# Patient Record
Sex: FEMALE | Race: BLACK OR AFRICAN AMERICAN | Employment: OTHER | URBAN - METROPOLITAN AREA
[De-identification: names, ages, dates, MRNs, and addresses within clinical notes are randomized per-mention and may not be internally consistent; named-entity substitution may affect disease eponyms.]

---

## 2023-04-23 LAB
HCV PCR QUANT: NOT DETECTED IU/ML
HCV RNA, PCR LOG: NORMAL LOG10 IU/ML

## 2023-10-27 ENCOUNTER — TELEMEDICINE CLINICAL SUPPORT (OUTPATIENT)
Dept: PREADMISSION TESTING | Facility: HOSPITAL | Age: 63
End: 2023-10-27
Payer: COMMERCIAL

## 2023-10-27 PROBLEM — K43.9 VENTRAL HERNIA: Status: ACTIVE | Noted: 2023-10-27

## 2023-10-27 PROBLEM — F17.200 NICOTINE USE DISORDER: Status: ACTIVE | Noted: 2023-06-01

## 2023-10-27 PROBLEM — R21 RASH AND OTHER NONSPECIFIC SKIN ERUPTION: Status: ACTIVE | Noted: 2022-02-01

## 2023-10-27 PROBLEM — B18.2 CHRONIC HEPATITIS C WITHOUT HEPATIC COMA (MULTI): Status: ACTIVE | Noted: 2023-10-27

## 2023-10-27 PROBLEM — I25.110 CORONARY ARTERY DISEASE INVOLVING NATIVE CORONARY ARTERY OF NATIVE HEART WITH UNSTABLE ANGINA PECTORIS (MULTI): Status: ACTIVE | Noted: 2023-05-31

## 2023-10-27 PROBLEM — I21.4 NSTEMI (NON-ST ELEVATED MYOCARDIAL INFARCTION) (MULTI): Status: ACTIVE | Noted: 2023-05-30

## 2023-10-27 RX ORDER — CARVEDILOL 6.25 MG/1
6.25 TABLET ORAL 2 TIMES DAILY
COMMUNITY
Start: 2023-07-10 | End: 2023-10-27 | Stop reason: WASHOUT

## 2023-10-27 RX ORDER — TRIAMCINOLONE ACETONIDE 1 MG/G
1 CREAM TOPICAL
COMMUNITY
Start: 2022-02-01 | End: 2023-10-27 | Stop reason: WASHOUT

## 2023-10-27 RX ORDER — NITROGLYCERIN 0.4 MG/1
0.4 TABLET SUBLINGUAL
COMMUNITY
Start: 2023-06-03

## 2023-10-27 RX ORDER — ATORVASTATIN CALCIUM 40 MG/1
40 TABLET, FILM COATED ORAL NIGHTLY
Status: ON HOLD | COMMUNITY
Start: 2023-04-05 | End: 2023-12-08 | Stop reason: WASHOUT

## 2023-10-27 RX ORDER — LISINOPRIL 40 MG/1
40 TABLET ORAL DAILY
COMMUNITY

## 2023-10-27 RX ORDER — ASPIRIN 81 MG/1
81 TABLET ORAL DAILY
COMMUNITY

## 2023-10-27 RX ORDER — EZETIMIBE 10 MG/1
10 TABLET ORAL DAILY
COMMUNITY
Start: 2023-06-04

## 2023-10-27 RX ORDER — PREGABALIN 75 MG/1
75 CAPSULE ORAL 3 TIMES DAILY
COMMUNITY

## 2023-10-27 RX ORDER — CLOBETASOL PROPIONATE 0.5 MG/G
1 OINTMENT TOPICAL
COMMUNITY
Start: 2022-02-01 | End: 2023-10-27 | Stop reason: WASHOUT

## 2023-10-27 RX ORDER — HYDROXYZINE HYDROCHLORIDE 25 MG/1
1 TABLET, FILM COATED ORAL 3 TIMES DAILY PRN
COMMUNITY

## 2023-10-27 RX ORDER — FAMOTIDINE 20 MG/1
20 TABLET, FILM COATED ORAL 2 TIMES DAILY
COMMUNITY
Start: 2023-07-05 | End: 2024-03-01 | Stop reason: WASHOUT

## 2023-10-27 RX ORDER — EVOLOCUMAB 140 MG/ML
1 INJECTION, SOLUTION SUBCUTANEOUS
COMMUNITY
Start: 2023-10-18 | End: 2024-01-10 | Stop reason: ALTCHOICE

## 2023-10-27 RX ORDER — USTEKINUMAB 90 MG/ML
90 INJECTION, SOLUTION SUBCUTANEOUS
COMMUNITY

## 2023-10-27 RX ORDER — ROSUVASTATIN CALCIUM 40 MG/1
1 TABLET, COATED ORAL NIGHTLY
COMMUNITY
Start: 2023-06-03

## 2023-10-27 RX ORDER — NAPROXEN 250 MG/1
250 TABLET ORAL AS NEEDED
COMMUNITY
End: 2023-12-19 | Stop reason: WASHOUT

## 2023-10-27 RX ORDER — VITAMIN E MIXED 400 UNIT
400 CAPSULE ORAL DAILY
COMMUNITY

## 2023-10-27 RX ORDER — HYDROCHLOROTHIAZIDE 25 MG/1
25 TABLET ORAL DAILY
COMMUNITY

## 2023-10-27 RX ORDER — CARVEDILOL 12.5 MG/1
25 TABLET ORAL 2 TIMES DAILY
COMMUNITY
Start: 2023-08-07

## 2023-10-27 RX ORDER — CLOPIDOGREL BISULFATE 75 MG/1
75 TABLET ORAL DAILY
COMMUNITY

## 2023-10-27 RX ORDER — METOPROLOL SUCCINATE 50 MG/1
50 TABLET, EXTENDED RELEASE ORAL DAILY
COMMUNITY
Start: 2023-05-09 | End: 2023-10-27 | Stop reason: WASHOUT

## 2023-11-01 ENCOUNTER — LAB (OUTPATIENT)
Dept: LAB | Facility: LAB | Age: 63
End: 2023-11-01
Payer: COMMERCIAL

## 2023-11-01 ENCOUNTER — PRE-ADMISSION TESTING (OUTPATIENT)
Dept: PREADMISSION TESTING | Facility: HOSPITAL | Age: 63
End: 2023-11-01
Payer: COMMERCIAL

## 2023-11-01 VITALS
BODY MASS INDEX: 27.05 KG/M2 | OXYGEN SATURATION: 98 % | WEIGHT: 137.79 LBS | HEIGHT: 60 IN | SYSTOLIC BLOOD PRESSURE: 156 MMHG | TEMPERATURE: 97 F | HEART RATE: 81 BPM | DIASTOLIC BLOOD PRESSURE: 86 MMHG | RESPIRATION RATE: 18 BRPM

## 2023-11-01 DIAGNOSIS — I25.110 CORONARY ARTERY DISEASE INVOLVING NATIVE CORONARY ARTERY OF NATIVE HEART WITH UNSTABLE ANGINA PECTORIS (MULTI): ICD-10-CM

## 2023-11-01 DIAGNOSIS — I25.110 CORONARY ARTERY DISEASE INVOLVING NATIVE CORONARY ARTERY OF NATIVE HEART WITH UNSTABLE ANGINA PECTORIS (MULTI): Primary | ICD-10-CM

## 2023-11-01 LAB
ABO GROUP (TYPE) IN BLOOD: NORMAL
ANION GAP SERPL CALC-SCNC: 9 MMOL/L (ref 10–20)
ANTIBODY SCREEN: NORMAL
BASOPHILS # BLD AUTO: 0.04 X10*3/UL (ref 0–0.1)
BASOPHILS NFR BLD AUTO: 0.5 %
BUN SERPL-MCNC: 8 MG/DL (ref 6–23)
CALCIUM SERPL-MCNC: 8.8 MG/DL (ref 8.6–10.3)
CHLORIDE SERPL-SCNC: 105 MMOL/L (ref 98–107)
CO2 SERPL-SCNC: 27 MMOL/L (ref 21–32)
CREAT SERPL-MCNC: 0.8 MG/DL (ref 0.5–1.05)
EOSINOPHIL # BLD AUTO: 0.13 X10*3/UL (ref 0–0.7)
EOSINOPHIL NFR BLD AUTO: 1.7 %
ERYTHROCYTE [DISTWIDTH] IN BLOOD BY AUTOMATED COUNT: 13.4 % (ref 11.5–14.5)
GFR SERPL CREATININE-BSD FRML MDRD: 82 ML/MIN/1.73M*2
GLUCOSE SERPL-MCNC: 84 MG/DL (ref 74–99)
HCT VFR BLD AUTO: 46.4 % (ref 36–46)
HGB BLD-MCNC: 15 G/DL (ref 12–16)
IMM GRANULOCYTES # BLD AUTO: 0.02 X10*3/UL (ref 0–0.7)
IMM GRANULOCYTES NFR BLD AUTO: 0.3 % (ref 0–0.9)
LYMPHOCYTES # BLD AUTO: 1.65 X10*3/UL (ref 1.2–4.8)
LYMPHOCYTES NFR BLD AUTO: 22 %
MCH RBC QN AUTO: 28.6 PG (ref 26–34)
MCHC RBC AUTO-ENTMCNC: 32.3 G/DL (ref 32–36)
MCV RBC AUTO: 89 FL (ref 80–100)
MONOCYTES # BLD AUTO: 0.44 X10*3/UL (ref 0.1–1)
MONOCYTES NFR BLD AUTO: 5.9 %
NEUTROPHILS # BLD AUTO: 5.21 X10*3/UL (ref 1.2–7.7)
NEUTROPHILS NFR BLD AUTO: 69.6 %
NRBC BLD-RTO: 0 /100 WBCS (ref 0–0)
PLATELET # BLD AUTO: 283 X10*3/UL (ref 150–450)
POTASSIUM SERPL-SCNC: 4.4 MMOL/L (ref 3.5–5.3)
RBC # BLD AUTO: 5.24 X10*6/UL (ref 4–5.2)
RH FACTOR (ANTIGEN D): NORMAL
SODIUM SERPL-SCNC: 137 MMOL/L (ref 136–145)
WBC # BLD AUTO: 7.5 X10*3/UL (ref 4.4–11.3)

## 2023-11-01 PROCEDURE — 86901 BLOOD TYPING SEROLOGIC RH(D): CPT

## 2023-11-01 PROCEDURE — 86900 BLOOD TYPING SEROLOGIC ABO: CPT

## 2023-11-01 PROCEDURE — 85025 COMPLETE CBC W/AUTO DIFF WBC: CPT

## 2023-11-01 PROCEDURE — 99204 OFFICE O/P NEW MOD 45 MIN: CPT | Performed by: NURSE PRACTITIONER

## 2023-11-01 PROCEDURE — 87081 CULTURE SCREEN ONLY: CPT | Mod: AHULAB | Performed by: NURSE PRACTITIONER

## 2023-11-01 PROCEDURE — 80048 BASIC METABOLIC PNL TOTAL CA: CPT

## 2023-11-01 PROCEDURE — 93005 ELECTROCARDIOGRAM TRACING: CPT | Performed by: NURSE PRACTITIONER

## 2023-11-01 PROCEDURE — 86850 RBC ANTIBODY SCREEN: CPT

## 2023-11-01 PROCEDURE — 36415 COLL VENOUS BLD VENIPUNCTURE: CPT

## 2023-11-01 RX ORDER — CHLORHEXIDINE GLUCONATE ORAL RINSE 1.2 MG/ML
SOLUTION DENTAL
Qty: 473 ML | Refills: 0 | Status: SHIPPED | OUTPATIENT
Start: 2023-11-01 | End: 2023-12-12 | Stop reason: HOSPADM

## 2023-11-01 NOTE — CPM/PAT H&P
CPM/PAT Evaluation       Name: Vivien Miramontes (Vivien Miramontes)  /Age: 1960/63 y.o.     In-Person       Date of Consult: 23 (original PAT visit date, surgery postponed to 23)    Referring Provider:  Dr. De Guzman    Surgery, Date, and Length:  23, robotic incisional hernia repair with mesh placement, 120 minutes    Patient presents to [facility] for perioperative risk assessment prior to scheduled surgery. Patient presents with recurrent incisional hernia repair that has become increasingly bothersome. She will be 6 months s/p cardiac stent placement and is able to come off Plavix per her cardiologist at [facility]. She was originally postponed for November hernia repair surgery as she was only 5 months post stent placement.     This note was created in part upon personal review of patient's medical records.    Pt denies any past history of anesthetic complications such as PONV, awareness, prolonged sedation, dental damage, aspiration, cardiac arrest, difficult intubation, difficult I.V. access or unexpected hospital admissions.  No history of malignant hyperthermia and or pseudocholinesterase deficiency.  No history of blood transfusions     The patient is not a Church and will accept blood and blood products if medically indicated.     Type and screen sent.    Past Medical History:   Diagnosis Date    Coronary artery disease     2012: cardiac cath with PCI, 23: cardiac cath with PCI x2 (LAD & LCx) intermediate disease of a tortuous RCA - cardiac clearance requested    Crohn's disease (CMS/HCC)     bowel perforation  s/p colostomy, reversed 2010    Depression     Hyperlipidemia     Hypertension     IBS (irritable bowel syndrome)     Lung nodules     nonconcerning per pulm note    Myocardial infarction (CMS/HCC)     Ulcerative colitis (CMS/HCC)        Past Surgical History:   Procedure Laterality Date    ABDOMINAL HERNIA REPAIR  2018    with mesh    CARDIAC CATHETERIZATION  2012    PCI     CARDIAC CATHETERIZATION  2023    PCI x 2 (LAD and LCx)     SECTION, LOW TRANSVERSE      COLONOSCOPY      COLOSTOMY      REVISION / TAKEDOWN COLOSTOMY      TUBAL LIGATION  1998    UMBILICAL HERNIA REPAIR      x 2     Family History   Problem Relation Name Age of Onset    Stroke Mother      Hypertension Mother      Heart attack Mother      Heart attack Father      Diabetes Father      Multiple sclerosis Sister      Breast cancer Sister      Diabetes Brother         Allergies   Allergen Reactions    Penicillins Hives       Current Outpatient Medications:     aspirin 81 mg EC tablet, Take 1 tablet (81 mg) by mouth once daily., Disp: , Rfl:     atorvastatin (Lipitor) 40 mg tablet, Take 1 tablet (40 mg) by mouth once daily at bedtime., Disp: , Rfl:     carvedilol (Coreg) 12.5 mg tablet, Take 1 tablet (12.5 mg) by mouth twice a day., Disp: , Rfl:     clopidogrel (Plavix) 75 mg tablet, Take 1 tablet (75 mg) by mouth once daily., Disp: , Rfl:     ezetimibe (Zetia) 10 mg tablet, Take 1 tablet (10 mg) by mouth once daily., Disp: , Rfl:     famotidine (Pepcid) 20 mg tablet, Take 1 tablet (20 mg) by mouth 2 times a day., Disp: , Rfl:     hydroCHLOROthiazide (HYDRODiuril) 25 mg tablet, Take 1 tablet (25 mg) by mouth once daily., Disp: , Rfl:     hydrOXYzine HCL (Atarax) 25 mg tablet, Take 1 tablet (25 mg) by mouth 3 times a day as needed for itching. May cause drowsiness, Disp: , Rfl:     lisinopril 40 mg tablet, Take 1 tablet (40 mg) by mouth once daily. as directed, Disp: , Rfl:     naproxen (Naprosyn) 250 mg tablet, Take 1 tablet (250 mg) by mouth if needed for mild pain (1 - 3)., Disp: , Rfl:     nitroglycerin (Nitrostat) 0.4 mg SL tablet, Place 1 tablet (0.4 mg) under the tongue. Dissolve 1 tablet under the tongue as needed for chest pain., Disp: , Rfl:     pregabalin (Lyrica) 75 mg capsule, Take 1 capsule (75 mg) by mouth if needed., Disp: , Rfl:     rosuvastatin (Crestor) 40 mg tablet, Take 1  tablet (40 mg) by mouth once daily at bedtime., Disp: , Rfl:     Stelara injection, Inject 1 mL (90 mg) under the skin every 8 (eight) weeks., Disp: , Rfl:     vitamin E 180 mg (400 unit) capsule, Take 1 capsule (400 Units) by mouth once daily., Disp: , Rfl:     chlorhexidine (Peridex) 0.12 % solution, SWISH AND SPIT 15ML FOR 30 SECONDS NIGHT PRIOR TO SURGERY AND MORNING OF SURGERY, Disp: 473 mL, Rfl: 0    evolocumab (Repatha Syringe) 140 mg/mL injection, Inject 1 mL (140 mg) under the skin every 14 (fourteen) days., Disp: , Rfl:       PAT ROS:   Constitutional:   neg    Neuro/Psych:   neg    Eyes:   neg    Ears:   neg    Nose:   neg    Mouth:   neg    Throat:   neg    Neck:   Cardio:   neg    Respiratory:   neg    Endocrine:   neg    GI:    abdominal pain  :   neg    Musculoskeletal:   neg    Hematologic:   neg    Skin:  neg        Physical Exam  Vitals reviewed. Physical exam within normal limits.          PAT AIRWAY:   Airway:     Mallampati::  II    Neck ROM::  Full      Visit Vitals  /86   Pulse 81   Temp 36.1 °C (97 °F)   Resp 18   Ht 1.524 m (5')   Wt 62.5 kg (137 lb 12.6 oz)   SpO2 98%   BMI 26.91 kg/m²   Smoking Status Every Day   BSA 1.63 m²       Assessment and Plan:     Patient is a 63 year old AA female scheduled for incisional hernia repair on 12/8/23.    Patient is at acceptable risk to proceed with planned surgical procedure. Further cardiac risk stratification deferred at this time.    Plan    Neuro:    Cardiovascular:    Patient denies any chest pain, tightness, heaviness, pressure, radiating pain, palpitations, irregular heartbeats, lightheadedness, cough, congestion, shortness of breath, TOBAR, PND, near syncope, weight loss or gain.    Good  functional capacity    EKG in PAT on 11/1/23:    Encounter Date: 11/01/23   ECG 12 Lead   Result Value    Ventricular Rate 68    Atrial Rate 68    NJ Interval 146    QRS Duration 96    QT Interval 432    QTC Calculation(Bazett) 459    P Axis 48    R  "Axis 0    T Axis 44    QRS Count 11    Q Onset 226    P Onset 153    P Offset 211    T Offset 442    QTC Fredericia 450    Narrative    Normal sinus rhythm  Possible Left atrial enlargement  Nonspecific ST and T wave abnormality  Abnormal ECG  When compared with ECG of 29-DEC-2017 00:20,  Questionable change in QRS axis  Confirmed by Judson Li ([id number]) on 11/2/2023 9:01:18 AM     RCRI: 1      CAD- followed by Dr. Christian at [facility].  S/p PCI June 2023. Now 6 months post placement. May come off Plavix for 5 days and  must remain on Aspirin per cardiologist note 11/1/23.    Per office note:    \"She was admitted in 06/2023 for NSTEMI. She had a LHC done showing multivessel disease however patient did want to undergo CABG evaluation. She underwent PCI of LAD and Lcx on 06/02/23 with a post procedural hematoma. She was discharged on ASA81 and Clopidogrel. Denies melena or BRBPR.      Since discharge she has no recurrence of her CP. She has been performing her iADLs at home without difficulty. She continues to follow with interventional cardiology\"    HTN- continue Carvedilol DOS, hold Lisinopril and hydrochlorothiazide DOS. BP controlled on current regimen.    HLD- continue statin    Pulm:  Known or suspected FAYE is considered an independent risk factor for difficult mask ventilation, difficult intubation or both.  Increased vigilance is recommended with the use of narcotics due to an increased risk for opioid induced respiratory depression.  The patient may benefit from continuous pulse oximetry to monitor for hypoxic events until baseline Sp02 is normal on room air.    Stop bang=3, obesity, HTN, age >50    Patient has history of nicotine dependency. Smoking cessation education was provided to the patient.Cessation encouraged. - Physiological and physical aspects of tobacco addiction as well as strategies for quitting were discussed. - Counseling was given focusing on the harmful effects of this addiction especially " given the patient's medical condition(s) which will be worsened because of the chemicals in tobacco    Renal/endo:  Recommendations to avoid nephrotoxic drugs and carefully monitor fluid status to maintain euvolemia. Use dose adjusted medications as needed for the underlying level of renal function.      GI/:    IBS- continue current regimen    Heme:  Patient instructed to ambulate as soon as possible postoperatively to decrease thromboembolic risk.    Initiate mechanical DVT prophylaxis as soon as possible and initiate chemical prophylaxis when deemed safe from a bleeding standpoint post surgery.    S/p PCI June 2023 at [facility]. May come off Plavix of surgery and will remain on Aspirin 81mg.    Caprini =4     Risk assessment complete.  Patient is scheduled for a intermediate surgical risk procedure. She is considered medically optimized for the planned procedure.    Labs/testing obtained in PAT on 11/1/23: CBC, BMP, T&S, MRSA.    Lab Results   Component Value Date    GLUCOSE 84 11/01/2023    CALCIUM 8.8 11/01/2023     11/01/2023    K 4.4 11/01/2023    CO2 27 11/01/2023     11/01/2023    BUN 8 11/01/2023    CREATININE 0.80 11/01/2023     Lab Results   Component Value Date    WBC 7.5 11/01/2023    HGB 15.0 11/01/2023    HCT 46.4 (H) 11/01/2023    MCV 89 11/01/2023     11/01/2023       Follow up: none    Preoperative medication instructions were provided and reviewed with the patient.  Any additional testing or evaluation was explained to the patient.  Nothing by mouth instructions were discussed and patient's questions were answered prior to conclusion to this encounter.  Patient verbalized understanding of preoperative instructions given in preadmission testing; discharge instructions available in EMR.    This note was dictated with speech recognition.  Minor errors may have been detected during use of speech recognition.

## 2023-11-01 NOTE — PREPROCEDURE INSTRUCTIONS
Medication List            Accurate as of November 1, 2023 11:22 AM. Always use your most recent med list.                aspirin 81 mg EC tablet  Notes to patient: Take morning of surgery     atorvastatin 40 mg tablet  Commonly known as: Lipitor  Medication Adjustments for Surgery: Stop 1 day before surgery     carvedilol 12.5 mg tablet  Commonly known as: Coreg  Notes to patient: Take morning of surgery     chlorhexidine 0.12 % solution  Commonly known as: Peridex  SWISH AND SPIT 15ML FOR 30 SECONDS NIGHT PRIOR TO SURGERY AND MORNING OF SURGERY  Notes to patient: Use night before surgery and morning of surgery     clopidogrel 75 mg tablet  Commonly known as: Plavix  Notes to patient: Stop 5 days prior to surgery. We will call you once we receive confirmation from Dr. Dukes.      ezetimibe 10 mg tablet  Commonly known as: Zetia  Medication Adjustments for Surgery: Stop 1 day before surgery     famotidine 20 mg tablet  Commonly known as: Pepcid  Notes to patient: May take morning of surgery     hydroCHLOROthiazide 25 mg tablet  Commonly known as: HYDRODiuril  Medication Adjustments for Surgery: Stop 1 day before surgery     hydrOXYzine HCL 25 mg tablet  Commonly known as: Atarax     lisinopril 40 mg tablet  Medication Adjustments for Surgery: Stop 1 day before surgery     naproxen 250 mg tablet  Commonly known as: Naprosyn  Medication Adjustments for Surgery: Stop 7 days before surgery     nitroglycerin 0.4 mg SL tablet  Commonly known as: Nitrostat  Notes to patient: May take morning of surgery     pregabalin 75 mg capsule  Commonly known as: Lyrica  Notes to patient: May take morning of surgery     Repatha Syringe 140 mg/mL injection  Generic drug: evolocumab  Medication Adjustments for Surgery: Stop 1 day before surgery     rosuvastatin 40 mg tablet  Commonly known as: Crestor  Medication Adjustments for Surgery: Stop 1 day before surgery     Stelara injection  Generic drug: ustekinumab  Medication  Adjustments for Surgery: Stop 1 day before surgery     vitamin E 180 mg (400 unit) capsule  Medication Adjustments for Surgery: Stop 7 days before surgery            CONTACT SURGEON'S OFFICE IF YOU DEVELOP:  * Fever = 100.4 F   * New respiratory symptoms (e.g. cough, shortness of breath, respiratory distress, sore throat)  * Recent loss of taste or smell  *Flu like symptoms such as headache, fatigue or gastrointestinal symptoms  * You develop any open sores, shingles, burning or painful urination   AND/OR:  * You no longer wish to have the surgery.  * Any other personal circumstances change that may lead to the need to cancel or defer this surgery.  *You were admitted to any hospital within one week of your planned procedure.    SMOKING:  *Quitting smoking can make a huge difference to your health and recovery from surgery.    *If you need help with quitting, call 7-726-QUIT-NOW.    THE DAY BEFORE SURGERY:  *Do not eat any food after midnight the night before surgery.   *You are permitted to drink clear liquids (i.e. water, black coffee, tea, clear broth, apple juice) up to 2 hours before your surgery.  DIABETICS:  Please check fasting blood sugar  upon waking up.  If fasting sugar is <80 mg/dl, please drink 100ml/3oz of apple juice no later than 2 hours prior to surgery.      SURGICAL TIME  *You will be contacted between 2 p.m. and 6 p.m. the business day before your surgery with your arrival time.  *If you haven't received a call by 6pm, call [phone].  *Scheduled surgery times may change and you will be notified if this occurs-check your personal voicemail for any updates.    ON THE MORNING OF SURGERY:  *Wear comfortable, loose fitting clothing.   *Do not use moisturizers, creams, lotions or perfume.  *All jewelry and valuables should be left at home.  *Prosthetic devices such as contact lenses, hearing aids, dentures, eyelash extensions, hairpins and body piercing must be removed before surgery.    BRING  WITH YOU:  *Photo ID and insurance card  *Current list of medicines and allergies  *Pacemaker/Defibrillator/Heart stent cards  *CPAP machine and mask  *Slings/splints/crutches  *Copy of your complete Advanced Directive/DHPOA-if applicable  *Neurostimulator implant remote    PARKING AND ARRIVAL:  *Check in at the Main Entrance desk and let them know you are here for surgery.  *You will be directed to the 2nd floor surgical waiting area.    AFTER OUTPATIENT SURGERY:  *A responsible adult MUST accompany you at the time of discharge and stay with you for 24 hours after your surgery.  *You may NOT drive yourself home after surgery.  *You may use a taxi or ride sharing service ([employer], Uber) to return home ONLY if you are accompanied by a friend or family member.  *Instructions for resuming your medications will be provided by your surgeon.

## 2023-11-02 LAB
ATRIAL RATE: 68 BPM
P AXIS: 48 DEGREES
P OFFSET: 211 MS
P ONSET: 153 MS
PR INTERVAL: 146 MS
Q ONSET: 226 MS
QRS COUNT: 11 BEATS
QRS DURATION: 96 MS
QT INTERVAL: 432 MS
QTC CALCULATION(BAZETT): 459 MS
QTC FREDERICIA: 450 MS
R AXIS: 0 DEGREES
T AXIS: 44 DEGREES
T OFFSET: 442 MS
VENTRICULAR RATE: 68 BPM

## 2023-11-03 LAB — STAPHYLOCOCCUS SPEC CULT: NORMAL

## 2023-11-09 ENCOUNTER — TELEPHONE (OUTPATIENT)
Dept: PREADMISSION TESTING | Facility: HOSPITAL | Age: 63
End: 2023-11-09
Payer: COMMERCIAL

## 2023-11-27 ENCOUNTER — TELEMEDICINE CLINICAL SUPPORT (OUTPATIENT)
Dept: PREADMISSION TESTING | Facility: HOSPITAL | Age: 63
End: 2023-11-27
Payer: COMMERCIAL

## 2023-11-27 ENCOUNTER — TELEPHONE (OUTPATIENT)
Dept: PREADMISSION TESTING | Facility: HOSPITAL | Age: 63
End: 2023-11-27
Payer: COMMERCIAL

## 2023-12-04 ASSESSMENT — ENCOUNTER SYMPTOMS
ABDOMINAL PAIN: 1
MUSCULOSKELETAL NEGATIVE: 1
CONSTITUTIONAL NEGATIVE: 1
ENDOCRINE NEGATIVE: 1
NEUROLOGICAL NEGATIVE: 1
CARDIOVASCULAR NEGATIVE: 1
RESPIRATORY NEGATIVE: 1
EYES NEGATIVE: 1

## 2023-12-04 NOTE — H&P (VIEW-ONLY)
[facility] [place]  [facility] Department of Cardiovascular Medicine                                         SECTION OF INTERVENTIONAL CARDIOLOGY    OUTPATIENT VISIT DATE  2023        OUTPATIENT VISIT TYPE  Followup        PRIMARY CARE PHYSICIAN:  Radha Giles MD ([facility])  [facility] [place] [address]  Phone: [phone]  Fax: [phone]    REFERRING PHYSICIAN:  No referring provider defined for this encounter.    CHIEF COMPLAINT:   Followup      HISTORY OF PRESENT ILLNESS:  62 year old female with hypertension and high cholesterol and is smoker and prior CAD s/p pCI in  and then NSTEMI  and found to have TVD and advised CABG but later treated with stenting with PCI of the LAD and LCX and medically managed for intermediate disease of the RCA    She is feeling overall well. She states she is bothered by her hernia and needs it to be repaired. She is not ecxercising but will walk a few blocks. She will tire but she has no chest pain when walking. She doesn't like to walk as her henria hurts her.      PAST MEDICAL HISTORY   Diagnosis Date   • Acute MI (HCC)     3 separate events   • Colon perforation (HCC)    • Coronary artery disease     s/p MI and PCI of the LCX and LAD with intermediate diseae of a tortuous RCA in  (had PCI in )   • Crohn's disease (HCC)    • Dyslipidemia    • Hypertension    • Ulcerative colitis (HCC)        PAST SURGICAL HISTORY   Procedure Laterality Date   • CARDIAC CATHETERIZATION HX     •  DELIVERY ONLY      , low transverse   • COLONOSCOPY  2017   • COLOSTOMY HX     • HERNIA REPAIR W/MESH  2018   • LIG/TRNSXJ FLP TUBE ABDL/VAG APPR UNI/BI  1998    Tubal ligation       SOCIAL HISTORY  Social History     Tobacco Use   • Smoking status: Every Day     Packs/day: 0.25      Years: 40.00     Additional pack years: 0.00     Total pack years: 10.00     Types: Cigarettes   • Smokeless tobacco: Never   • Tobacco comments:     Not even a whole half a pack , smoked since age 11-12 (over 50+ years)    Substance Use Topics   • 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 Advise she discuss with PCP  ALLERGIES   Allergen Reactions   • Penicillins          Hives       MEDICATIONS: Held Plavix and setllar for surgery  Current Outpatient Medications   Medication Sig   • famotidine (PEPCID) 20 mg tablet Take 20 mg by mouth two times a day.   • evolocumab (REPATHA SYRINGE) 140 mg/mL Inject 1 mL subcutaneously every 2 weeks.   • carvedilol (COREG) 12.5 mg tablet Take 1 tablet by mouth twice daily.   • ezetimibe (ZETIA) 10 mg tablet Take 1 tablet by mouth once daily.   • nitroglycerin sublingual (NITROQUICK) 0.4 mg SL tablet Dissolve 1 tablet under the tongue as needed for chest pain.   • rosuvastatin (CRESTOR) 40 mg tablet Take 1 tablet by mouth daily at bedtime.   • hydroCHLOROthiazide 25 mg tablet Take 25 mg by mouth once daily.   • lisinopril (ZESTRIL) 40 mg tablet Take 40 mg by mouth once daily.   • clopidogrel (PLAVIX) 75 mg tablet Take 75 mg by mouth once daily.   • pregabalin (LYRICA) 75 mg capsule Take 75 mg by mouth three times daily.   • ustekinumab (STELARA) 90 mg/mL injection Inject 90 mg subcutaneously every 8 weeks.   • Chlorhexidine Gluconate (PERIDEX) 0.12 % solution SWISH AND SPIT 15 ML FOR 30 SECONDS NIGHT PRIOR  "TO SURGERY AND MORNING OF SURGERY   • Blood Pressure Monitor Monitor BP once daily following BP technique     No current facility-administered medications for this visit.        REVIEW OF SYSTEMS:    Review of Systems   Constitutional: Positive for malaise/fatigue.   Cardiovascular:  Negative for chest pain, palpitations and syncope.   Respiratory:  Negative for shortness of breath.    Hematologic/Lymphatic: Does not bruise/bleed easily.   Neurological:  Negative for dizziness.   She denies chest pain or TOBAR or palpitations or dizziness or fainting or falls. Or easy brusing    PHYSICAL EXAMINATION:   12/04/23  1224   BP: 178/98   Pulse: 66   Resp: 17   SpO2: 99%   Weight: 62.7 kg (138 lb 4.8 oz)   Height: 154.9 cm (5' 1\")         Body mass index is 26.13 kg/m².  General: Well appearing, in no acute distress, speaking in complete sentences.  Skin: No clubbing, no cyanosis.  Head/Eyes: Grossly normal  Lungs: Clear to auscultation and no rales with good effort  Heart: Regular rhythm,S1, S2 normal, no S3, no S4, no rub and no murmur.  Abdomen: Soft, nontender, bowel sounds normal, no palpable organomegaly, no bruits.   Extremities: No peripheral edema   Musculoskeletal: Normal gait and ambulation  Neuro: Oriented to time, place and person    IMPRESSION:  Ms. Miramontes  is a 62 year old female  with hypertension and high choesterol and CAD s/p PCI of the distal LCX and LAD . She is feeling well but needs RF and lifestyle modification as she is not exercising and continues to smoke    Coronary artery disease involving native heart without angina pectoris, unspecified vessel or lesion type  (primary encounter diagnosis)  Comment: s/p PCI in 2012 and then again in 2023 when she had PCI of distal LCX and LAD and had intermediate disease of the Rca. Advised medical therapy for this. She is feeling well now and her symptoms have largely abated. She is on BB and DAPT and statin. Her LDL is not at goal and Dr Brown advised repatha. " She also is still smoking and I advised she stop    Hypertension, unspecified type  Comment: High today will further adjust the coreg to 25 mg twice a day; She is on ACE and diruteic as well    Left ventricular dysfunction  Comment: Mild post MI. On BB and ACE. Will monitor    Smoker  Not ready to quit but we discuss again. She is cutting down on cigarettes      High cholesterol  LDL of 123 when in house. Now on high dose BB and zetia. Then in  TC was 170 HDL of 41 and LDL of 103.     Preoperative Assessment  She has CAD and NSTEMI in June 2023 s/p multivessel PCI. She is feeling well and now > 6 months post PCI. She requires low to moderate risk surgery. She is an acceptable risk for the planned procedure. If needed the P2Y12 maybe held while CONTINUING apsirin 81 mg daily. The P2Y12 should be restrarted as soon as it is acceptable to do so (to be determien by surgeon)      PLAN AND RECOMMENDATIONS:  To continue current medications but increase the coreg to 25 mg twice a day  To take the repatha  There is no cardiac contraindication to surgery  To do routine fasting bloods before next visit  To work on ways to stop smoking  Increase ambulation as tolerated with a goal to do moderate activity 30 minutes a day 5 days a week  Follow a heart healthy diet, rich in fruits and vegetables, fish and whole grains. Limited the amount of processed foods, sugary drinks, fried foods, red meat, pork, and salt    To see me in 3 months      CONTACT INFORMATION:  Fannie Christian MD, FAC, FA, [facility]  Interventional Cardiologist and , Acute Coronary Care  [facility], Heart, V[facility] Thor[facility] [place]  [address] [place] [address], [address]  [place] [id number] [phone]

## 2023-12-08 ENCOUNTER — ANESTHESIA EVENT (OUTPATIENT)
Dept: OPERATING ROOM | Facility: HOSPITAL | Age: 63
DRG: 336 | End: 2023-12-08
Payer: COMMERCIAL

## 2023-12-08 ENCOUNTER — HOSPITAL ENCOUNTER (INPATIENT)
Facility: HOSPITAL | Age: 63
LOS: 4 days | Discharge: HOME | DRG: 336 | End: 2023-12-12
Attending: SURGERY | Admitting: SURGERY
Payer: COMMERCIAL

## 2023-12-08 ENCOUNTER — ANESTHESIA (OUTPATIENT)
Dept: OPERATING ROOM | Facility: HOSPITAL | Age: 63
DRG: 336 | End: 2023-12-08
Payer: COMMERCIAL

## 2023-12-08 DIAGNOSIS — I25.110 CORONARY ARTERY DISEASE INVOLVING NATIVE CORONARY ARTERY OF NATIVE HEART WITH UNSTABLE ANGINA PECTORIS (MULTI): ICD-10-CM

## 2023-12-08 DIAGNOSIS — M79.89 OTHER SPECIFIED SOFT TISSUE DISORDERS: ICD-10-CM

## 2023-12-08 DIAGNOSIS — Z87.19 STATUS POST REPAIR OF RECURRENT VENTRAL HERNIA: Primary | ICD-10-CM

## 2023-12-08 DIAGNOSIS — R60.0 EDEMA OF RIGHT UPPER EXTREMITY: ICD-10-CM

## 2023-12-08 DIAGNOSIS — K43.9 VENTRAL HERNIA WITHOUT OBSTRUCTION OR GANGRENE: ICD-10-CM

## 2023-12-08 DIAGNOSIS — Z98.890 STATUS POST REPAIR OF RECURRENT VENTRAL HERNIA: Primary | ICD-10-CM

## 2023-12-08 PROBLEM — Z95.5 STENTED CORONARY ARTERY: Status: ACTIVE | Noted: 2023-12-08

## 2023-12-08 PROBLEM — K50.90 CROHN'S DISEASE (MULTI): Status: ACTIVE | Noted: 2023-12-08

## 2023-12-08 LAB
ABO GROUP (TYPE) IN BLOOD: NORMAL
ANTIBODY SCREEN: NORMAL
RH FACTOR (ANTIGEN D): NORMAL

## 2023-12-08 PROCEDURE — 86850 RBC ANTIBODY SCREEN: CPT | Performed by: SURGERY

## 2023-12-08 PROCEDURE — 2500000001 HC RX 250 WO HCPCS SELF ADMINISTERED DRUGS (ALT 637 FOR MEDICARE OP): Performed by: SURGERY

## 2023-12-08 PROCEDURE — 7100000001 HC RECOVERY ROOM TIME - INITIAL BASE CHARGE: Performed by: SURGERY

## 2023-12-08 PROCEDURE — A49615 PR RPR AA HERNIA RECR 3-10 CM REDUCIBLE: Performed by: ANESTHESIOLOGIST ASSISTANT

## 2023-12-08 PROCEDURE — 2500000004 HC RX 250 GENERAL PHARMACY W/ HCPCS (ALT 636 FOR OP/ED): Performed by: SURGERY

## 2023-12-08 PROCEDURE — 0WUF4JZ SUPPLEMENT ABDOMINAL WALL WITH SYNTHETIC SUBSTITUTE, PERCUTANEOUS ENDOSCOPIC APPROACH: ICD-10-PCS | Performed by: SURGERY

## 2023-12-08 PROCEDURE — 3700000002 HC GENERAL ANESTHESIA TIME - EACH INCREMENTAL 1 MINUTE: Performed by: SURGERY

## 2023-12-08 PROCEDURE — 1100000001 HC PRIVATE ROOM DAILY

## 2023-12-08 PROCEDURE — A4217 STERILE WATER/SALINE, 500 ML: HCPCS | Performed by: SURGERY

## 2023-12-08 PROCEDURE — 0DNW4ZZ RELEASE PERITONEUM, PERCUTANEOUS ENDOSCOPIC APPROACH: ICD-10-PCS | Performed by: SURGERY

## 2023-12-08 PROCEDURE — 3600000004 HC OR TIME - INITIAL BASE CHARGE - PROCEDURE LEVEL FOUR: Performed by: SURGERY

## 2023-12-08 PROCEDURE — 2500000005 HC RX 250 GENERAL PHARMACY W/O HCPCS: Performed by: ANESTHESIOLOGY

## 2023-12-08 PROCEDURE — 36415 COLL VENOUS BLD VENIPUNCTURE: CPT | Performed by: SURGERY

## 2023-12-08 PROCEDURE — 2500000004 HC RX 250 GENERAL PHARMACY W/ HCPCS (ALT 636 FOR OP/ED): Performed by: ANESTHESIOLOGIST ASSISTANT

## 2023-12-08 PROCEDURE — 3600000009 HC OR TIME - EACH INCREMENTAL 1 MINUTE - PROCEDURE LEVEL FOUR: Performed by: SURGERY

## 2023-12-08 PROCEDURE — 2780000003 HC OR 278 NO HCPCS: Performed by: SURGERY

## 2023-12-08 PROCEDURE — 2500000005 HC RX 250 GENERAL PHARMACY W/O HCPCS: Performed by: ANESTHESIOLOGIST ASSISTANT

## 2023-12-08 PROCEDURE — 86920 COMPATIBILITY TEST SPIN: CPT

## 2023-12-08 PROCEDURE — 2500000004 HC RX 250 GENERAL PHARMACY W/ HCPCS (ALT 636 FOR OP/ED): Performed by: ANESTHESIOLOGY

## 2023-12-08 PROCEDURE — 2500000004 HC RX 250 GENERAL PHARMACY W/ HCPCS (ALT 636 FOR OP/ED)

## 2023-12-08 PROCEDURE — 3700000001 HC GENERAL ANESTHESIA TIME - INITIAL BASE CHARGE: Performed by: SURGERY

## 2023-12-08 PROCEDURE — 7100000002 HC RECOVERY ROOM TIME - EACH INCREMENTAL 1 MINUTE: Performed by: SURGERY

## 2023-12-08 PROCEDURE — 0DNU4ZZ RELEASE OMENTUM, PERCUTANEOUS ENDOSCOPIC APPROACH: ICD-10-PCS | Performed by: SURGERY

## 2023-12-08 PROCEDURE — 2720000007 HC OR 272 NO HCPCS: Performed by: SURGERY

## 2023-12-08 PROCEDURE — 8E0W4CZ ROBOTIC ASSISTED PROCEDURE OF TRUNK REGION, PERCUTANEOUS ENDOSCOPIC APPROACH: ICD-10-PCS | Performed by: SURGERY

## 2023-12-08 PROCEDURE — 99231 SBSQ HOSP IP/OBS SF/LOW 25: CPT

## 2023-12-08 PROCEDURE — A49615 PR RPR AA HERNIA RECR 3-10 CM REDUCIBLE: Performed by: ANESTHESIOLOGY

## 2023-12-08 PROCEDURE — 49616 RPR AA HRN RCR 3-10 NCR/STRN: CPT | Performed by: SURGERY

## 2023-12-08 PROCEDURE — C1781 MESH (IMPLANTABLE): HCPCS | Performed by: SURGERY

## 2023-12-08 PROCEDURE — 2500000005 HC RX 250 GENERAL PHARMACY W/O HCPCS: Performed by: SURGERY

## 2023-12-08 DEVICE — VENTRALIGHT ST MESH WITH ECHO 2 POSITIONING SYSTEM 11 CM (4.5)" CIRCLE
Type: IMPLANTABLE DEVICE | Site: ABDOMEN | Status: FUNCTIONAL
Brand: VENTRALIGHT ST MESH WITH ECHO 2 POSITIONING SYSTEM

## 2023-12-08 RX ORDER — HYDROCHLOROTHIAZIDE 25 MG/1
25 TABLET ORAL DAILY
Status: DISCONTINUED | OUTPATIENT
Start: 2023-12-08 | End: 2023-12-12 | Stop reason: HOSPADM

## 2023-12-08 RX ORDER — PHENYLEPHRINE HCL IN 0.9% NACL 1 MG/10 ML
SYRINGE (ML) INTRAVENOUS AS NEEDED
Status: DISCONTINUED | OUTPATIENT
Start: 2023-12-08 | End: 2023-12-08

## 2023-12-08 RX ORDER — NITROGLYCERIN 0.4 MG/1
0.4 TABLET SUBLINGUAL EVERY 5 MIN PRN
Status: DISCONTINUED | OUTPATIENT
Start: 2023-12-08 | End: 2023-12-12 | Stop reason: HOSPADM

## 2023-12-08 RX ORDER — CARVEDILOL 25 MG/1
25 TABLET ORAL
Status: DISCONTINUED | OUTPATIENT
Start: 2023-12-08 | End: 2023-12-12 | Stop reason: HOSPADM

## 2023-12-08 RX ORDER — OXYCODONE HYDROCHLORIDE 5 MG/1
5 TABLET ORAL EVERY 4 HOURS PRN
Status: DISCONTINUED | OUTPATIENT
Start: 2023-12-08 | End: 2023-12-10

## 2023-12-08 RX ORDER — VITAMIN E MIXED 400 UNIT
400 CAPSULE ORAL DAILY
Status: DISCONTINUED | OUTPATIENT
Start: 2023-12-08 | End: 2023-12-08

## 2023-12-08 RX ORDER — PANTOPRAZOLE SODIUM 40 MG/1
40 TABLET, DELAYED RELEASE ORAL
Status: DISCONTINUED | OUTPATIENT
Start: 2023-12-09 | End: 2023-12-12 | Stop reason: HOSPADM

## 2023-12-08 RX ORDER — FAMOTIDINE 20 MG/1
20 TABLET, FILM COATED ORAL 2 TIMES DAILY
Status: DISCONTINUED | OUTPATIENT
Start: 2023-12-08 | End: 2023-12-12 | Stop reason: HOSPADM

## 2023-12-08 RX ORDER — ACETAMINOPHEN 325 MG/1
650 TABLET ORAL ONCE
Status: DISCONTINUED | OUTPATIENT
Start: 2023-12-08 | End: 2023-12-08 | Stop reason: HOSPADM

## 2023-12-08 RX ORDER — CLOPIDOGREL BISULFATE 75 MG/1
75 TABLET ORAL DAILY
Status: DISCONTINUED | OUTPATIENT
Start: 2023-12-09 | End: 2023-12-12 | Stop reason: HOSPADM

## 2023-12-08 RX ORDER — PROPOFOL 10 MG/ML
INJECTION, EMULSION INTRAVENOUS AS NEEDED
Status: DISCONTINUED | OUTPATIENT
Start: 2023-12-08 | End: 2023-12-08

## 2023-12-08 RX ORDER — CEFAZOLIN 1 G/1
INJECTION, POWDER, FOR SOLUTION INTRAVENOUS AS NEEDED
Status: DISCONTINUED | OUTPATIENT
Start: 2023-12-08 | End: 2023-12-08

## 2023-12-08 RX ORDER — ATORVASTATIN CALCIUM 40 MG/1
40 TABLET, FILM COATED ORAL NIGHTLY
Status: DISCONTINUED | OUTPATIENT
Start: 2023-12-08 | End: 2023-12-08

## 2023-12-08 RX ORDER — ONDANSETRON HYDROCHLORIDE 2 MG/ML
4 INJECTION, SOLUTION INTRAVENOUS ONCE AS NEEDED
Status: DISCONTINUED | OUTPATIENT
Start: 2023-12-08 | End: 2023-12-08 | Stop reason: HOSPADM

## 2023-12-08 RX ORDER — FENTANYL CITRATE 50 UG/ML
INJECTION, SOLUTION INTRAMUSCULAR; INTRAVENOUS AS NEEDED
Status: DISCONTINUED | OUTPATIENT
Start: 2023-12-08 | End: 2023-12-08

## 2023-12-08 RX ORDER — WATER 1 ML/ML
IRRIGANT IRRIGATION AS NEEDED
Status: DISCONTINUED | OUTPATIENT
Start: 2023-12-08 | End: 2023-12-08 | Stop reason: HOSPADM

## 2023-12-08 RX ORDER — POLYETHYLENE GLYCOL 3350 17 G/17G
17 POWDER, FOR SOLUTION ORAL DAILY
Status: DISCONTINUED | OUTPATIENT
Start: 2023-12-08 | End: 2023-12-12 | Stop reason: HOSPADM

## 2023-12-08 RX ORDER — HYDROXYZINE HYDROCHLORIDE 25 MG/1
25 TABLET, FILM COATED ORAL 3 TIMES DAILY PRN
Status: DISCONTINUED | OUTPATIENT
Start: 2023-12-08 | End: 2023-12-12 | Stop reason: HOSPADM

## 2023-12-08 RX ORDER — MEPERIDINE HYDROCHLORIDE 25 MG/ML
12.5 INJECTION INTRAMUSCULAR; INTRAVENOUS; SUBCUTANEOUS EVERY 10 MIN PRN
Status: DISCONTINUED | OUTPATIENT
Start: 2023-12-08 | End: 2023-12-08 | Stop reason: HOSPADM

## 2023-12-08 RX ORDER — DEXAMETHASONE SODIUM PHOSPHATE 4 MG/ML
INJECTION, SOLUTION INTRA-ARTICULAR; INTRALESIONAL; INTRAMUSCULAR; INTRAVENOUS; SOFT TISSUE AS NEEDED
Status: DISCONTINUED | OUTPATIENT
Start: 2023-12-08 | End: 2023-12-08

## 2023-12-08 RX ORDER — MIDAZOLAM HYDROCHLORIDE 1 MG/ML
1 INJECTION INTRAMUSCULAR; INTRAVENOUS ONCE AS NEEDED
Status: DISCONTINUED | OUTPATIENT
Start: 2023-12-08 | End: 2023-12-08 | Stop reason: HOSPADM

## 2023-12-08 RX ORDER — ROSUVASTATIN CALCIUM 20 MG/1
40 TABLET, COATED ORAL NIGHTLY
Status: DISCONTINUED | OUTPATIENT
Start: 2023-12-08 | End: 2023-12-12 | Stop reason: HOSPADM

## 2023-12-08 RX ORDER — LIDOCAINE HYDROCHLORIDE 10 MG/ML
0.1 INJECTION, SOLUTION EPIDURAL; INFILTRATION; INTRACAUDAL; PERINEURAL ONCE
Status: DISCONTINUED | OUTPATIENT
Start: 2023-12-08 | End: 2023-12-08 | Stop reason: HOSPADM

## 2023-12-08 RX ORDER — PREGABALIN 75 MG/1
75 CAPSULE ORAL DAILY
Status: DISCONTINUED | OUTPATIENT
Start: 2023-12-08 | End: 2023-12-12 | Stop reason: HOSPADM

## 2023-12-08 RX ORDER — OXYCODONE HYDROCHLORIDE 5 MG/1
5 TABLET ORAL EVERY 4 HOURS PRN
Status: DISCONTINUED | OUTPATIENT
Start: 2023-12-08 | End: 2023-12-08 | Stop reason: HOSPADM

## 2023-12-08 RX ORDER — BUPIVACAINE HYDROCHLORIDE 5 MG/ML
INJECTION, SOLUTION PERINEURAL AS NEEDED
Status: DISCONTINUED | OUTPATIENT
Start: 2023-12-08 | End: 2023-12-08 | Stop reason: HOSPADM

## 2023-12-08 RX ORDER — KETOROLAC TROMETHAMINE 30 MG/ML
15 INJECTION, SOLUTION INTRAMUSCULAR; INTRAVENOUS EVERY 6 HOURS
Status: COMPLETED | OUTPATIENT
Start: 2023-12-08 | End: 2023-12-10

## 2023-12-08 RX ORDER — LABETALOL HYDROCHLORIDE 5 MG/ML
5 INJECTION, SOLUTION INTRAVENOUS ONCE AS NEEDED
Status: DISCONTINUED | OUTPATIENT
Start: 2023-12-08 | End: 2023-12-08 | Stop reason: HOSPADM

## 2023-12-08 RX ORDER — EZETIMIBE 10 MG/1
10 TABLET ORAL DAILY
Status: DISCONTINUED | OUTPATIENT
Start: 2023-12-08 | End: 2023-12-12 | Stop reason: HOSPADM

## 2023-12-08 RX ORDER — HEPARIN SODIUM 5000 [USP'U]/ML
5000 INJECTION, SOLUTION INTRAVENOUS; SUBCUTANEOUS EVERY 8 HOURS
Status: DISCONTINUED | OUTPATIENT
Start: 2023-12-09 | End: 2023-12-12 | Stop reason: HOSPADM

## 2023-12-08 RX ORDER — NALOXONE HYDROCHLORIDE 0.4 MG/ML
0.2 INJECTION, SOLUTION INTRAMUSCULAR; INTRAVENOUS; SUBCUTANEOUS EVERY 5 MIN PRN
Status: DISCONTINUED | OUTPATIENT
Start: 2023-12-08 | End: 2023-12-10

## 2023-12-08 RX ORDER — MIDAZOLAM HYDROCHLORIDE 1 MG/ML
INJECTION, SOLUTION INTRAMUSCULAR; INTRAVENOUS AS NEEDED
Status: DISCONTINUED | OUTPATIENT
Start: 2023-12-08 | End: 2023-12-08

## 2023-12-08 RX ORDER — ONDANSETRON HYDROCHLORIDE 2 MG/ML
4 INJECTION, SOLUTION INTRAVENOUS EVERY 8 HOURS PRN
Status: DISCONTINUED | OUTPATIENT
Start: 2023-12-08 | End: 2023-12-12 | Stop reason: HOSPADM

## 2023-12-08 RX ORDER — ASPIRIN 81 MG/1
81 TABLET ORAL DAILY
Status: DISCONTINUED | OUTPATIENT
Start: 2023-12-09 | End: 2023-12-12 | Stop reason: HOSPADM

## 2023-12-08 RX ORDER — LISINOPRIL 20 MG/1
40 TABLET ORAL DAILY
Status: DISCONTINUED | OUTPATIENT
Start: 2023-12-08 | End: 2023-12-12 | Stop reason: HOSPADM

## 2023-12-08 RX ORDER — ASPIRIN 81 MG/1
81 TABLET ORAL DAILY
Status: DISCONTINUED | OUTPATIENT
Start: 2023-12-08 | End: 2023-12-08

## 2023-12-08 RX ORDER — OXYCODONE HYDROCHLORIDE 5 MG/1
10 TABLET ORAL EVERY 6 HOURS PRN
Status: DISCONTINUED | OUTPATIENT
Start: 2023-12-08 | End: 2023-12-10

## 2023-12-08 RX ORDER — SODIUM CHLORIDE, SODIUM LACTATE, POTASSIUM CHLORIDE, CALCIUM CHLORIDE 600; 310; 30; 20 MG/100ML; MG/100ML; MG/100ML; MG/100ML
100 INJECTION, SOLUTION INTRAVENOUS CONTINUOUS
Status: DISCONTINUED | OUTPATIENT
Start: 2023-12-08 | End: 2023-12-08 | Stop reason: HOSPADM

## 2023-12-08 RX ORDER — ROCURONIUM BROMIDE 10 MG/ML
INJECTION, SOLUTION INTRAVENOUS AS NEEDED
Status: DISCONTINUED | OUTPATIENT
Start: 2023-12-08 | End: 2023-12-08

## 2023-12-08 RX ORDER — SODIUM CHLORIDE 9 MG/ML
75 INJECTION, SOLUTION INTRAVENOUS CONTINUOUS
Status: DISCONTINUED | OUTPATIENT
Start: 2023-12-08 | End: 2023-12-10

## 2023-12-08 RX ORDER — LIDOCAINE HYDROCHLORIDE 20 MG/ML
INJECTION, SOLUTION INFILTRATION; PERINEURAL AS NEEDED
Status: DISCONTINUED | OUTPATIENT
Start: 2023-12-08 | End: 2023-12-08

## 2023-12-08 RX ORDER — ONDANSETRON HYDROCHLORIDE 2 MG/ML
INJECTION, SOLUTION INTRAVENOUS AS NEEDED
Status: DISCONTINUED | OUTPATIENT
Start: 2023-12-08 | End: 2023-12-08

## 2023-12-08 RX ORDER — ALBUTEROL SULFATE 0.83 MG/ML
2.5 SOLUTION RESPIRATORY (INHALATION) ONCE AS NEEDED
Status: DISCONTINUED | OUTPATIENT
Start: 2023-12-08 | End: 2023-12-08 | Stop reason: HOSPADM

## 2023-12-08 RX ADMIN — ROSUVASTATIN CALCIUM 40 MG: 20 TABLET, FILM COATED ORAL at 21:54

## 2023-12-08 RX ADMIN — FENTANYL CITRATE 50 MCG: 50 INJECTION, SOLUTION INTRAMUSCULAR; INTRAVENOUS at 13:30

## 2023-12-08 RX ADMIN — SUGAMMADEX 200 MG: 100 INJECTION, SOLUTION INTRAVENOUS at 15:47

## 2023-12-08 RX ADMIN — LIDOCAINE HYDROCHLORIDE 50 MG: 20 INJECTION, SOLUTION INFILTRATION; PERINEURAL at 13:30

## 2023-12-08 RX ADMIN — PROPOFOL 60 MG: 10 INJECTION, EMULSION INTRAVENOUS at 13:33

## 2023-12-08 RX ADMIN — PROPOFOL 20 MG: 10 INJECTION, EMULSION INTRAVENOUS at 13:46

## 2023-12-08 RX ADMIN — MIDAZOLAM HYDROCHLORIDE 2 MG: 1 INJECTION, SOLUTION INTRAMUSCULAR; INTRAVENOUS at 13:20

## 2023-12-08 RX ADMIN — DEXAMETHASONE SODIUM PHOSPHATE 4 MG: 4 INJECTION, SOLUTION INTRAMUSCULAR; INTRAVENOUS at 13:49

## 2023-12-08 RX ADMIN — FAMOTIDINE 20 MG: 20 TABLET, FILM COATED ORAL at 21:54

## 2023-12-08 RX ADMIN — Medication 150 MCG: at 14:18

## 2023-12-08 RX ADMIN — FENTANYL CITRATE 50 MCG: 50 INJECTION, SOLUTION INTRAMUSCULAR; INTRAVENOUS at 13:46

## 2023-12-08 RX ADMIN — HYDROMORPHONE HYDROCHLORIDE 0.5 MG: 1 INJECTION, SOLUTION INTRAMUSCULAR; INTRAVENOUS; SUBCUTANEOUS at 16:36

## 2023-12-08 RX ADMIN — PROPOFOL 120 MG: 10 INJECTION, EMULSION INTRAVENOUS at 13:30

## 2023-12-08 RX ADMIN — Medication 100 MCG: at 13:50

## 2023-12-08 RX ADMIN — Medication 3 L/MIN: at 16:08

## 2023-12-08 RX ADMIN — ONDANSETRON 4 MG: 2 INJECTION INTRAMUSCULAR; INTRAVENOUS at 15:42

## 2023-12-08 RX ADMIN — HYDROMORPHONE HYDROCHLORIDE 0.5 MG: 1 INJECTION, SOLUTION INTRAMUSCULAR; INTRAVENOUS; SUBCUTANEOUS at 17:12

## 2023-12-08 RX ADMIN — SODIUM CHLORIDE, SODIUM LACTATE, POTASSIUM CHLORIDE, AND CALCIUM CHLORIDE: 600; 310; 30; 20 INJECTION, SOLUTION INTRAVENOUS at 13:15

## 2023-12-08 RX ADMIN — ROCURONIUM BROMIDE 10 MG: 10 INJECTION, SOLUTION INTRAVENOUS at 14:25

## 2023-12-08 RX ADMIN — ROCURONIUM BROMIDE 20 MG: 10 INJECTION, SOLUTION INTRAVENOUS at 15:11

## 2023-12-08 RX ADMIN — HYDROMORPHONE HYDROCHLORIDE 0.5 MG: 1 INJECTION, SOLUTION INTRAMUSCULAR; INTRAVENOUS; SUBCUTANEOUS at 16:19

## 2023-12-08 RX ADMIN — KETOROLAC TROMETHAMINE 15 MG: 30 INJECTION, SOLUTION INTRAMUSCULAR at 21:54

## 2023-12-08 RX ADMIN — ROCURONIUM BROMIDE 50 MG: 10 INJECTION, SOLUTION INTRAVENOUS at 13:31

## 2023-12-08 RX ADMIN — SODIUM CHLORIDE 500 ML: 9 INJECTION, SOLUTION INTRAVENOUS at 20:50

## 2023-12-08 RX ADMIN — CEFAZOLIN 2 G: 1 INJECTION, POWDER, FOR SOLUTION INTRAMUSCULAR; INTRAVENOUS at 13:42

## 2023-12-08 RX ADMIN — Medication 150 MCG: at 14:26

## 2023-12-08 RX ADMIN — SODIUM CHLORIDE 50 ML/HR: 9 INJECTION, SOLUTION INTRAVENOUS at 21:50

## 2023-12-08 SDOH — HEALTH STABILITY: MENTAL HEALTH: CURRENT SMOKER: 1

## 2023-12-08 SDOH — SOCIAL STABILITY: SOCIAL INSECURITY: WERE YOU ABLE TO COMPLETE ALL THE BEHAVIORAL HEALTH SCREENINGS?: YES

## 2023-12-08 SDOH — SOCIAL STABILITY: SOCIAL INSECURITY: ARE YOU OR HAVE YOU BEEN THREATENED OR ABUSED PHYSICALLY, EMOTIONALLY, OR SEXUALLY BY ANYONE?: NO

## 2023-12-08 SDOH — SOCIAL STABILITY: SOCIAL INSECURITY: ABUSE: ADULT

## 2023-12-08 SDOH — SOCIAL STABILITY: SOCIAL INSECURITY: ARE THERE ANY APPARENT SIGNS OF INJURIES/BEHAVIORS THAT COULD BE RELATED TO ABUSE/NEGLECT?: NO

## 2023-12-08 SDOH — SOCIAL STABILITY: SOCIAL INSECURITY: DOES ANYONE TRY TO KEEP YOU FROM HAVING/CONTACTING OTHER FRIENDS OR DOING THINGS OUTSIDE YOUR HOME?: NO

## 2023-12-08 SDOH — SOCIAL STABILITY: SOCIAL INSECURITY: DO YOU FEEL UNSAFE GOING BACK TO THE PLACE WHERE YOU ARE LIVING?: NO

## 2023-12-08 SDOH — SOCIAL STABILITY: SOCIAL INSECURITY: HAS ANYONE EVER THREATENED TO HURT YOUR FAMILY OR YOUR PETS?: NO

## 2023-12-08 SDOH — SOCIAL STABILITY: SOCIAL INSECURITY: HAVE YOU HAD THOUGHTS OF HARMING ANYONE ELSE?: NO

## 2023-12-08 SDOH — SOCIAL STABILITY: SOCIAL INSECURITY: DO YOU FEEL ANYONE HAS EXPLOITED OR TAKEN ADVANTAGE OF YOU FINANCIALLY OR OF YOUR PERSONAL PROPERTY?: NO

## 2023-12-08 ASSESSMENT — COGNITIVE AND FUNCTIONAL STATUS - GENERAL
MOBILITY SCORE: 18
MOVING FROM LYING ON BACK TO SITTING ON SIDE OF FLAT BED WITH BEDRAILS: A LITTLE
WALKING IN HOSPITAL ROOM: A LITTLE
MOVING TO AND FROM BED TO CHAIR: A LITTLE
HELP NEEDED FOR BATHING: A LITTLE
DRESSING REGULAR LOWER BODY CLOTHING: A LITTLE
DRESSING REGULAR UPPER BODY CLOTHING: A LITTLE
DAILY ACTIVITIY SCORE: 20
MOVING FROM LYING ON BACK TO SITTING ON SIDE OF FLAT BED WITH BEDRAILS: A LITTLE
CLIMB 3 TO 5 STEPS WITH RAILING: A LITTLE
DAILY ACTIVITIY SCORE: 20
MOVING TO AND FROM BED TO CHAIR: A LITTLE
TOILETING: A LITTLE
TURNING FROM BACK TO SIDE WHILE IN FLAT BAD: A LITTLE
CLIMB 3 TO 5 STEPS WITH RAILING: A LITTLE
STANDING UP FROM CHAIR USING ARMS: A LITTLE
STANDING UP FROM CHAIR USING ARMS: A LITTLE
TOILETING: A LITTLE
DRESSING REGULAR LOWER BODY CLOTHING: A LITTLE
HELP NEEDED FOR BATHING: A LITTLE
TURNING FROM BACK TO SIDE WHILE IN FLAT BAD: A LITTLE
WALKING IN HOSPITAL ROOM: A LITTLE
DRESSING REGULAR UPPER BODY CLOTHING: A LITTLE
PATIENT BASELINE BEDBOUND: NO
MOBILITY SCORE: 18

## 2023-12-08 ASSESSMENT — LIFESTYLE VARIABLES
SUBSTANCE_ABUSE_PAST_12_MONTHS: NO
HOW OFTEN DO YOU HAVE 6 OR MORE DRINKS ON ONE OCCASION: NEVER
AUDIT-C TOTAL SCORE: 0
PRESCIPTION_ABUSE_PAST_12_MONTHS: NO
HOW MANY STANDARD DRINKS CONTAINING ALCOHOL DO YOU HAVE ON A TYPICAL DAY: PATIENT DOES NOT DRINK
HOW OFTEN DO YOU HAVE A DRINK CONTAINING ALCOHOL: NEVER
AUDIT-C TOTAL SCORE: 0
SKIP TO QUESTIONS 9-10: 1

## 2023-12-08 ASSESSMENT — PATIENT HEALTH QUESTIONNAIRE - PHQ9
SUM OF ALL RESPONSES TO PHQ9 QUESTIONS 1 & 2: 0
2. FEELING DOWN, DEPRESSED OR HOPELESS: NOT AT ALL
1. LITTLE INTEREST OR PLEASURE IN DOING THINGS: NOT AT ALL

## 2023-12-08 ASSESSMENT — PAIN SCALES - GENERAL
PAINLEVEL_OUTOF10: 7
PAINLEVEL_OUTOF10: 7
PAINLEVEL_OUTOF10: 4
PAINLEVEL_OUTOF10: 3
PAINLEVEL_OUTOF10: 4
PAINLEVEL_OUTOF10: 7
PAINLEVEL_OUTOF10: 9
PAINLEVEL_OUTOF10: 5 - MODERATE PAIN
PAINLEVEL_OUTOF10: 5 - MODERATE PAIN
PAINLEVEL_OUTOF10: 7

## 2023-12-08 ASSESSMENT — COLUMBIA-SUICIDE SEVERITY RATING SCALE - C-SSRS
2. HAVE YOU ACTUALLY HAD ANY THOUGHTS OF KILLING YOURSELF?: NO
1. IN THE PAST MONTH, HAVE YOU WISHED YOU WERE DEAD OR WISHED YOU COULD GO TO SLEEP AND NOT WAKE UP?: NO
6. HAVE YOU EVER DONE ANYTHING, STARTED TO DO ANYTHING, OR PREPARED TO DO ANYTHING TO END YOUR LIFE?: NO

## 2023-12-08 ASSESSMENT — ACTIVITIES OF DAILY LIVING (ADL)
ADEQUATE_TO_COMPLETE_ADL: YES
WALKS IN HOME: INDEPENDENT
GROOMING: INDEPENDENT
HEARING - LEFT EAR: FUNCTIONAL
TOILETING: INDEPENDENT
FEEDING YOURSELF: INDEPENDENT
PATIENT'S MEMORY ADEQUATE TO SAFELY COMPLETE DAILY ACTIVITIES?: YES
DRESSING YOURSELF: INDEPENDENT
JUDGMENT_ADEQUATE_SAFELY_COMPLETE_DAILY_ACTIVITIES: YES
BATHING: INDEPENDENT
HEARING - RIGHT EAR: FUNCTIONAL
LACK_OF_TRANSPORTATION: NO

## 2023-12-08 NOTE — ANESTHESIA PREPROCEDURE EVALUATION
Patient: Vivien Miramontes    Procedure Information       Date/Time: 12/08/23 1300    Procedure: Robotic Incisional Hernia; Mesh Placement    Location: [facility] 08 / [facility]    Surgeons: Suresh De Guzman MD            Relevant Problems   Cardiovascular   (+) Coronary artery disease involving native coronary artery of native heart with unstable angina pectoris (CMS/HCC)   (+) NSTEMI (non-ST elevated myocardial infarction) (CMS/HCC)   (+) Stented coronary artery      GI   (+) Crohn's disease (CMS/HCC)      /Renal   (+) Chronic hepatitis C without hepatic coma (CMS/HCC)      GI/Hepatic   (+) Chronic hepatitis C without hepatic coma (CMS/HCC)      Infectious Disease   (+) Chronic hepatitis C without hepatic coma (CMS/HCC)     No history of anesthesia complications.    Last clopidogrel on 12/2.  Took ASA this morning with famotidine and carvedilol.    Clinical information reviewed:   Tobacco  Allergies  Meds   Med Hx  Surg Hx  OB Status  Fam Hx  Soc   Hx        NPO Detail:  NPO/Void Status  Carbonhydrate Drink Given Prior to Surgery? : N  Date of Last Liquid: 12/08/23  Time of Last Liquid: 1015  Date of Last Solid: 12/07/23  Time of Last Solid: 2030  Last Intake Type: Clear fluids         Physical Exam    Airway  Mallampati: II  TM distance: >3 FB  Neck ROM: full     Cardiovascular   Rhythm: regular  Rate: normal     Dental        Pulmonary   Breath sounds clear to auscultation     Abdominal   (+) scaphoid  Abdomen: soft             Anesthesia Plan    ASA 3     general     The patient is a current smoker.  Patient smoked on day of procedure.    intravenous induction   Postoperative administration of opioids is intended.  Trial extubation is planned.  Anesthetic plan and risks discussed with patient.  Use of blood products discussed with patient who consented to blood products.    Plan discussed with CAA.    Plan general endotracheal anesthesia with peripheral IV placement and ASA standard noninvasive  monitors.  The possibility of blood product transfusion was also described in detail.  Risks, benefits, alternatives of this plan were described in detail to the patient, who indicated understanding and agreed to proceed.    Mikey Rodriguez MD

## 2023-12-08 NOTE — OP NOTE
Robotic Recurrent Incisional Hernia; Mesh Placement; Extensive Lysis of Adhesions Operative Note     Date: 2023  OR Location: [facility] A OR    Name: Vivien Miramontes, : 1960, Age: 63 y.o., MRN: [id number], Sex: female    Diagnosis  Pre-op Diagnosis     * Incisional hernia without obstruction or gangrene [K43.2] Postop diagnosis is recurrent incisional hernia with incarcerated omentum, extensive adhesions     Procedures  Robotic assisted repair of recurrent incisional hernia, multiple defects containing omental fat total length of hernia 6.5 cm.  Robotic assisted enterolysis    Surgeons      * Suresh De Guzman - Primary    Resident/Fellow/Other Assistant:  Surgeon(s) and Role: PGY 4    Procedure Summary  Anesthesia: General  ASA: III  Anesthesia Staff: Anesthesiologist: Mikey Rodriguez MD; Moisés Gu MD  C-AA: SAPPHIRE Chan; SAPPHIRE Orozco  Estimated Blood Loss: 25mL  Intra-op Medications:   Medication Name Total Dose   BUPivacaine HCl (Marcaine) 0.5 % (5 mg/mL) injection 15 mL              Anesthesia Record               Intraprocedure I/O Totals          Intake    LR 1000.00 mL    Total Intake 1000 mL          Specimen: No specimens collected     Staff:   Circulator: Cherie Michel RN; Alisa KHAN RN  Relief Scrub: Zora Jacques  Scrub Person: Sarah Chacon         Drains and/or Catheters: * None in log *    Tourniquet Times:         Implants:  Implants       Type Name Action Serial No.      Implant POSITIONING SYSTEM, ECHO 2, HERNIA REPAIR, 11CM [place]  [facility]  [id number] Implanted NA              Findings: Hernia recurrence in the area of the prior repair involving omentum chronically incarcerated.  Almost look like the mesh avulsed away from abdominal wall superiorly and laterally leaving these defects    Indications: Vivien Miramontes is an 63 y.o. female who is having surgery for Incisional hernia without obstruction or gangrene [K43.2].     The patient was seen in the  preoperative area. The risks, benefits, complications, treatment options, non-operative alternatives, expected recovery and outcomes were discussed with the patient. The possibilities of reaction to medication, pulmonary aspiration, injury to surrounding structures, bleeding, recurrent infection, the need for additional procedures, failure to diagnose a condition, and creating a complication requiring transfusion or operation were discussed with the patient. The patient concurred with the proposed plan, giving informed consent.  The site of surgery was properly noted/marked if necessary per policy. The patient has been actively warmed in preoperative area. Preoperative antibiotics have been ordered and given within 1 hours of incision. Venous thrombosis prophylaxis have been ordered including bilateral sequential compression devices    Procedure Details: Patient consented for surgical correction of a recurrent incisional hernia.  Risks and benefits were explained to her and informed consent was obtained.    She is brought to the OR placed supine.  Timeout was performed confirm patient procedure.  Antibiotics were given general anesthesia ministered through endotracheal tube.  Both arms were tucked and then we prepped and draped sterilely.  Injected Marcaine made subxiphoid left midline incision with scalpel.  Fascia was incised entered the peritoneal cavity with the balloon port.  Placed my 30 degree robotic camera and then we encountered pretty dense adhesions.  I had to place a left subcostal 5 port and then using EndoShears to take down some adhesions to the anterior abdominal wall.  I was able to make a window in the falciform ligament and visualized the right lateral abdominal wall.  This allowed me to place three 8 mm robotic ports.  We then docked the robot with the camera in the middle.  I went to the console and then proceeded to perform enterolysis on fairly extensive adhesions mostly involving the omentum  but also some of the colon to the anterior abdominal wall and to the old mesh.  Fortunately the adhesions were lysed fairly easily.  No dense adhesions to the mesh appreciated.  In doing so we reduced a large amount of omentum from several fascial defects superior to the old mesh and also medially.  Almost look like the mesh had may be avulsed from its medial and superior attachments leaving these defects behind.  In any event once enterolysis was complete and everything was exposed we make sure there is no evidence of any bowel injuries or active bleeding.  I then reapproximated the fascial defects with a series of running #1 V-Loc sutures to close the holes.  I then measured out my area and then introduced the 11.4 cm Ventralight ST mesh.  Using the echo positioning system I was able to range it with adequate overlap and then circumferential transfascial fixation of the mesh was done with 2-0 V-Loc sutures.  We ran another 2 0 V-Loc suture down the metal after removing the echo positioning system.  All her needles were counted and removed from the abdominal cavity.  We then undocked the robot.  We removed our ports under direct visualization and desufflated.  The subxiphoid fascia was closed with 0 Vicryl.  All skin incisions were closed with 4-0 Monocryl and Dermabond.  A binder was placed and the patient was transferred to recovery in satisfactory condition    Complications:  None; patient tolerated the procedure well.    Disposition: PACU - hemodynamically stable.  Condition: stable         Additional Details:     Attending Attestation: I was present and scrubbed for the entire procedure.    Suresh De Guzman  Phone Number: [phone]

## 2023-12-08 NOTE — ANESTHESIA PROCEDURE NOTES
Airway  Date/Time: 12/8/2023 1:34 PM  Urgency: elective    Airway not difficult    Staffing  Performed: SAPPHIRE   Authorized by: Mikey Rodriguez MD    Performed by: SAPPHIRE Chan  Patient location during procedure: OR    Indications and Patient Condition  Indications for airway management: anesthesia  Spontaneous ventilation: present  Sedation level: deep  Preoxygenated: yes  Patient position: sniffing  MILS not maintained throughout  Mask difficulty assessment: 1 - vent by mask  Planned trial extubation    Final Airway Details  Final airway type: endotracheal airway      Successful airway: ETT  Cuffed: yes   Successful intubation technique: direct laryngoscopy  Facilitating devices/methods: intubating stylet  Endotracheal tube insertion site: oral  Blade: Krystle  Blade size: #4  ETT size (mm): 7.0  Placement verified by: chest auscultation and capnometry   Number of attempts at approach: 1  Ventilation between attempts: none

## 2023-12-08 NOTE — ANESTHESIA POSTPROCEDURE EVALUATION
Patient: Vivien Miramontes    Procedure Summary       Date: 12/08/23 Room / Location: [facility] 08 / [facility]    Anesthesia Start: 1315 Anesthesia Stop: 1614    Procedure: Robotic Recurrent Incisional Hernia; Mesh Placement; Extensive Lysis of Adhesions (Abdomen) Diagnosis:       Incisional hernia without obstruction or gangrene      (Incisional hernia without obstruction or gangrene [K43.2])    Surgeons: Suresh De Guzman MD Responsible Provider: Moisés Gu MD    Anesthesia Type: general ASA Status: 3            Anesthesia Type: general    Vitals Value Taken Time   /64 12/08/23 1625   Temp  12/08/23 1625   Pulse 55 12/08/23 1625   Resp  12/08/23 1625   SpO2 99 % 12/08/23 1625   Vitals shown include unvalidated device data.    Anesthesia Post Evaluation    Patient location during evaluation: PACU  Patient participation: complete - patient participated  Level of consciousness: awake  Pain management: adequate  Multimodal analgesia pain management approach  Airway patency: patent  Cardiovascular status: acceptable  Respiratory status: acceptable  Hydration status: acceptable  Postoperative Nausea and Vomiting: none  Comments: Will continue to assess PONV status.        No notable events documented.

## 2023-12-08 NOTE — PERIOPERATIVE NURSING NOTE
1608 Arrival to PACU. Drowsy but easily arousable. Complaining of 7/10 abdominal pain. Lap sites to abdomen x5 sealed with dermabond cdi. Abdominal binder in place.    1619 Medicated for pain with Dilaudid.     1636 Medicated for pain with Dilaudid.    1641 Family updated via text message.     1712 Medicated for pain with Dilaudid.    1720 Report sent to room 721 nurse.     1732 Updated patient's son LJ on patient's status and room number.     1746 Transported to room 721 via stretcher with oxygen and transporter.

## 2023-12-09 LAB
ANION GAP SERPL CALC-SCNC: 14 MMOL/L (ref 10–20)
BUN SERPL-MCNC: 20 MG/DL (ref 6–23)
CALCIUM SERPL-MCNC: 7.6 MG/DL (ref 8.6–10.3)
CHLORIDE SERPL-SCNC: 108 MMOL/L (ref 98–107)
CO2 SERPL-SCNC: 20 MMOL/L (ref 21–32)
CREAT SERPL-MCNC: 1.29 MG/DL (ref 0.5–1.05)
ERYTHROCYTE [DISTWIDTH] IN BLOOD BY AUTOMATED COUNT: 13.7 % (ref 11.5–14.5)
GFR SERPL CREATININE-BSD FRML MDRD: 46 ML/MIN/1.73M*2
GLUCOSE SERPL-MCNC: 132 MG/DL (ref 74–99)
HCT VFR BLD AUTO: 29.4 % (ref 36–46)
HGB BLD-MCNC: 9.2 G/DL (ref 12–16)
MCH RBC QN AUTO: 28.8 PG (ref 26–34)
MCHC RBC AUTO-ENTMCNC: 31.3 G/DL (ref 32–36)
MCV RBC AUTO: 92 FL (ref 80–100)
NRBC BLD-RTO: 0 /100 WBCS (ref 0–0)
PLATELET # BLD AUTO: 245 X10*3/UL (ref 150–450)
POTASSIUM SERPL-SCNC: 4.7 MMOL/L (ref 3.5–5.3)
RBC # BLD AUTO: 3.2 X10*6/UL (ref 4–5.2)
SODIUM SERPL-SCNC: 137 MMOL/L (ref 136–145)
WBC # BLD AUTO: 14.3 X10*3/UL (ref 4.4–11.3)

## 2023-12-09 PROCEDURE — 9420000001 HC RT PATIENT EDUCATION 5 MIN

## 2023-12-09 PROCEDURE — 2500000002 HC RX 250 W HCPCS SELF ADMINISTERED DRUGS (ALT 637 FOR MEDICARE OP, ALT 636 FOR OP/ED): Performed by: SURGERY

## 2023-12-09 PROCEDURE — 85027 COMPLETE CBC AUTOMATED: CPT

## 2023-12-09 PROCEDURE — 99024 POSTOP FOLLOW-UP VISIT: CPT | Performed by: SURGERY

## 2023-12-09 PROCEDURE — 36415 COLL VENOUS BLD VENIPUNCTURE: CPT

## 2023-12-09 PROCEDURE — 2500000004 HC RX 250 GENERAL PHARMACY W/ HCPCS (ALT 636 FOR OP/ED): Performed by: NURSE PRACTITIONER

## 2023-12-09 PROCEDURE — 96372 THER/PROPH/DIAG INJ SC/IM: CPT | Performed by: SURGERY

## 2023-12-09 PROCEDURE — 1100000001 HC PRIVATE ROOM DAILY

## 2023-12-09 PROCEDURE — 2500000004 HC RX 250 GENERAL PHARMACY W/ HCPCS (ALT 636 FOR OP/ED): Performed by: SURGERY

## 2023-12-09 PROCEDURE — 2500000001 HC RX 250 WO HCPCS SELF ADMINISTERED DRUGS (ALT 637 FOR MEDICARE OP): Performed by: PHARMACIST

## 2023-12-09 PROCEDURE — 2500000001 HC RX 250 WO HCPCS SELF ADMINISTERED DRUGS (ALT 637 FOR MEDICARE OP): Performed by: SURGERY

## 2023-12-09 PROCEDURE — 80048 BASIC METABOLIC PNL TOTAL CA: CPT

## 2023-12-09 PROCEDURE — 2500000001 HC RX 250 WO HCPCS SELF ADMINISTERED DRUGS (ALT 637 FOR MEDICARE OP)

## 2023-12-09 PROCEDURE — 99232 SBSQ HOSP IP/OBS MODERATE 35: CPT | Performed by: NURSE PRACTITIONER

## 2023-12-09 RX ADMIN — PREGABALIN 75 MG: 75 CAPSULE ORAL at 08:16

## 2023-12-09 RX ADMIN — OXYCODONE HYDROCHLORIDE 10 MG: 5 TABLET ORAL at 20:12

## 2023-12-09 RX ADMIN — HEPARIN SODIUM 5000 UNITS: 5000 INJECTION INTRAVENOUS; SUBCUTANEOUS at 17:00

## 2023-12-09 RX ADMIN — OXYCODONE HYDROCHLORIDE 10 MG: 5 TABLET ORAL at 01:44

## 2023-12-09 RX ADMIN — PANTOPRAZOLE SODIUM 40 MG: 40 TABLET, DELAYED RELEASE ORAL at 06:19

## 2023-12-09 RX ADMIN — FAMOTIDINE 20 MG: 20 TABLET, FILM COATED ORAL at 08:15

## 2023-12-09 RX ADMIN — FAMOTIDINE 20 MG: 20 TABLET, FILM COATED ORAL at 20:12

## 2023-12-09 RX ADMIN — POLYETHYLENE GLYCOL 3350 17 G: 17 POWDER, FOR SOLUTION ORAL at 08:14

## 2023-12-09 RX ADMIN — KETOROLAC TROMETHAMINE 15 MG: 30 INJECTION, SOLUTION INTRAMUSCULAR at 08:16

## 2023-12-09 RX ADMIN — OXYCODONE HYDROCHLORIDE 10 MG: 5 TABLET ORAL at 13:42

## 2023-12-09 RX ADMIN — KETOROLAC TROMETHAMINE 15 MG: 30 INJECTION, SOLUTION INTRAMUSCULAR at 15:53

## 2023-12-09 RX ADMIN — ASPIRIN 81 MG: 81 TABLET, COATED ORAL at 08:15

## 2023-12-09 RX ADMIN — KETOROLAC TROMETHAMINE 15 MG: 30 INJECTION, SOLUTION INTRAMUSCULAR at 03:03

## 2023-12-09 RX ADMIN — HYDROXYZINE HYDROCHLORIDE 25 MG: 25 TABLET, FILM COATED ORAL at 13:42

## 2023-12-09 RX ADMIN — SODIUM CHLORIDE 50 ML/HR: 9 INJECTION, SOLUTION INTRAVENOUS at 06:25

## 2023-12-09 RX ADMIN — SODIUM CHLORIDE 500 ML: 9 INJECTION, SOLUTION INTRAVENOUS at 11:29

## 2023-12-09 RX ADMIN — OXYCODONE HYDROCHLORIDE 10 MG: 5 TABLET ORAL at 07:52

## 2023-12-09 RX ADMIN — ROSUVASTATIN CALCIUM 40 MG: 20 TABLET, FILM COATED ORAL at 20:12

## 2023-12-09 RX ADMIN — EZETIMIBE 10 MG: 10 TABLET ORAL at 08:15

## 2023-12-09 RX ADMIN — HEPARIN SODIUM 5000 UNITS: 5000 INJECTION INTRAVENOUS; SUBCUTANEOUS at 08:16

## 2023-12-09 ASSESSMENT — COGNITIVE AND FUNCTIONAL STATUS - GENERAL
MOVING TO AND FROM BED TO CHAIR: A LITTLE
DAILY ACTIVITIY SCORE: 20
WALKING IN HOSPITAL ROOM: A LITTLE
STANDING UP FROM CHAIR USING ARMS: A LITTLE
MOVING FROM LYING ON BACK TO SITTING ON SIDE OF FLAT BED WITH BEDRAILS: A LITTLE
TURNING FROM BACK TO SIDE WHILE IN FLAT BAD: A LITTLE
DRESSING REGULAR UPPER BODY CLOTHING: A LITTLE
MOBILITY SCORE: 18
DRESSING REGULAR LOWER BODY CLOTHING: A LITTLE
CLIMB 3 TO 5 STEPS WITH RAILING: A LITTLE
TOILETING: A LITTLE
HELP NEEDED FOR BATHING: A LITTLE

## 2023-12-09 ASSESSMENT — PAIN SCALES - GENERAL
PAINLEVEL_OUTOF10: 9
PAINLEVEL_OUTOF10: 0 - NO PAIN
PAINLEVEL_OUTOF10: 8
PAINLEVEL_OUTOF10: 9
PAINLEVEL_OUTOF10: 8
PAINLEVEL_OUTOF10: 8

## 2023-12-09 ASSESSMENT — PAIN - FUNCTIONAL ASSESSMENT
PAIN_FUNCTIONAL_ASSESSMENT: 0-10

## 2023-12-09 NOTE — POST-PROCEDURE NOTE
Ms. Miramontes is a 63 year old female who is POD #0 from a robotic assisted repair of recurrent incisional hernia with mesh and extensive lysis of adhesions (Intraoperative Findings: Hernia recurrence in the area of the prior repair involving omentum chronically incarcerated. Almost looks like the mesh avulsed away from abdominal wall superiorly and laterally leaving these defects). She is endorsing abdominal pain post-operatively. She endorses nausea without vomiting. Denies fevers or chills. She has not urinated since OR.    PE:  Constitutional: A&Ox3, calm and cooperative, NAD.  Eyes: PERRL, clear sclera.  ENMT: Moist mucous membranes.  Head/Neck: Neck supple.  Cardiovascular: Normal rate and regular rhythm.   Respiratory/Thorax: Unlabored breathing.  Gastrointestinal: Abdomen non distended, soft, appropriately tender to palpation, no peritoneal signs, laparotomy sites c/d/i, well approximated with Dermabond, no erythema or drainage. Abdominal binder in place.  Genitourinary: Awaiting post-op void.  Musculoskeletal: ROM intact.  Neurological: A&Ox3, No focal deficits.  Psychological: Appropriate mood and behavior.  Skin: Warm and dry.    Radiology and labs reviewed. VSS, afebrile.    Plan:   - Diet: Cardiac  - IVF  - Daily PPI  - Continue current pain regimen   - PRN antiemetic   - DVT Proph: SCDs/ ambulate/ Heparin. Plavix start tomorrow morning.  - Bowel regimen: Miralax and Metamucil  - Maintain abdominal binder  - Monitor VS every 4 hours   - Labs ordered for AM   - IS every hour while awake   - Encourage ambulation / OOB as tolerated   - Monitor lap sites for drainage, erythema, excessive bruising   - F/u with Dr. De Guzman outpatient in 10-14 days once d/c from hospital     Dispo: Pain and nausea control. Awaiting post-op urinary void. OOB as able.

## 2023-12-09 NOTE — CARE PLAN
The patient's goals for the shift include      The clinical goals for the shift include patient will remain safe this shift      Problem: Pain - Adult  Goal: Verbalizes/displays adequate comfort level or baseline comfort level  Outcome: Progressing     Problem: Safety - Adult  Goal: Free from fall injury  Outcome: Progressing     Problem: Discharge Planning  Goal: Discharge to home or other facility with appropriate resources  Outcome: Progressing     Problem: Chronic Conditions and Co-morbidities  Goal: Patient's chronic conditions and co-morbidity symptoms are monitored and maintained or improved  Outcome: Progressing     Problem: Pain  Goal: Takes deep breaths with improved pain control throughout the shift  Outcome: Progressing  Goal: Turns in bed with improved pain control throughout the shift  Outcome: Progressing  Goal: Walks with improved pain control throughout the shift  Outcome: Progressing  Goal: Performs ADL's with improved pain control throughout shift  Outcome: Progressing  Goal: Participates in PT with improved pain control throughout the shift  Outcome: Progressing  Goal: Free from opioid side effects throughout the shift  Outcome: Progressing  Goal: Free from acute confusion related to pain meds throughout the shift  Outcome: Progressing     Problem: Fall/Injury  Goal: Not fall by end of shift  Outcome: Progressing  Goal: Be free from injury by end of the shift  Outcome: Progressing  Goal: Verbalize understanding of personal risk factors for fall in the hospital  Outcome: Progressing  Goal: Verbalize understanding of risk factor reduction measures to prevent injury from fall in the home  Outcome: Progressing  Goal: Use assistive devices by end of the shift  Outcome: Progressing  Goal: Pace activities to prevent fatigue by end of the shift  Outcome: Progressing

## 2023-12-09 NOTE — PROGRESS NOTES
"Vivien Miramontes is a 63 y.o. female on day 1 of admission presenting with Ventral hernia without obstruction or gangrene.    Assessment/Plan   We will start IV fluids for slight bump in creatinine.  Plan to repeat labs in morning.  MiraLAX twice a day.  Check hemoglobin in morning.  Venofer ordered    Subjective   Had some urinary retention.  Feels better today.  Good pain control       Objective     Physical Exam  NAD  A&Ox3  Non icteric  CTA  RR  Abdomen soft min tender. Wounds clean, intact  Extremities warm, well perfused     Last Recorded Vitals  Blood pressure 115/63, pulse 62, temperature 36.8 °C (98.3 °F), temperature source Temporal, resp. rate 18, height 1.549 m (5' 1\"), weight 64.5 kg (142 lb 3.2 oz), SpO2 92 %.  Intake/Output last 3 Shifts:  I/O last 3 completed shifts:  In: 1808.3 (28 mL/kg) [I.V.:308.3 (4.8 mL/kg); IV Piggyback:1500]  Out: 900 (14 mL/kg) [Urine:900 (0.4 mL/kg/hr)]  Weight: 64.5 kg     Relevant Results    Scheduled medications  aspirin, 81 mg, oral, Daily  [Held by provider] carvedilol, 25 mg, oral, BID with meals  [Held by provider] clopidogrel, 75 mg, oral, Daily  ezetimibe, 10 mg, oral, Daily  famotidine, 20 mg, oral, BID  heparin (porcine), 5,000 Units, subcutaneous, q8h  [Held by provider] hydroCHLOROthiazide, 25 mg, oral, Daily  ketorolac, 15 mg, intravenous, q6h  [Held by provider] lisinopril, 40 mg, oral, Daily  pantoprazole, 40 mg, oral, Daily before breakfast  polyethylene glycol, 17 g, oral, Daily  pregabalin, 75 mg, oral, Daily  psyllium, 1 packet, oral, Daily  rosuvastatin, 40 mg, oral, Nightly  sodium chloride, 500 mL, intravenous, Once      Continuous medications  sodium chloride 0.9%, 75 mL/hr, Last Rate: 50 mL/hr (12/09/23 0625)      PRN medications  PRN medications: HYDROmorphone, hydrOXYzine HCL, naloxone, nitroglycerin, ondansetron, oxyCODONE, oxyCODONE    Results for orders placed or performed during the hospital encounter of 12/08/23 (from the past 24 hour(s))   Type " And Screen   Result Value Ref Range    ABO TYPE B     Rh TYPE POS     ANTIBODY SCREEN NEG    CBC   Result Value Ref Range    WBC 14.3 (H) 4.4 - 11.3 x10*3/uL    nRBC 0.0 0.0 - 0.0 /100 WBCs    RBC 3.20 (L) 4.00 - 5.20 x10*6/uL    Hemoglobin 9.2 (L) 12.0 - 16.0 g/dL    Hematocrit 29.4 (L) 36.0 - 46.0 %    MCV 92 80 - 100 fL    MCH 28.8 26.0 - 34.0 pg    MCHC 31.3 (L) 32.0 - 36.0 g/dL    RDW 13.7 11.5 - 14.5 %    Platelets 245 150 - 450 x10*3/uL   Basic Metabolic Panel   Result Value Ref Range    Glucose 132 (H) 74 - 99 mg/dL    Sodium 137 136 - 145 mmol/L    Potassium 4.7 3.5 - 5.3 mmol/L    Chloride 108 (H) 98 - 107 mmol/L    Bicarbonate 20 (L) 21 - 32 mmol/L    Anion Gap 14 10 - 20 mmol/L    Urea Nitrogen 20 6 - 23 mg/dL    Creatinine 1.29 (H) 0.50 - 1.05 mg/dL    eGFR 46 (L) >60 mL/min/1.73m*2    Calcium 7.6 (L) 8.6 - 10.3 mg/dL           I spent 25 minutes in the professional and overall care of this patient.      Suresh De Guzman MD

## 2023-12-09 NOTE — DISCHARGE INSTRUCTIONS
Hernia Surgery    Pain  Pain will be different for every person.  For moderate pain a prescription medicine will be given and should be taken as directed.  For milder pain, an over the counter medicine such as Tylenol, Extra Strength Tylenol, or Advil may be taken.  Aspirin or aspirin containing products should not be used for two weeks before surgery and one week after surgery.     Activity  Walking may and should be done daily after surgery.  Stairs may be climbed, but you may need help for the first few days.  You should not do any strenuous or heavy exercise such as bicycling, jogging, or sports should be for at least four weeks after surgery.    Driving  You may resume driving when you no longer have discomfort getting in or out of the vehicle and you can perform braking without difficulty or pain.  This is generally one week after surgery.    Dressings  If you have a gauze dressing over your surgery site, you may remove it after 24 hours.  You may shower after 48 hours.   If you do not have gauze on the site, a clear plastic coating will be over the incision. This will come off on its own after about a week.  You may shower 24 hours after surgery.  It is normal to see bruising (black and blue color) around the groin, penis, scrotum upper thigh or vulva.  If you have severe pain, redness, or swelling this may mean you have an infection and you should call your doctor right away.    Bowel Movements  Constipation is common after hernia surgery. Mineral oil, which you can buy without a prescription, can be taken for 4-5 days after surgery.  Take 2 Tablespoons each evening with supper to avoid constipation. You may also take Miralax once or twice per day as needed.    Follow-up Appointments  Please call my office so that you can be seen 10-14 days after surgery.  Also, please do not hesitate to call my office if you have any questions.  Call doctor [phone] for:             Severe unrelieved pain              Fevers > 101F             Nausea/vomiting             Wound issues             Insurance/return to work forms             Shortness of breath             Chest pains

## 2023-12-09 NOTE — PROGRESS NOTES
"Vivien Miramontes is a 63 y.o. female on day 1 of admission presenting with Ventral hernia without obstruction or gangrene.    Subjective   Patient feels great this morning and is ready to go home. Has not voided since OR. Ate breakfast without issue. Pain is well controlled but has not been OOB. Denies fever, chills, CP, SOB, and N/V.        Objective     Physical Exam  Constitutional:       Appearance: Normal appearance.   Eyes:      Conjunctiva/sclera: Conjunctivae normal.   Cardiovascular:      Rate and Rhythm: Normal rate and regular rhythm.      Heart sounds: Normal heart sounds.   Pulmonary:      Effort: Pulmonary effort is normal.      Breath sounds: Normal breath sounds.   Abdominal:      General: Abdomen is flat.      Comments: Minimally distended, appropriately tender, Lap sites with Dermabond, C/D/I, edges well approximated, minor bruising without drainage or hematoma. Abdominal binder in place    Skin:     General: Skin is warm and dry.   Neurological:      Mental Status: She is oriented to person, place, and time.   Psychiatric:         Mood and Affect: Mood normal.         Behavior: Behavior normal.         Last Recorded Vitals  Blood pressure 135/74, pulse 63, temperature 36.7 °C (98 °F), temperature source Temporal, resp. rate 18, height 1.549 m (5' 1\"), weight 64.5 kg (142 lb 3.2 oz), SpO2 97 %.  Intake/Output last 3 Shifts:  I/O last 3 completed shifts:  In: 1808.3 (28 mL/kg) [I.V.:308.3 (4.8 mL/kg); IV Piggyback:1500]  Out: 900 (14 mL/kg) [Urine:900 (0.4 mL/kg/hr)]  Weight: 64.5 kg     Scheduled medications  aspirin, 81 mg, oral, Daily  [Held by provider] carvedilol, 25 mg, oral, BID with meals  [Held by provider] clopidogrel, 75 mg, oral, Daily  ezetimibe, 10 mg, oral, Daily  famotidine, 20 mg, oral, BID  heparin (porcine), 5,000 Units, subcutaneous, q8h  [Held by provider] hydroCHLOROthiazide, 25 mg, oral, Daily  iron sucrose, 200 mg, intravenous, Daily  ketorolac, 15 mg, intravenous, q6h  [Held by " provider] lisinopril, 40 mg, oral, Daily  pantoprazole, 40 mg, oral, Daily before breakfast  polyethylene glycol, 17 g, oral, Daily  pregabalin, 75 mg, oral, Daily  psyllium, 1 packet, oral, Daily  rosuvastatin, 40 mg, oral, Nightly      Continuous medications  sodium chloride 0.9%, 75 mL/hr, Last Rate: 50 mL/hr (12/09/23 0625)      PRN medications  PRN medications: HYDROmorphone, hydrOXYzine HCL, naloxone, nitroglycerin, ondansetron, oxyCODONE, oxyCODONE    Relevant Results  Results for orders placed or performed during the hospital encounter of 12/08/23 (from the past 24 hour(s))   CBC   Result Value Ref Range    WBC 14.3 (H) 4.4 - 11.3 x10*3/uL    nRBC 0.0 0.0 - 0.0 /100 WBCs    RBC 3.20 (L) 4.00 - 5.20 x10*6/uL    Hemoglobin 9.2 (L) 12.0 - 16.0 g/dL    Hematocrit 29.4 (L) 36.0 - 46.0 %    MCV 92 80 - 100 fL    MCH 28.8 26.0 - 34.0 pg    MCHC 31.3 (L) 32.0 - 36.0 g/dL    RDW 13.7 11.5 - 14.5 %    Platelets 245 150 - 450 x10*3/uL   Basic Metabolic Panel   Result Value Ref Range    Glucose 132 (H) 74 - 99 mg/dL    Sodium 137 136 - 145 mmol/L    Potassium 4.7 3.5 - 5.3 mmol/L    Chloride 108 (H) 98 - 107 mmol/L    Bicarbonate 20 (L) 21 - 32 mmol/L    Anion Gap 14 10 - 20 mmol/L    Urea Nitrogen 20 6 - 23 mg/dL    Creatinine 1.29 (H) 0.50 - 1.05 mg/dL    eGFR 46 (L) >60 mL/min/1.73m*2    Calcium 7.6 (L) 8.6 - 10.3 mg/dL       Assessment/Plan   Principal Problem:    Ventral hernia without obstruction or gangrene  Active Problems:    Stented coronary artery    Crohn's disease (CMS/HCC)    Vivien Miramontes is a 62 yo female who is s/p robotic incisional hernia repair with mesh and extensive ARNOLD.     Neuro:   Acute post-op pain   - OOB/ ambulate 5x per day   - continue     CV:  Hx: CAD, MI x3 s.p PCI to LCX and LAD, dyslipidemia, HTN  - VS every 4 hours   - VSS  - plavix on hold d/t drop in H/H  - held carvedilol, hztz, lisinopril     Pulm: on RA, lungs CTAB  - IS every hour while awake   - Pulse ox every 4 hours with VS      GI: abdomen soft, minimally distended, appropriately tender, Lap sites with Dermabond, C/D/I, edges well approximated, minor bruising without drainage or hematoma.   Hx: UC, Crohns disease, colonic perforation  POD #1 s/p robotic recurrent incisional hernia, mesh placement, extensive ARNOLD,   - regular diet  - Ensure surgery TID  - continue to wear abdominal binder  - PRN antiemetic   - oob and walking as much as possible   - miralax       : voiding without issue   FLORENCE   - Cr 1.29 from 0.80  - IVF  bolus ordered  - Monitor I&Os       HEME: DVT Proph   Acute post-op anemia  - SCDs/ ambulate/heparin  - H/H 9.2/29.4 from 15/46.4  - no s/s of bleeding   - iron ordered      Endo:   -no active issues    ID: afebrile   - WBC 14.3- likely reactive 2/2 surgery   - Monitor for s/s infection    Dispo: IVF bolus, started on iron, repeat labs in the AM. Discussed with Dr De Guzman.     I spent 30 minutes in the professional and overall care of this patient.      Petra Hung, INOCENCIA-CNP

## 2023-12-10 ENCOUNTER — APPOINTMENT (OUTPATIENT)
Dept: RADIOLOGY | Facility: HOSPITAL | Age: 63
DRG: 336 | End: 2023-12-10
Payer: COMMERCIAL

## 2023-12-10 LAB
AMPHETAMINES UR QL SCN: ABNORMAL
ANION GAP SERPL CALC-SCNC: 13 MMOL/L (ref 10–20)
APPEARANCE UR: CLEAR
BARBITURATES UR QL SCN: ABNORMAL
BENZODIAZ UR QL SCN: ABNORMAL
BILIRUB UR STRIP.AUTO-MCNC: NEGATIVE MG/DL
BUN SERPL-MCNC: 21 MG/DL (ref 6–23)
BZE UR QL SCN: ABNORMAL
CALCIUM SERPL-MCNC: 7.8 MG/DL (ref 8.6–10.3)
CANNABINOIDS UR QL SCN: ABNORMAL
CARDIAC TROPONIN I PNL SERPL HS: 162 NG/L (ref 0–13)
CARDIAC TROPONIN I PNL SERPL HS: 336 NG/L (ref 0–13)
CHLORIDE SERPL-SCNC: 109 MMOL/L (ref 98–107)
CO2 SERPL-SCNC: 20 MMOL/L (ref 21–32)
COLOR UR: YELLOW
CREAT SERPL-MCNC: 1.07 MG/DL (ref 0.5–1.05)
ERYTHROCYTE [DISTWIDTH] IN BLOOD BY AUTOMATED COUNT: 13.7 % (ref 11.5–14.5)
FENTANYL+NORFENTANYL UR QL SCN: ABNORMAL
GFR SERPL CREATININE-BSD FRML MDRD: 58 ML/MIN/1.73M*2
GLUCOSE BLD MANUAL STRIP-MCNC: 118 MG/DL (ref 74–99)
GLUCOSE BLD MANUAL STRIP-MCNC: 144 MG/DL (ref 74–99)
GLUCOSE BLD MANUAL STRIP-MCNC: 62 MG/DL (ref 74–99)
GLUCOSE BLD MANUAL STRIP-MCNC: 65 MG/DL (ref 74–99)
GLUCOSE SERPL-MCNC: 84 MG/DL (ref 74–99)
GLUCOSE UR STRIP.AUTO-MCNC: ABNORMAL MG/DL
HCT VFR BLD AUTO: 28.2 % (ref 36–46)
HGB BLD-MCNC: 8.8 G/DL (ref 12–16)
KETONES UR STRIP.AUTO-MCNC: NEGATIVE MG/DL
LEUKOCYTE ESTERASE UR QL STRIP.AUTO: NEGATIVE
MCH RBC QN AUTO: 29 PG (ref 26–34)
MCHC RBC AUTO-ENTMCNC: 31.2 G/DL (ref 32–36)
MCV RBC AUTO: 93 FL (ref 80–100)
NITRITE UR QL STRIP.AUTO: NEGATIVE
NRBC BLD-RTO: 0 /100 WBCS (ref 0–0)
OPIATES UR QL SCN: ABNORMAL
OXYCODONE+OXYMORPHONE UR QL SCN: ABNORMAL
PCP UR QL SCN: ABNORMAL
PH UR STRIP.AUTO: 6.5 [PH]
PLATELET # BLD AUTO: 195 X10*3/UL (ref 150–450)
POTASSIUM SERPL-SCNC: 4 MMOL/L (ref 3.5–5.3)
PROT UR STRIP.AUTO-MCNC: NEGATIVE MG/DL
RBC # BLD AUTO: 3.03 X10*6/UL (ref 4–5.2)
RBC # UR STRIP.AUTO: NEGATIVE /UL
SODIUM SERPL-SCNC: 138 MMOL/L (ref 136–145)
SP GR UR STRIP.AUTO: 1.01
UROBILINOGEN UR STRIP.AUTO-MCNC: ABNORMAL MG/DL
WBC # BLD AUTO: 9.9 X10*3/UL (ref 4.4–11.3)

## 2023-12-10 PROCEDURE — 81003 URINALYSIS AUTO W/O SCOPE: CPT | Performed by: INTERNAL MEDICINE

## 2023-12-10 PROCEDURE — 2550000001 HC RX 255 CONTRASTS: Performed by: SURGERY

## 2023-12-10 PROCEDURE — 80307 DRUG TEST PRSMV CHEM ANLYZR: CPT | Performed by: REGISTERED NURSE

## 2023-12-10 PROCEDURE — 2500000005 HC RX 250 GENERAL PHARMACY W/O HCPCS: Performed by: REGISTERED NURSE

## 2023-12-10 PROCEDURE — 96372 THER/PROPH/DIAG INJ SC/IM: CPT | Performed by: SURGERY

## 2023-12-10 PROCEDURE — 2500000001 HC RX 250 WO HCPCS SELF ADMINISTERED DRUGS (ALT 637 FOR MEDICARE OP): Performed by: SURGERY

## 2023-12-10 PROCEDURE — 80048 BASIC METABOLIC PNL TOTAL CA: CPT | Performed by: NURSE PRACTITIONER

## 2023-12-10 PROCEDURE — 82947 ASSAY GLUCOSE BLOOD QUANT: CPT

## 2023-12-10 PROCEDURE — 2500000004 HC RX 250 GENERAL PHARMACY W/ HCPCS (ALT 636 FOR OP/ED): Performed by: INTERNAL MEDICINE

## 2023-12-10 PROCEDURE — 2500000002 HC RX 250 W HCPCS SELF ADMINISTERED DRUGS (ALT 637 FOR MEDICARE OP, ALT 636 FOR OP/ED): Performed by: INTERNAL MEDICINE

## 2023-12-10 PROCEDURE — 71045 X-RAY EXAM CHEST 1 VIEW: CPT | Mod: FY

## 2023-12-10 PROCEDURE — 36415 COLL VENOUS BLD VENIPUNCTURE: CPT | Performed by: NURSE PRACTITIONER

## 2023-12-10 PROCEDURE — 99233 SBSQ HOSP IP/OBS HIGH 50: CPT | Performed by: REGISTERED NURSE

## 2023-12-10 PROCEDURE — 36415 COLL VENOUS BLD VENIPUNCTURE: CPT | Performed by: INTERNAL MEDICINE

## 2023-12-10 PROCEDURE — 2500000004 HC RX 250 GENERAL PHARMACY W/ HCPCS (ALT 636 FOR OP/ED): Performed by: NURSE PRACTITIONER

## 2023-12-10 PROCEDURE — 85027 COMPLETE CBC AUTOMATED: CPT | Performed by: NURSE PRACTITIONER

## 2023-12-10 PROCEDURE — 84484 ASSAY OF TROPONIN QUANT: CPT | Performed by: INTERNAL MEDICINE

## 2023-12-10 PROCEDURE — 70450 CT HEAD/BRAIN W/O DYE: CPT

## 2023-12-10 PROCEDURE — 2500000004 HC RX 250 GENERAL PHARMACY W/ HCPCS (ALT 636 FOR OP/ED): Performed by: SURGERY

## 2023-12-10 PROCEDURE — 87040 BLOOD CULTURE FOR BACTERIA: CPT | Mod: AHULAB | Performed by: INTERNAL MEDICINE

## 2023-12-10 PROCEDURE — 71275 CT ANGIOGRAPHY CHEST: CPT | Performed by: RADIOLOGY

## 2023-12-10 PROCEDURE — S4991 NICOTINE PATCH NONLEGEND: HCPCS | Performed by: INTERNAL MEDICINE

## 2023-12-10 PROCEDURE — 2500000001 HC RX 250 WO HCPCS SELF ADMINISTERED DRUGS (ALT 637 FOR MEDICARE OP): Performed by: PHARMACIST

## 2023-12-10 PROCEDURE — 71275 CT ANGIOGRAPHY CHEST: CPT

## 2023-12-10 PROCEDURE — 93010 ELECTROCARDIOGRAM REPORT: CPT | Performed by: INTERNAL MEDICINE

## 2023-12-10 PROCEDURE — 71045 X-RAY EXAM CHEST 1 VIEW: CPT | Performed by: STUDENT IN AN ORGANIZED HEALTH CARE EDUCATION/TRAINING PROGRAM

## 2023-12-10 PROCEDURE — 1200000002 HC GENERAL ROOM WITH TELEMETRY DAILY

## 2023-12-10 PROCEDURE — 70450 CT HEAD/BRAIN W/O DYE: CPT | Performed by: RADIOLOGY

## 2023-12-10 RX ORDER — DEXTROSE MONOHYDRATE AND SODIUM CHLORIDE 5; .9 G/100ML; G/100ML
50 INJECTION, SOLUTION INTRAVENOUS CONTINUOUS
Status: DISCONTINUED | OUTPATIENT
Start: 2023-12-10 | End: 2023-12-12 | Stop reason: HOSPADM

## 2023-12-10 RX ORDER — LEVOFLOXACIN 5 MG/ML
750 INJECTION, SOLUTION INTRAVENOUS
Status: DISCONTINUED | OUTPATIENT
Start: 2023-12-10 | End: 2023-12-11

## 2023-12-10 RX ORDER — ACETAMINOPHEN 160 MG/5ML
650 SOLUTION ORAL EVERY 4 HOURS PRN
Status: DISCONTINUED | OUTPATIENT
Start: 2023-12-10 | End: 2023-12-12 | Stop reason: HOSPADM

## 2023-12-10 RX ORDER — LORAZEPAM 1 MG/1
1 TABLET ORAL EVERY 6 HOURS PRN
Status: DISCONTINUED | OUTPATIENT
Start: 2023-12-10 | End: 2023-12-10

## 2023-12-10 RX ORDER — DEXTROSE 50 % IN WATER (D50W) INTRAVENOUS SYRINGE
25
Status: DISCONTINUED | OUTPATIENT
Start: 2023-12-10 | End: 2023-12-12 | Stop reason: HOSPADM

## 2023-12-10 RX ORDER — IPRATROPIUM BROMIDE AND ALBUTEROL SULFATE 2.5; .5 MG/3ML; MG/3ML
3 SOLUTION RESPIRATORY (INHALATION) EVERY 2 HOUR PRN
Status: DISCONTINUED | OUTPATIENT
Start: 2023-12-10 | End: 2023-12-12 | Stop reason: HOSPADM

## 2023-12-10 RX ORDER — ACETAMINOPHEN 325 MG/1
650 TABLET ORAL EVERY 4 HOURS PRN
Status: DISCONTINUED | OUTPATIENT
Start: 2023-12-10 | End: 2023-12-12 | Stop reason: HOSPADM

## 2023-12-10 RX ORDER — ACETAMINOPHEN 650 MG/1
650 SUPPOSITORY RECTAL EVERY 4 HOURS PRN
Status: DISCONTINUED | OUTPATIENT
Start: 2023-12-10 | End: 2023-12-12 | Stop reason: HOSPADM

## 2023-12-10 RX ORDER — DEXTROSE MONOHYDRATE 100 MG/ML
0.3 INJECTION, SOLUTION INTRAVENOUS ONCE AS NEEDED
Status: DISCONTINUED | OUTPATIENT
Start: 2023-12-10 | End: 2023-12-12 | Stop reason: HOSPADM

## 2023-12-10 RX ORDER — IBUPROFEN 200 MG
1 TABLET ORAL DAILY
Status: DISCONTINUED | OUTPATIENT
Start: 2023-12-10 | End: 2023-12-12 | Stop reason: HOSPADM

## 2023-12-10 RX ORDER — IPRATROPIUM BROMIDE AND ALBUTEROL SULFATE 2.5; .5 MG/3ML; MG/3ML
3 SOLUTION RESPIRATORY (INHALATION)
Status: DISCONTINUED | OUTPATIENT
Start: 2023-12-10 | End: 2023-12-10

## 2023-12-10 RX ADMIN — CARVEDILOL 25 MG: 25 TABLET, FILM COATED ORAL at 08:58

## 2023-12-10 RX ADMIN — ROSUVASTATIN CALCIUM 40 MG: 20 TABLET, FILM COATED ORAL at 20:29

## 2023-12-10 RX ADMIN — IOHEXOL 75 ML: 350 INJECTION, SOLUTION INTRAVENOUS at 15:27

## 2023-12-10 RX ADMIN — KETOROLAC TROMETHAMINE 15 MG: 30 INJECTION, SOLUTION INTRAMUSCULAR at 00:52

## 2023-12-10 RX ADMIN — POLYETHYLENE GLYCOL 3350 17 G: 17 POWDER, FOR SOLUTION ORAL at 08:52

## 2023-12-10 RX ADMIN — IRON SUCROSE 200 MG: 20 INJECTION, SOLUTION INTRAVENOUS at 06:20

## 2023-12-10 RX ADMIN — HEPARIN SODIUM 5000 UNITS: 5000 INJECTION INTRAVENOUS; SUBCUTANEOUS at 00:52

## 2023-12-10 RX ADMIN — FAMOTIDINE 20 MG: 20 TABLET, FILM COATED ORAL at 08:53

## 2023-12-10 RX ADMIN — LEVOFLOXACIN 750 MG: 750 INJECTION, SOLUTION INTRAVENOUS at 12:25

## 2023-12-10 RX ADMIN — HEPARIN SODIUM 5000 UNITS: 5000 INJECTION INTRAVENOUS; SUBCUTANEOUS at 17:45

## 2023-12-10 RX ADMIN — ASPIRIN 81 MG: 81 TABLET, COATED ORAL at 08:53

## 2023-12-10 RX ADMIN — DEXTROSE MONOHYDRATE 25 G: 25 INJECTION, SOLUTION INTRAVENOUS at 08:52

## 2023-12-10 RX ADMIN — HEPARIN SODIUM 5000 UNITS: 5000 INJECTION INTRAVENOUS; SUBCUTANEOUS at 08:53

## 2023-12-10 RX ADMIN — CARVEDILOL 25 MG: 25 TABLET, FILM COATED ORAL at 17:45

## 2023-12-10 RX ADMIN — HYDROCHLOROTHIAZIDE 25 MG: 25 TABLET ORAL at 12:26

## 2023-12-10 RX ADMIN — DEXTROSE AND SODIUM CHLORIDE 75 ML/HR: 5; 900 INJECTION, SOLUTION INTRAVENOUS at 10:47

## 2023-12-10 RX ADMIN — NICOTINE 1 PATCH: 14 PATCH, EXTENDED RELEASE TRANSDERMAL at 10:47

## 2023-12-10 ASSESSMENT — COGNITIVE AND FUNCTIONAL STATUS - GENERAL
MOBILITY SCORE: 24
DAILY ACTIVITIY SCORE: 24

## 2023-12-10 ASSESSMENT — PAIN - FUNCTIONAL ASSESSMENT: PAIN_FUNCTIONAL_ASSESSMENT: 0-10

## 2023-12-10 ASSESSMENT — PAIN SCALES - GENERAL: PAINLEVEL_OUTOF10: 4

## 2023-12-10 NOTE — NURSING NOTE
Patient has calmed down a&ox2 remains forgetful to time however easily redirected her son is sitting at bedside no s/s distress noted

## 2023-12-10 NOTE — NURSING NOTE
This am approx 0745 patient noted with change from baseline increased confusion wandering requiring redirection slow slurred speech vs 100.1-488-/64 o2 sats decreased 85-86% on room air o2 @ 3 l nc required to bring sats to >93% BG 62 Luis Damon NP made aware of changes came to access patient at bedside orders written patient given iv glucose with effectiveness  coreg given as ordered for BP patient remains with intermittent confusion anxiety irritable requiring frequent reassurance and redirection Dr TRINI Haynes also arrived to unit assessed patient via bedside with new orders as well IV fluids changed as ordered patient now receiving D5 NS @ 75ml/hr EKG completed NSR labs collected and pending urine collected and pending patient currently sitting up in bed respirations even/unlabored no c/o pain at this time will continue to monitor

## 2023-12-10 NOTE — NURSING NOTE
Patient with increased anxiety delusions becoming verbally aggressive and non-compliant charge nurse made aware patient now has 1on1 sitter at bedside Dr TRINI Haynes made aware

## 2023-12-10 NOTE — PROGRESS NOTES
"Vivien Miramontes is a 63 y.o. female on day 2 of admission presenting with Ventral hernia without obstruction or gangrene.    Subjective   Patient with AMS this morning. Per nursing, patient was found by the elevators this morning with IV pole saying she was going home. Pt was slow to respond, BG checked and was hypoglycemic 62, was given D50 IVP.  on recheck. Patient with new O2 requirements, on 3L NC. RN then found patient smoking cigarettes in the room. Denies any etoh or drug use, having some abdominal pain, but not increased since surgery, afebrile, denies any SOB or CP.        Objective     Physical Exam  Constitutional:       Appearance: Normal appearance.      Comments: Drowsy   HENT:      Mouth/Throat:      Mouth: Mucous membranes are moist.   Eyes:      Conjunctiva/sclera: Conjunctivae normal.   Cardiovascular:      Rate and Rhythm: Normal rate and regular rhythm.      Heart sounds: Normal heart sounds.   Pulmonary:      Effort: Pulmonary effort is normal.      Breath sounds: Normal breath sounds.      Comments: On 3L NC  Abdominal:      General: Abdomen is flat.      Comments: Minimally distended, appropriately tender, Lap sites with Dermabond, C/D/I, edges well approximated, minor bruising without drainage or hematoma. Abdominal binder in place    Skin:     General: Skin is warm and dry.   Neurological:      Comments: GCS 14 ([id number]) -1 confusion, BUE 5/5 BLE 5/5, sensation intact, pupils 3>2 bilateral brisk.    Psychiatric:         Mood and Affect: Mood normal.         Behavior: Behavior normal.         Last Recorded Vitals  Blood pressure 161/64, pulse 101, temperature 38.1 °C (100.5 °F), resp. rate 16, height 1.549 m (5' 1\"), weight 64.5 kg (142 lb 3.2 oz), SpO2 94 %.  Intake/Output last 3 Shifts:  I/O last 3 completed shifts:  In: 2104.2 (32.6 mL/kg) [P.O.:240; I.V.:1364.2 (21.2 mL/kg); IV Piggyback:500]  Out: 1300 (20.2 mL/kg) [Urine:1300 (0.6 mL/kg/hr)]  Weight: 64.5 kg     Scheduled " medications  aspirin, 81 mg, oral, Daily  carvedilol, 25 mg, oral, BID with meals  [Held by provider] clopidogrel, 75 mg, oral, Daily  ezetimibe, 10 mg, oral, Daily  famotidine, 20 mg, oral, BID  heparin (porcine), 5,000 Units, subcutaneous, q8h  [Held by provider] hydroCHLOROthiazide, 25 mg, oral, Daily  iron sucrose, 200 mg, intravenous, Daily  [Held by provider] lisinopril, 40 mg, oral, Daily  pantoprazole, 40 mg, oral, Daily before breakfast  polyethylene glycol, 17 g, oral, Daily  pregabalin, 75 mg, oral, Daily  psyllium, 1 packet, oral, Daily  rosuvastatin, 40 mg, oral, Nightly      Continuous medications  sodium chloride 0.9%, 75 mL/hr, Last Rate: Stopped (12/09/23 2015)      PRN medications  PRN medications: dextrose 10 % in water (D10W), dextrose, glucagon, HYDROmorphone, hydrOXYzine HCL, naloxone, nitroglycerin, ondansetron, oxyCODONE, oxyCODONE    Relevant Results  Results for orders placed or performed during the hospital encounter of 12/08/23 (from the past 24 hour(s))   CBC   Result Value Ref Range    WBC 9.9 4.4 - 11.3 x10*3/uL    nRBC 0.0 0.0 - 0.0 /100 WBCs    RBC 3.03 (L) 4.00 - 5.20 x10*6/uL    Hemoglobin 8.8 (L) 12.0 - 16.0 g/dL    Hematocrit 28.2 (L) 36.0 - 46.0 %    MCV 93 80 - 100 fL    MCH 29.0 26.0 - 34.0 pg    MCHC 31.2 (L) 32.0 - 36.0 g/dL    RDW 13.7 11.5 - 14.5 %    Platelets 195 150 - 450 x10*3/uL   Basic metabolic panel   Result Value Ref Range    Glucose 84 74 - 99 mg/dL    Sodium 138 136 - 145 mmol/L    Potassium 4.0 3.5 - 5.3 mmol/L    Chloride 109 (H) 98 - 107 mmol/L    Bicarbonate 20 (L) 21 - 32 mmol/L    Anion Gap 13 10 - 20 mmol/L    Urea Nitrogen 21 6 - 23 mg/dL    Creatinine 1.07 (H) 0.50 - 1.05 mg/dL    eGFR 58 (L) >60 mL/min/1.73m*2    Calcium 7.8 (L) 8.6 - 10.3 mg/dL   POCT GLUCOSE   Result Value Ref Range    POCT Glucose 62 (L) 74 - 99 mg/dL   POCT GLUCOSE   Result Value Ref Range    POCT Glucose 144 (H) 74 - 99 mg/dL       Assessment/Plan   Principal Problem:    Ventral  hernia without obstruction or gangrene  Active Problems:    Stented coronary artery    Crohn's disease (CMS/HCC)    Vivien Miramontes is a 64 yo female who is s/p robotic incisional hernia repair with mesh and extensive ARNOLD.     Neuro:   Acute post-op pain, new AMS  - STAT CTH ordered  - U TOX pending  - OOB/ ambulate 5x per day   - hold off on narcotics given AMS    CV:  Hx: CAD, MI x3 s.p PCI to LCX and LAD, dyslipidemia, HTN  ##Hypotension now with HTN  - VS every 4 hours   - VSS  - Plavix on hold d/t drop in H/H  - Resume carvedilol, but continue to hold hydrochlorothiazide and lisinopril in setting of resolving FLORENCE    Pulm: New O2 requirements 3L NC  - CXR pending   - Maintain SpO2 >92%  - IS every hour while awake   - Pulse ox every 4 hours with VS     GI: abdomen soft, minimally distended, appropriately tender, Lap sites with Dermabond, C/D/I, edges well approximated, minor bruising without drainage or hematoma.   Hx: UC, Crohns disease, colonic perforation  POD #2 s/p robotic recurrent incisional hernia, mesh placement, extensive ARNOLD,   - Cardiac diet  - Ensure surgery TID  - continue to wear abdominal binder  - PRN antiemetic   - oob and walking as much as possible   - miralax     : voiding without issue   FLORENCE, resolving   - Cr 1.07 from 1.29  - Monitor I&Os     HEME: DVT Proph   Acute post-op anemia  - SCDs/ ambulate/heparin  - H/H 8.8/28  - no s/s of bleeding   - Continue Venofer     Endo: Hypoglycemia  - Hypoglycemia protocol   - Maintain euglycemia     ID: afebrile  - WBC 9.9  - Monitor for s/s infection    Dispo: Consult Medicine, CTH, CXR and Utox pending.     Patient discussed with Dr Gab ZHENG spent 60 minutes in the professional and overall care of this patient.      Nette Damon APRN-CNP

## 2023-12-10 NOTE — NURSING NOTE
Patient able to  be weaned off of o2 sats 92-93% on room air respirations even/unlabored no s/s distress will be able to monitor

## 2023-12-10 NOTE — NURSING NOTE
Patient returned to unit from CT cont to desat on room air 80% o2 reapplied 3L nc to maintain sats >93% patient a&0 x3-4 1on1 sitter maintained at bedside for safety will cont to monitor

## 2023-12-10 NOTE — PROGRESS NOTES
"Vivien Miramontes is a 63 y.o. female on day 2 of admission presenting with Ventral hernia without obstruction or gangrene.    Assessment/Plan   Check urine tox screen.  CT of head.  Unclear if she has a history of alcohol.  May want to institute [facility] protocol.  Internal medicine consult    Subjective   Patient seems a little off today.  Confused about basic events.       Objective     Physical Exam  NAD  A&Ox3  Non icteric  CTA  RR  Abdomen soft mild distended.  No peritoneal signs.  Wound is nicely healed  Extremities warm, well perfused     Last Recorded Vitals  Blood pressure 161/64, pulse 101, temperature 38.1 °C (100.5 °F), resp. rate 16, height 1.549 m (5' 1\"), weight 64.5 kg (142 lb 3.2 oz), SpO2 94 %.  Intake/Output last 3 Shifts:  I/O last 3 completed shifts:  In: 2104.2 (32.6 mL/kg) [P.O.:240; I.V.:1364.2 (21.2 mL/kg); IV Piggyback:500]  Out: 1300 (20.2 mL/kg) [Urine:1300 (0.6 mL/kg/hr)]  Weight: 64.5 kg     Relevant Results    Scheduled medications  aspirin, 81 mg, oral, Daily  carvedilol, 25 mg, oral, BID with meals  [Held by provider] clopidogrel, 75 mg, oral, Daily  ezetimibe, 10 mg, oral, Daily  famotidine, 20 mg, oral, BID  heparin (porcine), 5,000 Units, subcutaneous, q8h  [Held by provider] hydroCHLOROthiazide, 25 mg, oral, Daily  iron sucrose, 200 mg, intravenous, Daily  [Held by provider] lisinopril, 40 mg, oral, Daily  pantoprazole, 40 mg, oral, Daily before breakfast  polyethylene glycol, 17 g, oral, Daily  pregabalin, 75 mg, oral, Daily  psyllium, 1 packet, oral, Daily  rosuvastatin, 40 mg, oral, Nightly      Continuous medications  sodium chloride 0.9%, 75 mL/hr, Last Rate: Stopped (12/09/23 2015)      PRN medications  PRN medications: dextrose 10 % in water (D10W), dextrose, glucagon, [Held by provider] HYDROmorphone, hydrOXYzine HCL, naloxone, nitroglycerin, ondansetron, [Held by provider] oxyCODONE, [Held by provider] oxyCODONE    Results for orders placed or performed during the hospital " encounter of 12/08/23 (from the past 24 hour(s))   CBC   Result Value Ref Range    WBC 9.9 4.4 - 11.3 x10*3/uL    nRBC 0.0 0.0 - 0.0 /100 WBCs    RBC 3.03 (L) 4.00 - 5.20 x10*6/uL    Hemoglobin 8.8 (L) 12.0 - 16.0 g/dL    Hematocrit 28.2 (L) 36.0 - 46.0 %    MCV 93 80 - 100 fL    MCH 29.0 26.0 - 34.0 pg    MCHC 31.2 (L) 32.0 - 36.0 g/dL    RDW 13.7 11.5 - 14.5 %    Platelets 195 150 - 450 x10*3/uL   Basic metabolic panel   Result Value Ref Range    Glucose 84 74 - 99 mg/dL    Sodium 138 136 - 145 mmol/L    Potassium 4.0 3.5 - 5.3 mmol/L    Chloride 109 (H) 98 - 107 mmol/L    Bicarbonate 20 (L) 21 - 32 mmol/L    Anion Gap 13 10 - 20 mmol/L    Urea Nitrogen 21 6 - 23 mg/dL    Creatinine 1.07 (H) 0.50 - 1.05 mg/dL    eGFR 58 (L) >60 mL/min/1.73m*2    Calcium 7.8 (L) 8.6 - 10.3 mg/dL   POCT GLUCOSE   Result Value Ref Range    POCT Glucose 62 (L) 74 - 99 mg/dL   POCT GLUCOSE   Result Value Ref Range    POCT Glucose 144 (H) 74 - 99 mg/dL           I spent 25 minutes in the professional and overall care of this patient.      Suresh De Guzman MD

## 2023-12-10 NOTE — NURSING NOTE
Patient BG 62 refusing iv glucose intervention stated that she will drink juice and eat dinner when her tray arrives insisting to go home stating that we are trying to keep her here and are making up things to keep her here patient reassured that she will be discharged once medically cleared patient remains upset juice and crackers given as requested sitter remains at bedside for safety no s/s distress noted

## 2023-12-10 NOTE — CONSULTS
Medical consult,    Reason For Consult  Postoperative patient is confused, and shortness of breath hypoxia,    History Of Present Illness  Vivien Miramontes is a 63 y.o. female presenting with she was admitted by surgical team, she had robotic assisted hernia surgery with mesh .  Surgery done, today postoperatively she was confused, and also she was hypoxic, and shortness of breath, we will ask for medical consult.  He is a smoker.  Apparently he did leave her room and came back and her urine tox screen was positive for fentanyl.  She complains shortness of breath.  No chest pain.  No fever no chills.       Past Medical History  She has a past medical history of Coronary artery disease, Crohn's disease (CMS/HCC), Depression, Hyperlipidemia, Hypertension, IBS (irritable bowel syndrome), Lung nodules, Myocardial infarction (CMS/HCC), and Ulcerative colitis (CMS/HCC).    Surgical History  She has a past surgical history that includes Colonoscopy; Colostomy (); Revision / takedown colostomy (); Cardiac catheterization (); Cardiac catheterization (2023);  section, low transverse (); Abdominal hernia repair (); Umbilical hernia repair; and Tubal ligation ().     Social History  She reports that she has been smoking cigarettes. She has a 12.50 pack-year smoking history. She has never used smokeless tobacco. She reports that she does not currently use alcohol. She reports that she does not currently use drugs.    Family History  Family History   Problem Relation Name Age of Onset    Stroke Mother      Hypertension Mother      Heart attack Mother      Heart attack Father      Diabetes Father      Multiple sclerosis Sister      Breast cancer Sister      Diabetes Brother          Allergies  Penicillins    Review of Systems  Less shortness of breath no chest pain no fever no chills no cough.  Mild confusion no headache no focal weakness.  No nausea or abdominal pain is better  No falls  noted       Physical Exam  Awake comfortable oriented to 2.  HEENT unremarkable.  Neck no JVD.  Heart S1-S2.  Lungs reduced air entry.  Abdomen is soft with a binder.  Legs no edema.  No focal deficits.         Last Recorded Vitals  /77   Pulse 98   Temp 37.6 °C (99.7 °F) (Temporal)   Resp 18   Wt 64.5 kg (142 lb 3.2 oz)   SpO2 100%     Relevant Results     Assessment/Plan     63-year-old -American female, who is a smoker, also known to have history of coronary artery disease, s/p PCI, hypertension, hyperlipidemia, she was admitted for hernia surgery,, herniorrhaphy done, postoperatively today she was very confused hypoxic, she was put on 3 L of oxygen, we were asked to be on medical consult.  Hypoxia, in a smoker, especially after the abdomen surgery, with the binder, we will add aerosols, also high suspicion for pneumonia, she is allergic to penicillin after the cultures levofloxacin was started CAT scan of the chest was done to rule out PE, also noted to be having high troponin EKG cardiology consult and trending the troponin,.  She is already on aspirin.    She is very confused she is with a sitter, also given Ativan per their request.  DVT prophylaxis    Kary Haynes MD

## 2023-12-11 ENCOUNTER — APPOINTMENT (OUTPATIENT)
Dept: CARDIOLOGY | Facility: HOSPITAL | Age: 63
DRG: 336 | End: 2023-12-11
Payer: COMMERCIAL

## 2023-12-11 LAB
ANION GAP SERPL CALC-SCNC: 8 MMOL/L (ref 10–20)
AORTIC VALVE MEAN GRADIENT: 4
AORTIC VALVE PEAK VELOCITY: 1.29
ATRIAL RATE: 89 BPM
AV PEAK GRADIENT: 6.7
AVA (PEAK VEL): 2.54
AVA (VTI): 2.24
BLOOD EXPIRATION DATE: NORMAL
BNP SERPL-MCNC: 128 PG/ML (ref 0–99)
BUN SERPL-MCNC: 12 MG/DL (ref 6–23)
CALCIUM SERPL-MCNC: 8.1 MG/DL (ref 8.6–10.3)
CHLORIDE SERPL-SCNC: 105 MMOL/L (ref 98–107)
CO2 SERPL-SCNC: 25 MMOL/L (ref 21–32)
CREAT SERPL-MCNC: 0.85 MG/DL (ref 0.5–1.05)
DISPENSE STATUS: NORMAL
EJECTION FRACTION APICAL 4 CHAMBER: 56.3
EJECTION FRACTION: 59
ERYTHROCYTE [DISTWIDTH] IN BLOOD BY AUTOMATED COUNT: 13.6 % (ref 11.5–14.5)
GFR SERPL CREATININE-BSD FRML MDRD: 77 ML/MIN/1.73M*2
GLUCOSE BLD MANUAL STRIP-MCNC: 104 MG/DL (ref 74–99)
GLUCOSE BLD MANUAL STRIP-MCNC: 89 MG/DL (ref 74–99)
GLUCOSE BLD MANUAL STRIP-MCNC: 94 MG/DL (ref 74–99)
GLUCOSE BLD MANUAL STRIP-MCNC: 98 MG/DL (ref 74–99)
GLUCOSE SERPL-MCNC: 91 MG/DL (ref 74–99)
HCT VFR BLD AUTO: 23.2 % (ref 36–46)
HGB BLD-MCNC: 7.5 G/DL (ref 12–16)
LEFT ATRIUM VOLUME AREA LENGTH INDEX BSA: 37.8
LEFT VENTRICLE INTERNAL DIMENSION DIASTOLE: 5.1 (ref 3.5–6)
LEFT VENTRICULAR OUTFLOW TRACT DIAMETER: 2.1
MCH RBC QN AUTO: 28.8 PG (ref 26–34)
MCHC RBC AUTO-ENTMCNC: 32.3 G/DL (ref 32–36)
MCV RBC AUTO: 89 FL (ref 80–100)
MITRAL VALVE E/A RATIO: 1
MITRAL VALVE E/E' RATIO: 10.71
NRBC BLD-RTO: 0 /100 WBCS (ref 0–0)
P AXIS: 41 DEGREES
P OFFSET: 209 MS
P ONSET: 156 MS
PLATELET # BLD AUTO: 196 X10*3/UL (ref 150–450)
POTASSIUM SERPL-SCNC: 3.7 MMOL/L (ref 3.5–5.3)
PR INTERVAL: 142 MS
PRODUCT BLOOD TYPE: 7300
PRODUCT CODE: NORMAL
Q ONSET: 227 MS
QRS COUNT: 15 BEATS
QRS DURATION: 90 MS
QT INTERVAL: 372 MS
QTC CALCULATION(BAZETT): 452 MS
QTC FREDERICIA: 424 MS
R AXIS: 14 DEGREES
RBC # BLD AUTO: 2.6 X10*6/UL (ref 4–5.2)
RIGHT VENTRICLE FREE WALL PEAK S': 16
SODIUM SERPL-SCNC: 134 MMOL/L (ref 136–145)
T AXIS: 3 DEGREES
T OFFSET: 413 MS
TRICUSPID ANNULAR PLANE SYSTOLIC EXCURSION: 2.2
UNIT ABO: NORMAL
UNIT NUMBER: NORMAL
UNIT RH: NORMAL
UNIT VOLUME: 350
VENTRICULAR RATE: 89 BPM
WBC # BLD AUTO: 9.6 X10*3/UL (ref 4.4–11.3)
XM INTEP: NORMAL

## 2023-12-11 PROCEDURE — 2500000004 HC RX 250 GENERAL PHARMACY W/ HCPCS (ALT 636 FOR OP/ED): Performed by: NURSE PRACTITIONER

## 2023-12-11 PROCEDURE — 82374 ASSAY BLOOD CARBON DIOXIDE: CPT | Performed by: INTERNAL MEDICINE

## 2023-12-11 PROCEDURE — 2500000004 HC RX 250 GENERAL PHARMACY W/ HCPCS (ALT 636 FOR OP/ED): Performed by: SURGERY

## 2023-12-11 PROCEDURE — 36430 TRANSFUSION BLD/BLD COMPNT: CPT

## 2023-12-11 PROCEDURE — 82947 ASSAY GLUCOSE BLOOD QUANT: CPT

## 2023-12-11 PROCEDURE — 96372 THER/PROPH/DIAG INJ SC/IM: CPT | Performed by: SURGERY

## 2023-12-11 PROCEDURE — 93306 TTE W/DOPPLER COMPLETE: CPT | Performed by: INTERNAL MEDICINE

## 2023-12-11 PROCEDURE — 99222 1ST HOSP IP/OBS MODERATE 55: CPT | Performed by: INTERNAL MEDICINE

## 2023-12-11 PROCEDURE — P9016 RBC LEUKOCYTES REDUCED: HCPCS

## 2023-12-11 PROCEDURE — 2500000001 HC RX 250 WO HCPCS SELF ADMINISTERED DRUGS (ALT 637 FOR MEDICARE OP): Performed by: SURGERY

## 2023-12-11 PROCEDURE — 1200000002 HC GENERAL ROOM WITH TELEMETRY DAILY

## 2023-12-11 PROCEDURE — 93306 TTE W/DOPPLER COMPLETE: CPT

## 2023-12-11 PROCEDURE — 85027 COMPLETE CBC AUTOMATED: CPT | Performed by: INTERNAL MEDICINE

## 2023-12-11 PROCEDURE — 93005 ELECTROCARDIOGRAM TRACING: CPT

## 2023-12-11 PROCEDURE — 36415 COLL VENOUS BLD VENIPUNCTURE: CPT | Performed by: INTERNAL MEDICINE

## 2023-12-11 PROCEDURE — 83880 ASSAY OF NATRIURETIC PEPTIDE: CPT

## 2023-12-11 PROCEDURE — 2500000002 HC RX 250 W HCPCS SELF ADMINISTERED DRUGS (ALT 637 FOR MEDICARE OP, ALT 636 FOR OP/ED): Performed by: SURGERY

## 2023-12-11 PROCEDURE — 99232 SBSQ HOSP IP/OBS MODERATE 35: CPT | Performed by: NURSE PRACTITIONER

## 2023-12-11 PROCEDURE — 2500000001 HC RX 250 WO HCPCS SELF ADMINISTERED DRUGS (ALT 637 FOR MEDICARE OP): Performed by: NURSE PRACTITIONER

## 2023-12-11 PROCEDURE — 99231 SBSQ HOSP IP/OBS SF/LOW 25: CPT | Performed by: SURGERY

## 2023-12-11 PROCEDURE — 2500000001 HC RX 250 WO HCPCS SELF ADMINISTERED DRUGS (ALT 637 FOR MEDICARE OP): Performed by: PHARMACIST

## 2023-12-11 RX ORDER — LEVOFLOXACIN 5 MG/ML
750 INJECTION, SOLUTION INTRAVENOUS EVERY 24 HOURS
Status: DISCONTINUED | OUTPATIENT
Start: 2023-12-11 | End: 2023-12-11

## 2023-12-11 RX ORDER — TRAMADOL HYDROCHLORIDE 50 MG/1
50 TABLET ORAL EVERY 6 HOURS PRN
Status: DISCONTINUED | OUTPATIENT
Start: 2023-12-11 | End: 2023-12-12 | Stop reason: HOSPADM

## 2023-12-11 RX ADMIN — HEPARIN SODIUM 5000 UNITS: 5000 INJECTION INTRAVENOUS; SUBCUTANEOUS at 01:04

## 2023-12-11 RX ADMIN — ROSUVASTATIN CALCIUM 40 MG: 20 TABLET, FILM COATED ORAL at 21:18

## 2023-12-11 RX ADMIN — HEPARIN SODIUM 5000 UNITS: 5000 INJECTION INTRAVENOUS; SUBCUTANEOUS at 17:15

## 2023-12-11 RX ADMIN — IRON SUCROSE 200 MG: 20 INJECTION, SOLUTION INTRAVENOUS at 05:16

## 2023-12-11 RX ADMIN — CARVEDILOL 25 MG: 25 TABLET, FILM COATED ORAL at 08:15

## 2023-12-11 RX ADMIN — ASPIRIN 81 MG: 81 TABLET, COATED ORAL at 08:15

## 2023-12-11 RX ADMIN — HEPARIN SODIUM 5000 UNITS: 5000 INJECTION INTRAVENOUS; SUBCUTANEOUS at 08:16

## 2023-12-11 RX ADMIN — TRAMADOL HYDROCHLORIDE 50 MG: 50 TABLET, COATED ORAL at 09:59

## 2023-12-11 RX ADMIN — TRAMADOL HYDROCHLORIDE 50 MG: 50 TABLET, COATED ORAL at 21:18

## 2023-12-11 RX ADMIN — HYDROCHLOROTHIAZIDE 25 MG: 25 TABLET ORAL at 08:15

## 2023-12-11 RX ADMIN — CARVEDILOL 25 MG: 25 TABLET, FILM COATED ORAL at 17:15

## 2023-12-11 RX ADMIN — EZETIMIBE 10 MG: 10 TABLET ORAL at 08:15

## 2023-12-11 RX ADMIN — PREGABALIN 75 MG: 75 CAPSULE ORAL at 08:15

## 2023-12-11 ASSESSMENT — PAIN - FUNCTIONAL ASSESSMENT
PAIN_FUNCTIONAL_ASSESSMENT: 0-10

## 2023-12-11 ASSESSMENT — COGNITIVE AND FUNCTIONAL STATUS - GENERAL
PERSONAL GROOMING: A LITTLE
DRESSING REGULAR LOWER BODY CLOTHING: A LITTLE
MOVING TO AND FROM BED TO CHAIR: A LITTLE
DAILY ACTIVITIY SCORE: 19
DRESSING REGULAR LOWER BODY CLOTHING: A LITTLE
DRESSING REGULAR UPPER BODY CLOTHING: A LITTLE
TOILETING: A LITTLE
DAILY ACTIVITIY SCORE: 18
DRESSING REGULAR UPPER BODY CLOTHING: A LITTLE
PERSONAL GROOMING: A LITTLE
MOVING FROM LYING ON BACK TO SITTING ON SIDE OF FLAT BED WITH BEDRAILS: A LITTLE
STANDING UP FROM CHAIR USING ARMS: A LITTLE
HELP NEEDED FOR BATHING: A LITTLE
HELP NEEDED FOR BATHING: A LITTLE
MOBILITY SCORE: 19
TOILETING: A LITTLE
WALKING IN HOSPITAL ROOM: A LITTLE
EATING MEALS: A LITTLE
TURNING FROM BACK TO SIDE WHILE IN FLAT BAD: A LITTLE
MOBILITY SCORE: 24

## 2023-12-11 ASSESSMENT — PAIN SCALES - GENERAL
PAINLEVEL_OUTOF10: 3
PAINLEVEL_OUTOF10: 9
PAINLEVEL_OUTOF10: 10 - WORST POSSIBLE PAIN

## 2023-12-11 ASSESSMENT — PAIN DESCRIPTION - LOCATION
LOCATION: ABDOMEN
LOCATION: BACK

## 2023-12-11 NOTE — PROGRESS NOTES
Multiple attempts to talk to patient and unsuccessful.  Patient to have an echo and receive an unit of blood.  Cardiology and general surgery following.  alexandria Zavala , reached out to [facility] and requesting  'discharge summary be faxed to him upon discharge at [phone].  [facility] to follow for homecare needs.  Penny Irene RN

## 2023-12-11 NOTE — PROGRESS NOTES
"Vivien Miramontes is a 63 y.o. female on day 3 of admission presenting with Ventral hernia without obstruction or gangrene.    Subjective   Alert and oriented this morning, sitter at bedside. Reporting abdominal pain that is not controlled with tylenol. Asking when she can go home. Tolerating diet without n/v. On 2L NC, NAD.        Objective     Physical Exam  Constitutional:       Appearance: Normal appearance.   Eyes:      Conjunctiva/sclera: Conjunctivae normal.   Cardiovascular:      Rate and Rhythm: Normal rate and regular rhythm.      Heart sounds: Normal heart sounds.   Pulmonary:      Effort: Pulmonary effort is normal.      Breath sounds: Normal breath sounds.      Comments: On 2L NC, appears SOB   Abdominal:      General: Abdomen is flat.      Comments: Minimally distended, appropriately tender, Lap sites with Dermabond, C/D/I, edges well approximated, minor bruising without drainage or hematoma. Abdominal binder in place    Genitourinary:     Comments: Voiding without difficulty    Skin:     General: Skin is warm and dry.   Neurological:      Mental Status: She is oriented to person, place, and time.   Psychiatric:         Mood and Affect: Mood normal.         Behavior: Behavior normal.         Last Recorded Vitals  Blood pressure 147/66, pulse 77, temperature 36.8 °C (98.2 °F), temperature source Temporal, resp. rate 18, height 1.549 m (5' 1\"), weight 64.5 kg (142 lb 3.2 oz), SpO2 95 %.  Intake/Output last 3 Shifts:  I/O last 3 completed shifts:  In: 1438.3 (22.3 mL/kg) [I.V.:1288.3 (20 mL/kg); IV Piggyback:150]  Out: 1800 (27.9 mL/kg) [Urine:1800 (0.8 mL/kg/hr)]  Weight: 64.5 kg     Scheduled medications  aspirin, 81 mg, oral, Daily  carvedilol, 25 mg, oral, BID with meals  [Held by provider] clopidogrel, 75 mg, oral, Daily  ezetimibe, 10 mg, oral, Daily  famotidine, 20 mg, oral, BID  heparin (porcine), 5,000 Units, subcutaneous, q8h  hydroCHLOROthiazide, 25 mg, oral, Daily  iron sucrose, 200 mg, " intravenous, Daily  levoFLOXacin, 750 mg, intravenous, q24h  [Held by provider] lisinopril, 40 mg, oral, Daily  nicotine, 1 patch, transdermal, Daily  pantoprazole, 40 mg, oral, Daily before breakfast  polyethylene glycol, 17 g, oral, Daily  pregabalin, 75 mg, oral, Daily  psyllium, 1 packet, oral, Daily  rosuvastatin, 40 mg, oral, Nightly      Continuous medications  D5 % and 0.9 % sodium chloride, 50 mL/hr, Last Rate: 50 mL/hr (12/11/23 0600)      PRN medications  PRN medications: acetaminophen **OR** acetaminophen **OR** acetaminophen, dextrose 10 % in water (D10W), dextrose 10 % in water (D10W), dextrose, dextrose, glucagon, glucagon, hydrOXYzine HCL, ipratropium-albuteroL, nitroglycerin, ondansetron, traMADol    Relevant Results  Results for orders placed or performed during the hospital encounter of 12/08/23 (from the past 24 hour(s))   Urinalysis with Reflex Microscopic   Result Value Ref Range    Color, Urine Yellow Straw, Yellow    Appearance, Urine Clear Clear    Specific Gravity, Urine 1.010 1.005 - 1.035    pH, Urine 6.5 5.0, 5.5, 6.0, 6.5, 7.0, 7.5, 8.0    Protein, Urine NEGATIVE NEGATIVE, 10 (TRACE) mg/dL    Glucose, Urine 300 (3+) (A) NEGATIVE mg/dL    Blood, Urine NEGATIVE NEGATIVE    Ketones, Urine NEGATIVE NEGATIVE mg/dL    Bilirubin, Urine NEGATIVE NEGATIVE    Urobilinogen, Urine NORM NORM mg/dL    Nitrite, Urine NEGATIVE NEGATIVE    Leukocyte Esterase, Urine NEGATIVE NEGATIVE   Blood Culture    Specimen: Peripheral Venipuncture; Blood culture   Result Value Ref Range    Blood Culture Loaded on Instrument - Culture in progress    Troponin I, High Sensitivity   Result Value Ref Range    Troponin I, High Sensitivity 336 (HH) 0 - 13 ng/L   Drug Screen, Urine   Result Value Ref Range    Amphetamine Screen, Urine Presumptive Negative Presumptive Negative    Barbiturate Screen, Urine Presumptive Negative Presumptive Negative    Benzodiazepines Screen, Urine Presumptive Negative Presumptive Negative     Cannabinoid Screen, Urine Presumptive Negative Presumptive Negative    Cocaine Metabolite Screen, Urine Presumptive Negative Presumptive Negative    Fentanyl Screen, Urine Presumptive Positive (A) Presumptive Negative    Opiate Screen, Urine Presumptive Negative Presumptive Negative    Oxycodone Screen, Urine Presumptive Positive (A) Presumptive Negative    PCP Screen, Urine Presumptive Negative Presumptive Negative   POCT GLUCOSE   Result Value Ref Range    POCT Glucose 65 (L) 74 - 99 mg/dL   Troponin I, High Sensitivity   Result Value Ref Range    Troponin I, High Sensitivity 162 (HH) 0 - 13 ng/L   POCT GLUCOSE   Result Value Ref Range    POCT Glucose 118 (H) 74 - 99 mg/dL   CBC   Result Value Ref Range    WBC 9.6 4.4 - 11.3 x10*3/uL    nRBC 0.0 0.0 - 0.0 /100 WBCs    RBC 2.60 (L) 4.00 - 5.20 x10*6/uL    Hemoglobin 7.5 (L) 12.0 - 16.0 g/dL    Hematocrit 23.2 (L) 36.0 - 46.0 %    MCV 89 80 - 100 fL    MCH 28.8 26.0 - 34.0 pg    MCHC 32.3 32.0 - 36.0 g/dL    RDW 13.6 11.5 - 14.5 %    Platelets 196 150 - 450 x10*3/uL   Basic Metabolic Panel   Result Value Ref Range    Glucose 91 74 - 99 mg/dL    Sodium 134 (L) 136 - 145 mmol/L    Potassium 3.7 3.5 - 5.3 mmol/L    Chloride 105 98 - 107 mmol/L    Bicarbonate 25 21 - 32 mmol/L    Anion Gap 8 (L) 10 - 20 mmol/L    Urea Nitrogen 12 6 - 23 mg/dL    Creatinine 0.85 0.50 - 1.05 mg/dL    eGFR 77 >60 mL/min/1.73m*2    Calcium 8.1 (L) 8.6 - 10.3 mg/dL   ECG 12 Lead   Result Value Ref Range    Ventricular Rate 89 BPM    Atrial Rate 89 BPM    KS Interval 142 ms    QRS Duration 90 ms    QT Interval 372 ms    QTC Calculation(Bazett) 452 ms    P Axis 41 degrees    R Axis 14 degrees    T Axis 3 degrees    QRS Count 15 beats    Q Onset 227 ms    P Onset 156 ms    P Offset 209 ms    T Offset 413 ms    QTC Fredericia 424 ms   POCT GLUCOSE   Result Value Ref Range    POCT Glucose 104 (H) 74 - 99 mg/dL       Assessment/Plan   Principal Problem:    Ventral hernia without obstruction or  gangrene  Active Problems:    Stented coronary artery    Crohn's disease (CMS/HCC)    Vivien Miramontes is a 64 yo female who is s/p robotic incisional hernia repair with mesh and extensive ARNOLD.     Neuro:   AMS- resolved, Acute post-op pain   - OOB/ ambulate 5x per day   - tramadol added for severe pain     CV:   Elevated troponins- downtrending   Hx: CAD, MI x3 s.p PCI to LCX and LAD, dyslipidemia, HTN  - VS every 4 hours   - VSS  - cardiology consulted- appreciate recs  - plavix on hold d/t drop in H/H  - held carvedilol, hztz, lisinopril     Pulm: on 2L NC, lungs CTAB  - CT negative for PE, suspected PNA  - Continue levaquin per medicine recs  - IS every hour while awake   - Pulse ox every 4 hours with VS   - wean O2 as able     GI: abdomen soft, minimally distended, appropriately tender, Lap sites with Dermabond, C/D/I, edges well approximated, minor bruising without drainage or hematoma.   Hx: UC, Crohns disease, colonic perforation  POD #3 s/p robotic recurrent incisional hernia, mesh placement, extensive ARNOLD,   - regular diet  - Ensure surgery TID  - continue to wear abdominal binder  - PRN antiemetic   - oob and walking as much as possible   - miralax QD      : voiding without issue   FLORENCE- resolved  - Cr 0.85- back to baseline  - IVF  bolus ordered  - Monitor I&Os       HEME: DVT Proph   Acute post-op anemia  - SCDs/ ambulate/heparin  - H/H 7.5/23.2 from 8.8/28.2  - 1 unit PRBC ordered  - no s/s of bleeding   - continue iron       Endo:   -no active issues    ID: afebrile   - WBC 14.3- likely reactive 2/2 surgery   - Monitor for s/s infection    Dispo: 1 unit of PRBCs ordered, tramadol added for pain control. Appreciate medicine and cardiology assistance and recs     I spent 30 minutes in the professional and overall care of this patient.      Petra Hung, APRN-CNP

## 2023-12-11 NOTE — CONSULTS
Inpatient consult to cardiology  Consult performed by: Pretty Meeks, APRN-CNP  Consult ordered by: Kary Haynes MD  Reason for consult: sob/ elevated trop          History Of Present Illness:    Vivien Miramontes is a 63 y.o. female with a past medical history of CAD s/p remote PCI (2012)> unknown anatomy, NSTEMI s/p PCI to the LAD and left circumflex (6/23) at [facility], last echo reveals LVEF down to 45% (6/23), gastrointestinal bleeding, Crohn's disease on Stelara, hypertension, hyperlipidemia, nicotine use, presents for hernia repair surgery.  Patient underwent robotic incisional hernia repair (12/8/23) course c/b postop confusion, dyspnea and elevated troponins.  Patient was on DAPT therapy with aspirin and Plavix s/p PCI since 6/23, for planned procedure she continued aspirin and took last dose of Plavix 12/2/23.  Initial EKG reveals sinus rhythm heart rate 89, CT angio of the chest to rule out PE was negative for PE, revealed patchy groundglass opacities throughout periphery, CT of head revealed no acute intercranial abnormalities or hemorrhage, troponins 336, 162, arrival hemoglobin 9.2, now 7.5.  Cardiology consulted for further evaluation of shortness of breath/history of CAD/elevated troponins.    [place] [facility] reports that she is here for planned hernia surgery in which she underwent procedure 12/8/23.  She reports feeling very fatigued and has pain all over.  She reports chest discomfort with coughing> causing a chest soreness sensation> reports that chest discomfort is not equivalent to pain experienced during MI requiring stents 6/23.  She denies any associated symptoms of dyspnea, palpitations, heart racing, nausea, vomiting, diaphoresis, PND or orthopnea.  Patient currently has supplemental oxygen in place via  nasal cannula 2 L, does not wear at home and states she needs it.  In addition, yesterday it was noted by nursing staff that patient had left  her room for a short while, when she returned, she was confused, combative and hypoxic requiring a sitter and oxygen> urine drug screen revealed fentanyl and oxycodone ; she has been receiving Dilaudid IV, but last dose of fentanyl was 12/8/2023.  Currently, not on telemetry.  At this time patient is alert, oriented, calm and cooperative.    In addition, patient reports that she lives alone, she takes all medications without missing doses.  She is followed by cardiology at the [facility].      All other systems reviewed and negative unless as mentioned in HPI.      CV medications:  Coreg 6.25 mg p.o. twice daily, aspirin 81 mg p.o. daily, Zetia 10 mg p.o. daily, nitroglycerin as needed, Crestor 40 mg p.o. daily, hydrochlorothiazide 25 mg p.o. daily, lisinopril 40 mg p.o. daily, Plavix 75 mg p.o. daily      Past Medical/ Surgical History:  CAD s/p remote PCI (2012)> unknown anatomy  NSTEMI s/p PCI to the LAD and left circumflex (6/23)  Gastrointestinal bleeding  Crohn's disease on Stelara  Hypertension  Hyperlipidemia      Social History:  Current smoker, smokes half a pack per day x 40 years  Denies alcohol or illicit drug use    Family History:  Reviewed, not pertinent to presenting problem          Family History   Problem Relation Name Age of Onset    Stroke Mother      Hypertension Mother      Heart attack Mother      Heart attack Father      Diabetes Father      Multiple sclerosis Sister      Breast cancer Sister      Diabetes Brother          Allergies:  Penicillins    ROS:  10 point review of systems including (Constitutional, Eyes, ENMT, Respiratory, Cardiac, Gastrointestinal, Neurological, Psychiatric, and Hematologic) was performed and is otherwise negative.    Objective Data:  Last Recorded Vitals:  Vitals:    12/10/23 2001 12/10/23 2335 12/11/23 0400 12/11/23 0830   BP: 111/71 113/70 120/72 147/66   BP Location:    Right arm   Patient Position:    Sitting   Pulse: 80 70 72 77   Resp: 18 18 18 18    Temp: 37.1 °C (98.7 °F) 36.5 °C (97.7 °F) 36.4 °C (97.6 °F) 36.8 °C (98.2 °F)   TempSrc: Temporal Temporal Temporal Temporal   SpO2: 97% 98% 98% 95%   Weight:       Height:         Medical Gas Therapy: Supplemental oxygen  O2 Delivery Method: Nasal cannula  Weight  Av.5 kg (142 lb 3.2 oz)  Min: 64.5 kg (142 lb 3.2 oz)  Max: 64.5 kg (142 lb 3.2 oz)      LABS:  CMP:  Results from last 7 days   Lab Units 23  0559 12/10/23  0627 23  0518   SODIUM mmol/L 134* 138 137   POTASSIUM mmol/L 3.7 4.0 4.7   CHLORIDE mmol/L 105 109* 108*   CO2 mmol/L 25 20* 20*   ANION GAP mmol/L 8* 13 14   BUN mg/dL 12 21 20   CREATININE mg/dL 0.85 1.07* 1.29*   EGFR mL/min/1.73m*2 77 58* 46*     CBC:  Results from last 7 days   Lab Units 23  0559 12/10/23  0627 23  0518   WBC AUTO x10*3/uL 9.6 9.9 14.3*   HEMOGLOBIN g/dL 7.5* 8.8* 9.2*   HEMATOCRIT % 23.2* 28.2* 29.4*   PLATELETS AUTO x10*3/uL 196 195 245   MCV fL 89 93 92     COAG:     ABO:   ABO TYPE   Date Value Ref Range Status   2023 B  Final     HEME/ENDO:     CARDIAC:   Results from last 7 days   Lab Units 12/10/23  1815 12/10/23  1024   TROPHS ng/L 162* 336*             Last I/O:    Intake/Output Summary (Last 24 hours) at 2023 0936  Last data filed at 2023 0600  Gross per 24 hour   Intake 1118.33 ml   Output 1400 ml   Net -281.67 ml     Net IO Since Admission: 1,670.83 mL [23 0936]      Imaging Results:  No results found.    Inpatient Medications:  Scheduled medications   Medication Dose Route Frequency    aspirin  81 mg oral Daily    carvedilol  25 mg oral BID with meals    [Held by provider] clopidogrel  75 mg oral Daily    ezetimibe  10 mg oral Daily    famotidine  20 mg oral BID    heparin (porcine)  5,000 Units subcutaneous q8h    hydroCHLOROthiazide  25 mg oral Daily    iron sucrose  200 mg intravenous Daily    levoFLOXacin  750 mg intravenous q24h    [Held by provider] lisinopril  40 mg oral Daily    nicotine  1 patch  transdermal Daily    pantoprazole  40 mg oral Daily before breakfast    polyethylene glycol  17 g oral Daily    pregabalin  75 mg oral Daily    psyllium  1 packet oral Daily    rosuvastatin  40 mg oral Nightly     PRN medications   Medication    acetaminophen    Or    acetaminophen    Or    acetaminophen    dextrose 10 % in water (D10W)    dextrose 10 % in water (D10W)    dextrose    dextrose    glucagon    glucagon    hydrOXYzine HCL    ipratropium-albuteroL    nitroglycerin    ondansetron     Continuous Medications   Medication Dose Last Rate    D5 % and 0.9 % sodium chloride  50 mL/hr 50 mL/hr (12/11/23 0600)       Inpatient Medications:  Scheduled medications   Medication Dose Route Frequency    aspirin  81 mg oral Daily    carvedilol  25 mg oral BID with meals    [Held by provider] clopidogrel  75 mg oral Daily    ezetimibe  10 mg oral Daily    famotidine  20 mg oral BID    heparin (porcine)  5,000 Units subcutaneous q8h    hydroCHLOROthiazide  25 mg oral Daily    iron sucrose  200 mg intravenous Daily    levoFLOXacin  750 mg intravenous q24h    [Held by provider] lisinopril  40 mg oral Daily    nicotine  1 patch transdermal Daily    pantoprazole  40 mg oral Daily before breakfast    polyethylene glycol  17 g oral Daily    pregabalin  75 mg oral Daily    psyllium  1 packet oral Daily    rosuvastatin  40 mg oral Nightly     PRN medications   Medication    acetaminophen    Or    acetaminophen    Or    acetaminophen    dextrose 10 % in water (D10W)    dextrose 10 % in water (D10W)    dextrose    dextrose    glucagon    glucagon    hydrOXYzine HCL    ipratropium-albuteroL    nitroglycerin    ondansetron     Continuous Medications   Medication Dose Last Rate    D5 % and 0.9 % sodium chloride  50 mL/hr 50 mL/hr (12/11/23 0600)     Outpatient Medications:  Current Outpatient Medications   Medication Instructions    aspirin 81 mg, oral, Daily    carvedilol (COREG) 25 mg, oral, 2 times daily    chlorhexidine (Peridex)  0.12 % solution SWISH AND SPIT 15ML FOR 30 SECONDS NIGHT PRIOR TO SURGERY AND MORNING OF SURGERY    clopidogrel (PLAVIX) 75 mg, oral, Daily    evolocumab (Repatha Syringe) 140 mg/mL injection 1 mL, subcutaneous, Every 14 days    ezetimibe (ZETIA) 10 mg, oral, Daily    famotidine (PEPCID) 20 mg, oral, 2 times daily    hydroCHLOROthiazide (HYDRODIURIL) 25 mg, oral, Daily    hydrOXYzine HCL (Atarax) 25 mg tablet 1 tablet, oral, 3 times daily PRN, May cause drowsiness    lisinopril 40 mg, oral, Daily, as directed    naproxen (NAPROSYN) 250 mg, oral, As needed    nitroglycerin (NITROSTAT) 0.4 mg, sublingual, Dissolve 1 tablet under the tongue as needed for chest pain.<BR>    pregabalin (LYRICA) 75 mg, oral, As needed    rosuvastatin (Crestor) 40 mg tablet 1 tablet, oral, Nightly    Stelara 90 mg, subcutaneous, Every 8 weeks    vitamin E 400 Units, oral, Daily       Physical Exam:  General:  Patient is awake, alert, and oriented.  Patient is in no acute distress.  HEENT:  Pupils equal and reactive.  Normocephalic.  Moist mucosa.    Neck:  No thyromegaly.  Normal Jugular Venous Pressure.  Cardiovascular:  Regular rate and rhythm. No cardiac mumurs noted. Normal S1 and S2.  Pulmonary:  Clear to auscultation bilaterally. Supp oxygen in place  Abdomen:  Soft. Non-tender.   Non-distended.  Positive bowel sounds.  Lower Extremities:  2+ pedal pulses. No LE edema.  Neurologic:  Cranial nerves intact.  No focal deficit.   Skin: Skin warm and dry, normal skin turgor.   Psychiatric: Normal affect.     Assessment/Plan   Vivien Miramontes is a 63 y.o. female with a past medical history of CAD s/p remote PCI (2012)> unknown anatomy, NSTEMI s/p PCI to the LAD and [facility]23[facility], last echo reveals LVEF down to 45% (6/23), gastrointestinal bleeding, Crohn's disease on Stelara, hypertension, hyperlipidemia, nicotine use, presents for hernia repair surgery.  Patient underwent robotic incisional hernia repair  (12/8/23) course c/b postop confusion, dyspnea and elevated troponins.  Patient was on DAPT therapy with aspirin and Plavix s/p PCI since 6/23, for planned procedure she continued aspirin and took last dose of Plavix 12/2/23.  Initial EKG reveals sinus rhythm heart rate 89, CT angio of the chest to rule out PE was negative for PE, revealed patchy groundglass opacities throughout periphery, CT of head revealed no acute intercranial abnormalities or hemorrhage, troponins 336, 162, arrival hemoglobin 9.2, now 7.5.  Cardiology consulted for further evaluation of shortness of breath/history of CAD/elevated troponins.      I reviewed EKG, reveals sinus rhythm heart rate 89  Currently not on telemetry  I reviewed all labs and imaging reports    Dyspnea  Post-op dyspnea in the setting of postop pain requiring IV narcotics, CT imaging revealing probable PNA vs HF> patient euvolemic on exam w/ oxygen that she does not feel she needs, suspicion of substance use during admission> patient reportedly disappeared from room for short period and return hypoxic and confused  Checking BNP> if less than 100 consider PNA eval / tx  Wean oxygen as tolerated    2.  Abnormal troponins in the setting of surgery, anemia, probable PNA versus HF.  Patient denies cardiac chest pain, EKG nonischemic.  Non-MI troponin elevation, core measures do not apply.    3.  CAD s/p PCI  -Continue aspirin, Plavix, statin    4.. ICM (EF reportedly 45% 6/23)  - Repeat Echo pending  - Continue BB    5.  Hypertension. acceptable given current circumstances    6.  Hyperlipidemia.  Continue statin    7.  Anemia> defer to primary for management        Recommendations as above:  Repeat echo  BNP pending  Recommend continuing aspirin and Plavix in the setting of recent cardiac stents> will discuss with surgery team          Code Status:  Full Code            Pretty Meeks, APRN-CNP

## 2023-12-11 NOTE — PROGRESS NOTES
"Vivien Miramontes is a 63 y.o. female on day 3 of admission presenting with Ventral hernia without obstruction or gangrene.    Subjective         , Discussed with the patient and the staff, she is with a sitter, but she is more alert not confused    Objective     Physical Exam  Awake comfortable oriented to 2.  HEENT unremarkable.  Neck no JVD.  Heart S1-S2.  Lungs reduced air entry.  Abdomen is soft with a binder.  Legs no edema.  No focal deficits.  Last Recorded Vitals  Blood pressure 177/81, pulse 69, temperature 36.8 °C (98.2 °F), resp. rate 16, height 1.549 m (5' 1\"), weight 64.4 kg (142 lb), SpO2 94 %.  Intake/Output last 3 Shifts:  I/O last 3 completed shifts:  In: 1828.3 (28.4 mL/kg) [P.O.:360; I.V.:968.3 (15 mL/kg); Blood:350; IV Piggyback:150]  Out: 1700 (26.4 mL/kg) [Urine:1700 (0.7 mL/kg/hr)]  Weight: 64.4 kg     Relevant Results  Results for orders placed or performed during the hospital encounter of 12/08/23 (from the past 96 hour(s))   Type And Screen   Result Value Ref Range    ABO TYPE B     Rh TYPE POS     ANTIBODY SCREEN NEG    CBC   Result Value Ref Range    WBC 14.3 (H) 4.4 - 11.3 x10*3/uL    nRBC 0.0 0.0 - 0.0 /100 WBCs    RBC 3.20 (L) 4.00 - 5.20 x10*6/uL    Hemoglobin 9.2 (L) 12.0 - 16.0 g/dL    Hematocrit 29.4 (L) 36.0 - 46.0 %    MCV 92 80 - 100 fL    MCH 28.8 26.0 - 34.0 pg    MCHC 31.3 (L) 32.0 - 36.0 g/dL    RDW 13.7 11.5 - 14.5 %    Platelets 245 150 - 450 x10*3/uL   Basic Metabolic Panel   Result Value Ref Range    Glucose 132 (H) 74 - 99 mg/dL    Sodium 137 136 - 145 mmol/L    Potassium 4.7 3.5 - 5.3 mmol/L    Chloride 108 (H) 98 - 107 mmol/L    Bicarbonate 20 (L) 21 - 32 mmol/L    Anion Gap 14 10 - 20 mmol/L    Urea Nitrogen 20 6 - 23 mg/dL    Creatinine 1.29 (H) 0.50 - 1.05 mg/dL    eGFR 46 (L) >60 mL/min/1.73m*2    Calcium 7.6 (L) 8.6 - 10.3 mg/dL   CBC   Result Value Ref Range    WBC 9.9 4.4 - 11.3 x10*3/uL    nRBC 0.0 0.0 - 0.0 /100 WBCs    RBC 3.03 (L) 4.00 - 5.20 x10*6/uL    " Hemoglobin 8.8 (L) 12.0 - 16.0 g/dL    Hematocrit 28.2 (L) 36.0 - 46.0 %    MCV 93 80 - 100 fL    MCH 29.0 26.0 - 34.0 pg    MCHC 31.2 (L) 32.0 - 36.0 g/dL    RDW 13.7 11.5 - 14.5 %    Platelets 195 150 - 450 x10*3/uL   Basic metabolic panel   Result Value Ref Range    Glucose 84 74 - 99 mg/dL    Sodium 138 136 - 145 mmol/L    Potassium 4.0 3.5 - 5.3 mmol/L    Chloride 109 (H) 98 - 107 mmol/L    Bicarbonate 20 (L) 21 - 32 mmol/L    Anion Gap 13 10 - 20 mmol/L    Urea Nitrogen 21 6 - 23 mg/dL    Creatinine 1.07 (H) 0.50 - 1.05 mg/dL    eGFR 58 (L) >60 mL/min/1.73m*2    Calcium 7.8 (L) 8.6 - 10.3 mg/dL   POCT GLUCOSE   Result Value Ref Range    POCT Glucose 62 (L) 74 - 99 mg/dL   POCT GLUCOSE   Result Value Ref Range    POCT Glucose 144 (H) 74 - 99 mg/dL   Urinalysis with Reflex Microscopic   Result Value Ref Range    Color, Urine Yellow Straw, Yellow    Appearance, Urine Clear Clear    Specific Gravity, Urine 1.010 1.005 - 1.035    pH, Urine 6.5 5.0, 5.5, 6.0, 6.5, 7.0, 7.5, 8.0    Protein, Urine NEGATIVE NEGATIVE, 10 (TRACE) mg/dL    Glucose, Urine 300 (3+) (A) NEGATIVE mg/dL    Blood, Urine NEGATIVE NEGATIVE    Ketones, Urine NEGATIVE NEGATIVE mg/dL    Bilirubin, Urine NEGATIVE NEGATIVE    Urobilinogen, Urine NORM NORM mg/dL    Nitrite, Urine NEGATIVE NEGATIVE    Leukocyte Esterase, Urine NEGATIVE NEGATIVE   Blood Culture    Specimen: Peripheral Venipuncture; Blood culture   Result Value Ref Range    Blood Culture Loaded on Instrument - Culture in progress    Troponin I, High Sensitivity   Result Value Ref Range    Troponin I, High Sensitivity 336 (HH) 0 - 13 ng/L   Drug Screen, Urine   Result Value Ref Range    Amphetamine Screen, Urine Presumptive Negative Presumptive Negative    Barbiturate Screen, Urine Presumptive Negative Presumptive Negative    Benzodiazepines Screen, Urine Presumptive Negative Presumptive Negative    Cannabinoid Screen, Urine Presumptive Negative Presumptive Negative    Cocaine Metabolite  Screen, Urine Presumptive Negative Presumptive Negative    Fentanyl Screen, Urine Presumptive Positive (A) Presumptive Negative    Opiate Screen, Urine Presumptive Negative Presumptive Negative    Oxycodone Screen, Urine Presumptive Positive (A) Presumptive Negative    PCP Screen, Urine Presumptive Negative Presumptive Negative   POCT GLUCOSE   Result Value Ref Range    POCT Glucose 65 (L) 74 - 99 mg/dL   Troponin I, High Sensitivity   Result Value Ref Range    Troponin I, High Sensitivity 162 (HH) 0 - 13 ng/L   POCT GLUCOSE   Result Value Ref Range    POCT Glucose 118 (H) 74 - 99 mg/dL   CBC   Result Value Ref Range    WBC 9.6 4.4 - 11.3 x10*3/uL    nRBC 0.0 0.0 - 0.0 /100 WBCs    RBC 2.60 (L) 4.00 - 5.20 x10*6/uL    Hemoglobin 7.5 (L) 12.0 - 16.0 g/dL    Hematocrit 23.2 (L) 36.0 - 46.0 %    MCV 89 80 - 100 fL    MCH 28.8 26.0 - 34.0 pg    MCHC 32.3 32.0 - 36.0 g/dL    RDW 13.6 11.5 - 14.5 %    Platelets 196 150 - 450 x10*3/uL   Basic Metabolic Panel   Result Value Ref Range    Glucose 91 74 - 99 mg/dL    Sodium 134 (L) 136 - 145 mmol/L    Potassium 3.7 3.5 - 5.3 mmol/L    Chloride 105 98 - 107 mmol/L    Bicarbonate 25 21 - 32 mmol/L    Anion Gap 8 (L) 10 - 20 mmol/L    Urea Nitrogen 12 6 - 23 mg/dL    Creatinine 0.85 0.50 - 1.05 mg/dL    eGFR 77 >60 mL/min/1.73m*2    Calcium 8.1 (L) 8.6 - 10.3 mg/dL   B-type Natriuretic Peptide   Result Value Ref Range     (H) 0 - 99 pg/mL   ECG 12 Lead   Result Value Ref Range    Ventricular Rate 89 BPM    Atrial Rate 89 BPM    PA Interval 142 ms    QRS Duration 90 ms    QT Interval 372 ms    QTC Calculation(Bazett) 452 ms    P Axis 41 degrees    R Axis 14 degrees    T Axis 3 degrees    QRS Count 15 beats    Q Onset 227 ms    P Onset 156 ms    P Offset 209 ms    T Offset 413 ms    QTC Fredericia 424 ms   POCT GLUCOSE   Result Value Ref Range    POCT Glucose 104 (H) 74 - 99 mg/dL   Prepare RBC: 1 Units   Result Value Ref Range    PRODUCT CODE U7323Y14     Unit Number  [id number]     Unit ABO B     Unit RH POS     XM INTEP COMP     Dispense Status TR     Blood Expiration Date January 02, 2024 23:59 EST     PRODUCT BLOOD TYPE 7300     UNIT VOLUME 350    POCT GLUCOSE   Result Value Ref Range    POCT Glucose 89 74 - 99 mg/dL   Transthoracic Echo (TTE) Complete   Result Value Ref Range    BSA 1.66 m2   POCT GLUCOSE   Result Value Ref Range    POCT Glucose 94 74 - 99 mg/dL        Medications:  aspirin, 81 mg, oral, Daily  carvedilol, 25 mg, oral, BID with meals  [Held by provider] clopidogrel, 75 mg, oral, Daily  ezetimibe, 10 mg, oral, Daily  famotidine, 20 mg, oral, BID  heparin (porcine), 5,000 Units, subcutaneous, q8h  hydroCHLOROthiazide, 25 mg, oral, Daily  iron sucrose, 200 mg, intravenous, Daily  [Held by provider] lisinopril, 40 mg, oral, Daily  nicotine, 1 patch, transdermal, Daily  pantoprazole, 40 mg, oral, Daily before breakfast  perflutren lipid microspheres, 0.5-10 mL of dilution, intravenous, Once in imaging  perflutren protein A microsphere, 0.5 mL, intravenous, Once in imaging  polyethylene glycol, 17 g, oral, Daily  pregabalin, 75 mg, oral, Daily  psyllium, 1 packet, oral, Daily  rosuvastatin, 40 mg, oral, Nightly  sulfur hexafluoride microsphr, 2 mL, intravenous, Once in imaging      PRN medications: acetaminophen **OR** acetaminophen **OR** acetaminophen, dextrose 10 % in water (D10W), dextrose 10 % in water (D10W), dextrose, dextrose, glucagon, glucagon, hydrOXYzine HCL, ipratropium-albuteroL, nitroglycerin, ondansetron, traMADol    No results found.    Assessment/Plan   Principal Problem:    Ventral hernia without obstruction or gangrene  Active Problems:    Stented coronary artery    Crohn's disease (CMS/HCC)    63-year-old -American female, who is a smoker, also known to have history of coronary artery disease, s/p PCI, hypertension, hyperlipidemia, she was admitted for hernia surgery,, herniorrhaphy done, postoperatively today she was very confused  hypoxic, she was put on 3 L of oxygen, we were asked to be on medical consult.  Pain, CT PE no evidence of pneumonia or no evidence of PE,  Hypoxia, in a smoker, pneumonia, continue with aerosols.  Positive troponin, most likely is secondary to the above and also acute anemia, though do not see any acute bleeding, known the fact that she has Crohn's also, and postop, but there is no overt bleeding, to be transfused, that may explain the high troponin, we are getting cardiology consult, monitoring, and repeat EKG,.  Regarding confusion she is much better today.    DVT prophylaxis       I spent 35 minutes in the professional and overall care of this patient.    Kary Haynes MD

## 2023-12-11 NOTE — PROGRESS NOTES
"Vivien Miramontes is a 63 y.o. female on day 3 of admission presenting with Ventral hernia without obstruction or gangrene.    Assessment/Plan   Acute blood loss anemia secondary to some bleeding from probably the effects of Plavix.  We were holding her Plavix.  Agree for 1 unit of blood transfusion.  Continue to wean oxygen.  May benefit from some Lasix.  Appreciate internal medicine help.    Subjective   Less confused today.  Still on oxygen however.       Objective     Physical Exam  NAD  A&Ox3  Non icteric  CTA  RR  Abdomen soft min tender. Wounds clean, intact.  Some bruising around the midline.  Extremities warm, well perfused     Last Recorded Vitals  Blood pressure 147/66, pulse 77, temperature 36.8 °C (98.2 °F), temperature source Temporal, resp. rate 18, height 1.549 m (5' 1\"), weight 64.5 kg (142 lb 3.2 oz), SpO2 95 %.  Intake/Output last 3 Shifts:  I/O last 3 completed shifts:  In: 1438.3 (22.3 mL/kg) [I.V.:1288.3 (20 mL/kg); IV Piggyback:150]  Out: 1800 (27.9 mL/kg) [Urine:1800 (0.8 mL/kg/hr)]  Weight: 64.5 kg     Relevant Results    Scheduled medications  aspirin, 81 mg, oral, Daily  carvedilol, 25 mg, oral, BID with meals  [Held by provider] clopidogrel, 75 mg, oral, Daily  ezetimibe, 10 mg, oral, Daily  famotidine, 20 mg, oral, BID  heparin (porcine), 5,000 Units, subcutaneous, q8h  hydroCHLOROthiazide, 25 mg, oral, Daily  iron sucrose, 200 mg, intravenous, Daily  [Held by provider] lisinopril, 40 mg, oral, Daily  nicotine, 1 patch, transdermal, Daily  pantoprazole, 40 mg, oral, Daily before breakfast  polyethylene glycol, 17 g, oral, Daily  pregabalin, 75 mg, oral, Daily  psyllium, 1 packet, oral, Daily  rosuvastatin, 40 mg, oral, Nightly      Continuous medications  D5 % and 0.9 % sodium chloride, 50 mL/hr, Last Rate: 50 mL/hr (12/11/23 0600)      PRN medications  PRN medications: acetaminophen **OR** acetaminophen **OR** acetaminophen, dextrose 10 % in water (D10W), dextrose 10 % in water (D10W), " dextrose, dextrose, glucagon, glucagon, hydrOXYzine HCL, ipratropium-albuteroL, nitroglycerin, ondansetron, traMADol    Results for orders placed or performed during the hospital encounter of 12/08/23 (from the past 24 hour(s))   Blood Culture    Specimen: Peripheral Venipuncture; Blood culture   Result Value Ref Range    Blood Culture Loaded on Instrument - Culture in progress    Troponin I, High Sensitivity   Result Value Ref Range    Troponin I, High Sensitivity 336 (HH) 0 - 13 ng/L   Drug Screen, Urine   Result Value Ref Range    Amphetamine Screen, Urine Presumptive Negative Presumptive Negative    Barbiturate Screen, Urine Presumptive Negative Presumptive Negative    Benzodiazepines Screen, Urine Presumptive Negative Presumptive Negative    Cannabinoid Screen, Urine Presumptive Negative Presumptive Negative    Cocaine Metabolite Screen, Urine Presumptive Negative Presumptive Negative    Fentanyl Screen, Urine Presumptive Positive (A) Presumptive Negative    Opiate Screen, Urine Presumptive Negative Presumptive Negative    Oxycodone Screen, Urine Presumptive Positive (A) Presumptive Negative    PCP Screen, Urine Presumptive Negative Presumptive Negative   POCT GLUCOSE   Result Value Ref Range    POCT Glucose 65 (L) 74 - 99 mg/dL   Troponin I, High Sensitivity   Result Value Ref Range    Troponin I, High Sensitivity 162 (HH) 0 - 13 ng/L   POCT GLUCOSE   Result Value Ref Range    POCT Glucose 118 (H) 74 - 99 mg/dL   CBC   Result Value Ref Range    WBC 9.6 4.4 - 11.3 x10*3/uL    nRBC 0.0 0.0 - 0.0 /100 WBCs    RBC 2.60 (L) 4.00 - 5.20 x10*6/uL    Hemoglobin 7.5 (L) 12.0 - 16.0 g/dL    Hematocrit 23.2 (L) 36.0 - 46.0 %    MCV 89 80 - 100 fL    MCH 28.8 26.0 - 34.0 pg    MCHC 32.3 32.0 - 36.0 g/dL    RDW 13.6 11.5 - 14.5 %    Platelets 196 150 - 450 x10*3/uL   Basic Metabolic Panel   Result Value Ref Range    Glucose 91 74 - 99 mg/dL    Sodium 134 (L) 136 - 145 mmol/L    Potassium 3.7 3.5 - 5.3 mmol/L    Chloride  105 98 - 107 mmol/L    Bicarbonate 25 21 - 32 mmol/L    Anion Gap 8 (L) 10 - 20 mmol/L    Urea Nitrogen 12 6 - 23 mg/dL    Creatinine 0.85 0.50 - 1.05 mg/dL    eGFR 77 >60 mL/min/1.73m*2    Calcium 8.1 (L) 8.6 - 10.3 mg/dL   ECG 12 Lead   Result Value Ref Range    Ventricular Rate 89 BPM    Atrial Rate 89 BPM    SC Interval 142 ms    QRS Duration 90 ms    QT Interval 372 ms    QTC Calculation(Bazett) 452 ms    P Axis 41 degrees    R Axis 14 degrees    T Axis 3 degrees    QRS Count 15 beats    Q Onset 227 ms    P Onset 156 ms    P Offset 209 ms    T Offset 413 ms    QTC Fredericia 424 ms   POCT GLUCOSE   Result Value Ref Range    POCT Glucose 104 (H) 74 - 99 mg/dL   Prepare RBC: 1 Units   Result Value Ref Range    PRODUCT CODE [id number]     Unit Number [id number]     Unit ABO B     Unit RH POS     XM INTEP COMP     Dispense Status XM     Blood Expiration Date January 02, 2024 23:59 EST     PRODUCT BLOOD TYPE 7300     UNIT VOLUME 350            I spent 25 minutes in the professional and overall care of this patient.      Suresh De Guzman MD

## 2023-12-12 ENCOUNTER — APPOINTMENT (OUTPATIENT)
Dept: VASCULAR MEDICINE | Facility: HOSPITAL | Age: 63
DRG: 336 | End: 2023-12-12
Payer: COMMERCIAL

## 2023-12-12 VITALS
HEIGHT: 61 IN | WEIGHT: 142 LBS | HEART RATE: 68 BPM | RESPIRATION RATE: 17 BRPM | DIASTOLIC BLOOD PRESSURE: 79 MMHG | SYSTOLIC BLOOD PRESSURE: 130 MMHG | BODY MASS INDEX: 26.81 KG/M2 | OXYGEN SATURATION: 96 % | TEMPERATURE: 97.8 F

## 2023-12-12 LAB
ANION GAP SERPL CALC-SCNC: 14 MMOL/L (ref 10–20)
BUN SERPL-MCNC: 15 MG/DL (ref 6–23)
CALCIUM SERPL-MCNC: 8.3 MG/DL (ref 8.6–10.3)
CHLORIDE SERPL-SCNC: 103 MMOL/L (ref 98–107)
CO2 SERPL-SCNC: 22 MMOL/L (ref 21–32)
CREAT SERPL-MCNC: 0.81 MG/DL (ref 0.5–1.05)
ERYTHROCYTE [DISTWIDTH] IN BLOOD BY AUTOMATED COUNT: 14.2 % (ref 11.5–14.5)
GFR SERPL CREATININE-BSD FRML MDRD: 81 ML/MIN/1.73M*2
GLUCOSE BLD MANUAL STRIP-MCNC: 86 MG/DL (ref 74–99)
GLUCOSE BLD MANUAL STRIP-MCNC: 94 MG/DL (ref 74–99)
GLUCOSE SERPL-MCNC: 67 MG/DL (ref 74–99)
HCT VFR BLD AUTO: 31.5 % (ref 36–46)
HGB BLD-MCNC: 10.2 G/DL (ref 12–16)
MCH RBC QN AUTO: 28.9 PG (ref 26–34)
MCHC RBC AUTO-ENTMCNC: 32.4 G/DL (ref 32–36)
MCV RBC AUTO: 89 FL (ref 80–100)
NRBC BLD-RTO: 0 /100 WBCS (ref 0–0)
PLATELET # BLD AUTO: 247 X10*3/UL (ref 150–450)
POTASSIUM SERPL-SCNC: 4.2 MMOL/L (ref 3.5–5.3)
RBC # BLD AUTO: 3.53 X10*6/UL (ref 4–5.2)
SODIUM SERPL-SCNC: 135 MMOL/L (ref 136–145)
WBC # BLD AUTO: 11.5 X10*3/UL (ref 4.4–11.3)

## 2023-12-12 PROCEDURE — 2500000001 HC RX 250 WO HCPCS SELF ADMINISTERED DRUGS (ALT 637 FOR MEDICARE OP): Performed by: NURSE PRACTITIONER

## 2023-12-12 PROCEDURE — 82947 ASSAY GLUCOSE BLOOD QUANT: CPT

## 2023-12-12 PROCEDURE — S4991 NICOTINE PATCH NONLEGEND: HCPCS | Performed by: INTERNAL MEDICINE

## 2023-12-12 PROCEDURE — 2500000002 HC RX 250 W HCPCS SELF ADMINISTERED DRUGS (ALT 637 FOR MEDICARE OP, ALT 636 FOR OP/ED): Performed by: SURGERY

## 2023-12-12 PROCEDURE — 93971 EXTREMITY STUDY: CPT

## 2023-12-12 PROCEDURE — 99231 SBSQ HOSP IP/OBS SF/LOW 25: CPT | Performed by: STUDENT IN AN ORGANIZED HEALTH CARE EDUCATION/TRAINING PROGRAM

## 2023-12-12 PROCEDURE — 85027 COMPLETE CBC AUTOMATED: CPT | Performed by: INTERNAL MEDICINE

## 2023-12-12 PROCEDURE — 99231 SBSQ HOSP IP/OBS SF/LOW 25: CPT | Performed by: SURGERY

## 2023-12-12 PROCEDURE — 2500000001 HC RX 250 WO HCPCS SELF ADMINISTERED DRUGS (ALT 637 FOR MEDICARE OP): Performed by: PHARMACIST

## 2023-12-12 PROCEDURE — 2500000001 HC RX 250 WO HCPCS SELF ADMINISTERED DRUGS (ALT 637 FOR MEDICARE OP): Performed by: SURGERY

## 2023-12-12 PROCEDURE — 2500000002 HC RX 250 W HCPCS SELF ADMINISTERED DRUGS (ALT 637 FOR MEDICARE OP, ALT 636 FOR OP/ED): Performed by: INTERNAL MEDICINE

## 2023-12-12 PROCEDURE — 96372 THER/PROPH/DIAG INJ SC/IM: CPT | Performed by: SURGERY

## 2023-12-12 PROCEDURE — 2500000004 HC RX 250 GENERAL PHARMACY W/ HCPCS (ALT 636 FOR OP/ED): Performed by: SURGERY

## 2023-12-12 PROCEDURE — 82374 ASSAY BLOOD CARBON DIOXIDE: CPT | Performed by: INTERNAL MEDICINE

## 2023-12-12 PROCEDURE — 36415 COLL VENOUS BLD VENIPUNCTURE: CPT | Performed by: INTERNAL MEDICINE

## 2023-12-12 PROCEDURE — 93971 EXTREMITY STUDY: CPT | Performed by: SURGERY

## 2023-12-12 PROCEDURE — 99232 SBSQ HOSP IP/OBS MODERATE 35: CPT | Performed by: NURSE PRACTITIONER

## 2023-12-12 PROCEDURE — 2500000004 HC RX 250 GENERAL PHARMACY W/ HCPCS (ALT 636 FOR OP/ED): Performed by: NURSE PRACTITIONER

## 2023-12-12 RX ORDER — POLYETHYLENE GLYCOL 3350 17 G/17G
17 POWDER, FOR SOLUTION ORAL DAILY
Qty: 14 PACKET | Refills: 0 | Status: SHIPPED | OUTPATIENT
Start: 2023-12-13 | End: 2023-12-27

## 2023-12-12 RX ORDER — ACETAMINOPHEN 325 MG/1
650 TABLET ORAL EVERY 6 HOURS PRN
Qty: 30 TABLET | Refills: 0 | OUTPATIENT
Start: 2023-12-12 | End: 2024-01-10

## 2023-12-12 RX ORDER — TRAMADOL HYDROCHLORIDE 50 MG/1
50 TABLET ORAL EVERY 6 HOURS PRN
Qty: 12 TABLET | Refills: 0 | Status: SHIPPED | OUTPATIENT
Start: 2023-12-12 | End: 2023-12-15

## 2023-12-12 RX ORDER — ONDANSETRON 4 MG/1
4 TABLET, ORALLY DISINTEGRATING ORAL EVERY 8 HOURS PRN
Qty: 20 TABLET | Refills: 0 | Status: SHIPPED | OUTPATIENT
Start: 2023-12-12

## 2023-12-12 RX ADMIN — HEPARIN SODIUM 5000 UNITS: 5000 INJECTION INTRAVENOUS; SUBCUTANEOUS at 08:43

## 2023-12-12 RX ADMIN — HYDROCHLOROTHIAZIDE 25 MG: 25 TABLET ORAL at 08:43

## 2023-12-12 RX ADMIN — FAMOTIDINE 20 MG: 20 TABLET, FILM COATED ORAL at 08:43

## 2023-12-12 RX ADMIN — EZETIMIBE 10 MG: 10 TABLET ORAL at 08:43

## 2023-12-12 RX ADMIN — TRAMADOL HYDROCHLORIDE 50 MG: 50 TABLET, COATED ORAL at 06:57

## 2023-12-12 RX ADMIN — ASPIRIN 81 MG: 81 TABLET, COATED ORAL at 08:43

## 2023-12-12 RX ADMIN — IRON SUCROSE 200 MG: 20 INJECTION, SOLUTION INTRAVENOUS at 05:54

## 2023-12-12 RX ADMIN — CARVEDILOL 25 MG: 25 TABLET, FILM COATED ORAL at 08:43

## 2023-12-12 RX ADMIN — HEPARIN SODIUM 5000 UNITS: 5000 INJECTION INTRAVENOUS; SUBCUTANEOUS at 00:35

## 2023-12-12 RX ADMIN — NICOTINE 1 PATCH: 14 PATCH, EXTENDED RELEASE TRANSDERMAL at 08:44

## 2023-12-12 RX ADMIN — PANTOPRAZOLE SODIUM 40 MG: 40 TABLET, DELAYED RELEASE ORAL at 06:45

## 2023-12-12 RX ADMIN — PREGABALIN 75 MG: 75 CAPSULE ORAL at 08:43

## 2023-12-12 NOTE — PROGRESS NOTES
Vivien Miramontes is a 63 y.o. female on day 4 of admission presenting with Ventral hernia without obstruction or gangrene.      Subjective   MALCOLM. Drowsy upon assessment but oriented x3, complains of right arm pain. Tolerating diet, abdominal pain minimal.      Objective     PE:  Constitutional: A&Ox3, calm and drowsy, NAD  Eyes: EOMI, clear sclera   Cardiovascular: Normal rate and regular rhythm. No murmurs  Respiratory/Thorax: CTAB, on RA  Gastrointestinal: Abd soft, minimally distended, +BS, lap sites CDI with surrounding ecchymosis.   Genitourinary: Voiding independently   Musculoskeletal: ROM intact  Extremities: No peripheral edema  Neurological: A&Ox3, No focal deficits   Psychological: Appropriate mood and behavior      Last Recorded Vitals  Vitals:    12/11/23 2100 12/12/23 0010 12/12/23 0336 12/12/23 0804   BP:  151/87 159/85 155/88   BP Location:  Left arm Left arm    Patient Position:  Lying Lying    Pulse:  67 69 69   Resp:  18 17 17   Temp:  36.8 °C (98.3 °F) 36.7 °C (98.1 °F) 36.6 °C (97.8 °F)   TempSrc:  Temporal Temporal    SpO2: 92% 91% 94% 93%   Weight:       Height:             Relevant Results    Imaging:     .=== 12/08/23 ===    XR CHEST 1 VIEW    - Impression -  1. Findings concerning for pulmonary venous congestion.    Critical Finding:  See findings. Notification was initiated on  12/10/2023 at 10:37 am by  Gentry Henderson.  (**-OCF-**) Instructions:      Signed by: Gentry Henderson 12/10/2023 10:37 AM  Dictation workstation:   [id number]   .=== 12/08/23 ===    CT ANGIO CHEST FOR PULMONARY EMBOLISM    - Impression -  1. No evidence for pulmonary embolism to the level of the distal  segmental arteries. Small subsegmental filling defects can not be  entirely excluded.  2. Patchy ground-glass opacities throughout the periphery of the  upper lobes which could represent edema or infectious or inflammatory  process.  3. Cardiomegaly. Reflux of contrast into the hepatic venous circuit  suggesting impaired  "right heart function.  4. Scattered locules of subdiaphragmatic free air adjacent to the  liver and subcutaneous air within the ventral abdominal wall,  correlate for recent surgery.    MACRO:  None    Signed by: Suresh Sanon 12/10/2023 6:08 PM  Dictation workstation:   [id number]         Lab Results:   Lab Results   Component Value Date    WBC 11.5 (H) 12/12/2023    HGB 10.2 (L) 12/12/2023    HCT 31.5 (L) 12/12/2023    MCV 89 12/12/2023     12/12/2023     Lab Results   Component Value Date    GLUCOSE 67 (L) 12/12/2023    CALCIUM 8.3 (L) 12/12/2023     (L) 12/12/2023    K 4.2 12/12/2023    CO2 22 12/12/2023     12/12/2023    BUN 15 12/12/2023    CREATININE 0.81 12/12/2023         Estimated Creatinine Clearance: 60.3 mL/min (by C-G formula based on SCr of 0.81 mg/dL).  No results found for: \"CRP\"      Assessment/Plan   Vivien Miramontes is a 63 y.o. female on day 4 of admission presenting with Ventral hernia without obstruction or gangrene,     now POD 4 Robotic Incisional Hernia repair with Mesh and Extensive ARNOLD complicated by FLORENCE (resolved), post-op anemia (improved) altered mental status (improved), and increased O2 requirements (resolved)     Intra-op findings: hernia recurrence in the area of the prior repair involving the omentum chronically incarcerated. Almost look like the mesh avulsed away from the abdominal wall superiorly and laterally leaving these defects.     Neuro: Post-op pain controlled with current regimen. Pt drowsy but oriented x 3 this morning.   - OOB/ ambulate 5x per day   - OARRS Reviewed: 30 day avg mme/day: 0.00  -continue home lyrica      CV: BP stable, more hypertensive today   - VS every 4 hours   - cardiology following, appreciate input  - resume plavix  - resume carvedilol, hydrochlorothiazide, and lisinopril   - continue home ezetimibe and statin  - PRN nitrostat      Pulm: CTAB, on RA  - IS every hour while awake   - Pulse ox every 4 hours with VS   - CT negative " for PE  - was put on levaquin by medicine but discontinued before administration?     GI: Tolerating diet  Hx: UC, Crohns, colonic perforation, incisional hernia  - PRN antiemetic   - Bowel regimen: miralax daily  - cardiac diet  - maintain abdominal binder  - daily PPI    : Voiding independently   - Cr 0.81- starr resolved  - Monitor I&Os     HEME: H/H 10.2/31.5  -no s/sx of active bleeding  - DVT Proph: SCDs/ ambulate/ heparin  - continue aspirin daily   - okay to resume plavix  - S/P 1 unit pRBC yesterday  - continue iron       Endo: BS 67  - no  hx of issues      ID: Afebrile, wbc 11.5  - Monitor for s/s infection    Dispo: Anticipate discharge today, discussed with medicine and cardiology who are in agreement that patient is medically ready.     I spent 35 minutes in the professional and overall care of this patient.      Cecy Travis PA-C

## 2023-12-12 NOTE — PROGRESS NOTES
"Vivien Miramontes is a 63 y.o. female on day 4 of admission presenting with Ventral hernia without obstruction or gangrene.    Assessment/Plan   Hemoglobin has risen appropriately.  She is been eating better.  Off oxygen.  I be okay with discharge as long as the other physicians agree.  Okay to resume Plavix    Subjective   Much better today.  Had a couple bowel movements yesterday.       Objective     Physical Exam  NAD  A&Ox3  Non icteric  CTA  RR  Abdomen soft min tender. Wounds clean, intact  Extremities warm, well perfused     Last Recorded Vitals  Blood pressure 155/88, pulse 69, temperature 36.6 °C (97.8 °F), resp. rate 17, height 1.549 m (5' 1\"), weight 64.4 kg (142 lb), SpO2 93 %.  Intake/Output last 3 Shifts:  I/O last 3 completed shifts:  In: 1678.3 (26.1 mL/kg) [P.O.:360; I.V.:968.3 (15 mL/kg); Blood:350]  Out: 700 (10.9 mL/kg) [Urine:700 (0.3 mL/kg/hr)]  Weight: 64.4 kg     Relevant Results    Scheduled medications  aspirin, 81 mg, oral, Daily  carvedilol, 25 mg, oral, BID with meals  [Held by provider] clopidogrel, 75 mg, oral, Daily  ezetimibe, 10 mg, oral, Daily  famotidine, 20 mg, oral, BID  heparin (porcine), 5,000 Units, subcutaneous, q8h  hydroCHLOROthiazide, 25 mg, oral, Daily  iron sucrose, 200 mg, intravenous, Daily  [Held by provider] lisinopril, 40 mg, oral, Daily  nicotine, 1 patch, transdermal, Daily  pantoprazole, 40 mg, oral, Daily before breakfast  perflutren lipid microspheres, 0.5-10 mL of dilution, intravenous, Once in imaging  perflutren protein A microsphere, 0.5 mL, intravenous, Once in imaging  polyethylene glycol, 17 g, oral, Daily  pregabalin, 75 mg, oral, Daily  psyllium, 1 packet, oral, Daily  rosuvastatin, 40 mg, oral, Nightly  sulfur hexafluoride microsphr, 2 mL, intravenous, Once in imaging      Continuous medications  D5 % and 0.9 % sodium chloride, 50 mL/hr, Last Rate: 50 mL/hr (12/11/23 0600)      PRN medications  PRN medications: acetaminophen **OR** acetaminophen **OR** " acetaminophen, dextrose 10 % in water (D10W), dextrose 10 % in water (D10W), dextrose, dextrose, glucagon, glucagon, hydrOXYzine HCL, ipratropium-albuteroL, nitroglycerin, ondansetron, traMADol    Results for orders placed or performed during the hospital encounter of 12/08/23 (from the past 24 hour(s))   Prepare RBC: 1 Units   Result Value Ref Range    PRODUCT CODE [id number]     Unit Number [id number]     Unit ABO B     Unit RH POS     XM INTEP COMP     Dispense Status TR     Blood Expiration Date January 02, 2024 23:59 EST     PRODUCT BLOOD TYPE 7300     UNIT VOLUME 350    POCT GLUCOSE   Result Value Ref Range    POCT Glucose 89 74 - 99 mg/dL   Transthoracic Echo (TTE) Complete   Result Value Ref Range    AV pk maryjo 1.29     AV mn grad 4.0     LVOT diam 2.10     LV biplane EF 59     MV avg E/e' ratio 10.71     MV E/A ratio 1.00     LA vol index A/L 37.8     Tricuspid annular plane systolic excursion 2.2     RV free wall pk S' 16.00     LVIDd 5.10     Aortic Valve Area by Continuity of VTI 2.24     Aortic Valve Area by Continuity of Peak Velocity 2.54     AV pk grad 6.7     LV A4C EF 56.3    POCT GLUCOSE   Result Value Ref Range    POCT Glucose 94 74 - 99 mg/dL   POCT GLUCOSE   Result Value Ref Range    POCT Glucose 98 74 - 99 mg/dL   Basic Metabolic Panel   Result Value Ref Range    Glucose 67 (L) 74 - 99 mg/dL    Sodium 135 (L) 136 - 145 mmol/L    Potassium 4.2 3.5 - 5.3 mmol/L    Chloride 103 98 - 107 mmol/L    Bicarbonate 22 21 - 32 mmol/L    Anion Gap 14 10 - 20 mmol/L    Urea Nitrogen 15 6 - 23 mg/dL    Creatinine 0.81 0.50 - 1.05 mg/dL    eGFR 81 >60 mL/min/1.73m*2    Calcium 8.3 (L) 8.6 - 10.3 mg/dL   CBC   Result Value Ref Range    WBC 11.5 (H) 4.4 - 11.3 x10*3/uL    nRBC 0.0 0.0 - 0.0 /100 WBCs    RBC 3.53 (L) 4.00 - 5.20 x10*6/uL    Hemoglobin 10.2 (L) 12.0 - 16.0 g/dL    Hematocrit 31.5 (L) 36.0 - 46.0 %    MCV 89 80 - 100 fL    MCH 28.9 26.0 - 34.0 pg    MCHC 32.4 32.0 - 36.0 g/dL    RDW 14.2 11.5 -  14.5 %    Platelets 247 150 - 450 x10*3/uL   POCT GLUCOSE   Result Value Ref Range    POCT Glucose 86 74 - 99 mg/dL           I spent 25 minutes in the professional and overall care of this patient.      Suresh De Guzman MD

## 2023-12-12 NOTE — PROGRESS NOTES
"Vivine Miramontes is a 63 y.o. female on day 4 of admission presenting with Ventral hernia without obstruction or gangrene.    Subjective       Examined, no new complaints, she is feeling much better    Objective     Physical Exam  Alert oriented to 3.  HEENT unremarkable.  Neck no JVD.  Heart S1-S2.  Lungs clear.  Upper extremity the right side with edema.  Lower extremity no edema  Last Recorded Vitals  Blood pressure 130/79, pulse 68, temperature 36.6 °C (97.8 °F), resp. rate 17, height 1.549 m (5' 1\"), weight 64.4 kg (142 lb), SpO2 96 %.  Intake/Output last 3 Shifts:  I/O last 3 completed shifts:  In: 1678.3 (26.1 mL/kg) [P.O.:360; I.V.:968.3 (15 mL/kg); Blood:350]  Out: 700 (10.9 mL/kg) [Urine:700 (0.3 mL/kg/hr)]  Weight: 64.4 kg     Relevant Results  Results for orders placed or performed during the hospital encounter of 12/08/23 (from the past 96 hour(s))   CBC   Result Value Ref Range    WBC 14.3 (H) 4.4 - 11.3 x10*3/uL    nRBC 0.0 0.0 - 0.0 /100 WBCs    RBC 3.20 (L) 4.00 - 5.20 x10*6/uL    Hemoglobin 9.2 (L) 12.0 - 16.0 g/dL    Hematocrit 29.4 (L) 36.0 - 46.0 %    MCV 92 80 - 100 fL    MCH 28.8 26.0 - 34.0 pg    MCHC 31.3 (L) 32.0 - 36.0 g/dL    RDW 13.7 11.5 - 14.5 %    Platelets 245 150 - 450 x10*3/uL   Basic Metabolic Panel   Result Value Ref Range    Glucose 132 (H) 74 - 99 mg/dL    Sodium 137 136 - 145 mmol/L    Potassium 4.7 3.5 - 5.3 mmol/L    Chloride 108 (H) 98 - 107 mmol/L    Bicarbonate 20 (L) 21 - 32 mmol/L    Anion Gap 14 10 - 20 mmol/L    Urea Nitrogen 20 6 - 23 mg/dL    Creatinine 1.29 (H) 0.50 - 1.05 mg/dL    eGFR 46 (L) >60 mL/min/1.73m*2    Calcium 7.6 (L) 8.6 - 10.3 mg/dL   CBC   Result Value Ref Range    WBC 9.9 4.4 - 11.3 x10*3/uL    nRBC 0.0 0.0 - 0.0 /100 WBCs    RBC 3.03 (L) 4.00 - 5.20 x10*6/uL    Hemoglobin 8.8 (L) 12.0 - 16.0 g/dL    Hematocrit 28.2 (L) 36.0 - 46.0 %    MCV 93 80 - 100 fL    MCH 29.0 26.0 - 34.0 pg    MCHC 31.2 (L) 32.0 - 36.0 g/dL    RDW 13.7 11.5 - 14.5 %    " Platelets 195 150 - 450 x10*3/uL   Basic metabolic panel   Result Value Ref Range    Glucose 84 74 - 99 mg/dL    Sodium 138 136 - 145 mmol/L    Potassium 4.0 3.5 - 5.3 mmol/L    Chloride 109 (H) 98 - 107 mmol/L    Bicarbonate 20 (L) 21 - 32 mmol/L    Anion Gap 13 10 - 20 mmol/L    Urea Nitrogen 21 6 - 23 mg/dL    Creatinine 1.07 (H) 0.50 - 1.05 mg/dL    eGFR 58 (L) >60 mL/min/1.73m*2    Calcium 7.8 (L) 8.6 - 10.3 mg/dL   POCT GLUCOSE   Result Value Ref Range    POCT Glucose 62 (L) 74 - 99 mg/dL   POCT GLUCOSE   Result Value Ref Range    POCT Glucose 144 (H) 74 - 99 mg/dL   Urinalysis with Reflex Microscopic   Result Value Ref Range    Color, Urine Yellow Straw, Yellow    Appearance, Urine Clear Clear    Specific Gravity, Urine 1.010 1.005 - 1.035    pH, Urine 6.5 5.0, 5.5, 6.0, 6.5, 7.0, 7.5, 8.0    Protein, Urine NEGATIVE NEGATIVE, 10 (TRACE) mg/dL    Glucose, Urine 300 (3+) (A) NEGATIVE mg/dL    Blood, Urine NEGATIVE NEGATIVE    Ketones, Urine NEGATIVE NEGATIVE mg/dL    Bilirubin, Urine NEGATIVE NEGATIVE    Urobilinogen, Urine NORM NORM mg/dL    Nitrite, Urine NEGATIVE NEGATIVE    Leukocyte Esterase, Urine NEGATIVE NEGATIVE   Blood Culture    Specimen: Peripheral Venipuncture; Blood culture   Result Value Ref Range    Blood Culture No growth at 1 day    Troponin I, High Sensitivity   Result Value Ref Range    Troponin I, High Sensitivity 336 (HH) 0 - 13 ng/L   Drug Screen, Urine   Result Value Ref Range    Amphetamine Screen, Urine Presumptive Negative Presumptive Negative    Barbiturate Screen, Urine Presumptive Negative Presumptive Negative    Benzodiazepines Screen, Urine Presumptive Negative Presumptive Negative    Cannabinoid Screen, Urine Presumptive Negative Presumptive Negative    Cocaine Metabolite Screen, Urine Presumptive Negative Presumptive Negative    Fentanyl Screen, Urine Presumptive Positive (A) Presumptive Negative    Opiate Screen, Urine Presumptive Negative Presumptive Negative    Oxycodone  Screen, Urine Presumptive Positive (A) Presumptive Negative    PCP Screen, Urine Presumptive Negative Presumptive Negative   POCT GLUCOSE   Result Value Ref Range    POCT Glucose 65 (L) 74 - 99 mg/dL   Troponin I, High Sensitivity   Result Value Ref Range    Troponin I, High Sensitivity 162 (HH) 0 - 13 ng/L   POCT GLUCOSE   Result Value Ref Range    POCT Glucose 118 (H) 74 - 99 mg/dL   CBC   Result Value Ref Range    WBC 9.6 4.4 - 11.3 x10*3/uL    nRBC 0.0 0.0 - 0.0 /100 WBCs    RBC 2.60 (L) 4.00 - 5.20 x10*6/uL    Hemoglobin 7.5 (L) 12.0 - 16.0 g/dL    Hematocrit 23.2 (L) 36.0 - 46.0 %    MCV 89 80 - 100 fL    MCH 28.8 26.0 - 34.0 pg    MCHC 32.3 32.0 - 36.0 g/dL    RDW 13.6 11.5 - 14.5 %    Platelets 196 150 - 450 x10*3/uL   Basic Metabolic Panel   Result Value Ref Range    Glucose 91 74 - 99 mg/dL    Sodium 134 (L) 136 - 145 mmol/L    Potassium 3.7 3.5 - 5.3 mmol/L    Chloride 105 98 - 107 mmol/L    Bicarbonate 25 21 - 32 mmol/L    Anion Gap 8 (L) 10 - 20 mmol/L    Urea Nitrogen 12 6 - 23 mg/dL    Creatinine 0.85 0.50 - 1.05 mg/dL    eGFR 77 >60 mL/min/1.73m*2    Calcium 8.1 (L) 8.6 - 10.3 mg/dL   B-type Natriuretic Peptide   Result Value Ref Range     (H) 0 - 99 pg/mL   ECG 12 Lead   Result Value Ref Range    Ventricular Rate 89 BPM    Atrial Rate 89 BPM    KS Interval 142 ms    QRS Duration 90 ms    QT Interval 372 ms    QTC Calculation(Bazett) 452 ms    P Axis 41 degrees    R Axis 14 degrees    T Axis 3 degrees    QRS Count 15 beats    Q Onset 227 ms    P Onset 156 ms    P Offset 209 ms    T Offset 413 ms    QTC Fredericia 424 ms   POCT GLUCOSE   Result Value Ref Range    POCT Glucose 104 (H) 74 - 99 mg/dL   Prepare RBC: 1 Units   Result Value Ref Range    PRODUCT CODE E1527J04     Unit Number [id number]     Unit ABO B     Unit RH POS     XM INTEP COMP     Dispense Status TR     Blood Expiration Date January 02, 2024 23:59 EST     PRODUCT BLOOD TYPE 7300     UNIT VOLUME 350    POCT GLUCOSE   Result  Value Ref Range    POCT Glucose 89 74 - 99 mg/dL   Transthoracic Echo (TTE) Complete   Result Value Ref Range    AV pk maryjo 1.29     AV mn grad 4.0     LVOT diam 2.10     LV biplane EF 59     MV avg E/e' ratio 10.71     MV E/A ratio 1.00     LA vol index A/L 37.8     Tricuspid annular plane systolic excursion 2.2     RV free wall pk S' 16.00     LVIDd 5.10     Aortic Valve Area by Continuity of VTI 2.24     Aortic Valve Area by Continuity of Peak Velocity 2.54     AV pk grad 6.7     LV A4C EF 56.3    POCT GLUCOSE   Result Value Ref Range    POCT Glucose 94 74 - 99 mg/dL   POCT GLUCOSE   Result Value Ref Range    POCT Glucose 98 74 - 99 mg/dL   Basic Metabolic Panel   Result Value Ref Range    Glucose 67 (L) 74 - 99 mg/dL    Sodium 135 (L) 136 - 145 mmol/L    Potassium 4.2 3.5 - 5.3 mmol/L    Chloride 103 98 - 107 mmol/L    Bicarbonate 22 21 - 32 mmol/L    Anion Gap 14 10 - 20 mmol/L    Urea Nitrogen 15 6 - 23 mg/dL    Creatinine 0.81 0.50 - 1.05 mg/dL    eGFR 81 >60 mL/min/1.73m*2    Calcium 8.3 (L) 8.6 - 10.3 mg/dL   CBC   Result Value Ref Range    WBC 11.5 (H) 4.4 - 11.3 x10*3/uL    nRBC 0.0 0.0 - 0.0 /100 WBCs    RBC 3.53 (L) 4.00 - 5.20 x10*6/uL    Hemoglobin 10.2 (L) 12.0 - 16.0 g/dL    Hematocrit 31.5 (L) 36.0 - 46.0 %    MCV 89 80 - 100 fL    MCH 28.9 26.0 - 34.0 pg    MCHC 32.4 32.0 - 36.0 g/dL    RDW 14.2 11.5 - 14.5 %    Platelets 247 150 - 450 x10*3/uL   POCT GLUCOSE   Result Value Ref Range    POCT Glucose 86 74 - 99 mg/dL   POCT GLUCOSE   Result Value Ref Range    POCT Glucose 94 74 - 99 mg/dL   Vascular US upper extremity venous duplex right   Result Value Ref Range    BSA 1.66 m2        Medications:  aspirin, 81 mg, oral, Daily  carvedilol, 25 mg, oral, BID with meals  clopidogrel, 75 mg, oral, Daily  ezetimibe, 10 mg, oral, Daily  famotidine, 20 mg, oral, BID  heparin (porcine), 5,000 Units, subcutaneous, q8h  hydroCHLOROthiazide, 25 mg, oral, Daily  iron sucrose, 200 mg, intravenous, Daily  [Held by  provider] lisinopril, 40 mg, oral, Daily  nicotine, 1 patch, transdermal, Daily  pantoprazole, 40 mg, oral, Daily before breakfast  perflutren lipid microspheres, 0.5-10 mL of dilution, intravenous, Once in imaging  perflutren protein A microsphere, 0.5 mL, intravenous, Once in imaging  polyethylene glycol, 17 g, oral, Daily  pregabalin, 75 mg, oral, Daily  psyllium, 1 packet, oral, Daily  rosuvastatin, 40 mg, oral, Nightly  sulfur hexafluoride microsphr, 2 mL, intravenous, Once in imaging      PRN medications: acetaminophen **OR** acetaminophen **OR** acetaminophen, dextrose 10 % in water (D10W), dextrose 10 % in water (D10W), dextrose, dextrose, glucagon, glucagon, hydrOXYzine HCL, ipratropium-albuteroL, nitroglycerin, ondansetron, traMADol    No results found.    Assessment/Plan    63-year-old -American female, who is a smoker, also known to have history of coronary artery disease, s/p PCI, hypertension, hyperlipidemia, she was admitted for hernia surgery,, herniorrhaphy done, postoperatively today she was very confused hypoxic, she was put on 3 L of oxygen, we were asked to be on medical consult.  Pain, CT PE no evidence of pneumonia or no evidence of PE,  Hypoxia, in a smoker, continue with aerosols.  Positive troponin, most likely is secondary to the above and also acute anemia, though do not see any acute bleeding, known the fact that she has Crohn's also, and postop, but there is no overt bleeding, to be transfused, that may explain the high troponin, seen by cardiologist,.  She is much better today, she is medically cleared for discharge    I spent 25 minutes in the professional and overall care of this patient.    Kary Haynes MD

## 2023-12-12 NOTE — NURSING NOTE

## 2023-12-12 NOTE — NURSING NOTE
Pt c/o of inability to move right hand after giving IV Venofer over 2 minutes refused IV fluids secure message sent INOCENCIA Camarena to notify prn Tramadol given for 10/10 c/o pain

## 2023-12-12 NOTE — CARE PLAN
The patient's goals for the shift include      The clinical goals for the shift include patient will remain safe and ring for assistance this shift

## 2023-12-12 NOTE — DISCHARGE SUMMARY
Discharge Diagnosis  Ventral hernia without obstruction or gangrene    Issues Requiring Follow-Up  S/P robotic assisted incisional hernia repair with mesh and extensive lysis of adhesions     Test Results Pending At Discharge  Pending Labs       Order Current Status    Urine culture Collected (12/10/23 1030)    Blood Culture Preliminary result            Hospital Course  Briefly, the patient is a a 63 year-old female with  history of recurrent incisional hernia status post repair that has become increasingly bothersome.  Pt opts for robotic assisted incisional hernia repair     HOSPITAL COURSE: The patient underwent robotic assisted incisional hernia repair with mesh and extensive lysis of adhesions on 12/8/23 which patient tolerated well and remained stable in the postoperative period. Post-op course complicated by acute kidney injury, hypotension, post-op anemia, and altered mental status. Medicine and Cardiology were consulted, CT head was negative for acute intracranial process, CTA was negative for PE. Echo showed normal LV function and ejection fraction. Urine tox screen was positive for fentanyl which was not given in OR per documentation. After transfusion of 1 unit of pRBCs and aggressive rehydration patient had improvement in H/H and kidney function. Her mental status improved on the next few days and her blood pressure returned to baseline.  On day of discharge patient was tolerating a diet well, afebrile with stable vital signs and lab values, and had adequate pain control. The lap sites were clean and dry. Patient was instructed to follow up in the office within two weeks after discharge and was given prescription for tramadol for pain. Patient also given script for miralax as needed for constipation, and zofran as needed for nausea.        Pertinent Physical Exam At Time of Discharge  PE:  Constitutional: A&Ox3, calm and drowsy, NAD  Eyes: EOMI, clear sclera   Cardiovascular: Normal rate and regular  rhythm. No murmurs  Respiratory/Thorax: CTAB, on RA  Gastrointestinal: Abd soft, minimally distended, +BS, lap sites CDI with surrounding ecchymosis.   Genitourinary: Voiding independently   Musculoskeletal: ROM intact  Extremities: No peripheral edema  Neurological: A&Ox3, No focal deficits   Psychological: Appropriate mood and behavior  Skin: right forearm edema without erythema       Home Medications     Medication List      START taking these medications     acetaminophen 325 mg tablet; Commonly known as: Tylenol; Take 2 tablets   (650 mg) by mouth every 6 hours if needed for mild pain (1 - 3).   ondansetron ODT 4 mg disintegrating tablet; Commonly known as:   Zofran-ODT; Take 1 tablet (4 mg) by mouth every 8 hours if needed for   nausea or vomiting.   polyethylene glycol 17 gram packet; Commonly known as: Glycolax,   Miralax; Take 17 g by mouth once daily for 14 days. Do not start before   December 13, 2023.; Start taking on: December 13, 2023   traMADol 50 mg tablet; Commonly known as: Ultram; Take 1 tablet (50 mg)   by mouth every 6 hours if needed for severe pain (7 - 10) for up to 3   days.     CONTINUE taking these medications     aspirin 81 mg EC tablet   carvedilol 12.5 mg tablet; Commonly known as: Coreg   clopidogrel 75 mg tablet; Commonly known as: Plavix   ezetimibe 10 mg tablet; Commonly known as: Zetia   famotidine 20 mg tablet; Commonly known as: Pepcid   hydroCHLOROthiazide 25 mg tablet; Commonly known as: HYDRODiuril   hydrOXYzine HCL 25 mg tablet; Commonly known as: Atarax   lisinopril 40 mg tablet   naproxen 250 mg tablet; Commonly known as: Naprosyn   nitroglycerin 0.4 mg SL tablet; Commonly known as: Nitrostat   pregabalin 75 mg capsule; Commonly known as: Lyrica   Repatha Syringe 140 mg/mL injection; Generic drug: evolocumab   rosuvastatin 40 mg tablet; Commonly known as: Crestor   Stelara injection; Generic drug: ustekinumab   vitamin E 180 mg (400 unit) capsule     STOP taking these  medications     chlorhexidine 0.12 % solution; Commonly known as: Peridex       Outpatient Follow-Up  Future Appointments   Date Time Provider Department Center   1/5/2024 10:00 AM Ping Pedro MD [id number] Enrique Travis PA-C

## 2023-12-12 NOTE — PROGRESS NOTES
"Subjective Data:  Denies any chest pain or shortness of breath  Eager to be discharged  Currently on room air     Overnight Events:    None reported     Objective Data:  Last Recorded Vitals:  Vitals:    12/11/23 2100 12/12/23 0010 12/12/23 0336 12/12/23 0804   BP:  151/87 159/85 155/88   BP Location:  Left arm Left arm    Patient Position:  Lying Lying    Pulse:  67 69 69   Resp:  18 17 17   Temp:  36.8 °C (98.3 °F) 36.7 °C (98.1 °F) 36.6 °C (97.8 °F)   TempSrc:  Temporal Temporal    SpO2: 92% 91% 94% 93%   Weight:       Height:           Last Labs:  LABS:  CMP:  Results from last 7 days   Lab Units 12/12/23  0718 12/11/23  0559 12/10/23  0627 12/09/23  0518   SODIUM mmol/L 135* 134* 138 137   POTASSIUM mmol/L 4.2 3.7 4.0 4.7   CHLORIDE mmol/L 103 105 109* 108*   CO2 mmol/L 22 25 20* 20*   ANION GAP mmol/L 14 8* 13 14   BUN mg/dL 15 12 21 20   CREATININE mg/dL 0.81 0.85 1.07* 1.29*   EGFR mL/min/1.73m*2 81 77 58* 46*     CBC:  Results from last 7 days   Lab Units 12/12/23  0719 12/11/23  0559 12/10/23  0627 12/09/23  0518   WBC AUTO x10*3/uL 11.5* 9.6 9.9 14.3*   HEMOGLOBIN g/dL 10.2* 7.5* 8.8* 9.2*   HEMATOCRIT % 31.5* 23.2* 28.2* 29.4*   PLATELETS AUTO x10*3/uL 247 196 195 245   MCV fL 89 89 93 92     COAG:     ABO: No results found for: \"ABO\"  HEME/ENDO:     CARDIAC:   Results from last 7 days   Lab Units 12/11/23  0559 12/10/23  1815 12/10/23  1024   TROPHS ng/L  --  162* 336*   BNP pg/mL 128*  --   --    No results for input(s): \"CHOL\", \"LDLF\", \"LDL\", \"LDLCALC\", \"HDL\", \"TRIG\" in the last 34071 hours.   Transthoracic Echo (TTE) Complete   Final Result      CT head wo IV contrast   Final Result   1. No acute intracranial abnormality identified.   2. Diffuse white matter changes, likely sequela of small-vessel   ischemic disease.                  MACRO:   None        Signed by: Suresh Sanon 12/10/2023 5:55 PM   Dictation workstation:   [id number]      CT angio chest for pulmonary embolism   Final Result   1. " No evidence for pulmonary embolism to the level of the distal   segmental arteries. Small subsegmental filling defects can not be   entirely excluded.   2. Patchy ground-glass opacities throughout the periphery of the   upper lobes which could represent edema or infectious or inflammatory   process.   3. Cardiomegaly. Reflux of contrast into the hepatic venous circuit   suggesting impaired right heart function.   4. Scattered locules of subdiaphragmatic free air adjacent to the   liver and subcutaneous air within the ventral abdominal wall,   correlate for recent surgery.        MACRO:   None        Signed by: Suresh Sanon 12/10/2023 6:08 PM   Dictation workstation:   [id number]      XR chest 1 view   Final Result   1. Findings concerning for pulmonary venous congestion.        Critical Finding:  See findings. Notification was initiated on   12/10/2023 at 10:37 am by  Gentry Henderson.  (**-OCF-**) Instructions:             Signed by: Gentry Henderson 12/10/2023 10:37 AM   Dictation workstation:   [id number]      Vascular US upper extremity venous duplex right    (Results Pending)      Last I/O:  I/O last 3 completed shifts:  In: 1678.3 (26.1 mL/kg) [P.O.:360; I.V.:968.3 (15 mL/kg); Blood:350]  Out: 700 (10.9 mL/kg) [Urine:700 (0.3 mL/kg/hr)]  Weight: 64.4 kg     Past Cardiology Tests (Last 3 Years):  EKG:  ECG 12 Lead 12/11/2023      ECG 12 Lead 11/01/2023    Echo:  Transthoracic Echo (TTE) Complete 12/11/2023   1. Left ventricular systolic function is normal with a 55% estimated ejection fraction.   2. Basal and mid inferolateral wall is abnormal.    Echocardiogram 5/30/2023- [facility]  CONCLUSIONS:  - Technically difficult exam due to suboptimal positioning.  - Exam indication: Abnormal Cardiac Biomarkers  - The left ventricle is normal in size. There is eccentric left ventricular  hypertrophy. Left ventricular systolic function is mildly decreased. EF = 45 ± 5%  (2D biplane) Grade I left ventricular diastolic  dysfunction.  - The right ventricle is normal in size. Right ventricular systolic function is  normal.  - The visualized aorta is dilated with a maximal dimension of 4.0 cm at the sinus  of Valsalva.  - No significant valve disease.  - Insufficient tricuspid regurgitation to estimate RVSP.  - The patient has not had a prior  echocardiographic exam for comparison.    The left ventricle is normal in size.  There is eccentric left ventricular hypertrophy.  Left ventricular systolic function is mildly decreased.  Grade I left ventricular diastolic dysfunction.  Mitral annular lateral E/e': 9.2. Mitral annular septal E/e': 11.5.  Wall Motion:  The inferior wall, posterior wall, and basal inferoseptal segment are akinetic.  The mid anterolateral segment is severely hypokinetic. The basal anteroseptal  segment, apical lateral segment, basal anterolateral segment, mid inferoseptal  segment, and apical inferior segment are mildly hypokinetic. All remaining scored  segments are normal.     Electronically signed by Ksenia Mckeon MD on 5/30/2023 at 2:30:32 PM      * * * Final * * *     Ejection Fractions:  EF   Date/Time Value Ref Range Status   12/11/2023 02:39 PM 59       [facility] note, last echo reveals LVEF down to 45% (6/23)     Cath:  NSTEMI with subsequent C 5/31- 30%LMT, 80% pLAD 60%mLAD, 90%distLCx, 50% proxRamus, 70% pRCA. She is planned to have revascularization on 6/2     s/p PCI to the LAD and left circumflex (6/2023) at [facility]         Stress Test:  No results found for this or any previous visit from the past 1095 days.    Cardiac Imaging:  No results found for this or any previous visit from the past 1095 days.      Inpatient Medications:  Scheduled medications   Medication Dose Route Frequency    aspirin  81 mg oral Daily    carvedilol  25 mg oral BID with meals    clopidogrel  75 mg oral Daily    ezetimibe  10 mg oral Daily    famotidine  20 mg oral BID    heparin (porcine)  5,000 Units subcutaneous q8h     hydroCHLOROthiazide  25 mg oral Daily    iron sucrose  200 mg intravenous Daily    [Held by provider] lisinopril  40 mg oral Daily    nicotine  1 patch transdermal Daily    pantoprazole  40 mg oral Daily before breakfast    perflutren lipid microspheres  0.5-10 mL of dilution intravenous Once in imaging    perflutren protein A microsphere  0.5 mL intravenous Once in imaging    polyethylene glycol  17 g oral Daily    pregabalin  75 mg oral Daily    psyllium  1 packet oral Daily    rosuvastatin  40 mg oral Nightly    sulfur hexafluoride microsphr  2 mL intravenous Once in imaging     PRN medications   Medication    acetaminophen    Or    acetaminophen    Or    acetaminophen    dextrose 10 % in water (D10W)    dextrose 10 % in water (D10W)    dextrose    dextrose    glucagon    glucagon    hydrOXYzine HCL    ipratropium-albuteroL    nitroglycerin    ondansetron    traMADol     Continuous Medications   Medication Dose Last Rate    D5 % and 0.9 % sodium chloride  50 mL/hr 50 mL/hr (12/11/23 0600)       Physical Exam:  GENERAL: alert, cooperative, pleasant, in no acute distress  SKIN: warm, dry  NECK: no JVD  CARDIAC: Regular rate and rhythm no murmurs  CHEST: Normal respiratory efforts, lungs clear to auscultation bilaterally. On room air   ABDOMEN: soft, nondistended  EXTREMITIES: no lower extremity edema  NEURO: Alert and oriented x 3.  Grossly normal.  Moves all 4 extremities.      Assessment/Plan   Vivien Miramontes is a 63 y.o. female with a past medical history of CAD s/p remote PCI (2012)> unknown anatomy, NSTEMI s/p PCI to the LAD and left circumflex (6/23) at [facility], last echo reveals LVEF down to 45% (6/23), gastrointestinal bleeding, Crohn's disease on Stelara, hypertension, hyperlipidemia, nicotine use, presents for hernia repair surgery.  Patient underwent robotic incisional hernia repair (12/8/23) course c/b postop confusion, dyspnea and elevated troponins.  Patient was on DAPT therapy with aspirin  and Plavix s/p PCI since 6/23, for planned procedure she continued aspirin and took last dose of Plavix 12/2/23.  Initial EKG reveals sinus rhythm heart rate 89, CT angio of the chest to rule out PE was negative for PE, revealed patchy groundglass opacities throughout periphery, CT of head revealed no acute intercranial abnormalities or hemorrhage, troponins 336, 162, arrival hemoglobin 9.2, now 7.5.  Cardiology consulted for further evaluation of shortness of breath/history of CAD/elevated troponins.       Dyspnea  Post-op dyspnea in the setting of postop pain requiring IV narcotics,    suspicion of substance use during admission> patient reportedly disappeared from room for short period and return hypoxic and confused    CT imaging revealing probable PNA vs HF> patient euvolemic on exam. Now on room air     2. Elevated troponin being 2/2 a non-MI troponin elevation / acute non-traumatic myocardial injury in the setting of history of CAD and marked anemia. Core measures do not apply.   -Echocardiogram stable this admission with improvement in LV systolic function.     3.  CAD s/p PCI  -Continue aspirin and statin  -Plavix resumed today.      4.. ICM (EF reportedly 45% 6/23)  - Continue BB  -Echo stable as above          Recommendations as above:  No cardiac barriers to discharge   No further cardiac testing needed at this time.   Follow up with outpatient cardiologist at [facility].     Code Status:  Full Code      Conner Hayes, INOCENCIA-CNP

## 2023-12-14 LAB — BACTERIA BLD CULT: NORMAL

## 2023-12-15 ENCOUNTER — PATIENT MESSAGE (OUTPATIENT)
Dept: GASTROENTEROLOGY | Facility: CLINIC | Age: 63
End: 2023-12-15

## 2023-12-15 DIAGNOSIS — K50.119 CROHN'S DISEASE OF LARGE INTESTINE WITH COMPLICATION (MULTI): Primary | ICD-10-CM

## 2023-12-19 ENCOUNTER — APPOINTMENT (OUTPATIENT)
Dept: RADIOLOGY | Facility: HOSPITAL | Age: 63
End: 2023-12-19
Payer: COMMERCIAL

## 2023-12-19 ENCOUNTER — APPOINTMENT (OUTPATIENT)
Dept: CARDIOLOGY | Facility: HOSPITAL | Age: 63
End: 2023-12-19
Payer: COMMERCIAL

## 2023-12-19 ENCOUNTER — HOSPITAL ENCOUNTER (OUTPATIENT)
Facility: HOSPITAL | Age: 63
Setting detail: OBSERVATION
Discharge: HOME | End: 2023-12-20
Attending: STUDENT IN AN ORGANIZED HEALTH CARE EDUCATION/TRAINING PROGRAM | Admitting: NURSE PRACTITIONER
Payer: COMMERCIAL

## 2023-12-19 DIAGNOSIS — E78.5 HYPERLIPIDEMIA, UNSPECIFIED HYPERLIPIDEMIA TYPE: ICD-10-CM

## 2023-12-19 DIAGNOSIS — I25.2 HISTORY OF NON-ST ELEVATION MYOCARDIAL INFARCTION (NSTEMI): ICD-10-CM

## 2023-12-19 DIAGNOSIS — K50.919 CROHN'S DISEASE WITH COMPLICATION, UNSPECIFIED GASTROINTESTINAL TRACT LOCATION (MULTI): ICD-10-CM

## 2023-12-19 DIAGNOSIS — Z95.5 HISTORY OF HEART ARTERY STENT: ICD-10-CM

## 2023-12-19 DIAGNOSIS — N17.9 AKI (ACUTE KIDNEY INJURY) (CMS-HCC): ICD-10-CM

## 2023-12-19 DIAGNOSIS — S30.1XXA ABDOMINAL HEMATOMA: Primary | ICD-10-CM

## 2023-12-19 DIAGNOSIS — R11.2 NAUSEA AND VOMITING, UNSPECIFIED VOMITING TYPE: ICD-10-CM

## 2023-12-19 DIAGNOSIS — R91.1 PULMONARY NODULE: ICD-10-CM

## 2023-12-19 DIAGNOSIS — I10 PRIMARY HYPERTENSION: ICD-10-CM

## 2023-12-19 DIAGNOSIS — B18.2 CHRONIC HEPATITIS C WITHOUT HEPATIC COMA (MULTI): ICD-10-CM

## 2023-12-19 DIAGNOSIS — F17.210 CIGARETTE SMOKER: ICD-10-CM

## 2023-12-19 PROBLEM — T14.8XXA HEMATOMA: Status: ACTIVE | Noted: 2023-12-19

## 2023-12-19 PROBLEM — R10.9 ABDOMINAL PAIN: Status: ACTIVE | Noted: 2023-12-19

## 2023-12-19 LAB
ALBUMIN SERPL BCP-MCNC: 4.2 G/DL (ref 3.4–5)
ALP SERPL-CCNC: 90 U/L (ref 33–136)
ALT SERPL W P-5'-P-CCNC: 19 U/L (ref 7–45)
ANION GAP BLDV CALCULATED.4IONS-SCNC: 10 MMOL/L (ref 10–25)
ANION GAP BLDV CALCULATED.4IONS-SCNC: 9 MMOL/L (ref 10–25)
ANION GAP SERPL CALC-SCNC: 19 MMOL/L (ref 10–20)
AST SERPL W P-5'-P-CCNC: 26 U/L (ref 9–39)
BASE EXCESS BLDV CALC-SCNC: -1.2 MMOL/L (ref -2–3)
BASE EXCESS BLDV CALC-SCNC: 1.3 MMOL/L (ref -2–3)
BASOPHILS # BLD AUTO: 0.07 X10*3/UL (ref 0–0.1)
BASOPHILS NFR BLD AUTO: 0.4 %
BILIRUB SERPL-MCNC: 1.1 MG/DL (ref 0–1.2)
BODY TEMPERATURE: 37 DEGREES CELSIUS
BODY TEMPERATURE: 37 DEGREES CELSIUS
BUN SERPL-MCNC: 31 MG/DL (ref 6–23)
CA-I BLDV-SCNC: 1.11 MMOL/L (ref 1.1–1.33)
CA-I BLDV-SCNC: 1.18 MMOL/L (ref 1.1–1.33)
CALCIUM SERPL-MCNC: 9.6 MG/DL (ref 8.6–10.6)
CARDIAC TROPONIN I PNL SERPL HS: 12 NG/L (ref 0–34)
CHLORIDE BLDV-SCNC: 102 MMOL/L (ref 98–107)
CHLORIDE BLDV-SCNC: 102 MMOL/L (ref 98–107)
CHLORIDE SERPL-SCNC: 101 MMOL/L (ref 98–107)
CO2 SERPL-SCNC: 19 MMOL/L (ref 21–32)
CREAT SERPL-MCNC: 1.62 MG/DL (ref 0.5–1.05)
CREAT SERPL-MCNC: 1.79 MG/DL (ref 0.5–1.05)
EOSINOPHIL # BLD AUTO: 0.15 X10*3/UL (ref 0–0.7)
EOSINOPHIL NFR BLD AUTO: 0.9 %
ERYTHROCYTE [DISTWIDTH] IN BLOOD BY AUTOMATED COUNT: 13.7 % (ref 11.5–14.5)
GFR SERPL CREATININE-BSD FRML MDRD: 31 ML/MIN/1.73M*2
GFR SERPL CREATININE-BSD FRML MDRD: 35 ML/MIN/1.73M*2
GLUCOSE BLDV-MCNC: 118 MG/DL (ref 74–99)
GLUCOSE BLDV-MCNC: 71 MG/DL (ref 74–99)
GLUCOSE SERPL-MCNC: 99 MG/DL (ref 74–99)
HCO3 BLDV-SCNC: 23.6 MMOL/L (ref 22–26)
HCO3 BLDV-SCNC: 25.9 MMOL/L (ref 22–26)
HCT VFR BLD AUTO: 38.6 % (ref 36–46)
HCT VFR BLD EST: 35 % (ref 36–46)
HCT VFR BLD EST: 41 % (ref 36–46)
HGB BLD-MCNC: 13 G/DL (ref 12–16)
HGB BLDV-MCNC: 11.6 G/DL (ref 12–16)
HGB BLDV-MCNC: 13.6 G/DL (ref 12–16)
HOLD SPECIMEN: NORMAL
IMM GRANULOCYTES # BLD AUTO: 0.26 X10*3/UL (ref 0–0.7)
IMM GRANULOCYTES NFR BLD AUTO: 1.5 % (ref 0–0.9)
LACTATE BLDV-SCNC: 0.8 MMOL/L (ref 0.4–2)
LACTATE BLDV-SCNC: 1.6 MMOL/L (ref 0.4–2)
LACTATE SERPL-SCNC: 1.6 MMOL/L (ref 0.4–2)
LYMPHOCYTES # BLD AUTO: 1.33 X10*3/UL (ref 1.2–4.8)
LYMPHOCYTES NFR BLD AUTO: 7.6 %
MCH RBC QN AUTO: 28.6 PG (ref 26–34)
MCHC RBC AUTO-ENTMCNC: 33.7 G/DL (ref 32–36)
MCV RBC AUTO: 85 FL (ref 80–100)
MONOCYTES # BLD AUTO: 0.86 X10*3/UL (ref 0.1–1)
MONOCYTES NFR BLD AUTO: 4.9 %
NEUTROPHILS # BLD AUTO: 14.77 X10*3/UL (ref 1.2–7.7)
NEUTROPHILS NFR BLD AUTO: 84.7 %
NRBC BLD-RTO: 0 /100 WBCS (ref 0–0)
OXYHGB MFR BLDV: 42.5 % (ref 45–75)
OXYHGB MFR BLDV: 59.3 % (ref 45–75)
PCO2 BLDV: 39 MM HG (ref 41–51)
PCO2 BLDV: 40 MM HG (ref 41–51)
PH BLDV: 7.39 PH (ref 7.33–7.43)
PH BLDV: 7.42 PH (ref 7.33–7.43)
PLATELET # BLD AUTO: 485 X10*3/UL (ref 150–450)
PO2 BLDV: 26 MM HG (ref 35–45)
PO2 BLDV: 40 MM HG (ref 35–45)
POTASSIUM BLDV-SCNC: 4.5 MMOL/L (ref 3.5–5.3)
POTASSIUM BLDV-SCNC: 5 MMOL/L (ref 3.5–5.3)
POTASSIUM SERPL-SCNC: 5.1 MMOL/L (ref 3.5–5.3)
POTASSIUM SERPL-SCNC: 5.8 MMOL/L (ref 3.5–5.3)
PROT SERPL-MCNC: 8.3 G/DL (ref 6.4–8.2)
RBC # BLD AUTO: 4.55 X10*6/UL (ref 4–5.2)
SAO2 % BLDV: 44 % (ref 45–75)
SAO2 % BLDV: 61 % (ref 45–75)
SODIUM BLDV-SCNC: 130 MMOL/L (ref 136–145)
SODIUM BLDV-SCNC: 133 MMOL/L (ref 136–145)
SODIUM SERPL-SCNC: 133 MMOL/L (ref 136–145)
WBC # BLD AUTO: 17.4 X10*3/UL (ref 4.4–11.3)

## 2023-12-19 PROCEDURE — G0378 HOSPITAL OBSERVATION PER HR: HCPCS

## 2023-12-19 PROCEDURE — 36415 COLL VENOUS BLD VENIPUNCTURE: CPT | Performed by: STUDENT IN AN ORGANIZED HEALTH CARE EDUCATION/TRAINING PROGRAM

## 2023-12-19 PROCEDURE — 99285 EMERGENCY DEPT VISIT HI MDM: CPT | Performed by: STUDENT IN AN ORGANIZED HEALTH CARE EDUCATION/TRAINING PROGRAM

## 2023-12-19 PROCEDURE — 2500000005 HC RX 250 GENERAL PHARMACY W/O HCPCS: Mod: SE | Performed by: STUDENT IN AN ORGANIZED HEALTH CARE EDUCATION/TRAINING PROGRAM

## 2023-12-19 PROCEDURE — 96375 TX/PRO/DX INJ NEW DRUG ADDON: CPT | Performed by: STUDENT IN AN ORGANIZED HEALTH CARE EDUCATION/TRAINING PROGRAM

## 2023-12-19 PROCEDURE — 2550000001 HC RX 255 CONTRASTS: Mod: SE | Performed by: STUDENT IN AN ORGANIZED HEALTH CARE EDUCATION/TRAINING PROGRAM

## 2023-12-19 PROCEDURE — 71045 X-RAY EXAM CHEST 1 VIEW: CPT | Performed by: RADIOLOGY

## 2023-12-19 PROCEDURE — 84075 ASSAY ALKALINE PHOSPHATASE: CPT | Performed by: STUDENT IN AN ORGANIZED HEALTH CARE EDUCATION/TRAINING PROGRAM

## 2023-12-19 PROCEDURE — 2500000002 HC RX 250 W HCPCS SELF ADMINISTERED DRUGS (ALT 637 FOR MEDICARE OP, ALT 636 FOR OP/ED): Mod: SE | Performed by: STUDENT IN AN ORGANIZED HEALTH CARE EDUCATION/TRAINING PROGRAM

## 2023-12-19 PROCEDURE — 93010 ELECTROCARDIOGRAM REPORT: CPT | Performed by: STUDENT IN AN ORGANIZED HEALTH CARE EDUCATION/TRAINING PROGRAM

## 2023-12-19 PROCEDURE — 71045 X-RAY EXAM CHEST 1 VIEW: CPT

## 2023-12-19 PROCEDURE — 84132 ASSAY OF SERUM POTASSIUM: CPT

## 2023-12-19 PROCEDURE — 96361 HYDRATE IV INFUSION ADD-ON: CPT | Performed by: STUDENT IN AN ORGANIZED HEALTH CARE EDUCATION/TRAINING PROGRAM

## 2023-12-19 PROCEDURE — 2500000004 HC RX 250 GENERAL PHARMACY W/ HCPCS (ALT 636 FOR OP/ED): Mod: SE | Performed by: STUDENT IN AN ORGANIZED HEALTH CARE EDUCATION/TRAINING PROGRAM

## 2023-12-19 PROCEDURE — 87040 BLOOD CULTURE FOR BACTERIA: CPT | Performed by: STUDENT IN AN ORGANIZED HEALTH CARE EDUCATION/TRAINING PROGRAM

## 2023-12-19 PROCEDURE — 93010 ELECTROCARDIOGRAM REPORT: CPT | Performed by: INTERNAL MEDICINE

## 2023-12-19 PROCEDURE — 2500000001 HC RX 250 WO HCPCS SELF ADMINISTERED DRUGS (ALT 637 FOR MEDICARE OP): Mod: SE | Performed by: NURSE PRACTITIONER

## 2023-12-19 PROCEDURE — 85025 COMPLETE CBC W/AUTO DIFF WBC: CPT | Performed by: STUDENT IN AN ORGANIZED HEALTH CARE EDUCATION/TRAINING PROGRAM

## 2023-12-19 PROCEDURE — 84132 ASSAY OF SERUM POTASSIUM: CPT | Mod: 59 | Performed by: STUDENT IN AN ORGANIZED HEALTH CARE EDUCATION/TRAINING PROGRAM

## 2023-12-19 PROCEDURE — 83605 ASSAY OF LACTIC ACID: CPT | Performed by: STUDENT IN AN ORGANIZED HEALTH CARE EDUCATION/TRAINING PROGRAM

## 2023-12-19 PROCEDURE — 93005 ELECTROCARDIOGRAM TRACING: CPT

## 2023-12-19 PROCEDURE — 83690 ASSAY OF LIPASE: CPT | Performed by: STUDENT IN AN ORGANIZED HEALTH CARE EDUCATION/TRAINING PROGRAM

## 2023-12-19 PROCEDURE — 2500000004 HC RX 250 GENERAL PHARMACY W/ HCPCS (ALT 636 FOR OP/ED): Mod: SE | Performed by: NURSE PRACTITIONER

## 2023-12-19 PROCEDURE — 71275 CT ANGIOGRAPHY CHEST: CPT

## 2023-12-19 PROCEDURE — 74177 CT ABD & PELVIS W/CONTRAST: CPT

## 2023-12-19 PROCEDURE — 71275 CT ANGIOGRAPHY CHEST: CPT | Performed by: RADIOLOGY

## 2023-12-19 PROCEDURE — 84484 ASSAY OF TROPONIN QUANT: CPT | Performed by: STUDENT IN AN ORGANIZED HEALTH CARE EDUCATION/TRAINING PROGRAM

## 2023-12-19 PROCEDURE — 74177 CT ABD & PELVIS W/CONTRAST: CPT | Performed by: STUDENT IN AN ORGANIZED HEALTH CARE EDUCATION/TRAINING PROGRAM

## 2023-12-19 PROCEDURE — 82565 ASSAY OF CREATININE: CPT | Mod: 59 | Performed by: STUDENT IN AN ORGANIZED HEALTH CARE EDUCATION/TRAINING PROGRAM

## 2023-12-19 PROCEDURE — 2500000002 HC RX 250 W HCPCS SELF ADMINISTERED DRUGS (ALT 637 FOR MEDICARE OP, ALT 636 FOR OP/ED): Mod: SE | Performed by: NURSE PRACTITIONER

## 2023-12-19 RX ORDER — CLOPIDOGREL BISULFATE 75 MG/1
75 TABLET ORAL ONCE
Status: COMPLETED | OUTPATIENT
Start: 2023-12-19 | End: 2023-12-19

## 2023-12-19 RX ORDER — LISINOPRIL 40 MG/1
40 TABLET ORAL DAILY
Status: DISCONTINUED | OUTPATIENT
Start: 2023-12-19 | End: 2023-12-19

## 2023-12-19 RX ORDER — SODIUM CHLORIDE 9 MG/ML
125 INJECTION, SOLUTION INTRAVENOUS CONTINUOUS
Status: DISCONTINUED | OUTPATIENT
Start: 2023-12-19 | End: 2023-12-20 | Stop reason: HOSPADM

## 2023-12-19 RX ORDER — DEXTROSE 50 % IN WATER (D50W) INTRAVENOUS SYRINGE
25 ONCE
Status: COMPLETED | OUTPATIENT
Start: 2023-12-19 | End: 2023-12-19

## 2023-12-19 RX ORDER — HYDROMORPHONE HYDROCHLORIDE 1 MG/ML
0.5 INJECTION, SOLUTION INTRAMUSCULAR; INTRAVENOUS; SUBCUTANEOUS ONCE
Status: COMPLETED | OUTPATIENT
Start: 2023-12-19 | End: 2023-12-19

## 2023-12-19 RX ORDER — FAMOTIDINE 20 MG/1
20 TABLET, FILM COATED ORAL ONCE
Status: DISCONTINUED | OUTPATIENT
Start: 2023-12-19 | End: 2023-12-20 | Stop reason: HOSPADM

## 2023-12-19 RX ORDER — EZETIMIBE 10 MG/1
10 TABLET ORAL NIGHTLY
Status: DISCONTINUED | OUTPATIENT
Start: 2023-12-19 | End: 2023-12-20 | Stop reason: HOSPADM

## 2023-12-19 RX ORDER — CARVEDILOL 25 MG/1
25 TABLET ORAL
Status: DISCONTINUED | OUTPATIENT
Start: 2023-12-19 | End: 2023-12-20 | Stop reason: HOSPADM

## 2023-12-19 RX ORDER — ONDANSETRON HYDROCHLORIDE 2 MG/ML
4 INJECTION, SOLUTION INTRAVENOUS EVERY 6 HOURS PRN
Status: DISCONTINUED | OUTPATIENT
Start: 2023-12-19 | End: 2023-12-20 | Stop reason: HOSPADM

## 2023-12-19 RX ORDER — ROSUVASTATIN CALCIUM 40 MG/1
40 TABLET, COATED ORAL NIGHTLY
Status: DISCONTINUED | OUTPATIENT
Start: 2023-12-19 | End: 2023-12-20 | Stop reason: HOSPADM

## 2023-12-19 RX ORDER — MORPHINE SULFATE 4 MG/ML
4 INJECTION INTRAVENOUS EVERY 4 HOURS PRN
Status: DISCONTINUED | OUTPATIENT
Start: 2023-12-19 | End: 2023-12-20 | Stop reason: HOSPADM

## 2023-12-19 RX ORDER — HYDROCHLOROTHIAZIDE 25 MG/1
25 TABLET ORAL DAILY
Status: DISCONTINUED | OUTPATIENT
Start: 2023-12-19 | End: 2023-12-19

## 2023-12-19 RX ADMIN — ROSUVASTATIN CALCIUM 40 MG: 40 TABLET, FILM COATED ORAL at 21:00

## 2023-12-19 RX ADMIN — HYDROMORPHONE HYDROCHLORIDE 0.5 MG: 1 INJECTION, SOLUTION INTRAMUSCULAR; INTRAVENOUS; SUBCUTANEOUS at 15:56

## 2023-12-19 RX ADMIN — MORPHINE SULFATE 4 MG: 4 INJECTION INTRAVENOUS at 21:21

## 2023-12-19 RX ADMIN — CLOPIDOGREL BISULFATE 75 MG: 75 TABLET ORAL at 19:28

## 2023-12-19 RX ADMIN — IOHEXOL 90 ML: 350 INJECTION, SOLUTION INTRAVENOUS at 14:21

## 2023-12-19 RX ADMIN — SODIUM CHLORIDE, SODIUM LACTATE, POTASSIUM CHLORIDE, AND CALCIUM CHLORIDE 1000 ML: 600; 310; 30; 20 INJECTION, SOLUTION INTRAVENOUS at 11:45

## 2023-12-19 RX ADMIN — CARVEDILOL 25 MG: 25 TABLET, FILM COATED ORAL at 19:28

## 2023-12-19 RX ADMIN — INSULIN HUMAN 5 UNITS: 100 INJECTION, SOLUTION PARENTERAL at 14:51

## 2023-12-19 RX ADMIN — DEXTROSE MONOHYDRATE 25 G: 25 INJECTION, SOLUTION INTRAVENOUS at 14:51

## 2023-12-19 RX ADMIN — EZETIMIBE 10 MG: 10 TABLET ORAL at 21:00

## 2023-12-19 RX ADMIN — SODIUM CHLORIDE 125 ML/HR: 9 INJECTION, SOLUTION INTRAVENOUS at 19:28

## 2023-12-19 ASSESSMENT — ENCOUNTER SYMPTOMS
SYNCOPE: 0
CHILLS: 0
DYSURIA: 0
SHORTNESS OF BREATH: 0
ABDOMINAL PAIN: 1
NAUSEA: 1
HEADACHES: 0
COUGH: 0
DIARRHEA: 1
VOMITING: 1
DIZZINESS: 0
FEVER: 0

## 2023-12-19 ASSESSMENT — PAIN - FUNCTIONAL ASSESSMENT
PAIN_FUNCTIONAL_ASSESSMENT: 0-10
PAIN_FUNCTIONAL_ASSESSMENT: 0-10

## 2023-12-19 ASSESSMENT — LIFESTYLE VARIABLES
HAVE YOU EVER FELT YOU SHOULD CUT DOWN ON YOUR DRINKING: NO
REASON UNABLE TO ASSESS: NO
HAVE PEOPLE ANNOYED YOU BY CRITICIZING YOUR DRINKING: NO
EVER FELT BAD OR GUILTY ABOUT YOUR DRINKING: NO
EVER HAD A DRINK FIRST THING IN THE MORNING TO STEADY YOUR NERVES TO GET RID OF A HANGOVER: NO

## 2023-12-19 ASSESSMENT — PAIN SCALES - GENERAL
PAINLEVEL_OUTOF10: 10 - WORST POSSIBLE PAIN
PAINLEVEL_OUTOF10: 3

## 2023-12-19 ASSESSMENT — COLUMBIA-SUICIDE SEVERITY RATING SCALE - C-SSRS
2. HAVE YOU ACTUALLY HAD ANY THOUGHTS OF KILLING YOURSELF?: NO
6. HAVE YOU EVER DONE ANYTHING, STARTED TO DO ANYTHING, OR PREPARED TO DO ANYTHING TO END YOUR LIFE?: NO
1. IN THE PAST MONTH, HAVE YOU WISHED YOU WERE DEAD OR WISHED YOU COULD GO TO SLEEP AND NOT WAKE UP?: NO

## 2023-12-19 NOTE — PROGRESS NOTES
Patient has been identified as having an emergent need for administration of iodinated contrast for CT scan prior to result of laboratory studies OR despite known elevated GFR due to possibility of life and/or limb threatening pathology.    I acknowledge the risks and benefits of emergently proceeding with contrast administration including that, at present, it is the position of the [employer] that contrast induced nephropathy (ELOY) is a rare but possible consequence. At this time the benefits of proceeding with contrast administration outweigh the risks.    Attempts will be made to mitigate possible ELOY risk with IV fluid hydration if able.    Eileen Portillo, DO   Emergency Medicine, PGY3

## 2023-12-19 NOTE — ED TRIAGE NOTES
Pt reports vomiting and SOB since last night. Pt had hernia surgery on 12/8 at [facility]. Pt has been doing okay since surgery but is not back to full activity

## 2023-12-19 NOTE — ED PROVIDER NOTES
HPI:  Vivien Miramontes is a 63 y.o. with a history of recent incisional hernia repair with mesh on 12/8, hypertension, CAD, status post stents, Crohn's, presenting to the emergency department with concern for abdominal pain, nausea, vomiting, chest pain and shortness of breath.  Patient states that she has been feeling lightheaded as well.  Denies any radiation of her chest pain to her back or arms.  States she has not been tolerating food or liquids well recently and ultimately came into the emergency department today per recommendation of her son.  Endorses that she has been having diarrhea, states has been ongoing for many months now, not associated with her surgery.  Denies any black or bloody stool.    Limitations to History: No limitations, history was additionally obtained by son at bedside    .------------------------------------------------------------------------------------------------------------------------------------------    Physical Exam:    ED Triage Vitals [12/19/23 1124]   Temp Heart Rate Resp BP   36.5 °C (97.7 °F) 85 16 83/60      SpO2 Temp src Heart Rate Source Patient Position   94 % -- -- Sitting      BP Location FiO2 (%)     Left arm --         Gen: Awake, alert, initial vital signs do show blood pressure 80s over 60s, no tachycardia, afebrile, satting well, no acute distress.    Head/Neck: NCAT, neck w/ FROM  Eyes: EOMI, PERRL, anicteric sclerae, noninjected conjunctivae  Nose: Nares patent w/o rhinorrhea  Mouth: Mucous membranes dry, no OP lesions noted  Heart: RRR, well perfused  Lungs: CTA b/l no RRW, no increased work of breathing  Abdomen: soft, nondistended, mild tenderness throughout, patient does have ecchymosis varied stages near incision sites which appear to be clean dry and intact, no surrounding erythema or purulence.    Musculoskeletal: no joint swelling noted  Extremities: Warm, well perfused. Compartments soft, nontender   Neurologic: Alert, symmetrical facies, phonates  clearly, moves all extremities equally, responsive to touch  Skin: warm, dry   Psychological: calm, no SI/HI    ------------------------------------------------------------------------------------------------------------------------------------------    Medical Decision Making  63-year-old female with a recent incisional hernia repair with mesh on 12 8 by Dr. De Guzman, CAD, hypertension, Crohn's, presents the emergency department complaining of abdominal pain, nausea, vomiting.  Patient also endorsing chest pain and shortness of breath, feels like sharp pleuritic pain is described by the patient.  Vital signs on arrival do show soft blood pressure 80s over 60s, she is mentating well, not tachycardic, satting well with no respiratory effort.  Does appear clinically dehydrated, abdominal exam is as above.  Differential is wide includes infectious pathology related to recent surgery, PE, ACS, pneumonia, etc.  EKG is nonischemic does show T wave inversions lead III without any contiguous changes. 2 L of fluids with improvement in blood pressure, remains mentating well. Initial labs show trop 12, CBC concerning for leukocytosis with a left shift, hemoglobin is stable at 13, metabolic panel does show hyperkalemia of 5.8, FLORENCE with a creatinine of 1.79, LFTs within normal limits.  Lactate is negative.  Chest x-ray shows no acute pathology.  CT PE, CT of the ab pelvis ordered and pending.  CT results do show large ventral hematoma.  Patient's hemoglobin is stable, not on any anticoagulation.  ACS was paged, callbacks pending on signout to incoming team.      ED Course as of 12/20/23 1413   Tue Dec 19, 2023   1550 EKG, interpreted by me, shows normal sinus rhythm at 75 bpm, normal axis, no ST elevations or depressions, patient does have T wave inversions in lead III, no contiguous changes. [SB]      ED Course User Index  [SB] Eileen Portillo DO         Diagnoses as of 12/20/23 1413   Abdominal hematoma   Nausea and vomiting,  unspecified vomiting type   FLORENCE (acute kidney injury) (CMS/HCC)   History of non-ST elevation myocardial infarction (NSTEMI)   History of heart artery stent   Chronic hepatitis C without hepatic coma (CMS/HCC)   Cigarette smoker   Crohn's disease with complication, unspecified gastrointestinal tract location (CMS/HCC)   Hyperlipidemia, unspecified hyperlipidemia type   Primary hypertension   Pulmonary nodule        Procedures      Chronic Medical Conditions Significantly Affecting Care: recent ventral hernia repair     Discussion of Management with Other Providers:  ACS paged, callback pending     Clinical Impression: abdominal pain, ventral hernia repair c/b abdominal wall hematoma     Dispo: pending upon signout to incoming team      Discussed with ED Attending, Dr. Augusta Portillo DO   Emergency Medicine, PGY3      Eileen Portillo DO  Resident  12/20/23 6723

## 2023-12-19 NOTE — H&P
History and Physical  [facility] CLINICAL DECISION  Patient: Vivien Miramontes  MRN: [id number]  : 1960  Date of Evaluation: 2023  ED Provider: AURELIO Harvey      Patient History:  Vivien Miramontes is a 63 y.o. female s/p robotic incisional hernia repair with mesh (Dr De Guzman) on , who presents to the emergency department complaining of abdominal pain. She endorses intermittent abdominal cramping since her surgery with worsening pain yesterday and associated nausea. Has had 3 episodes of vomiting this morning. Last BM yesterday, reports having chronic non-bloody diarrhea. She has reported decreased PO intake at home over the last several days due to abdominal pain and nausea. Denies fever/chills, CP, SOB, dizziness, urinary symptoms. Denies any drainage from surgical incision sites.     The acute evaluation included: CBC with elevated WBC of 17.4. Hgb 13, hcg 38.6. CMP with sodium of 133, BUN 31, Cr 1.62. Napoleon likely secondary to dehydration given decreased PO intake. Blood cultures obtained. She was given a total of 2L LR in the ED along with 0.5mg Dilaudid. CT abd/pelvis findings revealed postop changes of ventral hernia repair with mesh, an 11.2x8.5cm loculated fluid collection along with anterior abdominal wall within the surgical bed. Findings compatible with postop hematoma. Acute care surgery was consulted in the ED. Per ACS, low suspicion for acute intraabdominal or infectious process. Recommendations for CDU admission for IVF hydration, symptom management and repeat CBC, BMP in am.     Upon admission to the Clinical Decision Unit, patient is well appearing, nontoxic resting comfortably without distress. Continues to endorse generalized abdominal pain with associated nausea.     Past History     Past Medical History:   Diagnosis Date    Coronary artery disease     2012: cardiac cath with PCI, 23: cardiac cath with PCI x2 (LAD & LCx) intermediate disease of a tortuous RCA  - cardiac clearance requested    Crohn's disease (CMS/HCC)     bowel perforation  s/p colostomy, reversed 2010    Depression     Hyperlipidemia     Hypertension     IBS (irritable bowel syndrome)     Lung nodules     nonconcerning per pulm note    Myocardial infarction (CMS/HCC)     Ulcerative colitis (CMS/HCC)      Past Surgical History:   Procedure Laterality Date    ABDOMINAL HERNIA REPAIR  2018    with mesh    CARDIAC CATHETERIZATION      PCI    CARDIAC CATHETERIZATION  2023    PCI x 2 (LAD and LCx)     SECTION, LOW TRANSVERSE  1987    COLONOSCOPY      COLOSTOMY      REVISION / TAKEDOWN COLOSTOMY      TUBAL LIGATION  1998    UMBILICAL HERNIA REPAIR      x 2             Medications/Allergies     Previous Medications    ACETAMINOPHEN (TYLENOL) 325 MG TABLET    Take 2 tablets (650 mg) by mouth every 6 hours if needed for mild pain (1 - 3).    ASPIRIN 81 MG EC TABLET    Take 1 tablet (81 mg) by mouth once daily.    CARVEDILOL (COREG) 12.5 MG TABLET    Take 2 tablets (25 mg) by mouth twice a day.    CLOPIDOGREL (PLAVIX) 75 MG TABLET    Take 1 tablet (75 mg) by mouth once daily.    EVOLOCUMAB (REPATHA SYRINGE) 140 MG/ML INJECTION    Inject 1 mL (140 mg) under the skin every 14 (fourteen) days.    EZETIMIBE (ZETIA) 10 MG TABLET    Take 1 tablet (10 mg) by mouth once daily.    FAMOTIDINE (PEPCID) 20 MG TABLET    Take 1 tablet (20 mg) by mouth 2 times a day.    HYDROCHLOROTHIAZIDE (HYDRODIURIL) 25 MG TABLET    Take 1 tablet (25 mg) by mouth once daily.    HYDROXYZINE HCL (ATARAX) 25 MG TABLET    Take 1 tablet (25 mg) by mouth 3 times a day as needed for itching. May cause drowsiness    LISINOPRIL 40 MG TABLET    Take 1 tablet (40 mg) by mouth once daily. as directed    NAPROXEN (NAPROSYN) 250 MG TABLET    Take 1 tablet (250 mg) by mouth if needed for mild pain (1 - 3).    NITROGLYCERIN (NITROSTAT) 0.4 MG SL TABLET    Place 1 tablet (0.4 mg) under the tongue. Dissolve 1 tablet under the  tongue as needed for chest pain.    ONDANSETRON ODT (ZOFRAN-ODT) 4 MG DISINTEGRATING TABLET    Take 1 tablet (4 mg) by mouth every 8 hours if needed for nausea or vomiting.    POLYETHYLENE GLYCOL (GLYCOLAX, MIRALAX) 17 GRAM PACKET    Take 17 g by mouth once daily for 14 days. Do not start before December 13, 2023.    PREGABALIN (LYRICA) 75 MG CAPSULE    Take 1 capsule (75 mg) by mouth if needed.    ROSUVASTATIN (CRESTOR) 40 MG TABLET    Take 1 tablet (40 mg) by mouth once daily at bedtime.    STELARA INJECTION    Inject 1 mL (90 mg) under the skin every 8 (eight) weeks.    VITAMIN E 180 MG (400 UNIT) CAPSULE    Take 1 capsule (400 Units) by mouth once daily.     Allergies   Allergen Reactions    Penicillins Hives         Physical Exam     Visit Vitals  /67   Pulse 66   Temp 36.5 °C (97.7 °F)   Resp 19   SpO2 100%   OB Status Postmenopausal   Smoking Status Every Day           Consultants  1) Acute Care Surgery- consulted in the ED. After reviewing CT abd/pelvis, fluid collection likey hematoma secondary to postop vs infection. Recommend CDU admission for IVF, symptom management and repeat CBC, BMP in am                 Social History  She reports that she has been smoking cigarettes. She has a 12.50 pack-year smoking history. She has never used smokeless tobacco. She reports that she does not currently use alcohol. She reports that she does not currently use drugs.           Allergies  Penicillins         Review of Systems   Constitutional: Negative for chills and fever.   Eyes:  Negative for visual disturbance.   Cardiovascular:  Negative for chest pain, leg swelling and syncope.   Respiratory:  Negative for cough and shortness of breath.    Gastrointestinal:  Positive for abdominal pain, diarrhea, nausea and vomiting.   Genitourinary:  Negative for dysuria.   Neurological:  Negative for dizziness and headaches.        Physical Exam   GENERAL:  The patient appears nourished and normally developed. Vital signs  as documented.     HEENT:  Head normocephalic, atraumatic, EOMs intact, PERRLA, Mucous membranes moist. Nares patent without copious rhinorrhea.  No lymphadenopathy.    PULMONARY:  Lungs are clear to auscultation, without any respiratory distress. Able to speak full sentences, no accessory muscle use    CARDIAC:   Normal rate. No murmurs, rubs or gallops    ABDOMEN:  Soft, non-distended, , BS positive x 4 quadrants, Incisional sites intact, there is no drainage or surrounding erythema. +RUQ, epigastric and LUQ tenderness on exam. No rebound or guarding, no peritoneal signs, no CVA tenderness, no masses or organomegaly    MUSCULOSKELETAL:   Able to ambulate, Non edematous, with no obvious deformities. Pulses intact distal    SKIN:   Good color, with no significant rashes.  No pallor.    NEURO:  No obvious neurological deficits, normal sensation and strength bilaterally.  Able to follow commands, NIH 0, CN 2-12 intact.    Psych: Appropriate mood and affect  Last Recorded Vitals  Blood pressure 106/67, pulse 66, temperature 36.5 °C (97.7 °F), resp. rate 19, SpO2 100 %.      Assessment/Plan   Principal Problem:    Hematoma  Active Problems:    Abdominal hematoma    Abdominal pain          Impression and Plan  In summary, iVvien Miramontes is admitted to the [facility] Clinical Decision Unit for abdominal pain. Dr. Munguia is the CDU admission attending.    This patient has been risk-stratified based on available history, physical exam, and related study findings. Admission to the observation status for further diagnosis/treatment/monitoring of abdominal pain, FLORENCE is warranted clinically. This extended period of observation is specifically required to determine the need for hospitalization.     The goals of this admission based on the patient's clinical problem list are:  1) Patient will achieve adequate improvement of abdominal pain  2) FLORENCE- likely secondary to dehydration. IV NS fluids @  125cc/hr  3) Patient will be able to tolerate regular diet    We will observe the patient for the following endpoints:   1) Monitor for improvement in pain and nausea  2) Repeat CBC, BMP in am  3) Acute care surgery to re-evaluate patient tomorrow    When met, appropriate disposition will be arranged.            Sarina Mariee, APRN-CNP

## 2023-12-19 NOTE — CONSULTS
[facility]  ACUTE CARE SURGERY - HISTORY AND PHYSICAL / CONSULT    Patient Name: Vivien Miramontes  MRN: [id number]  Admit Date: 1219  : 1960  AGE: 63 y.o.   GENDER: female  ==============================================================================  TODAY'S ASSESSMENT AND PLAN OF CARE:  62 y/o F who is POD 11 s/p robotic incisional hernia repair with mesh (Dr. De Guzman) on  and presents to the ED with abdominal pain, N/V, and poor PO intake. CT A/P concerning for intraabdominal fluid collection, likely post-surgical in nature. This patient's labs are reflective of her dehydrated state and less concerning for an acute intraabdominal process.    Recommendations:  - Observe in CDU  - IVF hydration  - Regular diet  - Zofran for nausea  - CBC, BMP in the AM for determination of admission vs discharge    Discussed with Dr. Narvaez and Dr. De Guzman (operating attending).     Arianne Preciado MD  PGY-2 General Surgery  ACS [id number]  ==============================================================================  CHIEF COMPLAINT/REASON FOR CONSULT:  This is a 62 y/o F with a recent surgical history of robotic repair of incisional hernia with Dr. De Guzman on  who presents to the ED with N/V, abdominal pain. ACS consulted to evaluate given CT findings c/f interval development of abscess vs hematoma in the setting of leukocytosis.    Ms. Miramontes has underwent repair of this same incisional hernia twice, with the second instance done robotically as aforementioned. Her hernia was repaired with mesh and involved extensive ARNOLD. Her postop course was c/b FLORENCE, hypotension, ABLA, and AMS. These were managed in the inpatient setting and the patient was discharged on . She was initially doing well after discharge but developed N/V, abdominal pain, and SOB last night. On arrival to the ED, she was hypotensive to 80s/60s but has been fluid responsive. Her labs are remarkable for a leukocytosis to 17,  hyponatremia to 133, hyperkalemia to 5.8, and an FLORENCE with Cr 1.79 (baseline 0.8). She states that she has had poor PO intake since discharge from [facility]. Prior to discharge, she was able to tolerate solid food but has not been able to do that the last few days. She endorses chills and diarrhea, although she states that that is 2/2 to her Crohn's disease. She denies any dysuria.    PAST MEDICAL HISTORY:   PMH:   Past Medical History:   Diagnosis Date    Coronary artery disease     2012: cardiac cath with PCI, 23: cardiac cath with PCI x2 (LAD & LCx) intermediate disease of a tortuous RCA - cardiac clearance requested    Crohn's disease (CMS/HCC)     bowel perforation 2009 s/p colostomy, reversed 2010    Depression     Hyperlipidemia     Hypertension     IBS (irritable bowel syndrome)     Lung nodules     nonconcerning per pulm note    Myocardial infarction (CMS/HCC)     Ulcerative colitis (CMS/HCC)      Last menstrual period: N/A    PSH:   Past Surgical History:   Procedure Laterality Date    ABDOMINAL HERNIA REPAIR  2018    with mesh    CARDIAC CATHETERIZATION      PCI    CARDIAC CATHETERIZATION  2023    PCI x 2 (LAD and LCx)     SECTION, LOW TRANSVERSE  1987    COLONOSCOPY      COLOSTOMY  2009    REVISION / TAKEDOWN COLOSTOMY  2010    TUBAL LIGATION  1998    UMBILICAL HERNIA REPAIR      x 2     FH:   Family History   Problem Relation Name Age of Onset    Stroke Mother      Hypertension Mother      Heart attack Mother      Heart attack Father      Diabetes Father      Multiple sclerosis Sister      Breast cancer Sister      Diabetes Brother       SOCIAL HISTORY:    Smoking:    Social History     Tobacco Use   Smoking Status Every Day    Packs/day: 0.25    Years: 50.00    Additional pack years: 0.00    Total pack years: 12.50    Types: Cigarettes   Smokeless Tobacco Never       Alcohol:    Social History     Substance and Sexual Activity   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 Authorizing Provider   acetaminophen (Tylenol) 325 mg tablet Take 2 tablets (650 mg) by mouth every 6 hours if needed for mild pain (1 - 3). 12/12/23   Cecy Travis PA-C   aspirin 81 mg EC tablet Take 1 tablet (81 mg) by mouth once daily.    Historical Provider, MD   carvedilol (Coreg) 12.5 mg tablet Take 2 tablets (25 mg) by mouth twice a day. 8/7/23   Historical Provider, MD   clopidogrel (Plavix) 75 mg tablet Take 1 tablet (75 mg) by mouth once daily.    Historical Provider, MD   evolocumab (Repatha Syringe) 140 mg/mL injection Inject 1 mL (140 mg) under the skin every 14 (fourteen) days. 10/18/23   Historical Provider, MD   ezetimibe (Zetia) 10 mg tablet Take 1 tablet (10 mg) by mouth once daily. 6/4/23   Historical Provider, MD   famotidine (Pepcid) 20 mg tablet Take 1 tablet (20 mg) by mouth 2 times a day. 7/5/23   Historical Provider, MD   hydroCHLOROthiazide (HYDRODiuril) 25 mg tablet Take 1 tablet (25 mg) by mouth once daily.    Historical Provider, MD   hydrOXYzine HCL (Atarax) 25 mg tablet Take 1 tablet (25 mg) by mouth 3 times a day as needed for itching. May cause drowsiness    Historical Provider, MD   lisinopril 40 mg tablet Take 1 tablet (40 mg) by mouth once daily. as directed    Historical Provider, MD   naproxen (Naprosyn) 250 mg tablet Take 1 tablet (250 mg) by mouth if needed for mild pain (1 - 3).    Historical Provider, MD   nitroglycerin (Nitrostat) 0.4 mg SL tablet Place 1 tablet (0.4 mg) under the tongue. Dissolve 1 tablet under the tongue as needed for chest pain. 6/3/23   Historical Provider, MD   ondansetron ODT (Zofran-ODT) 4 mg disintegrating tablet Take 1 tablet (4 mg) by mouth every 8 hours if needed for nausea or vomiting. 12/12/23   Cecy Travis PA-C   polyethylene glycol (Glycolax, Miralax) 17 gram packet Take 17 g by mouth once daily for 14 days. Do not  start before December 13, 2023. 12/13/23 12/27/23  Cecy Travis PA-C   pregabalin (Lyrica) 75 mg capsule Take 1 capsule (75 mg) by mouth if needed.    Historical Provider, MD   rosuvastatin (Crestor) 40 mg tablet Take 1 tablet (40 mg) by mouth once daily at bedtime. 6/3/23   Historical Provider, MD   Stelara injection Inject 1 mL (90 mg) under the skin every 8 (eight) weeks.    Historical Provider, MD   traMADol (Ultram) 50 mg tablet Take 1 tablet (50 mg) by mouth every 6 hours if needed for severe pain (7 - 10) for up to 3 days. 12/12/23 12/15/23  Cecy Travis PA-C   vitamin E 180 mg (400 unit) capsule Take 1 capsule (400 Units) by mouth once daily.    Historical Provider, MD     ALLERGIES:   Allergies   Allergen Reactions    Penicillins Hives     REVIEW OF SYSTEMS: All systems reviewed and otherwise negative.    PHYSICAL EXAM:  General: nontoxic  HEENT: NCAT  Resp: nonlabored breathing   CV: RRR  Abd: soft, mildly distended, moderately TTP diffusely without any rebound or guarding  : no suprapubic tenderness  MSK: moves all extremities  Ext: no LE edema  Psych: appropriate mood and behavior    IMAGING SUMMARY:   1.  Postoperative changes of ventral hernia repair with mesh. An approximately 11.2 x 3.4 x 8.5 cm loculated hyperdense fluid collection with layering hyperdensity, along the anterior abdominal wall within the surgical bed extending to the left subphrenic region. This collection was partially visualized on 12/10/2023 CT chest, however appears more hypodense on today's scan. Findings compatible with postoperative hematoma. No air foci noted within the collection, however sterility can not be assessed on this exam.   2. Please refer to concurrently imaged and separately dictated CTA of the chest for intrathoracic findings.    LABS:  Results from last 7 days   Lab Units 12/19/23  1205   WBC AUTO x10*3/uL 17.4*   HEMOGLOBIN g/dL 13.0   HEMATOCRIT % 38.6   PLATELETS AUTO x10*3/uL 485*   NEUTROS PCT AUTO  % 84.7   LYMPHS PCT AUTO % 7.6   MONOS PCT AUTO % 4.9   EOS PCT AUTO % 0.9         Results from last 7 days   Lab Units 12/19/23  1205   SODIUM mmol/L 133*   POTASSIUM mmol/L 5.8*   CHLORIDE mmol/L 101   CO2 mmol/L 19*   BUN mg/dL 31*   CREATININE mg/dL 1.79*  1.62*   CALCIUM mg/dL 9.6   PROTEIN TOTAL g/dL 8.3*   BILIRUBIN TOTAL mg/dL 1.1   ALK PHOS U/L 90   ALT U/L 19   AST U/L 26   GLUCOSE mg/dL 99     Results from last 7 days   Lab Units 12/19/23  1205   BILIRUBIN TOTAL mg/dL 1.1         I have reviewed all laboratory and imaging results ordered/pertinent for this encounter.

## 2023-12-20 VITALS
RESPIRATION RATE: 18 BRPM | SYSTOLIC BLOOD PRESSURE: 109 MMHG | HEART RATE: 75 BPM | DIASTOLIC BLOOD PRESSURE: 73 MMHG | TEMPERATURE: 97.7 F | OXYGEN SATURATION: 95 %

## 2023-12-20 LAB
ANION GAP SERPL CALC-SCNC: 11 MMOL/L (ref 10–20)
APPEARANCE UR: CLEAR
ATRIAL RATE: 75 BPM
BASOPHILS # BLD AUTO: 0.03 X10*3/UL (ref 0–0.1)
BASOPHILS NFR BLD AUTO: 0.3 %
BILIRUB UR STRIP.AUTO-MCNC: NEGATIVE MG/DL
BUN SERPL-MCNC: 30 MG/DL (ref 6–23)
CALCIUM SERPL-MCNC: 7.4 MG/DL (ref 8.6–10.6)
CHLORIDE SERPL-SCNC: 109 MMOL/L (ref 98–107)
CO2 SERPL-SCNC: 19 MMOL/L (ref 21–32)
COLOR UR: YELLOW
CREAT SERPL-MCNC: 1.19 MG/DL (ref 0.5–1.05)
EOSINOPHIL # BLD AUTO: 0.23 X10*3/UL (ref 0–0.7)
EOSINOPHIL NFR BLD AUTO: 2.2 %
ERYTHROCYTE [DISTWIDTH] IN BLOOD BY AUTOMATED COUNT: 13.6 % (ref 11.5–14.5)
GFR SERPL CREATININE-BSD FRML MDRD: 51 ML/MIN/1.73M*2
GLUCOSE SERPL-MCNC: 89 MG/DL (ref 74–99)
GLUCOSE UR STRIP.AUTO-MCNC: NEGATIVE MG/DL
HCT VFR BLD AUTO: 24.5 % (ref 36–46)
HGB BLD-MCNC: 8.5 G/DL (ref 12–16)
HOLD SPECIMEN: NORMAL
IMM GRANULOCYTES # BLD AUTO: 0.27 X10*3/UL (ref 0–0.7)
IMM GRANULOCYTES NFR BLD AUTO: 2.5 % (ref 0–0.9)
KETONES UR STRIP.AUTO-MCNC: NEGATIVE MG/DL
LEUKOCYTE ESTERASE UR QL STRIP.AUTO: NEGATIVE
LIPASE SERPL-CCNC: 44 U/L (ref 9–82)
LYMPHOCYTES # BLD AUTO: 1.25 X10*3/UL (ref 1.2–4.8)
LYMPHOCYTES NFR BLD AUTO: 11.8 %
MCH RBC QN AUTO: 29.2 PG (ref 26–34)
MCHC RBC AUTO-ENTMCNC: 34.7 G/DL (ref 32–36)
MCV RBC AUTO: 84 FL (ref 80–100)
MONOCYTES # BLD AUTO: 0.73 X10*3/UL (ref 0.1–1)
MONOCYTES NFR BLD AUTO: 6.9 %
NEUTROPHILS # BLD AUTO: 8.09 X10*3/UL (ref 1.2–7.7)
NEUTROPHILS NFR BLD AUTO: 76.3 %
NITRITE UR QL STRIP.AUTO: NEGATIVE
NRBC BLD-RTO: 0 /100 WBCS (ref 0–0)
P AXIS: 38 DEGREES
P OFFSET: 196 MS
P ONSET: 143 MS
PH UR STRIP.AUTO: 5 [PH]
PLATELET # BLD AUTO: 330 X10*3/UL (ref 150–450)
POTASSIUM SERPL-SCNC: 3.6 MMOL/L (ref 3.5–5.3)
PR INTERVAL: 138 MS
PROT UR STRIP.AUTO-MCNC: NEGATIVE MG/DL
Q ONSET: 212 MS
QRS COUNT: 12 BEATS
QRS DURATION: 88 MS
QT INTERVAL: 436 MS
QTC CALCULATION(BAZETT): 486 MS
QTC FREDERICIA: 469 MS
R AXIS: 6 DEGREES
RBC # BLD AUTO: 2.91 X10*6/UL (ref 4–5.2)
RBC # UR STRIP.AUTO: NEGATIVE /UL
SODIUM SERPL-SCNC: 135 MMOL/L (ref 136–145)
SP GR UR STRIP.AUTO: >1.06
T AXIS: 18 DEGREES
T OFFSET: 430 MS
UROBILINOGEN UR STRIP.AUTO-MCNC: <2 MG/DL
VENTRICULAR RATE: 75 BPM
WBC # BLD AUTO: 10.6 X10*3/UL (ref 4.4–11.3)

## 2023-12-20 PROCEDURE — 81003 URINALYSIS AUTO W/O SCOPE: CPT | Performed by: STUDENT IN AN ORGANIZED HEALTH CARE EDUCATION/TRAINING PROGRAM

## 2023-12-20 PROCEDURE — G0378 HOSPITAL OBSERVATION PER HR: HCPCS

## 2023-12-20 PROCEDURE — 96374 THER/PROPH/DIAG INJ IV PUSH: CPT | Performed by: STUDENT IN AN ORGANIZED HEALTH CARE EDUCATION/TRAINING PROGRAM

## 2023-12-20 PROCEDURE — 85025 COMPLETE CBC W/AUTO DIFF WBC: CPT | Performed by: PHYSICIAN ASSISTANT

## 2023-12-20 PROCEDURE — 2500000004 HC RX 250 GENERAL PHARMACY W/ HCPCS (ALT 636 FOR OP/ED): Mod: SE | Performed by: NURSE PRACTITIONER

## 2023-12-20 PROCEDURE — 36415 COLL VENOUS BLD VENIPUNCTURE: CPT | Performed by: PHYSICIAN ASSISTANT

## 2023-12-20 PROCEDURE — 96376 TX/PRO/DX INJ SAME DRUG ADON: CPT | Performed by: STUDENT IN AN ORGANIZED HEALTH CARE EDUCATION/TRAINING PROGRAM

## 2023-12-20 PROCEDURE — 80048 BASIC METABOLIC PNL TOTAL CA: CPT | Performed by: PHYSICIAN ASSISTANT

## 2023-12-20 RX ORDER — ONDANSETRON 4 MG/1
4 TABLET, ORALLY DISINTEGRATING ORAL EVERY 6 HOURS PRN
Qty: 20 TABLET | Refills: 0 | Status: SHIPPED | OUTPATIENT
Start: 2023-12-20 | End: 2024-03-06 | Stop reason: WASHOUT

## 2023-12-20 RX ADMIN — ONDANSETRON 4 MG: 2 INJECTION INTRAMUSCULAR; INTRAVENOUS at 08:04

## 2023-12-20 RX ADMIN — MORPHINE SULFATE 4 MG: 4 INJECTION INTRAVENOUS at 08:04

## 2023-12-20 ASSESSMENT — PAIN DESCRIPTION - PROGRESSION: CLINICAL_PROGRESSION: NOT CHANGED

## 2023-12-20 ASSESSMENT — PAIN DESCRIPTION - FREQUENCY: FREQUENCY: CONSTANT/CONTINUOUS

## 2023-12-20 ASSESSMENT — PAIN DESCRIPTION - ONSET: ONSET: ONGOING

## 2023-12-20 ASSESSMENT — PAIN - FUNCTIONAL ASSESSMENT: PAIN_FUNCTIONAL_ASSESSMENT: 0-10

## 2023-12-20 ASSESSMENT — PAIN DESCRIPTION - LOCATION: LOCATION: ABDOMEN

## 2023-12-20 ASSESSMENT — PAIN SCALES - GENERAL: PAINLEVEL_OUTOF10: 7

## 2023-12-20 NOTE — PROGRESS NOTES
Subjective   Vivien Miramontes is a 63 y.o. female on day 1 of admission presenting with 11 x 8 x 3cm hematoma of abdominal surgical wound found to have FLORENCE. She presented to the ED c/o abdominal pain, nausea and vomiting since robotic repair of incisional hernia 12/08/23.   At re-exam, she states that she feels significantly improved, has eaten a meal, and denies nausea or pain.       Objective     Physical Exam  Vitals and nursing note reviewed.   Constitutional:       General: She is not in acute distress.     Appearance: Normal appearance. She is well-developed. She is obese. She is not ill-appearing, toxic-appearing or diaphoretic.   HENT:      Head: Normocephalic and atraumatic.      Right Ear: Tympanic membrane normal.      Left Ear: Tympanic membrane normal.      Nose: Nose normal. No congestion or rhinorrhea.      Mouth/Throat:      Mouth: Mucous membranes are moist.      Pharynx: Oropharynx is clear.   Eyes:      Extraocular Movements: Extraocular movements intact.      Pupils: Pupils are equal, round, and reactive to light.   Cardiovascular:      Rate and Rhythm: Normal rate and regular rhythm.      Heart sounds: No murmur heard.  Pulmonary:      Effort: Pulmonary effort is normal. No respiratory distress.      Breath sounds: Normal breath sounds.   Abdominal:      General: Bowel sounds are normal. There is no distension.      Palpations: Abdomen is soft.      Tenderness: There is generalized abdominal tenderness. There is no guarding or rebound.       Musculoskeletal:         General: No tenderness. Normal range of motion.      Cervical back: Normal range of motion and neck supple. No rigidity.      Right lower leg: No edema.      Left lower leg: No edema.   Skin:     General: Skin is warm and dry.      Capillary Refill: Capillary refill takes less than 2 seconds.   Neurological:      General: No focal deficit present.      Mental Status: She is alert and oriented to person, place, and time. Mental  status is at baseline.      GCS: GCS eye subscore is 4. GCS verbal subscore is 5. GCS motor subscore is 6.      Cranial Nerves: Cranial nerves 2-12 are intact. No cranial nerve deficit.      Sensory: Sensation is intact.      Motor: Motor function is intact.      Coordination: Coordination is intact.      Gait: Gait is intact.      Comments: Speech clear, thoughts concise.   Psychiatric:         Attention and Perception: Attention normal.         Mood and Affect: Mood normal.         Speech: Speech normal.         Behavior: Behavior is cooperative.         Cognition and Memory: Cognition normal.         Judgment: Judgment normal.         Last Recorded Vitals  Blood pressure 124/67, pulse 76, temperature 36.5 °C (97.7 °F), resp. rate 18, SpO2 98 %.  Intake/Output last 3 Shifts:  No intake/output data recorded.    Relevant Results  Results for orders placed or performed during the hospital encounter of 12/19/23 (from the past 24 hour(s))   CBC and Auto Differential   Result Value Ref Range    WBC 17.4 (H) 4.4 - 11.3 x10*3/uL    nRBC 0.0 0.0 - 0.0 /100 WBCs    RBC 4.55 4.00 - 5.20 x10*6/uL    Hemoglobin 13.0 12.0 - 16.0 g/dL    Hematocrit 38.6 36.0 - 46.0 %    MCV 85 80 - 100 fL    MCH 28.6 26.0 - 34.0 pg    MCHC 33.7 32.0 - 36.0 g/dL    RDW 13.7 11.5 - 14.5 %    Platelets 485 (H) 150 - 450 x10*3/uL    Neutrophils % 84.7 40.0 - 80.0 %    Immature Granulocytes %, Automated 1.5 (H) 0.0 - 0.9 %    Lymphocytes % 7.6 13.0 - 44.0 %    Monocytes % 4.9 2.0 - 10.0 %    Eosinophils % 0.9 0.0 - 6.0 %    Basophils % 0.4 0.0 - 2.0 %    Neutrophils Absolute 14.77 (H) 1.20 - 7.70 x10*3/uL    Immature Granulocytes Absolute, Automated 0.26 0.00 - 0.70 x10*3/uL    Lymphocytes Absolute 1.33 1.20 - 4.80 x10*3/uL    Monocytes Absolute 0.86 0.10 - 1.00 x10*3/uL    Eosinophils Absolute 0.15 0.00 - 0.70 x10*3/uL    Basophils Absolute 0.07 0.00 - 0.10 x10*3/uL   Comprehensive Metabolic Panel   Result Value Ref Range    Glucose 99 74 - 99 mg/dL     Sodium 133 (L) 136 - 145 mmol/L    Potassium 5.8 (H) 3.5 - 5.3 mmol/L    Chloride 101 98 - 107 mmol/L    Bicarbonate 19 (L) 21 - 32 mmol/L    Anion Gap 19 10 - 20 mmol/L    Urea Nitrogen 31 (H) 6 - 23 mg/dL    Creatinine 1.62 (H) 0.50 - 1.05 mg/dL    eGFR 35 (L) >60 mL/min/1.73m*2    Calcium 9.6 8.6 - 10.6 mg/dL    Albumin 4.2 3.4 - 5.0 g/dL    Alkaline Phosphatase 90 33 - 136 U/L    Total Protein 8.3 (H) 6.4 - 8.2 g/dL    AST 26 9 - 39 U/L    Bilirubin, Total 1.1 0.0 - 1.2 mg/dL    ALT 19 7 - 45 U/L   Blood Culture    Specimen: Peripheral Venipuncture; Blood culture   Result Value Ref Range    Blood Culture Loaded on Instrument - Culture in progress    Creatinine, Serum   Result Value Ref Range    Creatinine 1.79 (H) 0.50 - 1.05 mg/dL    eGFR 31 (L) >60 mL/min/1.73m*2   Troponin, High Sensitivity, 1 Hour   Result Value Ref Range    Troponin I, High Sensitivity 12 0 - 34 ng/L   Lavender Top   Result Value Ref Range    Extra Tube Hold for add-ons.    Blood Gas Venous Full Panel Unsolicited   Result Value Ref Range    POCT pH, Venous 7.42 7.33 - 7.43 pH    POCT pCO2, Venous 40 (L) 41 - 51 mm Hg    POCT pO2, Venous 26 (L) 35 - 45 mm Hg    POCT SO2, Venous 44 (L) 45 - 75 %    POCT Oxy Hemoglobin, Venous 42.5 (L) 45.0 - 75.0 %    POCT Hematocrit Calculated, Venous 35.0 (L) 36.0 - 46.0 %    POCT Sodium, Venous 133 (L) 136 - 145 mmol/L    POCT Potassium, Venous 5.0 3.5 - 5.3 mmol/L    POCT Chloride, Venous 102 98 - 107 mmol/L    POCT Ionized Calicum, Venous 1.18 1.10 - 1.33 mmol/L    POCT Glucose, Venous 118 (H) 74 - 99 mg/dL    POCT Lactate, Venous 1.6 0.4 - 2.0 mmol/L    POCT Base Excess, Venous 1.3 -2.0 - 3.0 mmol/L    POCT HCO3 Calculated, Venous 25.9 22.0 - 26.0 mmol/L    POCT Hemoglobin, Venous 11.6 (L) 12.0 - 16.0 g/dL    POCT Anion Gap, Venous 10.0 10.0 - 25.0 mmol/L    Patient Temperature 37.0 degrees Celsius   Lactate   Result Value Ref Range    Lactate 1.6 0.4 - 2.0 mmol/L   Blood Culture    Specimen:  Peripheral Venipuncture; Blood culture   Result Value Ref Range    Blood Culture Loaded on Instrument - Culture in progress    Potassium   Result Value Ref Range    Potassium 5.1 3.5 - 5.3 mmol/L   Blood Gas Venous Full Panel Unsolicited   Result Value Ref Range    POCT pH, Venous 7.39 7.33 - 7.43 pH    POCT pCO2, Venous 39 (L) 41 - 51 mm Hg    POCT pO2, Venous 40 35 - 45 mm Hg    POCT SO2, Venous 61 45 - 75 %    POCT Oxy Hemoglobin, Venous 59.3 45.0 - 75.0 %    POCT Hematocrit Calculated, Venous 41.0 36.0 - 46.0 %    POCT Sodium, Venous 130 (L) 136 - 145 mmol/L    POCT Potassium, Venous 4.5 3.5 - 5.3 mmol/L    POCT Chloride, Venous 102 98 - 107 mmol/L    POCT Ionized Calicum, Venous 1.11 1.10 - 1.33 mmol/L    POCT Glucose, Venous 71 (L) 74 - 99 mg/dL    POCT Lactate, Venous 0.8 0.4 - 2.0 mmol/L    POCT Base Excess, Venous -1.2 -2.0 - 3.0 mmol/L    POCT HCO3 Calculated, Venous 23.6 22.0 - 26.0 mmol/L    POCT Hemoglobin, Venous 13.6 12.0 - 16.0 g/dL    POCT Anion Gap, Venous 9.0 (L) 10.0 - 25.0 mmol/L    Patient Temperature 37.0 degrees Celsius           IMPRESSION:  1. No evidence of acute pulmonary embolism to segmental level. Please  note that, assessment of subsegmental branches is limited and small  peripheral emboli are not entirely excluded.  2. Left ventricular chamber enlargement with extensive coronary  artery calcifications. Correlate with a component of ischemic heart  disease. Bibasilar opacities most likely atelectatic in nature.          1.  Incidental Finding:  A non-calcified pulmonary nodule measures  0.5 cm, likely benign. No further follow-up is required, however, if  the patient has high risk factors for primary lung malignancy,  follow-up noncontrast CT scan chest in 12 months may be obtained.  (Saurabh Griffiths et al., Guidelines for management of incidental  pulmonary nodules detected on CT images: From the Fleischner Society  2017, Radiology. 2017 Jul;284 (1):228-243.) [email or web address]       I personally reviewed the images/study and I agree with the findings  as stated above by resident physician, Joel Trammell MD. This study  was interpreted at [facility],  [place], [place].      MACRO:  Incidental Finding:  A non-calcified pulmonary nodule/multiple  non-calcified pulmonary nodules measuring less than 6 mm, likely  benign.  (**-YCF-**)      Instructions:  No further follow-up is required, however, if the  patient has high risk factors for primary lung malignancy, follow-up  noncontrast CT scan chest in 12 months may be obtained. (Saurabh Griffiths et al., Guidelines for management of incidental pulmonary  nodules detected on CT images: From the Fleischner Society 2017,  Radiology. 2017 Jul;284 (1):228-243.) [email or web address]      Signed by: Bipin Woody 12/19/2023 5:07 PM       IMPRESSION:  1.  Postoperative changes of ventral hernia repair with mesh. An  approximately 11.2 x 3.4 x 8.5 cm loculated hyperdense fluid  collection with layering hyperdensity, along the anterior abdominal  wall within the surgical bed extending to the left subphrenic region.  This collection was partially visualized on 12/10/2023 CT chest,  however appears more hypodense on today's scan. Findings compatible  with postoperative hematoma. No air foci noted within the collection,  however sterility can not be assessed on this exam. Follow-up to  resolution is recommended.  2. Please refer to concurrently imaged and separately dictated CTA of  the chest for intrathoracic findings.      I personally reviewed the images/study and resident's interpretation  and I agree with the findings as stated by Destiny Griffin MD (resident  radiologist). This study was analyzed and interpreted at [facility], [place], [place].      MACRO:  Gilmar Ellis discussed the significance and urgency of this critical  finding by Epic secure chat with Dr. Joel Solorzano and   QUYEN STONE on 12/19/2023 at 3:11 pm.  (**-RCF-**) Findings:  See findings.      Signed by: Gilmar Ellis 12/19/2023 3:15 PM      Assessment/Plan   Principal Problem:    Hematoma  Active Problems:    Abdominal hematoma    Abdominal pain  --ACS consulting and providing recommendations.  2.  FLORENCE:  IVF, advance diet, repeat labs  3.  CAD with Stent: Plavix  4.  History of NSTEMI.  5.  Chronic Hep C  6.  HTN  7.  Hyperlipidemia.  8.  Crohn's Disease         I spent 40 minutes in the professional and overall care of this patient.      CARMITA JangC

## 2023-12-20 NOTE — SIGNIFICANT EVENT
Acute Care Surgery updated Recs:    64 y/o F who is POD 12 s/p robotic incisional hernia repair with mesh (Dr. De Guzman) on 12/8 and presents to the ED with abdominal pain, N/V, and poor PO intake. CT A/P concerning for intraabdominal fluid collection, likely post-surgical in nature. This patient's labs are reflective of her dehydrated state and less concerning for an acute intraabdominal process. She was admitted to the CDU and fluid resuscitated and given Zofran as needed for nausea. Patient significantly improved. Minimal abdominal pain on exam this AM. Tolerating diet and having bowel function.     Recs::  Ok from Surgical standpoint for patient to be discharged home  Patient has follow up with Dr. De Guzman tomorrow  Dispo per ED    Patient seen and discussed with Attending Dr. Nadya [facility]  Acute Care Surgery   Pager [phone]

## 2023-12-21 ENCOUNTER — OFFICE VISIT (OUTPATIENT)
Dept: SURGERY | Facility: CLINIC | Age: 63
End: 2023-12-21
Payer: COMMERCIAL

## 2023-12-21 VITALS
BODY MASS INDEX: 25.7 KG/M2 | DIASTOLIC BLOOD PRESSURE: 79 MMHG | SYSTOLIC BLOOD PRESSURE: 116 MMHG | HEART RATE: 85 BPM | WEIGHT: 136 LBS | TEMPERATURE: 96.8 F

## 2023-12-21 DIAGNOSIS — K43.2 RECURRENT VENTRAL HERNIA: Primary | ICD-10-CM

## 2023-12-21 PROCEDURE — 99213 OFFICE O/P EST LOW 20 MIN: CPT | Performed by: SURGERY

## 2023-12-21 ASSESSMENT — ENCOUNTER SYMPTOMS: DEPRESSION: 0

## 2023-12-21 ASSESSMENT — PAIN SCALES - GENERAL: PAINLEVEL: 7

## 2023-12-21 NOTE — PROGRESS NOTES
Assessment/Plan   Slowly making progress.  She is in return to work January 2.  We reviewed activity limitations.  She is given see me in 2 to 3 weeks    Subjective   Status post robotic assisted repair of a recurrent incisional hernia.  He was actually at [facility] with dehydration yesterday and was discharged home.  Feeling better this week compared to last week.  Bowels are working.  Appetite slowly coming back.       Objective     Physical Exam  NAD  A&Ox3  Non icteric  CTA  RR  Abdomen soft min tender. Wounds clean, intact.  Some extensive bruising in the right flank where she had her laparoscopic port sites placed.  No hernia recurrence  Extremities warm, well perfused         Relevant Results      No results found for this or any previous visit (from the past 24 hour(s)).    I reviewed blood work and CT scan from yesterday.    I spent 25 minutes in the professional and overall care of this patient.      Suresh De Guzman MD

## 2023-12-21 NOTE — LETTER
December 21, 2023     Kelly Morales MD  [address]  [address]  [place] [address]    Patient: Vivien Miramontes   YOB: 1960   Date of Visit: 12/21/2023       Dear Dr. Kelly Morales MD:    Thank you for referring Vivien Miramontes to me for evaluation. Below are my notes for this consultation.  If you have questions, please do not hesitate to call me. I look forward to following your patient along with you.       Sincerely,     Suresh De Guzman MD      CC: No Recipients  ______________________________________________________________________________________    Assessment/Plan  Slowly making progress.  She is in return to work January 2.  We reviewed activity limitations.  She is given see me in 2 to 3 weeks    Subjective  Status post robotic assisted repair of a recurrent incisional hernia.  He was actually at [facility] with dehydration yesterday and was discharged home.  Feeling better this week compared to last week.  Bowels are working.  Appetite slowly coming back.       Objective    Physical Exam  NAD  A&Ox3  Non icteric  CTA  RR  Abdomen soft min tender. Wounds clean, intact.  Some extensive bruising in the right flank where she had her laparoscopic port sites placed.  No hernia recurrence  Extremities warm, well perfused         Relevant Results      No results found for this or any previous visit (from the past 24 hour(s)).    I reviewed blood work and CT scan from yesterday.    I spent 25 minutes in the professional and overall care of this patient.      Suresh De Guzman MD

## 2023-12-21 NOTE — LETTER
December 21, 2023     Patient: Vivien Miramontes   YOB: 1960   Date of Visit: 12/21/2023       To Whom It May Concern:    It is my medical opinion that Vivien Miramontes may return to work on January 2, 2024.  She has been recovering from recent hernia surgery .    If you have any questions or concerns, please don't hesitate to call.         Sincerely,        Suresh De Guzman MD    CC: No Recipients

## 2023-12-23 LAB
BACTERIA BLD CULT: NORMAL
BACTERIA BLD CULT: NORMAL

## 2023-12-28 RX ORDER — GABAPENTIN 300 MG/1
1 CAPSULE ORAL 3 TIMES DAILY
COMMUNITY
End: 2024-01-10 | Stop reason: ALTCHOICE

## 2024-01-05 ENCOUNTER — APPOINTMENT (OUTPATIENT)
Dept: GASTROENTEROLOGY | Facility: CLINIC | Age: 64
End: 2024-01-05
Payer: COMMERCIAL

## 2024-01-09 ENCOUNTER — APPOINTMENT (OUTPATIENT)
Dept: RADIOLOGY | Facility: HOSPITAL | Age: 64
End: 2024-01-09
Payer: COMMERCIAL

## 2024-01-09 ENCOUNTER — CLINICAL SUPPORT (OUTPATIENT)
Dept: EMERGENCY MEDICINE | Facility: HOSPITAL | Age: 64
End: 2024-01-09
Payer: COMMERCIAL

## 2024-01-09 ENCOUNTER — HOSPITAL ENCOUNTER (OUTPATIENT)
Facility: HOSPITAL | Age: 64
Setting detail: OBSERVATION
Discharge: HOME | End: 2024-01-10
Attending: EMERGENCY MEDICINE | Admitting: NURSE PRACTITIONER
Payer: COMMERCIAL

## 2024-01-09 DIAGNOSIS — R26.2 AMBULATORY DYSFUNCTION: ICD-10-CM

## 2024-01-09 DIAGNOSIS — R29.898 WEAKNESS OF BOTH LOWER EXTREMITIES: Primary | ICD-10-CM

## 2024-01-09 DIAGNOSIS — M48.061 LUMBAR FORAMINAL STENOSIS: ICD-10-CM

## 2024-01-09 LAB
ABO GROUP (TYPE) IN BLOOD: NORMAL
ALBUMIN SERPL BCP-MCNC: 4.1 G/DL (ref 3.4–5)
ANION GAP SERPL CALC-SCNC: 13 MMOL/L (ref 10–20)
ANTIBODY SCREEN: NORMAL
APPEARANCE UR: ABNORMAL
APTT PPP: 36 SECONDS (ref 27–38)
BASOPHILS # BLD AUTO: 0.03 X10*3/UL (ref 0–0.1)
BASOPHILS NFR BLD AUTO: 0.3 %
BILIRUB UR STRIP.AUTO-MCNC: NEGATIVE MG/DL
BUN SERPL-MCNC: 15 MG/DL (ref 6–23)
CA-I BLD-SCNC: 1.1 MMOL/L (ref 1.1–1.33)
CALCIUM SERPL-MCNC: 9.6 MG/DL (ref 8.6–10.6)
CARDIAC TROPONIN I PNL SERPL HS: 5 NG/L (ref 0–34)
CHLORIDE SERPL-SCNC: 106 MMOL/L (ref 98–107)
CO2 SERPL-SCNC: 25 MMOL/L (ref 21–32)
COLOR UR: YELLOW
CREAT SERPL-MCNC: 1.05 MG/DL (ref 0.5–1.05)
EGFRCR SERPLBLD CKD-EPI 2021: 59 ML/MIN/1.73M*2
EOSINOPHIL # BLD AUTO: 0.19 X10*3/UL (ref 0–0.7)
EOSINOPHIL NFR BLD AUTO: 2 %
ERYTHROCYTE [DISTWIDTH] IN BLOOD BY AUTOMATED COUNT: 13.8 % (ref 11.5–14.5)
GLUCOSE SERPL-MCNC: 88 MG/DL (ref 74–99)
GLUCOSE UR STRIP.AUTO-MCNC: NEGATIVE MG/DL
HCT VFR BLD AUTO: 39.4 % (ref 36–46)
HGB BLD-MCNC: 13.3 G/DL (ref 12–16)
HOLD SPECIMEN: NORMAL
IMM GRANULOCYTES # BLD AUTO: 0.03 X10*3/UL (ref 0–0.7)
IMM GRANULOCYTES NFR BLD AUTO: 0.3 % (ref 0–0.9)
INR PPP: 1 (ref 0.9–1.1)
KETONES UR STRIP.AUTO-MCNC: NEGATIVE MG/DL
LEUKOCYTE ESTERASE UR QL STRIP.AUTO: ABNORMAL
LYMPHOCYTES # BLD AUTO: 2.03 X10*3/UL (ref 1.2–4.8)
LYMPHOCYTES NFR BLD AUTO: 21.3 %
MAGNESIUM SERPL-MCNC: 1.94 MG/DL (ref 1.6–2.4)
MCH RBC QN AUTO: 28.9 PG (ref 26–34)
MCHC RBC AUTO-ENTMCNC: 33.8 G/DL (ref 32–36)
MCV RBC AUTO: 86 FL (ref 80–100)
MONOCYTES # BLD AUTO: 0.59 X10*3/UL (ref 0.1–1)
MONOCYTES NFR BLD AUTO: 6.2 %
MUCOUS THREADS #/AREA URNS AUTO: NORMAL /LPF
NEUTROPHILS # BLD AUTO: 6.68 X10*3/UL (ref 1.2–7.7)
NEUTROPHILS NFR BLD AUTO: 69.9 %
NITRITE UR QL STRIP.AUTO: NEGATIVE
NRBC BLD-RTO: 0 /100 WBCS (ref 0–0)
PH UR STRIP.AUTO: 5 [PH]
PHOSPHATE SERPL-MCNC: 3.2 MG/DL (ref 2.5–4.9)
PLATELET # BLD AUTO: 302 X10*3/UL (ref 150–450)
POTASSIUM SERPL-SCNC: 4 MMOL/L (ref 3.5–5.3)
PROT UR STRIP.AUTO-MCNC: NEGATIVE MG/DL
PROTHROMBIN TIME: 11.2 SECONDS (ref 9.8–12.8)
RBC # BLD AUTO: 4.6 X10*6/UL (ref 4–5.2)
RBC # UR STRIP.AUTO: NEGATIVE /UL
RBC #/AREA URNS AUTO: NORMAL /HPF
RH FACTOR (ANTIGEN D): NORMAL
SODIUM SERPL-SCNC: 140 MMOL/L (ref 136–145)
SP GR UR STRIP.AUTO: 1.01
SQUAMOUS #/AREA URNS AUTO: NORMAL /HPF
TSH SERPL-ACNC: 0.56 MIU/L (ref 0.44–3.98)
UROBILINOGEN UR STRIP.AUTO-MCNC: <2 MG/DL
WBC # BLD AUTO: 9.6 X10*3/UL (ref 4.4–11.3)
WBC #/AREA URNS AUTO: NORMAL /HPF

## 2024-01-09 PROCEDURE — 36415 COLL VENOUS BLD VENIPUNCTURE: CPT | Performed by: STUDENT IN AN ORGANIZED HEALTH CARE EDUCATION/TRAINING PROGRAM

## 2024-01-09 PROCEDURE — 72131 CT LUMBAR SPINE W/O DYE: CPT | Performed by: RADIOLOGY

## 2024-01-09 PROCEDURE — 85610 PROTHROMBIN TIME: CPT | Performed by: STUDENT IN AN ORGANIZED HEALTH CARE EDUCATION/TRAINING PROGRAM

## 2024-01-09 PROCEDURE — 82330 ASSAY OF CALCIUM: CPT | Performed by: STUDENT IN AN ORGANIZED HEALTH CARE EDUCATION/TRAINING PROGRAM

## 2024-01-09 PROCEDURE — 2500000001 HC RX 250 WO HCPCS SELF ADMINISTERED DRUGS (ALT 637 FOR MEDICARE OP): Mod: SE | Performed by: NURSE PRACTITIONER

## 2024-01-09 PROCEDURE — 72148 MRI LUMBAR SPINE W/O DYE: CPT | Performed by: RADIOLOGY

## 2024-01-09 PROCEDURE — 96375 TX/PRO/DX INJ NEW DRUG ADDON: CPT

## 2024-01-09 PROCEDURE — 99285 EMERGENCY DEPT VISIT HI MDM: CPT | Performed by: EMERGENCY MEDICINE

## 2024-01-09 PROCEDURE — 84484 ASSAY OF TROPONIN QUANT: CPT | Performed by: EMERGENCY MEDICINE

## 2024-01-09 PROCEDURE — 72131 CT LUMBAR SPINE W/O DYE: CPT

## 2024-01-09 PROCEDURE — 83735 ASSAY OF MAGNESIUM: CPT | Performed by: STUDENT IN AN ORGANIZED HEALTH CARE EDUCATION/TRAINING PROGRAM

## 2024-01-09 PROCEDURE — 84443 ASSAY THYROID STIM HORMONE: CPT | Performed by: STUDENT IN AN ORGANIZED HEALTH CARE EDUCATION/TRAINING PROGRAM

## 2024-01-09 PROCEDURE — 96372 THER/PROPH/DIAG INJ SC/IM: CPT | Performed by: NURSE PRACTITIONER

## 2024-01-09 PROCEDURE — G0378 HOSPITAL OBSERVATION PER HR: HCPCS

## 2024-01-09 PROCEDURE — 2500000004 HC RX 250 GENERAL PHARMACY W/ HCPCS (ALT 636 FOR OP/ED): Mod: SE | Performed by: NURSE PRACTITIONER

## 2024-01-09 PROCEDURE — 99222 1ST HOSP IP/OBS MODERATE 55: CPT | Performed by: NURSE PRACTITIONER

## 2024-01-09 PROCEDURE — 93005 ELECTROCARDIOGRAM TRACING: CPT

## 2024-01-09 PROCEDURE — 96372 THER/PROPH/DIAG INJ SC/IM: CPT | Mod: 25

## 2024-01-09 PROCEDURE — 80069 RENAL FUNCTION PANEL: CPT | Performed by: STUDENT IN AN ORGANIZED HEALTH CARE EDUCATION/TRAINING PROGRAM

## 2024-01-09 PROCEDURE — 99221 1ST HOSP IP/OBS SF/LOW 40: CPT | Performed by: ORTHOPAEDIC SURGERY

## 2024-01-09 PROCEDURE — 2500000004 HC RX 250 GENERAL PHARMACY W/ HCPCS (ALT 636 FOR OP/ED): Mod: SE | Performed by: STUDENT IN AN ORGANIZED HEALTH CARE EDUCATION/TRAINING PROGRAM

## 2024-01-09 PROCEDURE — 85025 COMPLETE CBC W/AUTO DIFF WBC: CPT | Performed by: STUDENT IN AN ORGANIZED HEALTH CARE EDUCATION/TRAINING PROGRAM

## 2024-01-09 PROCEDURE — 72148 MRI LUMBAR SPINE W/O DYE: CPT

## 2024-01-09 PROCEDURE — 96374 THER/PROPH/DIAG INJ IV PUSH: CPT

## 2024-01-09 PROCEDURE — 87086 URINE CULTURE/COLONY COUNT: CPT | Performed by: STUDENT IN AN ORGANIZED HEALTH CARE EDUCATION/TRAINING PROGRAM

## 2024-01-09 PROCEDURE — 2500000001 HC RX 250 WO HCPCS SELF ADMINISTERED DRUGS (ALT 637 FOR MEDICARE OP): Mod: SE | Performed by: STUDENT IN AN ORGANIZED HEALTH CARE EDUCATION/TRAINING PROGRAM

## 2024-01-09 PROCEDURE — 86900 BLOOD TYPING SEROLOGIC ABO: CPT | Performed by: STUDENT IN AN ORGANIZED HEALTH CARE EDUCATION/TRAINING PROGRAM

## 2024-01-09 PROCEDURE — 81001 URINALYSIS AUTO W/SCOPE: CPT | Performed by: STUDENT IN AN ORGANIZED HEALTH CARE EDUCATION/TRAINING PROGRAM

## 2024-01-09 RX ORDER — CYCLOBENZAPRINE HCL 10 MG
5 TABLET ORAL ONCE
Status: COMPLETED | OUTPATIENT
Start: 2024-01-09 | End: 2024-01-09

## 2024-01-09 RX ORDER — MORPHINE SULFATE 4 MG/ML
1 INJECTION INTRAVENOUS EVERY 4 HOURS PRN
Status: DISCONTINUED | OUTPATIENT
Start: 2024-01-09 | End: 2024-01-10 | Stop reason: HOSPADM

## 2024-01-09 RX ORDER — PREGABALIN 75 MG/1
75 CAPSULE ORAL 3 TIMES DAILY
Status: DISCONTINUED | OUTPATIENT
Start: 2024-01-09 | End: 2024-01-10 | Stop reason: HOSPADM

## 2024-01-09 RX ORDER — EZETIMIBE 10 MG/1
10 TABLET ORAL DAILY
Status: DISCONTINUED | OUTPATIENT
Start: 2024-01-10 | End: 2024-01-10 | Stop reason: HOSPADM

## 2024-01-09 RX ORDER — PANTOPRAZOLE SODIUM 40 MG/1
40 TABLET, DELAYED RELEASE ORAL
Status: DISCONTINUED | OUTPATIENT
Start: 2024-01-10 | End: 2024-01-10 | Stop reason: HOSPADM

## 2024-01-09 RX ORDER — POLYETHYLENE GLYCOL 3350 17 G/17G
17 POWDER, FOR SOLUTION ORAL DAILY PRN
Status: DISCONTINUED | OUTPATIENT
Start: 2024-01-09 | End: 2024-01-10 | Stop reason: HOSPADM

## 2024-01-09 RX ORDER — ACETAMINOPHEN 325 MG/1
975 TABLET ORAL EVERY 8 HOURS
Status: DISCONTINUED | OUTPATIENT
Start: 2024-01-09 | End: 2024-01-10 | Stop reason: HOSPADM

## 2024-01-09 RX ORDER — ASPIRIN 81 MG/1
81 TABLET ORAL DAILY
Status: DISCONTINUED | OUTPATIENT
Start: 2024-01-10 | End: 2024-01-10 | Stop reason: HOSPADM

## 2024-01-09 RX ORDER — HYDROCHLOROTHIAZIDE 25 MG/1
25 TABLET ORAL DAILY
Status: DISCONTINUED | OUTPATIENT
Start: 2024-01-10 | End: 2024-01-10 | Stop reason: HOSPADM

## 2024-01-09 RX ORDER — ROSUVASTATIN CALCIUM 20 MG/1
40 TABLET, COATED ORAL NIGHTLY
Status: DISCONTINUED | OUTPATIENT
Start: 2024-01-09 | End: 2024-01-10 | Stop reason: HOSPADM

## 2024-01-09 RX ORDER — CLOPIDOGREL BISULFATE 75 MG/1
75 TABLET ORAL DAILY
Status: DISCONTINUED | OUTPATIENT
Start: 2024-01-10 | End: 2024-01-10 | Stop reason: HOSPADM

## 2024-01-09 RX ORDER — CARVEDILOL 12.5 MG/1
25 TABLET ORAL
Status: DISCONTINUED | OUTPATIENT
Start: 2024-01-10 | End: 2024-01-10 | Stop reason: HOSPADM

## 2024-01-09 RX ORDER — HYDROMORPHONE HYDROCHLORIDE 1 MG/ML
0.5 INJECTION, SOLUTION INTRAMUSCULAR; INTRAVENOUS; SUBCUTANEOUS ONCE
Status: COMPLETED | OUTPATIENT
Start: 2024-01-09 | End: 2024-01-09

## 2024-01-09 RX ORDER — KETOROLAC TROMETHAMINE 15 MG/ML
15 INJECTION, SOLUTION INTRAMUSCULAR; INTRAVENOUS EVERY 6 HOURS PRN
Status: DISCONTINUED | OUTPATIENT
Start: 2024-01-09 | End: 2024-01-10 | Stop reason: HOSPADM

## 2024-01-09 RX ORDER — KETOROLAC TROMETHAMINE 15 MG/ML
15 INJECTION, SOLUTION INTRAMUSCULAR; INTRAVENOUS ONCE
Status: COMPLETED | OUTPATIENT
Start: 2024-01-09 | End: 2024-01-09

## 2024-01-09 RX ORDER — OXYCODONE HYDROCHLORIDE 5 MG/1
5 TABLET ORAL EVERY 6 HOURS PRN
Status: DISCONTINUED | OUTPATIENT
Start: 2024-01-09 | End: 2024-01-10 | Stop reason: HOSPADM

## 2024-01-09 RX ORDER — LISINOPRIL 20 MG/1
40 TABLET ORAL DAILY
Status: DISCONTINUED | OUTPATIENT
Start: 2024-01-10 | End: 2024-01-10 | Stop reason: HOSPADM

## 2024-01-09 RX ORDER — ENOXAPARIN SODIUM 100 MG/ML
40 INJECTION SUBCUTANEOUS EVERY 24 HOURS
Status: DISCONTINUED | OUTPATIENT
Start: 2024-01-09 | End: 2024-01-10 | Stop reason: HOSPADM

## 2024-01-09 RX ORDER — ACETAMINOPHEN 500 MG
10 TABLET ORAL DAILY PRN
Status: DISCONTINUED | OUTPATIENT
Start: 2024-01-09 | End: 2024-01-10 | Stop reason: HOSPADM

## 2024-01-09 RX ADMIN — ROSUVASTATIN 40 MG: 20 TABLET, FILM COATED ORAL at 23:25

## 2024-01-09 RX ADMIN — CYCLOBENZAPRINE 5 MG: 10 TABLET, FILM COATED ORAL at 14:45

## 2024-01-09 RX ADMIN — ENOXAPARIN SODIUM 40 MG: 100 INJECTION SUBCUTANEOUS at 23:25

## 2024-01-09 RX ADMIN — PREGABALIN 75 MG: 75 CAPSULE ORAL at 23:25

## 2024-01-09 RX ADMIN — HYDROMORPHONE HYDROCHLORIDE 0.5 MG: 1 INJECTION, SOLUTION INTRAMUSCULAR; INTRAVENOUS; SUBCUTANEOUS at 21:45

## 2024-01-09 RX ADMIN — KETOROLAC TROMETHAMINE 15 MG: 15 INJECTION, SOLUTION INTRAMUSCULAR; INTRAVENOUS at 14:45

## 2024-01-09 ASSESSMENT — PAIN DESCRIPTION - ORIENTATION
ORIENTATION: LOWER
ORIENTATION: RIGHT;LEFT

## 2024-01-09 ASSESSMENT — LIFESTYLE VARIABLES
HAVE YOU EVER FELT YOU SHOULD CUT DOWN ON YOUR DRINKING: NO
HAVE PEOPLE ANNOYED YOU BY CRITICIZING YOUR DRINKING: NO
EVER HAD A DRINK FIRST THING IN THE MORNING TO STEADY YOUR NERVES TO GET RID OF A HANGOVER: NO
REASON UNABLE TO ASSESS: NO
EVER FELT BAD OR GUILTY ABOUT YOUR DRINKING: NO

## 2024-01-09 ASSESSMENT — PAIN SCALES - GENERAL
PAINLEVEL_OUTOF10: 4
PAINLEVEL_OUTOF10: 8
PAINLEVEL_OUTOF10: 9

## 2024-01-09 ASSESSMENT — PAIN - FUNCTIONAL ASSESSMENT
PAIN_FUNCTIONAL_ASSESSMENT: 0-10

## 2024-01-09 ASSESSMENT — ENCOUNTER SYMPTOMS
FLANK PAIN: 0
FEVER: 0
CONSTIPATION: 0
VOMITING: 0
PALPITATIONS: 0
NAUSEA: 0
HEMATURIA: 0
ABDOMINAL PAIN: 0
CHILLS: 0
WEAKNESS: 1
DIARRHEA: 0
FREQUENCY: 0
DYSURIA: 0
SHORTNESS OF BREATH: 0

## 2024-01-09 ASSESSMENT — PAIN DESCRIPTION - LOCATION
LOCATION: ABDOMEN
LOCATION: LEG
LOCATION: LEG

## 2024-01-09 ASSESSMENT — PAIN DESCRIPTION - DESCRIPTORS: DESCRIPTORS: CRAMPING

## 2024-01-09 ASSESSMENT — PAIN DESCRIPTION - PAIN TYPE: TYPE: ACUTE PAIN

## 2024-01-09 NOTE — ED PROVIDER NOTES
HPI:  Patient is a 63-year-old female with a history of hypertension, CAD (on dual antiplatelet therapy) status post PCI x 2, LAD and left circumflex (June 2023), Crohn's disease, depression, GERD, who presents with bilateral lower extremity weakness onset last night.  Patient states she woke up this morning with persistent weakness and difficulty ambulating which prompted her to come to the ED.  She denies recent falls or other trauma.  No back pain.  No lower extremity paresthesias.  No bowel or bladder incontinence.    ROS: A 10-system ROS was performed and was negative except as documented in the HPI.    PMH/PSH: Reviewed in EMR. As above in HPI.  SH: Denies EtOH or illicit drug use. Pt smokes 0.5PPD.  Allergies:   Allergies   Allergen Reactions    Penicillins Hives      Medications: See prescription writer for full medication list.     General: no acute distress, appropriate conversation  HEENT:  No rhinorrhea. MMM.  Cardiac: regular rate rhythm, no murmurs  Pulm:  normal respiratory effort on room air, equal chest expansion, clear bilaterally, no wheeze or crackles  GI: soft, nontender, nondistended, +BS  Extremities:  moves all extremities freely, no edema noted  Skin: warm, well-perfused, no lesions noted on exposed skin.  Back: No midline or paraspinal L-spine tenderness palpation.  No bony deformities or step-offs.  Neuro:  AOx3, moves all 4 extremities freely and independently. CN II-XII grossly intact. No facial droop. Normal reported sensation to light touch in all 4 extremities distally. Appropriate and symmetric  strength bilaterally. Appropriate and symmetric plantar/dorsiflexion with resistance bilaterally however patient unable to lift her legs independently off the bed for greater than 5 seconds.  Gait is slowed and patient requiring 2 person assistance to ambulate to the bathroom and [facility]    DDX: Includes but not limited to electrolyte derangement versus cord  compression versus cauda equina versus UTI versus other.    Assessment/Plan/MDM  Patient is a 63-year-old female with a history of hypertension, CAD (on dual antiplatelet therapy) status post PCI x 2, LAD and left circumflex (June 2023), Crohn's disease, depression, GERD, who presents with bilateral lower extremity weakness onset last night.  UA without evidence of UTI.   iCal within normal limits.  Mag within normal limits.  TSH within normal limits.,  No gross electrolyte derangements, no leukocytosis or anemia, CT L-spine with degenerative changes at L4/L5.  Given significant lower extremity weakness, MRI L-spine without contrast ordered.  Pending at time of shift change. Pt requiring 2-person assistance to ambulate to the bathroom and transfer from wheelchair to stretcher.     EKG shows normal sinus rhythm, rate 78, normal axis, normal parables, normal QTc, no ST elevation, PVCs in aVL and aVF, grossly unchanged from prior (December 2023).    ED Course/Progress:    Diagnoses as of 01/09/24 1445   Weakness of both lower extremities        Clinical Impression: as above  Dispo: deferred to Dr. Jimenez    Pt seen and discussed with attending physician, Dr. Luis Flores MD  PGY3, Emergency Medicine    Disclaimer: This note was dictated by speech recognition. An attempt at proof reading was made to minimize errors. Errors in transcription may be present.  Please call if questions.      Angelica Flores MD  Resident  01/10/24 [id number]

## 2024-01-09 NOTE — ED TRIAGE NOTES
"Pt to ED with c/o bilateral leg weakness that began last night. Pt takes BP medication and was unsure if she may have taken too much and was causing her to feel that way. Pt states it began last night but brushed it off until she was sitting at her desk working and her legs still felt \"wobbly.\" Pt has hx of stents in May 2023 and HTN. On thinners for her stents. Pt denies CP/SOB and only dizzy when up walking. Pt A&Ox4.   "

## 2024-01-09 NOTE — PROGRESS NOTES
63-year-old female here with acute onset of bilateral lower extremity weakness and pain.  She presents hemodynamically stable, exam shows 4 out of 5 strength bilateral lower extremities and pain over L-spine consistent with spinal pathology, given the acute onset as well as weakness, previous team ordered MRI of the L-spine to further assess which shows significant stenosis at L3-L5 without acute cord compression, spine surgery consulted who recommended nonoperative management, and admission for PT OT, patient admitted in stable condition with pain well-controlled.

## 2024-01-10 VITALS
HEART RATE: 68 BPM | HEIGHT: 61 IN | OXYGEN SATURATION: 98 % | TEMPERATURE: 99.3 F | WEIGHT: 125 LBS | SYSTOLIC BLOOD PRESSURE: 143 MMHG | RESPIRATION RATE: 16 BRPM | BODY MASS INDEX: 23.6 KG/M2 | DIASTOLIC BLOOD PRESSURE: 64 MMHG

## 2024-01-10 LAB
ATRIAL RATE: 76 BPM
BACTERIA UR CULT: NORMAL
P AXIS: 43 DEGREES
P OFFSET: 196 MS
P ONSET: 138 MS
PR INTERVAL: 150 MS
Q ONSET: 213 MS
QRS COUNT: 12 BEATS
QRS DURATION: 100 MS
QT INTERVAL: 386 MS
QTC CALCULATION(BAZETT): 434 MS
QTC FREDERICIA: 417 MS
R AXIS: 24 DEGREES
T AXIS: -2 DEGREES
T OFFSET: 406 MS
VENTRICULAR RATE: 76 BPM

## 2024-01-10 PROCEDURE — 97161 PT EVAL LOW COMPLEX 20 MIN: CPT | Mod: GP

## 2024-01-10 PROCEDURE — 2500000001 HC RX 250 WO HCPCS SELF ADMINISTERED DRUGS (ALT 637 FOR MEDICARE OP): Mod: SE | Performed by: NURSE PRACTITIONER

## 2024-01-10 PROCEDURE — G0378 HOSPITAL OBSERVATION PER HR: HCPCS

## 2024-01-10 PROCEDURE — 2500000002 HC RX 250 W HCPCS SELF ADMINISTERED DRUGS (ALT 637 FOR MEDICARE OP, ALT 636 FOR OP/ED): Mod: SE | Performed by: NURSE PRACTITIONER

## 2024-01-10 RX ADMIN — HYDROCHLOROTHIAZIDE 25 MG: 25 TABLET ORAL at 08:23

## 2024-01-10 RX ADMIN — CLOPIDOGREL BISULFATE 75 MG: 75 TABLET ORAL at 08:23

## 2024-01-10 RX ADMIN — ASPIRIN 81 MG: 81 TABLET, COATED ORAL at 08:23

## 2024-01-10 RX ADMIN — LISINOPRIL 40 MG: 20 TABLET ORAL at 08:23

## 2024-01-10 RX ADMIN — PREGABALIN 75 MG: 75 CAPSULE ORAL at 08:23

## 2024-01-10 RX ADMIN — OXYCODONE HYDROCHLORIDE 5 MG: 5 TABLET ORAL at 05:24

## 2024-01-10 RX ADMIN — ACETAMINOPHEN 975 MG: 325 TABLET ORAL at 05:24

## 2024-01-10 RX ADMIN — CARVEDILOL 25 MG: 12.5 TABLET, FILM COATED ORAL at 07:48

## 2024-01-10 RX ADMIN — EZETIMIBE 10 MG: 10 TABLET ORAL at 08:23

## 2024-01-10 ASSESSMENT — PAIN DESCRIPTION - FREQUENCY: FREQUENCY: CONSTANT/CONTINUOUS

## 2024-01-10 ASSESSMENT — COGNITIVE AND FUNCTIONAL STATUS - GENERAL
STANDING UP FROM CHAIR USING ARMS: A LITTLE
TURNING FROM BACK TO SIDE WHILE IN FLAT BAD: A LITTLE
CLIMB 3 TO 5 STEPS WITH RAILING: A LOT
WALKING IN HOSPITAL ROOM: A LITTLE
MOVING TO AND FROM BED TO CHAIR: A LITTLE
MOVING FROM LYING ON BACK TO SITTING ON SIDE OF FLAT BED WITH BEDRAILS: A LITTLE
MOBILITY SCORE: 17

## 2024-01-10 ASSESSMENT — PAIN SCALES - GENERAL
PAINLEVEL_OUTOF10: 6
PAINLEVEL_OUTOF10: 0 - NO PAIN

## 2024-01-10 ASSESSMENT — PAIN DESCRIPTION - PROGRESSION: CLINICAL_PROGRESSION: NOT CHANGED

## 2024-01-10 ASSESSMENT — PAIN - FUNCTIONAL ASSESSMENT
PAIN_FUNCTIONAL_ASSESSMENT: 0-10
PAIN_FUNCTIONAL_ASSESSMENT: 0-10

## 2024-01-10 ASSESSMENT — PAIN DESCRIPTION - DESCRIPTORS: DESCRIPTORS: ACHING

## 2024-01-10 ASSESSMENT — PAIN DESCRIPTION - ORIENTATION
ORIENTATION: RIGHT;LEFT
ORIENTATION: RIGHT;LEFT

## 2024-01-10 ASSESSMENT — PAIN DESCRIPTION - LOCATION
LOCATION: LEG
LOCATION: LEG

## 2024-01-10 ASSESSMENT — ACTIVITIES OF DAILY LIVING (ADL): ADL_ASSISTANCE: INDEPENDENT

## 2024-01-10 ASSESSMENT — PAIN DESCRIPTION - ONSET: ONSET: ONGOING

## 2024-01-10 ASSESSMENT — PAIN DESCRIPTION - PAIN TYPE: TYPE: ACUTE PAIN

## 2024-01-10 NOTE — PROGRESS NOTES
The patient is undecided about which home health agencies to pick from. I informed her that she can have her PCP set up the home care once discharged if she needs more time. I advised the patient to let her nurse know if she picks a home agency before she is discharged. Then her nurse can contact me. MD, and nurse updated.   Benji Ayon RN

## 2024-01-10 NOTE — PROGRESS NOTES
I spoke to patient at bedside regarding discharge planning. The patient stated she wanted to go home with home PT. Her PCP is Dr. Martinez with [facility]. I gave the patient a list of home health agencies to pick from. Per PT, the patient also would benefit from a walker. Walker delivered to patient. MD updated. Per MD, patient will be discharged today.  Benji Ayon RN

## 2024-01-10 NOTE — DISCHARGE SUMMARY
"[facility]  Discharge Summary with Progress Note       Vivien Miramontes  : 1960  MRN:  [id number]    ADMIT DATE: 2024  DISCHARGE DATE:  1/10/2024    PRIMARYCARE PHYSICIAN:  Kelly Morales MD    VISIT STATUS: Observation    CODE STATUS:  Full Code    HOSPITAL COURSE, DISCHARGE DIAGNOSES, AND PLAN:  Vivien Miramontes is a 63 y.o. female with a past medical history of CAD s/p PCI () with NSTEMI (2023) w/ PCI x2 (LAD and Lcx), Crohn's disease c/b GI bleeding, HTN, and tobacco use disorder who presented to the ED with bilateral leg weakness that started last night. She reported that she was at work and her legs felt \"wobbly\".     #Inability to ambulate - improved  #Bilteral leg weakness  #Moderate foraminal spinal stenosis of L4-5  -Ortho evaluated patient in ED -> no surgical intervention; recommend PT/OT  -MRI with mild-mod stenosis  -PT evaluated and recommended [facility] - patient agreeable; will be discharged home with walker for DME     #HTN  -Controlled  -Continue home medications     #CAD s/p PCI  #Remote NSTEMI  -No acute issues  -Continue Plavix, Zetia     #Crohn's disease  -No acute issues  -Stelara as outpatient  -f/u with GI/PCP on DC as needed     #Nicotine dependence  -Encourage cessation  -Nicotine patch    DAY OF DISCHARGE:    HPI:   Patient seen and examined on day of discharge. Vitals reviewed. Patient reports she is feeling \"okay\" and has been ambulating to bathroom and back without issues this morning. She states she feels comfortable going home.  Discussed discharge plan, follow up appointments, new medications with patient/family - patient/family express understanding and agreement.    ROS:  All other ROS negative unless otherwise noted above in HPI.      Patient Vitals for the past 24 hrs:   BP Pulse Resp SpO2   01/10/24 1100 112/74 61 18 97 %   01/10/24 0726 120/77 65 -- 98 %   01/10/24 0515 131/85 67 16 100 %   01/10/24 0238 120/76 72 16 99 %   01/10/24 0040 122/74 74 18 99 %   24 2105 126/80 " 69 16 100 %   24 1700 125/71 76 18 97 %   24 1500 130/80 71 18 98 %       Average, Min, and Max forlast 24 hours Vitals:  TEMPERATURE:  No data recorded    RESPIRATIONS RANGE: Resp  Av.1  Min: 16  Max: 18    PULSE RANGE: Pulse  Av.4  Min: 61  Max: 76    BLOOD PRESSURE RANGE:  Systolic (24hrs), Av , Min:112 , Max:131   ; Diastolic (24hrs), Av, Min:71, Max:85      PULSE OXIMETRYRANGE: SpO2  Av.5 %  Min: 97 %  Max: 100 %    No intake/output data recorded.      Physical Exam  Vitals and nursing note reviewed.   Constitutional:       General: She is not in acute distress.     Appearance: Normal appearance. She is not ill-appearing or toxic-appearing.   HENT:      Head: Normocephalic and atraumatic.      Nose: Nose normal.      Mouth/Throat:      Mouth: Mucous membranes are moist.   Eyes:      Extraocular Movements: Extraocular movements intact.   Pulmonary:      Effort: Pulmonary effort is normal. No respiratory distress.   Abdominal:      General: There is no distension.   Musculoskeletal:         General: No swelling.      Cervical back: No rigidity.   Skin:     Coloration: Skin is not pale.   Neurological:      Mental Status: She is alert.      Motor: Weakness (bilat lower extremities, but able to ambulate) present.      Gait: Gait normal.   Psychiatric:         Mood and Affect: Mood normal.         Behavior: Behavior normal.         PROCEDURES:  N/A    CONSULTANTS:  Orthopedic spine surgery    DISCHARGE MEDICATIONS:         Your medication list        CONTINUE taking these medications        Instructions Last Dose Given Next Dose Due   aspirin 81 mg EC tablet           carvedilol 12.5 mg tablet  Commonly known as: Coreg           clopidogrel 75 mg tablet  Commonly known as: Plavix           ezetimibe 10 mg tablet  Commonly known as: Zetia           famotidine 20 mg tablet  Commonly known as: Pepcid           hydroCHLOROthiazide 25 mg tablet  Commonly known as: HYDRODiuril            hydrOXYzine HCL 25 mg tablet  Commonly known as: Atarax           lisinopril 40 mg tablet           nitroglycerin 0.4 mg SL tablet  Commonly known as: Nitrostat           ondansetron ODT 4 mg disintegrating tablet  Commonly known as: Zofran-ODT      Take 1 tablet (4 mg) by mouth every 8 hours if needed for nausea or vomiting.       ondansetron ODT 4 mg disintegrating tablet  Commonly known as: Zofran-ODT      Take 1 tablet (4 mg) by mouth every 6 hours if needed for nausea or vomiting.       pregabalin 75 mg capsule  Commonly known as: Lyrica           rosuvastatin 40 mg tablet  Commonly known as: Crestor           Stelara injection  Generic drug: ustekinumab           vitamin E 180 mg (400 unit) capsule                  STOP taking these medications      acetaminophen 325 mg tablet  Commonly known as: Tylenol                  DIET: regular    ACTIVITY: resume regular activity    DISPOSITION:  Home with Home Health care    FACILITY/HOME CARE AGENCY NAME: [facility]    Follow up with Kelly Morales MD within 14 days of discharge.    DISCHARGE TIME: > 30 minutes    Electronically signed by Tamara Day DO on 01/10/24 at 11:57 AM

## 2024-01-10 NOTE — CONSULTS
"Orthopaedic Surgery Consult H&P      Requesting Provider / Service:  ED    CC: new L>R leg weakness    HPI: 63F (HTN, CAD on ASA/Plavix s/p PCI, Crohn’s, depression, GERD) presents w/ 1 day hx of new L>R leg weakness. Denies any trauma. Denies paresthesias, B/B incontinence, saddle anesthesia. States her L leg just got \"wobbly\". MRI w/ multi-level degen changes, L3-4 mild canal stenosis w/ b/l mod foraminal stenosis, L4-5 mod conal stenosis, mod R>L foraminal stenosis.     Patient ambulates without assistance at baseline.    No history of VTE.  /2 ppd smoker.    PMH:  Past Medical History:   Diagnosis Date    Coronary artery disease     2012: cardiac cath with PCI, 23: cardiac cath with PCI x2 (LAD & LCx) intermediate disease of a tortuous RCA - cardiac clearance requested    Crohn's disease (CMS/HCC)     bowel perforation 2009 s/p colostomy, reversed 2010    Depression     Hyperlipidemia     Hypertension     IBS (irritable bowel syndrome)     Lung nodules     nonconcerning per pulm note    Myocardial infarction (CMS/HCC)     Ulcerative colitis (CMS/HCC)        Past Surgical History:   Procedure Laterality Date    ABDOMINAL HERNIA REPAIR  2018    with mesh    CARDIAC CATHETERIZATION      PCI    CARDIAC CATHETERIZATION  2023    PCI x 2 (LAD and LCx)     SECTION, LOW TRANSVERSE      COLONOSCOPY      COLOSTOMY  2009    REVISION / TAKEDOWN COLOSTOMY  2010    TUBAL LIGATION  1998    UMBILICAL HERNIA REPAIR      x 2       Social History     Socioeconomic History    Marital status:      Spouse name: Not on file    Number of children: Not on file    Years of education: Not on file    Highest education level: Not on file   Occupational History    Not on file   Tobacco Use    Smoking status: Every Day     Packs/day: 0.25     Years: 50.00     Additional pack years: 0.00     Total pack years: 12.50     Types: Cigarettes    Smokeless tobacco: Never   Substance and Sexual Activity    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    Sexual activity: Not on file   Other Topics Concern    Not on file   Social History Narrative    Not on file     Social Determinants of Health     Financial Resource Strain: Low Risk  (12/8/2023)    Overall Financial Resource Strain (CARDIA)     Difficulty of Paying Living Expenses: Not hard at all   Food Insecurity: Not on file   Transportation Needs: No Transportation Needs (12/8/2023)    PRAPARE - Transportation     Lack of Transportation (Medical): No     Lack of Transportation (Non-Medical): No   Physical Activity: Not on file   Stress: Not on file   Social Connections: Not on file   Intimate Partner Violence: Not on file   Housing Stability: Low Risk  (12/8/2023)    Housing Stability Vital Sign     Unable to Pay for Housing in the Last Year: No     Number of Places Lived in the Last Year: 1     Unstable Housing in the Last Year: No       Allergies   Allergen Reactions    Penicillins Hives       No current facility-administered medications for this encounter.    Current Outpatient Medications:     acetaminophen (Tylenol) 325 mg tablet, Take 2 tablets (650 mg) by mouth every 6 hours if needed for mild pain (1 - 3). (Patient not taking: Reported on 12/21/2023), Disp: 30 tablet, Rfl: 0    aspirin 81 mg EC tablet, Take 1 tablet (81 mg) by mouth once daily., Disp: , Rfl:     carvedilol (Coreg) 12.5 mg tablet, Take 2 tablets (25 mg) by mouth twice a day., Disp: , Rfl:     clopidogrel (Plavix) 75 mg tablet, Take 1 tablet (75 mg) by mouth once daily., Disp: , Rfl:     evolocumab (Repatha Syringe) 140 mg/mL injection, Inject 1 mL (140 mg) under the skin every 14 (fourteen) days., Disp: , Rfl:     ezetimibe (Zetia) 10 mg tablet, Take 1 tablet (10 mg) by mouth once daily., Disp: , Rfl:     famotidine (Pepcid) 20 mg tablet, Take 1 tablet (20 mg) by mouth 2 times a day., Disp: , Rfl:     gabapentin (Neurontin) 300 mg  "capsule, Take 1 capsule (300 mg) by mouth 3 times a day., Disp: , Rfl:     hydroCHLOROthiazide (HYDRODiuril) 25 mg tablet, Take 1 tablet (25 mg) by mouth once daily., Disp: , Rfl:     hydrOXYzine HCL (Atarax) 25 mg tablet, Take 1 tablet (25 mg) by mouth 3 times a day as needed for itching. May cause drowsiness, Disp: , Rfl:     lisinopril 40 mg tablet, Take 1 tablet (40 mg) by mouth once daily. as directed, Disp: , Rfl:     nitroglycerin (Nitrostat) 0.4 mg SL tablet, Place 1 tablet (0.4 mg) under the tongue. Dissolve 1 tablet under the tongue as needed for chest pain., Disp: , Rfl:     ondansetron ODT (Zofran-ODT) 4 mg disintegrating tablet, Take 1 tablet (4 mg) by mouth every 8 hours if needed for nausea or vomiting., Disp: 20 tablet, Rfl: 0    ondansetron ODT (Zofran-ODT) 4 mg disintegrating tablet, Take 1 tablet (4 mg) by mouth every 6 hours if needed for nausea or vomiting., Disp: 20 tablet, Rfl: 0    pregabalin (Lyrica) 75 mg capsule, Take 1 capsule (75 mg) by mouth if needed., Disp: , Rfl:     rosuvastatin (Crestor) 40 mg tablet, Take 1 tablet (40 mg) by mouth once daily at bedtime., Disp: , Rfl:     Stelara injection, Inject 1 mL (90 mg) under the skin every 8 (eight) weeks., Disp: , Rfl:     vitamin E 180 mg (400 unit) capsule, Take 1 capsule (400 Units) by mouth once daily., Disp: , Rfl:     Family History: non-contributory      Review of Systems:   ROS  No pertinent symptoms related to systems other than those stated above      O:  @24HRVITALS@  No intake or output data in the 24 hours ending 01/09/24 2010    Physical Exam:   /71   Pulse 76   Temp 37.4 °C (99.3 °F) (Temporal)   Resp 18   Ht 1.549 m (5' 1\")   Wt 56.7 kg (125 lb)   SpO2 97%   BMI 23.62 kg/m²     Constitutional: NAD  HEENT: hearing and vision grossly intact, MMM  Resp: breathing comfortably on RA  CV: extremities warm, well perfused  Neuro: alert, sensory and motor grossly intact  Psych: Appropriate mood and " affect    Spine:    L1: SILT       L2: SILT      Hip flexors 0/5 Left (poor effort); 5/5 Right  L3: SILT      Knee extension 4/5 Left; 5/5 Right  L4: SILT      Tib Ant. (Dorsiflexion) 4/5 Left; 5/5 Right  L5: SILT      EHL 4/5 Left; 5/5 Right  S1: SILT      Plantar flexion 5/5 Left; 5/5 Right    Patellar reflex: 2+   Bilaterally    Babinkski: Intact  No clonus    Relevant Results  Results for orders placed or performed during the hospital encounter of 01/09/24 (from the past 24 hour(s))   CBC and Auto Differential   Result Value Ref Range    WBC 9.6 4.4 - 11.3 x10*3/uL    nRBC 0.0 0.0 - 0.0 /100 WBCs    RBC 4.60 4.00 - 5.20 x10*6/uL    Hemoglobin 13.3 12.0 - 16.0 g/dL    Hematocrit 39.4 36.0 - 46.0 %    MCV 86 80 - 100 fL    MCH 28.9 26.0 - 34.0 pg    MCHC 33.8 32.0 - 36.0 g/dL    RDW 13.8 11.5 - 14.5 %    Platelets 302 150 - 450 x10*3/uL    Neutrophils % 69.9 40.0 - 80.0 %    Immature Granulocytes %, Automated 0.3 0.0 - 0.9 %    Lymphocytes % 21.3 13.0 - 44.0 %    Monocytes % 6.2 2.0 - 10.0 %    Eosinophils % 2.0 0.0 - 6.0 %    Basophils % 0.3 0.0 - 2.0 %    Neutrophils Absolute 6.68 1.20 - 7.70 x10*3/uL    Immature Granulocytes Absolute, Automated 0.03 0.00 - 0.70 x10*3/uL    Lymphocytes Absolute 2.03 1.20 - 4.80 x10*3/uL    Monocytes Absolute 0.59 0.10 - 1.00 x10*3/uL    Eosinophils Absolute 0.19 0.00 - 0.70 x10*3/uL    Basophils Absolute 0.03 0.00 - 0.10 x10*3/uL   Renal function panel   Result Value Ref Range    Glucose 88 74 - 99 mg/dL    Sodium 140 136 - 145 mmol/L    Potassium 4.0 3.5 - 5.3 mmol/L    Chloride 106 98 - 107 mmol/L    Bicarbonate 25 21 - 32 mmol/L    Anion Gap 13 10 - 20 mmol/L    Urea Nitrogen 15 6 - 23 mg/dL    Creatinine 1.05 0.50 - 1.05 mg/dL    eGFR 59 (L) >60 mL/min/1.73m*2    Calcium 9.6 8.6 - 10.6 mg/dL    Phosphorus 3.2 2.5 - 4.9 mg/dL    Albumin 4.1 3.4 - 5.0 g/dL   Type and Screen   Result Value Ref Range    ABO TYPE B     Rh TYPE POS     ANTIBODY SCREEN NEG    Coagulation Screen    Result Value Ref Range    Protime 11.2 9.8 - 12.8 seconds    INR 1.0 0.9 - 1.1    aPTT 36 27 - 38 seconds   Magnesium   Result Value Ref Range    Magnesium 1.94 1.60 - 2.40 mg/dL   TSH with reflex to Free T4 if abnormal   Result Value Ref Range    Thyroid Stimulating Hormone 0.56 0.44 - 3.98 mIU/L   CALCIUM, IONIZED   Result Value Ref Range    POCT Calcium, Ionized 1.10 1.1 - 1.33 mmol/L   Troponin I, High Sensitivity   Result Value Ref Range    Troponin I, High Sensitivity 5 0 - 34 ng/L   Urinalysis with Reflex Culture and Microscopic   Result Value Ref Range    Color, Urine Yellow Straw, Yellow    Appearance, Urine Hazy (N) Clear    Specific Gravity, Urine 1.012 1.005 - 1.035    pH, Urine 5.0 5.0, 5.5, 6.0, 6.5, 7.0, 7.5, 8.0    Protein, Urine NEGATIVE NEGATIVE mg/dL    Glucose, Urine NEGATIVE NEGATIVE mg/dL    Blood, Urine NEGATIVE NEGATIVE    Ketones, Urine NEGATIVE NEGATIVE mg/dL    Bilirubin, Urine NEGATIVE NEGATIVE    Urobilinogen, Urine <2.0 <2.0 mg/dL    Nitrite, Urine NEGATIVE NEGATIVE    Leukocyte Esterase, Urine TRACE (A) NEGATIVE   Microscopic Only, Urine   Result Value Ref Range    WBC, Urine 1-5 1-5, NONE /HPF    RBC, Urine 1-2 NONE, 1-2, 3-5 /HPF    Squamous Epithelial Cells, Urine 10-25 (FEW) Reference range not established. /HPF    Mucus, Urine 1+ Reference range not established. /LPF       MR lumbar spine wo IV contrast    Result Date: 1/9/2024  Interpreted By:  Eliel Tan,  and Lianet Willams STUDY: MR LUMBAR SPINE WO IV CONTRAST;  1/9/2024 5:52 pm   INDICATION: Signs/Symptoms:BLE weakness, acute onset, difficulty walking.   COMPARISON: Same day CT of the lumbar spine from 12:40 p.m. CT abdomen pelvis 09/21/2023   ACCESSION NUMBER(S): [id number]   ORDERING CLINICIAN: CARLITA RAMOS   TECHNIQUE: Sagittal T1, T2, STIR, axial T1 and T2 weighted images of the lumbar spine were acquired.   FINDINGS: Alignment: No spondylolisthesis or facet widening.   Vertebrae/Intervertebral Discs: Vertebral  body heights are maintained.Mild diffuse nonspecific heterogeneity of the bone marrow without focal marrow replacing lesion. Signal changes compatible with disc desiccation are present predominantly within the lower lumbar spine. There is severe disc space height loss at L4-5 with more mild disc space height loss at L3-4.   Conus: The lower thoracic cord appears unremarkable. The conus terminates at L1. The more distal nerve fibers are unremarkable..   T12-L1:  There is no significant spinal canal or neural foraminal stenosis.   L1-2: Mild-to-moderate bilateral facet arthropathy. No significant posterior disc contour abnormality. There is no significant spinal canal or neural foraminal stenosis.   L2-3: Moderate bilateral facet arthropathy with subchondral cystic formation in the bilateral inferior articular processes of L2 as well as fluid signal intensity in the joint spaces bilaterally. Thickening of the right aspect of the ligamentum flavum. No significant posterior disc contour abnormality. There is no significant spinal canal or neural foraminal stenosis.   L3-4: Moderate bilateral facet arthropathy with increased fluid signal in the joint spaces bilaterally. Thickening of the ligamentum flavum. Moderate diffuse disc bulge causing mild spinal canal stenosis with effacement of the anterior thecal sac as well as mild-to-moderate bilateral neural foraminal stenosis.   L4-5: Moderate bilateral facet arthropathy with right-sided thickening of the ligamentum flavum. There is diffuse disc bulge moderate predominantly right-sided spinal canal stenosis with effacement of the anterior thecal sac and moderate right-greater-than-left neural foraminal stenosis.   L5-S1: Moderate bilateral facet arthropathy. Mild diffuse circumferential disc bulge without significant spinal canal stenosis. No significant right-sided neural foraminal stenosis. Mild-to-moderate left-sided neural foraminal stenosis.   The prevertebral and  posterior paraspinous soft tissues are unremarkable. No significant abnormality of the visualized abdomen.       Multilevel degenerative changes of the lumbar spine as described above worst at L4-5, where there is moderate spinal canal stenosis with moderate right-greater-than-left neural foraminal stenosis, and L3-4, where there is mild spinal canal stenosis with mild-to-moderate bilateral neural foraminal stenosis.   I personally reviewed the images/study and I agree with the findings as stated by Imer Martini MD (resident) . This study was interpreted at [place], [place].   MACRO: None   Signed by: Eliel Tan 1/9/2024 6:40 PM Dictation workstation:   [id number]    CT lumbar spine wo IV contrast    Result Date: 1/9/2024  Interpreted By:  Wero Lin, STUDY: L spine  CT LUMBAR SPINE WO IV CONTRAST;  1/9/2024 12:40 pm   INDICATION: Signs/Symptoms:BLE weakness, atraumatic.   COMPARISON: None.   ACCESSION NUMBER(S): [id number]   ORDERING CLINICIAN: CARLITA RAMOS   TECHNIQUE: CT examination was performed throughout the lumbar spine. Multiplanar reconstructions were made.   FINDINGS: Alignment and vertebral body height are normal. Bone density is normal.   There is loss of height with endplate sclerosis and vacuum disc phenomenon at L4/L5 with minor lateral marginal osteophytes. There is no measurable bony canal stenosis or bony foraminal narrowing. Visualized sacrum is normal. Visualized paraspinal soft tissues are normal.       Minor bony productive and degenerative changes at L4/L5   MACRO: none   Signed by: Wero Lin 1/9/2024 12:48 PM Dictation workstation:   [id number]         Imaging:   MRI w/ multi-level degen changes, L3-4 mild canal stenosis w/ b/l mod foraminal stenosis, L4-5 mod conal stenosis, mod R>L foraminal stenosis.      A/P: 63F (HTN, CAD on ASA/Plavix s/p PCI, Crohn’s, depression, GERD) presents w/ 1 day hx of new L>R leg  "weakness. Denies any trauma. Denies paresthesias, B/B incontinence, saddle anesthesia. States her L leg just got \"wobbly\". On exam, full strength/sensation RLE, LLE SILT throughout, 0/5 L HF (poor effort/questionable exam), 4/5 KE/KF/DF/EHL, 5/5 PF, no UMNs. No MRI evidence of severe stenosis. Appropriate for initial trial of PT in house.     - No acute orthopedic intervention  - Recommend medicine admit for PT then placement  - WB: WBAT, AAT  - No spine restrictions    - Dispo: Follow-up OP w/ Dr. Leal in 1-2 weeks    Discussed with attending on call, Dr. Leal.    Please do not hesitate to page with additional questions or concerns.    ------------------------------------------------------    Noe Duffy MD  Orthopaedic Surgery, PGY-2  Available by Epic Chat  01/09/24  8:10 PM      After 5 pm and on weekends, please page the on-call resident ([phone]) with questions or concerns.      01/09/24  This consult was seen and staffed within 30 minutes of the initial consult.     I saw and evaluated the patient.  I personally obtained the key and critical portions of the history and physical exam or was physically present for key and critical portions performed by the Resident. I reviewed the documentation and discussed the patient with the Resident.  I agree with the Resident’s medical decision making as documented in the note.    63-year-old female who presents to the ED with 1 day history of left leg weakness.  Otherwise no bowel or bladder disturbances or saddle anesthesia.  Exam limited to patient's poor effort.  MRI of the lumbar spine show multilevel degenerative changes.  No significant central stenosis however patient has right L4-L5 foraminal stenosis which does not correlate with patient's physical exam.  Recommend a course of physical therapy for strengthening and conservative treatments for pain management.  Patient can follow-up as an outpatient.  "

## 2024-01-10 NOTE — DISCHARGE INSTRUCTIONS
Physician discharge instructions:    Dear Ms. Kulwinder,     Thank you for allowing me to be a part of the team that provided your medical care during this hospital stay.  Here is a brief summary of what we found and instructions for you as you leave the hospital.    Reason(s) for hospital stay:  Weakness due to spinal stenosis  -You were evaluated by orthopedic spine surgery and deemed not a candidate for surgical intervention.  -You were evaluated by physical therapy and was recommended ongoing physical therapy - this will be set up at home via home health care services. Please make sure to arrange this with your company of choice.  -Please call and schedule an appointment with your primary care physician to be seen within 2 weeks of discharge.    Discharge instructions:  - Please attend all follow-up appointments as scheduled.  - Please bring a medication list and all of your medications to your appointments.  - Please take all medications exactly as prescribed in these instructions.    If your symptoms return or worsen, please seek immediate medical attention.      Thank you for allowing me to be part of your care. I hope you continue to feel better soon.    Tamara Day, [facility]   Hospital Medicine    Please note: You were cared for by a hospitalist during your hospital stay. Once you are discharged, your primary care physician will handle any further medical issues. Please note that no refills for any discharge medications will be authorized once you are discharged, as it is imperative that you return to your primary care physician (or establish a relationship with a primary care physician if you do not have one) for your aftercare needs so that they can reassess your need for medications and monitor your lab values. Your primary care physician should be able to send refills for medications after your follow up appointment.

## 2024-01-10 NOTE — PROGRESS NOTES
The patient picked [facility]. Referral sent to agency. I told the patient to call her PCP to set up home health care if she does not hear from [facility] in three days. MD updated.    Benji Ayon RN

## 2024-01-10 NOTE — H&P
"History Of Present Illness  Vivien Miramontes is a 63 y.o. female with a past medical history of CAD s/p PCI () with NSTEMI (2023) w/ PCI x2 (LAD and Lcx), Crohn's disease c/b GI bleeding, HTN, and tobacco use disorder who presented to the ED with bilateral leg weakness that started last night. She reported that she was at work and her legs felt \"wobbly\". She denies any fever, chills, chest pain, shortness of breath, falls, trauma, back pain, numbness, tingling, or loss of bladder/bowel. At baseline, the patient ambulates without any assistance. On exam in ED21H, she reports improvement of her pain, however reports her weakness is still present. She reports feeling unsteady, denying dizziness, elaborating that she \"feels like she is on a boat\".      Past Medical History  Past Medical History:   Diagnosis Date    Coronary artery disease     2012: cardiac cath with PCI, 23: cardiac cath with PCI x2 (LAD & LCx) intermediate disease of a tortuous RCA - cardiac clearance requested    Crohn's disease (CMS/HCC)     bowel perforation 2009 s/p colostomy, reversed 2010    Depression     Hyperlipidemia     Hypertension     IBS (irritable bowel syndrome)     Lung nodules     nonconcerning per pulm note    Myocardial infarction (CMS/HCC)     Ulcerative colitis (CMS/HCC)        Surgical History  Past Surgical History:   Procedure Laterality Date    ABDOMINAL HERNIA REPAIR  2018    with mesh    CARDIAC CATHETERIZATION      PCI    CARDIAC CATHETERIZATION  2023    PCI x 2 (LAD and LCx)     SECTION, LOW TRANSVERSE      COLONOSCOPY      COLOSTOMY      REVISION / TAKEDOWN COLOSTOMY      TUBAL LIGATION      UMBILICAL HERNIA REPAIR      x 2        Social History  She reports that she has been smoking cigarettes. She has a 12.50 pack-year smoking history. She has never used smokeless tobacco. She reports that she does not currently use alcohol. She reports that she does not currently use " "drugs.    Family History  Family History   Problem Relation Name Age of Onset    Stroke Mother      Hypertension Mother      Heart attack Mother      Heart attack Father      Diabetes Father      Multiple sclerosis Sister      Breast cancer Sister      Diabetes Brother          Allergies  Penicillins    Review of Systems   Constitutional:  Negative for chills and fever.   Respiratory:  Negative for shortness of breath.    Cardiovascular:  Negative for chest pain and palpitations.   Gastrointestinal:  Negative for abdominal pain, constipation, diarrhea, nausea and vomiting.   Genitourinary:  Negative for dysuria, flank pain, frequency, hematuria and urgency.   Neurological:  Positive for weakness.   All other systems reviewed and are negative.       Physical Exam  Vitals reviewed.   HENT:      Head: Normocephalic and atraumatic.   Cardiovascular:      Rate and Rhythm: Normal rate and regular rhythm.   Pulmonary:      Effort: Pulmonary effort is normal.      Breath sounds: Normal breath sounds and air entry.   Abdominal:      General: Bowel sounds are normal.      Palpations: Abdomen is soft.      Tenderness: There is no abdominal tenderness.   Musculoskeletal:         General: No deformity.   Skin:     General: Skin is warm and dry.   Neurological:      General: No focal deficit present.      Mental Status: She is alert and oriented to person, place, and time.   Psychiatric:         Mood and Affect: Mood normal.         Behavior: Behavior normal.          Last Recorded Vitals  Blood pressure 126/80, pulse 69, temperature 37.4 °C (99.3 °F), temperature source Temporal, resp. rate 16, height 1.549 m (5' 1\"), weight 56.7 kg (125 lb), SpO2 100 %.    Relevant Results      Lab Results   Component Value Date    WBC 9.6 01/09/2024    HGB 13.3 01/09/2024    HCT 39.4 01/09/2024    MCV 86 01/09/2024     01/09/2024     Lab Results   Component Value Date    GLUCOSE 88 01/09/2024    CALCIUM 9.6 01/09/2024     " 01/09/2024    K 4.0 01/09/2024    CO2 25 01/09/2024     01/09/2024    BUN 15 01/09/2024    CREATININE 1.05 01/09/2024     MR lumbar spine wo IV contrast  Narrative: Interpreted By:  Eliel Tan,  and Lianet Willams   STUDY:  MR LUMBAR SPINE WO IV CONTRAST;  1/9/2024 5:52 pm      INDICATION:  Signs/Symptoms:BLE weakness, acute onset, difficulty walking.      COMPARISON:  Same day CT of the lumbar spine from 12:40 p.m.  CT abdomen pelvis 09/21/2023      ACCESSION NUMBER(S):  [id number]      ORDERING CLINICIAN:  CARLITA RAMOS      TECHNIQUE:  Sagittal T1, T2, STIR, axial T1 and T2 weighted images of the lumbar  spine were acquired.      FINDINGS:  Alignment: No spondylolisthesis or facet widening.      Vertebrae/Intervertebral Discs: Vertebral body heights are  maintained.Mild diffuse nonspecific heterogeneity of the bone marrow  without focal marrow replacing lesion. Signal changes compatible with  disc desiccation are present predominantly within the lower lumbar  spine. There is severe disc space height loss at L4-5 with more mild  disc space height loss at L3-4.      Conus: The lower thoracic cord appears unremarkable. The conus  terminates at L1. The more distal nerve fibers are unremarkable..      T12-L1:  There is no significant spinal canal or neural foraminal  stenosis.      L1-2: Mild-to-moderate bilateral facet arthropathy. No significant  posterior disc contour abnormality. There is no significant spinal  canal or neural foraminal stenosis.      L2-3: Moderate bilateral facet arthropathy with subchondral cystic  formation in the bilateral inferior articular processes of L2 as well  as fluid signal intensity in the joint spaces bilaterally. Thickening  of the right aspect of the ligamentum flavum. No significant  posterior disc contour abnormality. There is no significant spinal  canal or neural foraminal stenosis.      L3-4: Moderate bilateral facet arthropathy with increased fluid  signal in  the joint spaces bilaterally. Thickening of the ligamentum  flavum. Moderate diffuse disc bulge causing mild spinal canal  stenosis with effacement of the anterior thecal sac as well as  mild-to-moderate bilateral neural foraminal stenosis.      L4-5: Moderate bilateral facet arthropathy with right-sided  thickening of the ligamentum flavum. There is diffuse disc bulge  moderate predominantly right-sided spinal canal stenosis with  effacement of the anterior thecal sac and moderate  right-greater-than-left neural foraminal stenosis.      L5-S1: Moderate bilateral facet arthropathy. Mild diffuse  circumferential disc bulge without significant spinal canal stenosis.  No significant right-sided neural foraminal stenosis.  Mild-to-moderate left-sided neural foraminal stenosis.      The prevertebral and posterior paraspinous soft tissues are  unremarkable. No significant abnormality of the visualized abdomen.      Impression: Multilevel degenerative changes of the lumbar spine as described  above worst at L4-5, where there is moderate spinal canal stenosis  with moderate right-greater-than-left neural foraminal stenosis, and  L3-4, where there is mild spinal canal stenosis with mild-to-moderate  bilateral neural foraminal stenosis.      I personally reviewed the images/study and I agree with the findings  as stated by Imer Martini MD (resident) . This study was  interpreted at [place], [place].      MACRO:  None      Signed by: Eliel Tan 1/9/2024 6:40 PM  Dictation workstation:   [id number]  CT lumbar spine wo IV contrast  Narrative: Interpreted By:  Wero Lin,   STUDY:  L spine  CT LUMBAR SPINE WO IV CONTRAST;  1/9/2024 12:40 pm      INDICATION:  Signs/Symptoms:BLE weakness, atraumatic.      COMPARISON:  None.      ACCESSION NUMBER(S):  [id number]      ORDERING CLINICIAN:  CARLITA RAMOS      TECHNIQUE:  CT examination was performed throughout the lumbar  spine. Multiplanar  reconstructions were made.      FINDINGS:  Alignment and vertebral body height are normal. Bone density is  normal.      There is loss of height with endplate sclerosis and vacuum disc  phenomenon at L4/L5 with minor lateral marginal osteophytes. There is  no measurable bony canal stenosis or bony foraminal narrowing.  Visualized sacrum is normal. Visualized paraspinal soft tissues are  normal.      Impression: Minor bony productive and degenerative changes at L4/L5      MACRO:  none      Signed by: Wero Lin 1/9/2024 12:48 PM  Dictation workstation:   [id number]    ED Medication Administration from 01/09/2024 1039 to 01/09/2024 2156         Date/Time Order Dose Route Action Action by     01/09/2024 1445 EST cyclobenzaprine (Flexeril) tablet 5 mg 5 mg oral Given MARIANELA Rodríguez     01/09/2024 1445 EST ketorolac (Toradol) injection 15 mg 15 mg intravenous Given MARIANELA Rodríguez     01/09/2024 2145 EST HYDROmorphone (Dilaudid) injection 0.5 mg 0.5 mg intravenous Given MARIANELA Rodríguez               Assessment/Plan   Principal Problem:    Ambulatory dysfunction  Active Problems:    Weakness of both lower extremities      #Inability to ambulate  #Bilteral leg weakness  -Fall precautions  -Ortho evaluated patient in ED  '-MRI with mild-mod stenosis  -No acute surgical intervention  -PT for possible placement  -WBAT  -Multimodal pain control    #HTN  -Controlled  -Continue home medications    #CAD s/p PCI  #Remote NSTEMI  -No acute issues  -Continue Plavix, Zetia    #Crohn's disease  -No acute issues  -Stelara as outpatient  -f/u with GI/PCP on DC as needed    #Nicotine dependence  -Encourage cessation  -Nicotine patch         Toi Pacheco, APRN-CNP

## 2024-01-10 NOTE — PROGRESS NOTES
Pharmacy Medication History Review    Vivien Miramontes is a 63 y.o. female admitted for Ambulatory dysfunction. Pharmacy reviewed the patient's [id number] medications and allergies for accuracy.    The list below reflects the updated PTA list. Comments regarding how patient may be taking medications differently can be found in the Admit Orders Activity  Prior to Admission Medications   Prescriptions Last Dose Informant Patient Reported? Taking?   Stelara injection Past Month Self Yes Yes   Sig: Inject 1 mL (90 mg) under the skin every 8 (eight) weeks.   acetaminophen (Tylenol) 325 mg tablet  Self No No   Sig: Take 2 tablets (650 mg) by mouth every 6 hours if needed for mild pain (1 - 3).   Patient not taking: Reported on 12/21/2023   aspirin 81 mg EC tablet 1/8/2024 at am Self Yes Yes   Sig: Take 1 tablet (81 mg) by mouth once daily.   carvedilol (Coreg) 12.5 mg tablet 1/9/2024 Self Yes Yes   Sig: Take 2 tablets (25 mg) by mouth twice a day.   clopidogrel (Plavix) 75 mg tablet 1/9/2024 at am Self Yes Yes   Sig: Take 1 tablet (75 mg) by mouth once daily.   ezetimibe (Zetia) 10 mg tablet 1/9/2024 at am Self Yes Yes   Sig: Take 1 tablet (10 mg) by mouth once daily.   famotidine (Pepcid) 20 mg tablet 1/8/2024 Self Yes Yes   Sig: Take 1 tablet (20 mg) by mouth 2 times a day.   hydrOXYzine HCL (Atarax) 25 mg tablet 1/8/2024 Self Yes Yes   Sig: Take 1 tablet (25 mg) by mouth 3 times a day as needed for itching. May cause drowsiness   hydroCHLOROthiazide (HYDRODiuril) 25 mg tablet 1/8/2024 at pm Self Yes Yes   Sig: Take 1 tablet (25 mg) by mouth once daily.   lisinopril 40 mg tablet 1/9/2024 at am Self Yes Yes   Sig: Take 1 tablet (40 mg) by mouth once daily. as directed   nitroglycerin (Nitrostat) 0.4 mg SL tablet  Self Yes Yes   Sig: Place 1 tablet (0.4 mg) under the tongue. Dissolve 1 tablet under the tongue as needed for chest pain.   ondansetron ODT (Zofran-ODT) 4 mg disintegrating tablet  Self No Yes   Sig: Take  1 tablet (4 mg) by mouth every 8 hours if needed for nausea or vomiting.   ondansetron ODT (Zofran-ODT) 4 mg disintegrating tablet  Self No Yes   Sig: Take 1 tablet (4 mg) by mouth every 6 hours if needed for nausea or vomiting.   pregabalin (Lyrica) 75 mg capsule 1/9/2024 at am Self Yes Yes   Sig: Take 1 capsule (75 mg) by mouth 3 times a day.   rosuvastatin (Crestor) 40 mg tablet 1/8/2024 at pm Self Yes Yes   Sig: Take 1 tablet (40 mg) by mouth once daily at bedtime.   vitamin E 180 mg (400 unit) capsule 1/9/2024 at am Self Yes Yes   Sig: Take 1 capsule (400 Units) by mouth once daily.      Facility-Administered Medications: None        The list below reflects the updated allergy list. Please review each documented allergy for additional clarification and justification.  Allergies  Reviewed by Yumiko Bergeron PharmD on 1/10/2024        Severity Reactions Comments    Penicillins Low Hives             Patient accepts M2B at discharge. Pharmacy has been updated to [facility].    Sources used to complete the med history include   Patient Interview - reliable historian able to confirm medications from a verbally presented list, and independently state accurate directions for administration and provide supplemental medication history infornation  Admission MedRec Grid  [facility] - Pregabalin 75mg LF: 12/18/23, #90, 30DS  [facility] medication dispense report    Below are additional concerns with the patient's PTA list.  Patient states that PCP recently changed pregbalin directions from 1 cap TID to 2 capsules BID, no notes or dispense history to support claim     Yumiko Bergeron PharmD   Transitions of Care Pharmacist  [facility]  Please reach out via Secure Chat for questions, or if no response call BiiCode or [facility]era [facility]

## 2024-01-10 NOTE — PROGRESS NOTES
Physical Therapy    Physical Therapy Evaluation    Patient Name: Vivien Miramontes  MRN: [id number]  Today's Date: 1/10/2024   Time Calculation  Start Time: 1043  Stop Time: 1105  Time Calculation (min): 22 min    Assessment/Plan   PT Assessment  PT Assessment Results: Decreased strength, Decreased endurance, Impaired balance, Decreased mobility, Decreased safety awareness  Rehab Prognosis: Good  Evaluation/Treatment Tolerance: Patient tolerated treatment well  Medical Staff Made Aware: Yes  End of Session Communication: Bedside nurse  Assessment Comment: 63 year old female admitted with bilateral leg weakness.. Pt presents with decreased strength, endurance and balance impacting functional mobility. Pt would benefit from continued PT while in hospital to improve functional mobility and return to PLOF. Pt lives alone and is at a high fall risk at this time, with pt demonstrating L knee buckling when walking. Recommend MOD intensity PT after DC. If pt refuses, recommend LOW intensity with 24/7 supervision.  End of Session Patient Position: Bed, 1 rail up (sitting EOB; RN aware)  IP OR SWING BED PT PLAN  Inpatient or Swing Bed: Inpatient  PT Plan  Treatment/Interventions: Bed mobility, Transfer training, Gait training, Stair training, Balance training, Neuromuscular re-education, Strengthening, Endurance training, Therapeutic exercise, Therapeutic activity, Home exercise program, Positioning, Postural re-education  PT Plan: Skilled PT  PT Frequency: 3 times per week  PT Discharge Recommendations: Moderate intensity level of continued care  Equipment Recommended upon Discharge: Wheeled walker  PT Recommended Transfer Status: Assist x1  PT - OK to Discharge: Yes    Subjective   General Visit Information:  Reason for Referral: 63 year old female admitted with bilateral leg weakness.  Past Medical History Relevant to Rehab: CAD s/p PCI (2012) with NSTEMI (05/2023) w/ PCI x2 (LAD and Lcx), Crohn's disease c/b GI bleeding, HTN,  and tobacco use disorder  Prior to Session Communication: Bedside nurse  Patient Position Received: Bed, 2 rail up, Alarm off, not on at start of session  General Comment: Pt supine in bed upon entry to room. Pt pleasant, cooperative and willing to work with PT.   Home Living:  Home Living  Type of Home: House  Lives With: Alone  Home Adaptive Equipment: None  Home Layout: One level  Home Access:  (4 VELIA with HR)  Bathroom Shower/Tub: Tub/shower unit  Prior Level of Function:  Prior Function Per Pt/Caregiver Report  Level of [place]: Independent with ADLs and functional transfers, Independent with homemaking with ambulation  ADL Assistance: Independent  Homemaking Assistance: Independent  Ambulatory Assistance: Independent  Vocational:  (works as an )  Prior Function Comments: pt denies any falls over the last 6 months. Pt stating that prior to monday, that she had no issues walking or completing any functional mobility. Pt stating that if she needs help, her friend can come over.  Precautions:  Precautions  Medical Precautions: Fall precautions    Objective     Pain:  Pain Assessment  Pain Assessment: 0-10  Pain Score: 0 - No pain  Cognition:  Cognition  Orientation Level: Oriented X4    Extremity/Trunk Assessments:  Strength:     RLE   RLE : Within Functional Limits  LLE   LLE :  (3+/5 noted in L knee extension and hip flexion; 4/5 noted for DF; knee flexion and PF 5/5; overall questionably participation in MMT)    General Assessments:     General Observation  General Observation: pt genrally unsteady while completing ambulation this date. Recommend MOD itnensity PT for safety, as pt is a high fall risk. If pt declines, recommend LOW with 24/7 assist.   Activity Tolerance  Endurance: Tolerates 10 - 20 min exercise with multiple rests  Sensation  Light Touch:  (NT occasionally in B hands)     Static Sitting Balance  Static Sitting-Comment/Number of Minutes: sbsa  Dynamic Sitting  Balance  Dynamic Sitting-Comments: sba  Static Standing Balance  Static Standing-Comment/Number of Minutes: min  Dynamic Standing Balance  Dynamic Standing-Comments: mod    Functional Assessments:  Bed Mobility  Bed Mobility: Yes  Bed Mobility 1  Bed Mobility 1: Supine to sitting  Level of Assistance 1: Contact guard  Transfers  Transfer: Yes  Transfer 1  Technique 1: Sit to stand, Stand to sit  Transfer Device 1: Walker  Transfer Level of Assistance 1: Contact guard (verbal cuing for hand placement on fww)  Ambulation/Gait Training  Ambulation/Gait Training Performed: Yes  Ambulation/Gait Training 1  Surface 1: Level tile  Device 1: Rolling walker  Assistance 1: Minimum assistance  Quality of Gait 1:  (pt with noted L knee buckling upon standing and then again about ~7 ft into walk. Pt able to maintain balance and recover with fww and no assist. Pt noted to have difficulty advancing and placing LLE on floor when advancing, with pt appearing)  Comments/Distance (ft) 1: 15ft     Outcome Measures:  [facility] Basic Mobility  Turning from your back to your side while in a flat bed without using bedrails: A little  Moving from lying on your back to sitting on the side of a flat bed without using bedrails: A little  Moving to and from bed to chair (including a wheelchair): A little  Standing up from a chair using your arms (e.g. wheelchair or bedside chair): A little  To walk in hospital room: A little  Climbing 3-5 steps with railing: A lot  Basic Mobility - Total Score: 17    Encounter Problems       Encounter Problems (Active)       Balance       Pt will score >24 On the [place] to reduce the risk for falls.   (Progressing)       Start:  01/10/24    Expected End:  01/24/24               Mobility       Pt will ambulate >250ft with LRAD and sba Assist with no LOB and VSS.   (Progressing)       Start:  01/10/24    Expected End:  01/24/24            Pt will demonstrate WFL BLE strength to complete therapeutic exercise and  participate in functional mobility.   (Progressing)       Start:  01/10/24    Expected End:  01/24/24               Transfers       Pt will perform all functional transfers with LRAD and sba assist.   (Progressing)       Start:  01/10/24    Expected End:  01/24/24                   Education Documentation  Mobility Training, taught by Ana Maria Heath PT at 1/10/2024 12:26 PM.  Learner: Patient  Readiness: Acceptance  Method: Explanation  Response: Needs Reinforcement    Education Comments  No comments found.      01/10/24 at 12:28 PM   Ana Maria Heath PT   Rehab Office: [phone]

## 2024-01-11 ENCOUNTER — HOME HEALTH ADMISSION (OUTPATIENT)
Dept: HOME HEALTH SERVICES | Facility: HOME HEALTH | Age: 64
End: 2024-01-11
Payer: COMMERCIAL

## 2024-01-22 ENCOUNTER — DOCUMENTATION (OUTPATIENT)
Dept: HOME HEALTH SERVICES | Facility: HOME HEALTH | Age: 64
End: 2024-01-22
Payer: COMMERCIAL

## 2024-01-22 NOTE — HH CARE COORDINATION
This referral has been made a Non Admit with  Home Care due to Staffing Constraints. If you have further questions, feel free to reach out to our office at [phone]. Thank you, [facility] Intake.

## 2024-02-19 NOTE — PROGRESS NOTES
It was a pleasure to see Ms. Miramontes at the [facility] at [facility].     She is a really nice 63 y.o. female  who presents to us with complaints LOW BACK PAIN radiation to LEFT LEG  that started about  1 1/2months ago, and have been gradually worsening since that time.  The symptoms started after no known injury    The pain is 8 /10. The pain is described as aching, sharp, and tingling and occurs all day.  Symptoms are exacerbated by nothing in particular. Factors which relieve the pain include nothing      Numbness and/or tingling - YES left arm and leg    Weakness : YES left arm and leg    Bladder/Bowel symptoms - NO    The patient has tried medications (eg: gabapentin, NSAIDS and narcotics ) : No  PT : No  Pain Management with ESIs/selective nerve blocks  - NO    she is a SMOKER and NON-DIABETIC    History of Depression : YES    PRIOR SPINE SURGERY: NO     use of Aspirin, Coumadin, or Plavix or any other anticoagulants Plavix and Baby ASA    NARCOTICS for pain : NO    Part of this patient’s history is from personal review of the patient’s previous charts.      Past Medical History:   Diagnosis Date    Coronary artery disease     2012: cardiac cath with PCI, 6/2/23: cardiac cath with PCI x2 (LAD & LCx) intermediate disease of a tortuous RCA - cardiac clearance requested    Crohn's disease (CMS/HCC)     bowel perforation 2009 s/p colostomy, reversed 2010    Depression     Hyperlipidemia     Hypertension     IBS (irritable bowel syndrome)     Lung nodules     nonconcerning per pulm note    Myocardial infarction (CMS/HCC)     Ulcerative colitis (CMS/HCC)            Current Outpatient Medications:     aspirin 81 mg EC tablet, Take 1 tablet (81 mg) by mouth once daily., Disp: , Rfl:     carvedilol (Coreg) 12.5 mg tablet, Take 2 tablets (25 mg) by mouth twice a day., Disp: , Rfl:     clopidogrel (Plavix) 75 mg tablet, Take 1 tablet (75 mg) by mouth once daily., Disp: , Rfl:     ezetimibe (Zetia)  Last Office Visit 7/26/19  Next Office Visit none   Last Labs  Last Refill   PARoxetine (PAXIL) 20 MG tablet 90 tablet 0 10/31/2019     Sig: TAKE 1 TABLET BY MOUTH ONCE DAILY    Sent to pharmacy as: PARoxetine HCl 20 MG Oral Tablet    Class: Eprescribe    E-Prescribing Status: Receipt confirmed by pharmacy (10/31/2019 12:54 PM CDT)      we will continue all medications I have given Robaxin gabapentin Paxil  I cannot explain her symptoms in terms of her diarrhea urine incontinence recommend see urology she will see GI look for non-rheumatologic causes MRI lumbar spine to be done as well     return in 3 months    Please advise          10 mg tablet, Take 1 tablet (10 mg) by mouth once daily., Disp: , Rfl:     famotidine (Pepcid) 20 mg tablet, Take 1 tablet (20 mg) by mouth 2 times a day., Disp: , Rfl:     hydroCHLOROthiazide (HYDRODiuril) 25 mg tablet, Take 1 tablet (25 mg) by mouth once daily., Disp: , Rfl:     hydrOXYzine HCL (Atarax) 25 mg tablet, Take 1 tablet (25 mg) by mouth 3 times a day as needed for itching. May cause drowsiness, Disp: , Rfl:     lisinopril 40 mg tablet, Take 1 tablet (40 mg) by mouth once daily. as directed, Disp: , Rfl:     nitroglycerin (Nitrostat) 0.4 mg SL tablet, Place 1 tablet (0.4 mg) under the tongue. Dissolve 1 tablet under the tongue as needed for chest pain., Disp: , Rfl:     ondansetron ODT (Zofran-ODT) 4 mg disintegrating tablet, Take 1 tablet (4 mg) by mouth every 8 hours if needed for nausea or vomiting., Disp: 20 tablet, Rfl: 0    ondansetron ODT (Zofran-ODT) 4 mg disintegrating tablet, Take 1 tablet (4 mg) by mouth every 6 hours if needed for nausea or vomiting., Disp: 20 tablet, Rfl: 0    pregabalin (Lyrica) 75 mg capsule, Take 1 capsule (75 mg) by mouth 3 times a day., Disp: , Rfl:     rosuvastatin (Crestor) 40 mg tablet, Take 1 tablet (40 mg) by mouth once daily at bedtime., Disp: , Rfl:     Stelara injection, Inject 1 mL (90 mg) under the skin every 8 (eight) weeks., Disp: , Rfl:     vitamin E 180 mg (400 unit) capsule, Take 1 capsule (400 Units) by mouth once daily., Disp: , Rfl:       Review of Systems :   Constitutional: No fever or chills  Respiratory: No shortness of breath or wheezing  Musculoskeletal: see HPI above   Neurologic: See HPI above        EXAM:   There were no vitals filed for this visit.  NEURO: Neurologically patient is awake alert and oriented X 3    No obvious cranial nerve deficit.  Strength is almost 5/5 throughout all motor groups tested with no asymmetric significant motor deficit but she does have weakness involving left upper extremity with  of about 80% as compared to  the right along with 4 x 5 strength in the left upper extremity involving all muscle groups.  Deep tendon reflexes are symmetric throughout but she does have evidence of a wide-based gait and has difficulty doing tandem walking and 1 legged stance even for a few seconds.  Gait : She does have a staggering gait  Sensory examination is intact to touch and painful stimuli.  Tone increased in the lower extremities    IMAGING:   MRI of the lumbar spine was reviewed personally and shows evidence of degenerative changes at the L4-5 level with versus right L4-5 from stenosis compared to the left.    ASSESSMENT AND PLAN:  Vivien Miramontes is a 63 y.o. female presents to me with symptoms of low back and left lower extremity pain but weakness involving the left side of the body especially the hand along with numbness and tingling and gait imbalance on direct questioning.  She also has hyperreflexia on clinical examination.  Lumbar spine shows evidence of degenerative changes but I do not recommend any surgical intervention for the same.  Discussed with her the option of physical therapy and planning for that.  Recommended stopping smoking.  However she does have significant symptoms that are bothering him and my clinical examination are suspicious for presence of cervical myelopathy and I have ordered an MRI of the cervical spine to rule that out as she would remain at risk of progression of the same she does have any spinal cord compression.  I will review the MR images and we will get back to her with the results.    It was a pleasure to participate in Ms. Miramontes clinical care. All questions were answered to her satisfaction and she explained understanding of the further treatment plan.       Martin Garcia MD, [facility]   of Neurological Surgery  [facility] [employer]  Attending Surgeon  Director - Minimally Invasive Spine Surgery  [facility] ,  [place]      Some of this note was completed using Dragon voice recognition technology and sometimes the software misinterprets words. This may include unintended errors with respect to translation of words, typographical errors or grammar errors which may not have been identified prior to finalization of the chart note. Please take this into account when reading this note

## 2024-02-20 ENCOUNTER — OFFICE VISIT (OUTPATIENT)
Dept: NEUROSURGERY | Facility: CLINIC | Age: 64
End: 2024-02-20
Payer: COMMERCIAL

## 2024-02-20 VITALS
DIASTOLIC BLOOD PRESSURE: 85 MMHG | HEART RATE: 66 BPM | WEIGHT: 138.4 LBS | TEMPERATURE: 96.1 F | HEIGHT: 60 IN | SYSTOLIC BLOOD PRESSURE: 169 MMHG | BODY MASS INDEX: 27.17 KG/M2 | RESPIRATION RATE: 18 BRPM

## 2024-02-20 DIAGNOSIS — G95.9 CERVICAL MYELOPATHY (MULTI): Primary | ICD-10-CM

## 2024-02-20 DIAGNOSIS — M48.061 LUMBAR FORAMINAL STENOSIS: ICD-10-CM

## 2024-02-20 PROCEDURE — 99204 OFFICE O/P NEW MOD 45 MIN: CPT | Performed by: NEUROLOGICAL SURGERY

## 2024-02-20 PROCEDURE — 99214 OFFICE O/P EST MOD 30 MIN: CPT | Performed by: NEUROLOGICAL SURGERY

## 2024-02-20 ASSESSMENT — PAIN SCALES - GENERAL: PAINLEVEL: 9

## 2024-03-01 ENCOUNTER — OFFICE VISIT (OUTPATIENT)
Dept: GASTROENTEROLOGY | Facility: CLINIC | Age: 64
End: 2024-03-01
Payer: COMMERCIAL

## 2024-03-01 ENCOUNTER — LAB (OUTPATIENT)
Dept: LAB | Facility: LAB | Age: 64
End: 2024-03-01
Payer: COMMERCIAL

## 2024-03-01 VITALS
DIASTOLIC BLOOD PRESSURE: 84 MMHG | WEIGHT: 136 LBS | SYSTOLIC BLOOD PRESSURE: 148 MMHG | HEART RATE: 66 BPM | TEMPERATURE: 97.3 F | HEIGHT: 60 IN | BODY MASS INDEX: 26.7 KG/M2

## 2024-03-01 DIAGNOSIS — K50.919 CROHN'S DISEASE WITH COMPLICATION, UNSPECIFIED GASTROINTESTINAL TRACT LOCATION (MULTI): Primary | ICD-10-CM

## 2024-03-01 DIAGNOSIS — R10.13 EPIGASTRIC PAIN: ICD-10-CM

## 2024-03-01 DIAGNOSIS — K50.919 CROHN'S DISEASE WITH COMPLICATION, UNSPECIFIED GASTROINTESTINAL TRACT LOCATION (MULTI): ICD-10-CM

## 2024-03-01 LAB — HBV SURFACE AG SERPL QL IA: NONREACTIVE

## 2024-03-01 PROCEDURE — 86481 TB AG RESPONSE T-CELL SUSP: CPT

## 2024-03-01 PROCEDURE — 36415 COLL VENOUS BLD VENIPUNCTURE: CPT

## 2024-03-01 PROCEDURE — 99213 OFFICE O/P EST LOW 20 MIN: CPT | Performed by: INTERNAL MEDICINE

## 2024-03-01 PROCEDURE — 87340 HEPATITIS B SURFACE AG IA: CPT

## 2024-03-01 RX ORDER — POLYETHYLENE GLYCOL 3350, SODIUM SULFATE ANHYDROUS, SODIUM BICARBONATE, SODIUM CHLORIDE, POTASSIUM CHLORIDE 236; 22.74; 6.74; 5.86; 2.97 G/4L; G/4L; G/4L; G/4L; G/4L
4000 POWDER, FOR SOLUTION ORAL ONCE
Qty: 4000 ML | Refills: 0 | Status: SHIPPED | OUTPATIENT
Start: 2024-03-01 | End: 2024-03-01

## 2024-03-01 RX ORDER — PANTOPRAZOLE SODIUM 40 MG/1
40 TABLET, DELAYED RELEASE ORAL
COMMUNITY
Start: 2023-12-09

## 2024-03-01 RX ORDER — METOPROLOL SUCCINATE 50 MG/1
TABLET, EXTENDED RELEASE ORAL
COMMUNITY
Start: 2022-02-01

## 2024-03-01 RX ORDER — ACETAMINOPHEN 500 MG
10 TABLET ORAL DAILY PRN
COMMUNITY
Start: 2024-01-09

## 2024-03-01 RX ORDER — ENOXAPARIN SODIUM 100 MG/ML
40 INJECTION SUBCUTANEOUS
COMMUNITY
Start: 2024-01-09 | End: 2024-03-06 | Stop reason: WASHOUT

## 2024-03-01 NOTE — PATIENT INSTRUCTIONS
Colonoscopy with MAC.  Will check fecal calprotectin, T spot, hepatitis B surface antigen.  May need to switch to Rinvoq (Upadacitinib)  Hold Plavix 5 days before colonoscopy if okay with cardiologist.  Okay to continue aspirin.  Continue famotidine  Follow-up with me in 3 to 4 months

## 2024-03-03 LAB
NIL(NEG) CONTROL SPOT COUNT: NORMAL
PANEL A SPOT COUNT: 0
PANEL B SPOT COUNT: 0
POS CONTROL SPOT COUNT: NORMAL
T-SPOT. TB INTERPRETATION: NEGATIVE

## 2024-03-06 ENCOUNTER — HOSPITAL ENCOUNTER (OUTPATIENT)
Dept: RADIOLOGY | Facility: HOSPITAL | Age: 64
Discharge: HOME | End: 2024-03-06
Payer: COMMERCIAL

## 2024-03-06 DIAGNOSIS — M48.061 LUMBAR FORAMINAL STENOSIS: ICD-10-CM

## 2024-03-06 PROCEDURE — 72141 MRI NECK SPINE W/O DYE: CPT

## 2024-03-06 PROCEDURE — 72141 MRI NECK SPINE W/O DYE: CPT | Performed by: RADIOLOGY

## 2024-03-08 ENCOUNTER — APPOINTMENT (OUTPATIENT)
Dept: PAIN MEDICINE | Facility: CLINIC | Age: 64
End: 2024-03-08
Payer: COMMERCIAL

## 2024-03-10 ENCOUNTER — HOSPITAL ENCOUNTER (EMERGENCY)
Facility: HOSPITAL | Age: 64
Discharge: HOME | End: 2024-03-10
Payer: COMMERCIAL

## 2024-03-10 VITALS
RESPIRATION RATE: 16 BRPM | OXYGEN SATURATION: 100 % | HEART RATE: 84 BPM | TEMPERATURE: 97 F | SYSTOLIC BLOOD PRESSURE: 141 MMHG | DIASTOLIC BLOOD PRESSURE: 85 MMHG

## 2024-03-10 DIAGNOSIS — M54.42 ACUTE LEFT-SIDED LOW BACK PAIN WITH LEFT-SIDED SCIATICA: Primary | ICD-10-CM

## 2024-03-10 LAB
APPEARANCE UR: ABNORMAL
BACTERIA #/AREA URNS AUTO: ABNORMAL /HPF
BILIRUB UR STRIP.AUTO-MCNC: NEGATIVE MG/DL
COLOR UR: YELLOW
GLUCOSE UR STRIP.AUTO-MCNC: NORMAL MG/DL
HYALINE CASTS #/AREA URNS AUTO: ABNORMAL /LPF
KETONES UR STRIP.AUTO-MCNC: ABNORMAL MG/DL
LEUKOCYTE ESTERASE UR QL STRIP.AUTO: ABNORMAL
MUCOUS THREADS #/AREA URNS AUTO: ABNORMAL /LPF
NITRITE UR QL STRIP.AUTO: NEGATIVE
PH UR STRIP.AUTO: 5.5 [PH]
PROT UR STRIP.AUTO-MCNC: ABNORMAL MG/DL
RBC # UR STRIP.AUTO: NEGATIVE /UL
RBC #/AREA URNS AUTO: ABNORMAL /HPF
SP GR UR STRIP.AUTO: 1.03
SQUAMOUS #/AREA URNS AUTO: ABNORMAL /HPF
UROBILINOGEN UR STRIP.AUTO-MCNC: ABNORMAL MG/DL
WBC #/AREA URNS AUTO: ABNORMAL /HPF

## 2024-03-10 PROCEDURE — 2500000001 HC RX 250 WO HCPCS SELF ADMINISTERED DRUGS (ALT 637 FOR MEDICARE OP): Mod: SE

## 2024-03-10 PROCEDURE — 99283 EMERGENCY DEPT VISIT LOW MDM: CPT

## 2024-03-10 PROCEDURE — 81001 URINALYSIS AUTO W/SCOPE: CPT

## 2024-03-10 PROCEDURE — 87086 URINE CULTURE/COLONY COUNT: CPT

## 2024-03-10 PROCEDURE — 2500000005 HC RX 250 GENERAL PHARMACY W/O HCPCS: Mod: SE

## 2024-03-10 PROCEDURE — 99284 EMERGENCY DEPT VISIT MOD MDM: CPT

## 2024-03-10 RX ORDER — LIDOCAINE 560 MG/1
1 PATCH PERCUTANEOUS; TOPICAL; TRANSDERMAL DAILY
Status: DISCONTINUED | OUTPATIENT
Start: 2024-03-10 | End: 2024-03-10 | Stop reason: HOSPADM

## 2024-03-10 RX ORDER — ACETAMINOPHEN 325 MG/1
650 TABLET ORAL EVERY 6 HOURS PRN
Qty: 30 TABLET | Refills: 0 | Status: SHIPPED | OUTPATIENT
Start: 2024-03-10

## 2024-03-10 RX ORDER — CYCLOBENZAPRINE HCL 10 MG
10 TABLET ORAL ONCE
Status: COMPLETED | OUTPATIENT
Start: 2024-03-10 | End: 2024-03-10

## 2024-03-10 RX ORDER — IBUPROFEN 600 MG/1
600 TABLET ORAL ONCE
Status: COMPLETED | OUTPATIENT
Start: 2024-03-10 | End: 2024-03-10

## 2024-03-10 RX ORDER — IBUPROFEN 600 MG/1
600 TABLET ORAL EVERY 6 HOURS PRN
Qty: 30 TABLET | Refills: 0 | Status: SHIPPED | OUTPATIENT
Start: 2024-03-10

## 2024-03-10 RX ORDER — ACETAMINOPHEN 325 MG/1
650 TABLET ORAL ONCE
Status: COMPLETED | OUTPATIENT
Start: 2024-03-10 | End: 2024-03-10

## 2024-03-10 RX ORDER — CYCLOBENZAPRINE HCL 10 MG
10 TABLET ORAL 3 TIMES DAILY PRN
Qty: 20 TABLET | Refills: 0 | Status: SHIPPED | OUTPATIENT
Start: 2024-03-10

## 2024-03-10 RX ADMIN — IBUPROFEN 600 MG: 600 TABLET, FILM COATED ORAL at 13:01

## 2024-03-10 RX ADMIN — LIDOCAINE 1 PATCH: 4 PATCH TOPICAL at 13:01

## 2024-03-10 RX ADMIN — ACETAMINOPHEN 650 MG: 325 TABLET ORAL at 13:01

## 2024-03-10 RX ADMIN — CYCLOBENZAPRINE 10 MG: 10 TABLET, FILM COATED ORAL at 13:01

## 2024-03-10 ASSESSMENT — PAIN DESCRIPTION - DESCRIPTORS: DESCRIPTORS: ACHING

## 2024-03-10 ASSESSMENT — PAIN SCALES - GENERAL: PAINLEVEL_OUTOF10: 10 - WORST POSSIBLE PAIN

## 2024-03-10 ASSESSMENT — PAIN DESCRIPTION - PAIN TYPE: TYPE: ACUTE PAIN;CHRONIC PAIN

## 2024-03-10 ASSESSMENT — PAIN - FUNCTIONAL ASSESSMENT: PAIN_FUNCTIONAL_ASSESSMENT: 0-10

## 2024-03-10 ASSESSMENT — PAIN DESCRIPTION - LOCATION: LOCATION: BACK

## 2024-03-10 ASSESSMENT — PAIN DESCRIPTION - ORIENTATION: ORIENTATION: LOWER

## 2024-03-10 ASSESSMENT — PAIN DESCRIPTION - PROGRESSION: CLINICAL_PROGRESSION: NOT CHANGED

## 2024-03-10 NOTE — DISCHARGE INSTRUCTIONS
Follow-up with the orthospine clinic at [phone].  Take the medications to help your symptoms.  Get plenty rest and apply hot packs.

## 2024-03-10 NOTE — ED PROVIDER NOTES
HPI   Chief Complaint   Patient presents with    Back Pain       63-year-old female with history of spinal stenosis, HTN, HLD, CAD, hep C, Crohn's, UC, presents for chief complaint of left lower back pain.  Ongoing for a few days but worse yesterday and much worse today.  Denies any known injury or heavy lifting recently.  Endorses pain mostly in the left lower back that is often chronic but much worse lately.  Radiates down the left leg.  10/10 on movement but improves with rest.  No numbness.  Denies changes in urination/bowel movements such as incontinence or retention.  Denies fever, chills, myalgia.  Denies chest pain or dyspnea.  Denies nausea or vomiting.                          Adriana Coma Scale Score: 15                     Patient History   Past Medical History:   Diagnosis Date    Coronary artery disease     2012: cardiac cath with PCI, 23: cardiac cath with PCI x2 (LAD & LCx) intermediate disease of a tortuous RCA - cardiac clearance requested    Crohn's disease (CMS/HCC)     bowel perforation 2009 s/p colostomy, reversed 2010    Depression     Hyperlipidemia     Hypertension     IBS (irritable bowel syndrome)     Lung nodules     nonconcerning per pulm note    Myocardial infarction (CMS/HCC)     Ulcerative colitis (CMS/HCC)      Past Surgical History:   Procedure Laterality Date    ABDOMINAL HERNIA REPAIR  2018    with mesh    CARDIAC CATHETERIZATION      PCI    CARDIAC CATHETERIZATION  2023    PCI x 2 (LAD and LCx)     SECTION, LOW TRANSVERSE      COLONOSCOPY      COLOSTOMY  2009    REVISION / TAKEDOWN COLOSTOMY  2010    TUBAL LIGATION      UMBILICAL HERNIA REPAIR      x 2     Family History   Problem Relation Name Age of Onset    Stroke Mother      Hypertension Mother      Heart attack Mother      Heart attack Father      Diabetes Father      Multiple sclerosis Sister      Breast cancer Sister      Diabetes Brother       Social History     Tobacco Use    Smoking status:  Every Day     Packs/day: 0.25     Years: 50.00     Additional pack years: 0.00     Total pack years: 12.50     Types: Cigarettes    Smokeless tobacco: Never   Substance Use Topics    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   HENT:      Head: Normocephalic and atraumatic.      Mouth/Throat:      Mouth: Mucous membranes are moist.      Pharynx: Oropharynx is clear.   Eyes:      Extraocular Movements: Extraocular movements intact.      Conjunctiva/sclera: Conjunctivae normal.      Pupils: Pupils are equal, round, and reactive to light.   Cardiovascular:      Rate and Rhythm: Normal rate and regular rhythm.      Pulses: Normal pulses.      Heart sounds: Normal heart sounds.   Pulmonary:      Effort: Pulmonary effort is normal.      Breath sounds: Normal breath sounds.   Abdominal:      General: Abdomen is flat.      Palpations: Abdomen is soft.   Musculoskeletal:         General: Normal range of motion.      Cervical back: Normal range of motion and neck supple.      Comments: Positive straight leg raise test on the left side.  MSPs intact in extremities.  No sensory deficits.  No midline C, T, L-spine tenderness, crepitus, step-off, deformity.  No CVA tenderness.   Skin:     General: Skin is warm and dry.      Capillary Refill: Capillary refill takes less than 2 seconds.   Neurological:      General: No focal deficit present.      Mental Status: She is alert and oriented to person, place, and time.   Psychiatric:         Mood and Affect: Mood normal.         Behavior: Behavior normal.         Thought Content: Thought content normal.          Judgment: Judgment normal.         ED Course & MDM   Diagnoses as of 03/16/24 0748   Acute left-sided low back pain with left-sided sciatica       Medical Decision Making  Vital signs reviewed, unremarkable at this time.  Patient is well-appearing) distress.  Speaks full sentences without difficulty.  Diagnostic testing performed.  Tylenol, Motrin, Flexeril, lidocaine patch given for symptom management.  Likely sciatica due to history of degenerative disc disease.  However we are still checking urinalysis to rule out UTI/pyelonephritis.  At the end of my shift the results still are not back yet.  She remained calm and cooperative and in stable condition for duration.  Handed off to oncoming provider.        Procedure  Procedures     Ross Raymond, INOCENCIA-SVEN  03/10/24 1414       INOCENCIA Bird-SVEN  03/16/24 07

## 2024-03-10 NOTE — PROGRESS NOTES
This patient was signed out to me from previous provider, see provider for HPI and hospital course.  In brief this is a 63-year-old male with history of spinal stenosis, HTN, HLD, CAD, HCV, Crohn's, UC presenting for left lower back pain x 2 days.  Primary taking over care evaluation was nonconcerning for any red flag signs or symptoms to be concern for any spinal cord pathology.  She was treated symptomatically and urinalysis ordered and pending at time my taking over care.  Urinalysis returned equivocal, I did reevaluation patient she states she feels mildly improved.  She denies any lower urinary symptoms including frequency urgency or dysuria.  Given her UA is equivocal with no lower urinary tract symptoms very low suspicion for UTI including very low suspicion for pyelonephritis causing her lower back pains.  As she has known degenerative disc disease high suspicion is for sciatica/chronic condition causing her pain.  Will give her prescriptions for Tylenol ibuprofen Flexeril lidocaine patch.  She is to follow-up with orthospine clinic.  Return precautions reviewed.  Imaging and laboratory work as seen below has been reviewed.    Labs Reviewed   URINALYSIS WITH REFLEX CULTURE AND MICROSCOPIC    Narrative:     The following orders were created for panel order Urinalysis with Reflex Culture and Microscopic.  Procedure                               Abnormality         Status                     ---------                               -----------         ------                     Urinalysis with Reflex C...[[id number]]                                                 Extra Urine Gray Tube[[id number]]                                                         Please view results for these tests on the individual orders.   URINALYSIS WITH REFLEX CULTURE AND MICROSCOPIC   EXTRA URINE GRAY TUBE     No orders to display       Diagnoses as of 03/10/24 1517   Acute left-sided low back pain with left-sided sciatica

## 2024-03-11 LAB — HOLD SPECIMEN: NORMAL

## 2024-03-12 LAB — BACTERIA UR CULT: NO GROWTH

## 2024-03-24 ASSESSMENT — ENCOUNTER SYMPTOMS
DIARRHEA: 1
ABDOMINAL DISTENTION: 1

## 2024-03-25 NOTE — PROGRESS NOTES
Chief Complaint: Vivien Miramontes is a 63 y.o. female who presents for New Patient Visit.  HPI  63-year-old female with history of CAD SP stent placement in May 2023-on aspirin and Plavix, hypertension, hyperlipidemia, chron's disease-diagnosed in , initially was on Humira then switched to  Stelara came to GI clinic for follow-up visit.  Still complaints  abdominal pain-epigastric and lower abdominal, loose stool.  Hepatitis C antibody was positive however hepatitis C viral load was undetected.  She had bowel perforation due to complicated diverticulitis, had colostomy in  at [facility] subsequently had reversal in  .  Colonoscopy on 2021 by Dr. Man: Focal area of mild ulceration in the sigmoid colon otherwise the colon was entirely normal.colon prep was poor.   CBC unremarkable on 2024  Review of Systems   Gastrointestinal:  Positive for abdominal distention and diarrhea.     12 Point ROS negative outside of symptoms stated above in HPI    Past Medical History:   Diagnosis Date    Coronary artery disease     2012: cardiac cath with PCI, 23: cardiac cath with PCI x2 (LAD & LCx) intermediate disease of a tortuous RCA - cardiac clearance requested    Crohn's disease (CMS/HCC)     bowel perforation 2009 s/p colostomy, reversed 2010    Depression     Hyperlipidemia     Hypertension     IBS (irritable bowel syndrome)     Lung nodules     nonconcerning per pulm note    Myocardial infarction (CMS/HCC)     Ulcerative colitis (CMS/HCC)        Past Surgical History:   Procedure Laterality Date    ABDOMINAL HERNIA REPAIR  2018    with mesh    CARDIAC CATHETERIZATION      PCI    CARDIAC CATHETERIZATION  2023    PCI x 2 (LAD and LCx)     SECTION, LOW TRANSVERSE      COLONOSCOPY      COLOSTOMY  2009    REVISION / TAKEDOWN COLOSTOMY  2010    TUBAL LIGATION  1998    UMBILICAL HERNIA REPAIR      x 2       Family medical history includes stroke in mother.     reports that she has been  smoking cigarettes. She has a 12.50 pack-year smoking history. She has never used smokeless tobacco. She reports that she does not currently use alcohol. She reports that she does not currently use drugs.    Allergies   Allergen Reactions    Penicillins Hives       Imaging  MR cervical spine wo IV contrast    Result Date: 3/6/2024  Interpreted By:  Oly Arroyo, STUDY: MR CERVICAL SPINE WO IV CONTRAST   INDICATION: Signs/Symptoms:cervical myelopathy   COMPARISON: None.   ACCESSION NUMBER(S): [id number]   ORDERING CLINICIAN: JAQUI THOAMS   TECHNIQUE: Multiplanar multisequence MR imaging of the cervical spine was performed without the administration of intravenous contrast, according to standard protocol.   FINDINGS: ALIGNMENT: Straightening of the usual cervical lordosis. Trace retrolisthesis of C6 on C7 and trace anterolisthesis of C7 on T1.   VERTEBRAE: The vertebral bodies are normal in height. There is no fracture or aggressive osseous lesion.   DISCS: Multilevel disc desiccation. Mild disc height loss at C5-6 and C6-7.   CORD: There is no intrinsic spinal cord signal abnormality.   PARAVERTEBRAL SOFT TISSUES: The visualized paravertebral soft tissues appear within normal limits.   EVALUATION OF INDIVIDUAL LEVELS: C2-3: No disc herniation  spinal canal or neuroforaminal stenosis.   C3-4: No disc herniation  spinal canal or neuroforaminal stenosis.   C4-5: Tortuous course of the left vertebral artery which appears to extend into the left neural foramen and may contact the exiting left C5 nerve root (series 7, image 27). Otherwise, spinal canal and neural foramina are unremarkable.   C5-6: Uncovertebral and facet hypertrophy mildly narrow bilateral foramina. Spinal canal is patent.   C6-7: Mild retrolisthesis with disc osteophyte complex and facet hypertrophy. Moderate bilateral foraminal stenosis and mild narrowing of the spinal canal.   C7-T1: No spinal canal or neuroforaminal stenosis.       Degenerative  changes in the lower cervical spine most prominent at C6-7 where there is moderate narrowing of bilateral foramina and mild narrowing of the spinal canal.   Tortuous course of the left vertebral artery, which appears to extend into the left neural foramen at C4-5 and may contact the exiting left C5 nerve root. This variant anatomy is nonspecific and is often seen in asymptomatic patients. However, C5 nerve root compression can not be excluded. Correlation with dermatomal distribution recommended.   Signed by: Oly Arroyo 3/6/2024 2:27 PM Dictation workstation:   [id number]        Laboratory  No results found for this or any previous visit (from the past 96 hour(s)).     Objective       Current Outpatient Medications:     aspirin 81 mg EC tablet, Take 1 tablet (81 mg) by mouth once daily., Disp: , Rfl:     carvedilol (Coreg) 12.5 mg tablet, Take 2 tablets (25 mg) by mouth twice a day., Disp: , Rfl:     clopidogrel (Plavix) 75 mg tablet, Take 1 tablet (75 mg) by mouth once daily., Disp: , Rfl:     ezetimibe (Zetia) 10 mg tablet, Take 1 tablet (10 mg) by mouth once daily., Disp: , Rfl:     hydroCHLOROthiazide (HYDRODiuril) 25 mg tablet, Take 1 tablet (25 mg) by mouth once daily., Disp: , Rfl:     hydrOXYzine HCL (Atarax) 25 mg tablet, Take 1 tablet (25 mg) by mouth 3 times a day as needed for itching. May cause drowsiness, Disp: , Rfl:     lisinopril 40 mg tablet, Take 1 tablet (40 mg) by mouth once daily. as directed, Disp: , Rfl:     metoprolol succinate XL (Toprol-XL) 50 mg 24 hr tablet, Take by mouth., Disp: , Rfl:     nitroglycerin (Nitrostat) 0.4 mg SL tablet, Place 1 tablet (0.4 mg) under the tongue. Dissolve 1 tablet under the tongue as needed for chest pain., Disp: , Rfl:     pregabalin (Lyrica) 75 mg capsule, Take 1 capsule (75 mg) by mouth 3 times a day., Disp: , Rfl:     rosuvastatin (Crestor) 40 mg tablet, Take 1 tablet (40 mg) by mouth once daily at bedtime., Disp: , Rfl:     Stelara injection, Inject  1 mL (90 mg) under the skin every 8 (eight) weeks., Disp: , Rfl:     vitamin E 180 mg (400 unit) capsule, Take 1 capsule (400 Units) by mouth once daily., Disp: , Rfl:     acetaminophen (TylenoL) 325 mg tablet, Take 2 tablets (650 mg) by mouth every 6 hours if needed for mild pain (1 - 3) or fever (temp greater than 38.0 C)., Disp: 30 tablet, Rfl: 0    adalimumab (Humira) 40 mg/0.8 mL syringe kit prefilled syringe, Inject under the skin., Disp: , Rfl:     cyclobenzaprine (Flexeril) 10 mg tablet, Take 1 tablet (10 mg) by mouth 3 times a day as needed for muscle spasms (Pain)., Disp: 20 tablet, Rfl: 0    ibuprofen 600 mg tablet, Take 1 tablet (600 mg) by mouth every 6 hours if needed for mild pain (1 - 3) or fever (temp greater than 38.0 C)., Disp: 30 tablet, Rfl: 0    lidocaine 4 % kit, Apply 1 patch topically once every 24 hours. Apply to affected area for 12hrs, remove for 12 hours, repeat with new patch the next day, Disp: 10 kit, Rfl: 0    melatonin 5 mg tablet, Take 2 tablets (10 mg) by mouth once daily as needed., Disp: , Rfl:     ondansetron ODT (Zofran-ODT) 4 mg disintegrating tablet, Take 1 tablet (4 mg) by mouth every 8 hours if needed for nausea or vomiting. (Patient not taking: Reported on 3/1/2024), Disp: 20 tablet, Rfl: 0    pantoprazole (ProtoNix) 40 mg EC tablet, Take 1 tablet (40 mg) by mouth., Disp: , Rfl:     Last Recorded Vitals  Blood pressure 148/84, pulse 66, temperature 36.3 °C (97.3 °F), height 1.524 m (5'), weight 61.7 kg (136 lb).    Physical Exam  Cardiovascular:      Rate and Rhythm: Normal rate and regular rhythm.   Pulmonary:      Effort: Pulmonary effort is normal.      Breath sounds: Normal breath sounds.   Abdominal:      General: Bowel sounds are normal.      Palpations: Abdomen is soft.      Comments: Mild epigastric and left lower quadrant tenderness   Neurological:      Mental Status: She is alert and oriented to person, place, and time.         Assessment/Plan      Crohn's  colitis-on Stelara.  Last colonoscopy in 2021  S/P colostomy in 2009 subsequently had reversal in 2010  Hepatitis C antibody positive: Spontaneous clearance versus false positive antibody.    Colonoscopy with MAC.  Will check fecal calprotectin, T spot, hepatitis B surface antigen.  May need to switch to Rinvoq (Upadacitinib)  Hold Plavix 5 days before colonoscopy if okay with cardiologist.  Okay to continue aspirin.  Continue famotidine  Follow-up with me in 3 to 4 months

## 2024-04-08 ENCOUNTER — APPOINTMENT (OUTPATIENT)
Dept: PRIMARY CARE | Facility: HOSPITAL | Age: 64
End: 2024-04-08
Payer: COMMERCIAL

## 2024-06-25 ENCOUNTER — ANESTHESIA EVENT (OUTPATIENT)
Dept: GASTROENTEROLOGY | Facility: HOSPITAL | Age: 64
End: 2024-06-25
Payer: COMMERCIAL

## 2024-06-26 ENCOUNTER — HOSPITAL ENCOUNTER (OUTPATIENT)
Dept: GASTROENTEROLOGY | Facility: HOSPITAL | Age: 64
Setting detail: OUTPATIENT SURGERY
Discharge: HOME | End: 2024-06-26
Payer: COMMERCIAL

## 2024-06-26 ENCOUNTER — ANESTHESIA (OUTPATIENT)
Dept: GASTROENTEROLOGY | Facility: HOSPITAL | Age: 64
End: 2024-06-26
Payer: COMMERCIAL

## 2024-06-26 VITALS
HEIGHT: 60 IN | TEMPERATURE: 97.8 F | RESPIRATION RATE: 18 BRPM | DIASTOLIC BLOOD PRESSURE: 101 MMHG | HEART RATE: 90 BPM | BODY MASS INDEX: 27.29 KG/M2 | SYSTOLIC BLOOD PRESSURE: 141 MMHG | OXYGEN SATURATION: 94 % | WEIGHT: 139 LBS

## 2024-06-26 DIAGNOSIS — K50.919 CROHN'S DISEASE WITH COMPLICATION, UNSPECIFIED GASTROINTESTINAL TRACT LOCATION (MULTI): ICD-10-CM

## 2024-06-26 PROCEDURE — 7100000009 HC PHASE TWO TIME - INITIAL BASE CHARGE

## 2024-06-26 PROCEDURE — G0500 MOD SEDAT ENDO SERVICE >5YRS: HCPCS | Performed by: INTERNAL MEDICINE

## 2024-06-26 PROCEDURE — 3700000012 HC SEDATION LEVEL 5+ TIME - INITIAL 15 MINUTES 5/> YEARS

## 2024-06-26 PROCEDURE — 2500000004 HC RX 250 GENERAL PHARMACY W/ HCPCS (ALT 636 FOR OP/ED): Mod: SE | Performed by: INTERNAL MEDICINE

## 2024-06-26 PROCEDURE — 7100000010 HC PHASE TWO TIME - EACH INCREMENTAL 1 MINUTE

## 2024-06-26 PROCEDURE — 45380 COLONOSCOPY AND BIOPSY: CPT | Performed by: INTERNAL MEDICINE

## 2024-06-26 RX ORDER — FENTANYL CITRATE 50 UG/ML
INJECTION, SOLUTION INTRAMUSCULAR; INTRAVENOUS AS NEEDED
Status: COMPLETED | OUTPATIENT
Start: 2024-06-26 | End: 2024-06-26

## 2024-06-26 RX ORDER — PROPOFOL 10 MG/ML
INJECTION, EMULSION INTRAVENOUS CONTINUOUS PRN
Status: SHIPPED | OUTPATIENT
Start: 2024-06-26

## 2024-06-26 RX ORDER — MIDAZOLAM HYDROCHLORIDE 1 MG/ML
INJECTION, SOLUTION INTRAMUSCULAR; INTRAVENOUS AS NEEDED
Status: COMPLETED | OUTPATIENT
Start: 2024-06-26 | End: 2024-06-26

## 2024-06-26 RX ORDER — SODIUM CHLORIDE, SODIUM LACTATE, POTASSIUM CHLORIDE, CALCIUM CHLORIDE 600; 310; 30; 20 MG/100ML; MG/100ML; MG/100ML; MG/100ML
20 INJECTION, SOLUTION INTRAVENOUS CONTINUOUS
Status: DISCONTINUED | OUTPATIENT
Start: 2024-06-26 | End: 2024-06-27 | Stop reason: HOSPADM

## 2024-06-26 RX ORDER — FENTANYL CITRATE 50 UG/ML
INJECTION, SOLUTION INTRAMUSCULAR; INTRAVENOUS CONTINUOUS PRN
Status: SHIPPED | OUTPATIENT
Start: 2024-06-26

## 2024-06-26 ASSESSMENT — COLUMBIA-SUICIDE SEVERITY RATING SCALE - C-SSRS
1. IN THE PAST MONTH, HAVE YOU WISHED YOU WERE DEAD OR WISHED YOU COULD GO TO SLEEP AND NOT WAKE UP?: NO
2. HAVE YOU ACTUALLY HAD ANY THOUGHTS OF KILLING YOURSELF?: NO
6. HAVE YOU EVER DONE ANYTHING, STARTED TO DO ANYTHING, OR PREPARED TO DO ANYTHING TO END YOUR LIFE?: NO

## 2024-06-26 ASSESSMENT — PAIN SCALES - GENERAL
PAINLEVEL_OUTOF10: 0 - NO PAIN
PAINLEVEL_OUTOF10: 7
PAINLEVEL_OUTOF10: 0 - NO PAIN

## 2024-06-26 ASSESSMENT — PAIN - FUNCTIONAL ASSESSMENT
PAIN_FUNCTIONAL_ASSESSMENT: 0-10

## 2024-06-26 NOTE — H&P
History Of Present Illness  Vivien Miramontes is a 63 y.o. female presenting with diarrhea, crohns.     Past Medical History  Past Medical History:   Diagnosis Date    Coronary artery disease     2012: cardiac cath with PCI, 23: cardiac cath with PCI x2 (LAD & LCx) intermediate disease of a tortuous RCA - cardiac clearance requested    Crohn's disease (Multi)     bowel perforation  s/p colostomy, reversed 2010    Depression     Hyperlipidemia     Hypertension     IBS (irritable bowel syndrome)     Lung nodules     nonconcerning per pulm note    Myocardial infarction (Multi)     Ulcerative colitis (Multi)      Surgical History  Past Surgical History:   Procedure Laterality Date    ABDOMINAL HERNIA REPAIR  2018    with mesh    CARDIAC CATHETERIZATION      PCI    CARDIAC CATHETERIZATION  2023    PCI x 2 (LAD and LCx)     SECTION, LOW TRANSVERSE      COLONOSCOPY      COLOSTOMY      REVISION / TAKEDOWN COLOSTOMY      TUBAL LIGATION      UMBILICAL HERNIA REPAIR      x 2     Social History  She reports that she has been smoking cigarettes. She has a 12.5 pack-year smoking history. She has never used smokeless tobacco. She reports that she does not currently use alcohol. She reports that she does not currently use drugs.    Family History  Family History   Problem Relation Name Age of Onset    Stroke Mother      Hypertension Mother      Heart attack Mother      Heart attack Father      Diabetes Father      Multiple sclerosis Sister      Breast cancer Sister      Diabetes Brother          Allergies  Allergies   Allergen Reactions    Penicillins Hives     Review of Systems  Pre-sedation Evaluation:  ASA Classification - ASA 3 - Patient with moderate systemic disease with functional limitations  Mallampati Score - III (soft and hard palate and base of uvula visible)    Physical Exam     Last Recorded Vitals  Blood pressure (!) 139/101, pulse 93, temperature 36.6 °C (97.8 °F), temperature  source Temporal, resp. rate 16, height 1.524 m (5'), weight 63 kg (139 lb), SpO2 97%.     Assessment/Plan   Diarrhea w crohns assess for active disease- colonoscpy     PTA/Current Medications:  (Not in a hospital admission)    Current Outpatient Medications   Medication Sig Dispense Refill    aspirin 81 mg EC tablet Take 1 tablet (81 mg) by mouth once daily.      carvedilol (Coreg) 12.5 mg tablet Take 2 tablets (25 mg) by mouth twice a day.      clopidogrel (Plavix) 75 mg tablet Take 1 tablet (75 mg) by mouth once daily.      cyclobenzaprine (Flexeril) 10 mg tablet Take 1 tablet (10 mg) by mouth 3 times a day as needed for muscle spasms (Pain). 20 tablet 0    hydroCHLOROthiazide (HYDRODiuril) 25 mg tablet Take 1 tablet (25 mg) by mouth once daily.      lisinopril 40 mg tablet Take 1 tablet (40 mg) by mouth once daily. as directed      metoprolol succinate XL (Toprol-XL) 50 mg 24 hr tablet Take by mouth.      pregabalin (Lyrica) 75 mg capsule Take 1 capsule (75 mg) by mouth 3 times a day.      rosuvastatin (Crestor) 40 mg tablet Take 1 tablet (40 mg) by mouth once daily at bedtime.      Stelara injection Inject 1 mL (90 mg) under the skin every 8 (eight) weeks.      vitamin E 180 mg (400 unit) capsule Take 1 capsule (400 Units) by mouth once daily.      acetaminophen (TylenoL) 325 mg tablet Take 2 tablets (650 mg) by mouth every 6 hours if needed for mild pain (1 - 3) or fever (temp greater than 38.0 C). (Patient not taking: Reported on 6/26/2024) 30 tablet 0    adalimumab (Humira) 40 mg/0.8 mL syringe kit prefilled syringe Inject under the skin.      ezetimibe (Zetia) 10 mg tablet Take 1 tablet (10 mg) by mouth once daily.      hydrOXYzine HCL (Atarax) 25 mg tablet Take 1 tablet (25 mg) by mouth 3 times a day as needed for itching. May cause drowsiness      ibuprofen 600 mg tablet Take 1 tablet (600 mg) by mouth every 6 hours if needed for mild pain (1 - 3) or fever (temp greater than 38.0 C). (Patient not taking:  Reported on 6/26/2024) 30 tablet 0    lidocaine 4 % kit Apply 1 patch topically once every 24 hours. Apply to affected area for 12hrs, remove for 12 hours, repeat with new patch the next day 10 kit 0    melatonin 5 mg tablet Take 2 tablets (10 mg) by mouth once daily as needed.      nitroglycerin (Nitrostat) 0.4 mg SL tablet Place 1 tablet (0.4 mg) under the tongue. Dissolve 1 tablet under the tongue as needed for chest pain.      ondansetron ODT (Zofran-ODT) 4 mg disintegrating tablet Take 1 tablet (4 mg) by mouth every 8 hours if needed for nausea or vomiting. (Patient not taking: Reported on 3/1/2024) 20 tablet 0    pantoprazole (ProtoNix) 40 mg EC tablet Take 1 tablet (40 mg) by mouth.       Current Facility-Administered Medications   Medication Dose Route Frequency Provider Last Rate Last Admin    lactated Ringer's infusion  20 mL/hr intravenous Continuous Jessica Hughes MD         Facility-Administered Medications Ordered in Other Encounters   Medication Dose Route Frequency Provider Last Rate Last Admin    fentaNYL PF (Sublimaze) injection    Continuous PRN SAPPHIRE Kincaid        propofol (Diprivan) infusion    Continuous PRN SAPPHIRE Kincaid MD

## 2024-06-26 NOTE — DISCHARGE INSTRUCTIONS

## 2024-07-11 LAB
LABORATORY COMMENT REPORT: NORMAL
PATH REPORT.FINAL DX SPEC: NORMAL
PATH REPORT.GROSS SPEC: NORMAL
PATH REPORT.TOTAL CANCER: NORMAL

## 2024-07-30 ENCOUNTER — TELEPHONE (OUTPATIENT)
Dept: GASTROENTEROLOGY | Facility: CLINIC | Age: 64
End: 2024-07-30
Payer: COMMERCIAL

## 2024-07-30 NOTE — TELEPHONE ENCOUNTER
----- Message from Ping Pedro sent at 7/28/2024  7:12 PM EDT -----  Biopsy on colonoscopy-still shows some feature of Crohn's colitis.  We might need to switch to another medication like Skyrizi/Rinvoq.      Hui please make follow-up appointment in GI clinic

## 2024-07-30 NOTE — TELEPHONE ENCOUNTER
----- Message from Ping Pedro sent at 7/29/2024  1:07 AM EDT -----  Biopsy on colonoscopy-abnormal mucosal changes related to Crohn's.  Might need to switch therapy.    Herman/Hui, please make a follow-up appointment with me in GI clinic

## 2024-10-04 ENCOUNTER — OFFICE VISIT (OUTPATIENT)
Dept: GASTROENTEROLOGY | Facility: CLINIC | Age: 64
End: 2024-10-04
Payer: COMMERCIAL

## 2024-10-04 VITALS
BODY MASS INDEX: 27.38 KG/M2 | WEIGHT: 145 LBS | DIASTOLIC BLOOD PRESSURE: 76 MMHG | SYSTOLIC BLOOD PRESSURE: 147 MMHG | TEMPERATURE: 97.7 F | HEART RATE: 70 BPM | HEIGHT: 61 IN

## 2024-10-04 DIAGNOSIS — K50.919 CROHN'S DISEASE WITH COMPLICATION, UNSPECIFIED GASTROINTESTINAL TRACT LOCATION: Primary | ICD-10-CM

## 2024-10-04 PROCEDURE — 99214 OFFICE O/P EST MOD 30 MIN: CPT | Performed by: INTERNAL MEDICINE

## 2024-10-04 PROCEDURE — 1125F AMNT PAIN NOTED PAIN PRSNT: CPT | Performed by: INTERNAL MEDICINE

## 2024-10-04 PROCEDURE — 3008F BODY MASS INDEX DOCD: CPT | Performed by: INTERNAL MEDICINE

## 2024-10-04 PROCEDURE — 1159F MED LIST DOCD IN RCRD: CPT | Performed by: INTERNAL MEDICINE

## 2024-10-04 ASSESSMENT — PAIN SCALES - GENERAL: PAINLEVEL: 7

## 2024-10-04 NOTE — PATIENT INSTRUCTIONS
Smoking cessation  May need to switch to another agent like Entyvio depending on insurance coverage  Take famotidine as needed.  Follow-up with me in 3 to 4 months

## 2024-10-04 NOTE — PROGRESS NOTES
Chief Complaint: Vivien Miramontes is a 64 y.o. female who presents for Follow-up (Follow up).  HPI  64-year-old female with history of CAD SP stent placement in May 2023-on aspirin and Plavix, hypertension, hyperlipidemia, chron's disease-diagnosed in 2010, initially was on Humira then switched to  Stelara came to GI clinic for follow-up visit after colonoscopy.  Colonoscopy by Dr. Jessica Hughes on 6/26/2024: Mild (in right colon ) to moderate erythema (transverse colon, descending colon, sigmoid colon )consistent with chron's colitis, TI appears to be normal, biopsy-chronic colitis in right colon.   Still complaining abdominal pain, diarrhea-can be 7-8 bowel movement per day although she is on Stelara over the year.  T spot, hepatitis B surface antigen negative.  Hepatitis C antibody was positive however hepatitis C viral load was undetected.  She had bowel perforation due to complicated diverticulitis, had colostomy in 2009 at [facility] subsequently had reversal in 2010 .  Colonoscopy on 9/7/2021 by Dr. Man: Focal area of mild ulceration in the sigmoid colon otherwise the colon was entirely normal.colon prep was poor.   Review of Systems   Gastrointestinal:  Positive for abdominal pain and diarrhea.     12 Point ROS negative outside of symptoms stated above in HPI    Past Medical History:   Diagnosis Date    Coronary artery disease     2012: cardiac cath with PCI, 6/2/23: cardiac cath with PCI x2 (LAD & LCx) intermediate disease of a tortuous RCA - cardiac clearance requested    Crohn's disease (Multi)     bowel perforation 2009 s/p colostomy, reversed 2010    Depression     Hyperlipidemia     Hypertension     IBS (irritable bowel syndrome)     Lung nodules     nonconcerning per pulm note    Myocardial infarction (Multi)     Ulcerative colitis        Past Surgical History:   Procedure Laterality Date    ABDOMINAL HERNIA REPAIR  2018    with mesh    CARDIAC CATHETERIZATION  2012    PCI    CARDIAC CATHETERIZATION   2023    PCI x 2 (LAD and LCx)     SECTION, LOW TRANSVERSE      COLONOSCOPY      COLOSTOMY  2009    REVISION / TAKEDOWN COLOSTOMY  2010    TUBAL LIGATION  1998    UMBILICAL HERNIA REPAIR      x 2       Family medical history includes stroke in mother.     reports that she has been smoking cigarettes. She has a 12.5 pack-year smoking history. She has never used smokeless tobacco. She reports that she does not currently use alcohol. She reports that she does not currently use drugs.    Allergies   Allergen Reactions    Penicillin Unknown    Penicillins Hives       Imaging  No results found.      Laboratory  No results found for this or any previous visit (from the past 96 hour(s)).     Objective       Current Outpatient Medications:     aspirin 81 mg EC tablet, Take 1 tablet (81 mg) by mouth once daily., Disp: , Rfl:     carvedilol (Coreg) 12.5 mg tablet, Take 2 tablets (25 mg) by mouth twice a day., Disp: , Rfl:     clopidogrel (Plavix) 75 mg tablet, Take 1 tablet (75 mg) by mouth once daily., Disp: , Rfl:     cyclobenzaprine (Flexeril) 10 mg tablet, Take 1 tablet (10 mg) by mouth 3 times a day as needed for muscle spasms (Pain)., Disp: 20 tablet, Rfl: 0    hydroCHLOROthiazide (HYDRODiuril) 25 mg tablet, Take 1 tablet (25 mg) by mouth once daily., Disp: , Rfl:     hydrOXYzine HCL (Atarax) 25 mg tablet, Take 1 tablet (25 mg) by mouth 3 times a day as needed for itching. May cause drowsiness, Disp: , Rfl:     ibuprofen 600 mg tablet, Take 1 tablet (600 mg) by mouth every 6 hours if needed for mild pain (1 - 3) or fever (temp greater than 38.0 C)., Disp: 30 tablet, Rfl: 0    lidocaine 4 % kit, Apply 1 patch topically once every 24 hours. Apply to affected area for 12hrs, remove for 12 hours, repeat with new patch the next day, Disp: 10 kit, Rfl: 0    lisinopril 40 mg tablet, Take 1 tablet (40 mg) by mouth once daily. as directed, Disp: , Rfl:     melatonin 5 mg tablet, Take 2 tablets (10 mg) by mouth  "once daily as needed., Disp: , Rfl:     metoprolol succinate XL (Toprol-XL) 50 mg 24 hr tablet, Take by mouth., Disp: , Rfl:     nitroglycerin (Nitrostat) 0.4 mg SL tablet, Place 1 tablet (0.4 mg) under the tongue. Dissolve 1 tablet under the tongue as needed for chest pain., Disp: , Rfl:     pregabalin (Lyrica) 75 mg capsule, Take 1 capsule (75 mg) by mouth 3 times a day., Disp: , Rfl:     rosuvastatin (Crestor) 40 mg tablet, Take 1 tablet (40 mg) by mouth once daily at bedtime., Disp: , Rfl:     Stelara injection, Inject 1 mL (90 mg) under the skin every 8 (eight) weeks., Disp: , Rfl:     vitamin E 180 mg (400 unit) capsule, Take 1 capsule (400 Units) by mouth once daily., Disp: , Rfl:     acetaminophen (TylenoL) 325 mg tablet, Take 2 tablets (650 mg) by mouth every 6 hours if needed for mild pain (1 - 3) or fever (temp greater than 38.0 C). (Patient not taking: Reported on 10/4/2024), Disp: 30 tablet, Rfl: 0    adalimumab (Humira) 40 mg/0.8 mL syringe kit prefilled syringe, Inject under the skin., Disp: , Rfl:     ezetimibe (Zetia) 10 mg tablet, Take 1 tablet (10 mg) by mouth once daily., Disp: , Rfl:     ondansetron ODT (Zofran-ODT) 4 mg disintegrating tablet, Take 1 tablet (4 mg) by mouth every 8 hours if needed for nausea or vomiting. (Patient not taking: Reported on 10/4/2024), Disp: 20 tablet, Rfl: 0    pantoprazole (ProtoNix) 40 mg EC tablet, Take 1 tablet (40 mg) by mouth., Disp: , Rfl:     Last Recorded Vitals  Blood pressure 147/76, pulse 70, temperature 36.5 °C (97.7 °F), height 1.549 m (5' 1\"), weight 65.8 kg (145 lb).    Physical Exam  Cardiovascular:      Rate and Rhythm: Normal rate and regular rhythm.   Pulmonary:      Effort: Pulmonary effort is normal. No respiratory distress.      Breath sounds: Normal breath sounds.   Abdominal:      General: Abdomen is flat. Bowel sounds are normal.      Palpations: Abdomen is soft.      Tenderness: There is abdominal tenderness.      Comments: Mild tenderness " in center on deep  palpation   Neurological:      Mental Status: She is alert.         Assessment/Plan   Crohn's colitis-on Stelara.  She was on Humira in the past.  As no remission with the Stelara(IL 12 and 23 blocker ) as a result switch to Skyrizi (IL 23 blocker) is not a great idea.  Also due to significant underlying cardiac comorbidities-Rinvoq is not a good.  Will write prescription for Entyvio, depending on insurance coverage.  S/P colostomy in 2009 subsequently had reversal in 2010  Hepatitis C antibody positive: Spontaneous clearance versus false positive antibody.  Smoker-trying to stop smoking  GERD     Smoking cessation  May need to switch to another agent like Entyvio depending on insurance coverage  Take famotidine as needed.  Follow-up with me in 3 to 4 months

## 2024-10-04 NOTE — LETTER
October 6, 2024     Scooby Martinez MD  [address] [address]    Patient: Vivien Miramontes   YOB: 1960   Date of Visit: 10/4/2024       Dear Dr. Scooby Martinez MD:    Thank you for referring Vivien Miramontes to me for evaluation. Below are my notes for this consultation.  If you have questions, please do not hesitate to call me. I look forward to following your patient along with you.       Sincerely,     Ping Pedro MD      CC: No Recipients  ______________________________________________________________________________________    Chief Complaint: Vivien Miramontes is a 64 y.o. female who presents for Follow-up (Follow up).  HPI  64-year-old female with history of CAD SP stent placement in May 2023-on aspirin and Plavix, hypertension, hyperlipidemia, chron's disease-diagnosed in 2010, initially was on Humira then switched to  Stelara came to GI clinic for follow-up visit after colonoscopy.  Colonoscopy by Dr. Jessica Hughes on 6/26/2024: Mild (in right colon ) to moderate erythema (transverse colon, descending colon, sigmoid colon )consistent with chron's colitis, TI appears to be normal, biopsy-chronic colitis in right colon.   Still complaining abdominal pain, diarrhea-can be 7-8 bowel movement per day although she is on Stelara over the year.  T spot, hepatitis B surface antigen negative.  Hepatitis C antibody was positive however hepatitis C viral load was undetected.  She had bowel perforation due to complicated diverticulitis, had colostomy in 2009 at [facility] subsequently had reversal in 2010 .  Colonoscopy on 9/7/2021 by Dr. Man: Focal area of mild ulceration in the sigmoid colon otherwise the colon was entirely normal.colon prep was poor.   Review of Systems   Gastrointestinal:  Positive for abdominal pain and diarrhea.     12 Point ROS negative outside of symptoms stated above in HPI    Past Medical History:   Diagnosis Date   • Coronary artery disease     2012:  cardiac cath with PCI, 23: cardiac cath with PCI x2 (LAD & LCx) intermediate disease of a tortuous RCA - cardiac clearance requested   • Crohn's disease (Multi)     bowel perforation  s/p colostomy, reversed    • Depression    • Hyperlipidemia    • Hypertension    • IBS (irritable bowel syndrome)    • Lung nodules     nonconcerning per pulm note   • Myocardial infarction (Multi)    • Ulcerative colitis        Past Surgical History:   Procedure Laterality Date   • ABDOMINAL HERNIA REPAIR  2018    with mesh   • CARDIAC CATHETERIZATION      PCI   • CARDIAC CATHETERIZATION  2023    PCI x 2 (LAD and LCx)   •  SECTION, LOW TRANSVERSE     • COLONOSCOPY     • COLOSTOMY     • REVISION / TAKEDOWN COLOSTOMY     • TUBAL LIGATION     • UMBILICAL HERNIA REPAIR      x 2       Family medical history includes stroke in mother.     reports that she has been smoking cigarettes. She has a 12.5 pack-year smoking history. She has never used smokeless tobacco. She reports that she does not currently use alcohol. She reports that she does not currently use drugs.    Allergies   Allergen Reactions   • Penicillin Unknown   • Penicillins Hives       Imaging  No results found.      Laboratory  No results found for this or any previous visit (from the past 96 hour(s)).     Objective       Current Outpatient Medications:   •  aspirin 81 mg EC tablet, Take 1 tablet (81 mg) by mouth once daily., Disp: , Rfl:   •  carvedilol (Coreg) 12.5 mg tablet, Take 2 tablets (25 mg) by mouth twice a day., Disp: , Rfl:   •  clopidogrel (Plavix) 75 mg tablet, Take 1 tablet (75 mg) by mouth once daily., Disp: , Rfl:   •  cyclobenzaprine (Flexeril) 10 mg tablet, Take 1 tablet (10 mg) by mouth 3 times a day as needed for muscle spasms (Pain)., Disp: 20 tablet, Rfl: 0  •  hydroCHLOROthiazide (HYDRODiuril) 25 mg tablet, Take 1 tablet (25 mg) by mouth once daily., Disp: , Rfl:   •  hydrOXYzine HCL (Atarax) 25 mg tablet,  Take 1 tablet (25 mg) by mouth 3 times a day as needed for itching. May cause drowsiness, Disp: , Rfl:   •  ibuprofen 600 mg tablet, Take 1 tablet (600 mg) by mouth every 6 hours if needed for mild pain (1 - 3) or fever (temp greater than 38.0 C)., Disp: 30 tablet, Rfl: 0  •  lidocaine 4 % kit, Apply 1 patch topically once every 24 hours. Apply to affected area for 12hrs, remove for 12 hours, repeat with new patch the next day, Disp: 10 kit, Rfl: 0  •  lisinopril 40 mg tablet, Take 1 tablet (40 mg) by mouth once daily. as directed, Disp: , Rfl:   •  melatonin 5 mg tablet, Take 2 tablets (10 mg) by mouth once daily as needed., Disp: , Rfl:   •  metoprolol succinate XL (Toprol-XL) 50 mg 24 hr tablet, Take by mouth., Disp: , Rfl:   •  nitroglycerin (Nitrostat) 0.4 mg SL tablet, Place 1 tablet (0.4 mg) under the tongue. Dissolve 1 tablet under the tongue as needed for chest pain., Disp: , Rfl:   •  pregabalin (Lyrica) 75 mg capsule, Take 1 capsule (75 mg) by mouth 3 times a day., Disp: , Rfl:   •  rosuvastatin (Crestor) 40 mg tablet, Take 1 tablet (40 mg) by mouth once daily at bedtime., Disp: , Rfl:   •  Stelara injection, Inject 1 mL (90 mg) under the skin every 8 (eight) weeks., Disp: , Rfl:   •  vitamin E 180 mg (400 unit) capsule, Take 1 capsule (400 Units) by mouth once daily., Disp: , Rfl:   •  acetaminophen (TylenoL) 325 mg tablet, Take 2 tablets (650 mg) by mouth every 6 hours if needed for mild pain (1 - 3) or fever (temp greater than 38.0 C). (Patient not taking: Reported on 10/4/2024), Disp: 30 tablet, Rfl: 0  •  adalimumab (Humira) 40 mg/0.8 mL syringe kit prefilled syringe, Inject under the skin., Disp: , Rfl:   •  ezetimibe (Zetia) 10 mg tablet, Take 1 tablet (10 mg) by mouth once daily., Disp: , Rfl:   •  ondansetron ODT (Zofran-ODT) 4 mg disintegrating tablet, Take 1 tablet (4 mg) by mouth every 8 hours if needed for nausea or vomiting. (Patient not taking: Reported on 10/4/2024), Disp: 20 tablet, Rfl:  "0  •  pantoprazole (ProtoNix) 40 mg EC tablet, Take 1 tablet (40 mg) by mouth., Disp: , Rfl:     Last Recorded Vitals  Blood pressure 147/76, pulse 70, temperature 36.5 °C (97.7 °F), height 1.549 m (5' 1\"), weight 65.8 kg (145 lb).    Physical Exam  Cardiovascular:      Rate and Rhythm: Normal rate and regular rhythm.   Pulmonary:      Effort: Pulmonary effort is normal. No respiratory distress.      Breath sounds: Normal breath sounds.   Abdominal:      General: Abdomen is flat. Bowel sounds are normal.      Palpations: Abdomen is soft.      Tenderness: There is abdominal tenderness.      Comments: Mild tenderness in center on deep  palpation   Neurological:      Mental Status: She is alert.         Assessment/Plan   Crohn's colitis-on Stelara.  She was on Humira in the past.  As no remission with the Stelara(IL 12 and 23 blocker ) as a result switch to Skyrizi ([place] 23 blocker) is not a great idea.  Also due to significant underlying cardiac comorbidities-Rinvoq is not a good.  Will write prescription for Entyvio, depending on insurance coverage.  S/P colostomy in 2009 subsequently had reversal in 2010  Hepatitis C antibody positive: Spontaneous clearance versus false positive antibody.  Smoker-trying to stop smoking  GERD     Smoking cessation  May need to switch to another agent like Entyvio depending on insurance coverage  Take famotidine as needed.  Follow-up with me in 3 to 4 months  "

## 2024-10-06 RX ORDER — EPINEPHRINE 0.3 MG/.3ML
0.3 INJECTION SUBCUTANEOUS EVERY 5 MIN PRN
OUTPATIENT
Start: 2024-10-14

## 2024-10-06 RX ORDER — HEPARIN 100 UNIT/ML
500 SYRINGE INTRAVENOUS AS NEEDED
OUTPATIENT
Start: 2024-10-14

## 2024-10-06 RX ORDER — ALBUTEROL SULFATE 0.83 MG/ML
3 SOLUTION RESPIRATORY (INHALATION) AS NEEDED
OUTPATIENT
Start: 2024-10-14

## 2024-10-06 RX ORDER — HEPARIN SODIUM,PORCINE/PF 10 UNIT/ML
50 SYRINGE (ML) INTRAVENOUS AS NEEDED
OUTPATIENT
Start: 2024-10-14

## 2024-10-06 RX ORDER — DIPHENHYDRAMINE HYDROCHLORIDE 50 MG/ML
50 INJECTION INTRAMUSCULAR; INTRAVENOUS AS NEEDED
OUTPATIENT
Start: 2024-10-14

## 2024-10-06 RX ORDER — FAMOTIDINE 10 MG/ML
20 INJECTION INTRAVENOUS ONCE AS NEEDED
OUTPATIENT
Start: 2024-10-14

## 2024-10-06 ASSESSMENT — ENCOUNTER SYMPTOMS
ABDOMINAL PAIN: 1
DIARRHEA: 1

## 2024-10-08 ENCOUNTER — SPECIALTY PHARMACY (OUTPATIENT)
Dept: PHARMACY | Facility: CLINIC | Age: 64
End: 2024-10-08

## 2024-10-08 DIAGNOSIS — K50.119 CROHN'S DISEASE OF LARGE INTESTINE WITH COMPLICATION (MULTI): Primary | ICD-10-CM

## 2024-10-08 DIAGNOSIS — K50.118 CROHN'S DISEASE OF LARGE INTESTINE WITH OTHER COMPLICATION: ICD-10-CM

## 2024-10-08 RX ORDER — ALBUTEROL SULFATE 0.83 MG/ML
3 SOLUTION RESPIRATORY (INHALATION) AS NEEDED
OUTPATIENT
Start: 2024-10-08

## 2024-10-08 RX ORDER — DIPHENHYDRAMINE HYDROCHLORIDE 50 MG/ML
50 INJECTION INTRAMUSCULAR; INTRAVENOUS AS NEEDED
OUTPATIENT
Start: 2024-10-08

## 2024-10-08 RX ORDER — EPINEPHRINE 0.3 MG/.3ML
0.3 INJECTION SUBCUTANEOUS EVERY 5 MIN PRN
OUTPATIENT
Start: 2024-10-08

## 2024-10-08 RX ORDER — FAMOTIDINE 10 MG/ML
20 INJECTION INTRAVENOUS ONCE AS NEEDED
OUTPATIENT
Start: 2024-10-08

## 2024-10-22 ENCOUNTER — TELEPHONE (OUTPATIENT)
Dept: GASTROENTEROLOGY | Facility: CLINIC | Age: 64
End: 2024-10-22
Payer: COMMERCIAL

## 2024-10-23 NOTE — TELEPHONE ENCOUNTER
RAIMUNDO Guerra PharmD; Herman Piña MA  PT called regarding this again because she doesn't want to be without medication. If its possible to zach it as urgent that would be great thank you!          Previous Messages       ----- Message -----  From: Steff Hamm PharmD  Sent: 10/22/2024  10:42 AM EDT  To: Herman Piña MA; Hui Alvarenga MA  Subject: RE: Entyvio update                              Hello!    Vivien's authorization for the entyvio infusions is currently pending. Did you need me to have the technician zach this as urgent?    Thank you,  Steff  ----- Message -----  From: Herman Piña MA  Sent: 10/22/2024  10:25 AM EDT  To: Steff Hamm PharmD; Hui Alvarenga MA  Subject: Entyvio update                                  Could you please send an update on the patients prior-auth for Entyvio?

## 2024-11-04 DIAGNOSIS — K50.919 CROHN'S DISEASE WITH COMPLICATION, UNSPECIFIED GASTROINTESTINAL TRACT LOCATION: Primary | ICD-10-CM

## 2024-11-04 RX ORDER — PREDNISONE 20 MG/1
TABLET ORAL
Qty: 50 TABLET | Refills: 12 | Status: SHIPPED | OUTPATIENT
Start: 2024-11-04 | End: 2024-12-02

## 2024-11-05 NOTE — TELEPHONE ENCOUNTER
----- Message from Ping Pedro sent at 11/4/2024  7:25 PM EST -----  Regarding: RE: Entyvio update  Talk to patient's PMD today.  She still complaining abdominal pain, frequent diarrhea.  Will send prednisone prescription while we are waiting for approval of Entyvio  from insurance.  Hui, please inform patient to  prednisone from pharmacy  ----- Message -----  From: Steff Hamm PharmD  Sent: 11/4/2024   2:35 PM EST  To: Herman Piña MA; Ping Pedro MD; #  Subject: RE: Entyvio update                               I checked with the technician again today, no response from insurance yet. She said she will call the plan for an update if she doesn't hear back today.  ----- Message -----  From: Hui Alvarenga MA  Sent: 11/4/2024   2:20 PM EST  To: Herman Piña MA; Ping Pedro MD; #  Subject: RE: Entyvio update                                asked that I check on an update for approval?  ----- Message -----  From: Steff Hamm PharmD  Sent: 10/30/2024  10:10 AM EST  To: Herman Piña MA; Hui Alvarenga MA  Subject: RE: Entyvio update                               As of right now the authorization is still pending with insurance. I will let you know as soon as it is approved!    Thank you,  Steff  ----- Message -----  From: Hui Alvarenga MA  Sent: 10/29/2024  12:06 PM EDT  To: Herman Piña MA; Steff Hamm PharmD  Subject: RE: Entyvio update                               Any update? Sorry to ask again pt just called and asked. Thank you  ----- Message -----  From: Steff Hamm PharmD  Sent: 10/23/2024  11:30 AM EDT  To: Herman Piña MA; Hui Alvarenga MA  Subject: RE: Entyvio update                               Yes, I will zach as urgent and keep you both updated on if/when she is approved.     Thank you,  Steff  ----- Message -----  From: Hui Alvarenga MA  Sent: 10/23/2024   9:30 AM EDT  To: Herman Piña MA; Steff Hamm, PharmD  Subject: RE:  Entyvio update                               PT called regarding this again because she doesn't want to be without medication. If its possible to zach it as urgent that would be great thank you!  ----- Message -----  From: Steff Hamm PharmD  Sent: 10/22/2024  10:42 AM EDT  To: Herman Piña MA; Hui Alvarenga MA  Subject: RE: Entyvio update                               Hello!    Vivien's authorization for the entyvio infusions is currently pending. Did you need me to have the technician zach this as urgent?    Thank you,  Steff  ----- Message -----  From: Herman Piña MA  Sent: 10/22/2024  10:25 AM EDT  To: Steff Hamm PharmD; Hui Alvarenga MA  Subject: Entyvio update                                   Could you please send an update on the patients prior-auth for Entyvio?

## 2024-11-08 ENCOUNTER — TELEPHONE (OUTPATIENT)
Dept: GASTROENTEROLOGY | Facility: CLINIC | Age: 64
End: 2024-11-08
Payer: COMMERCIAL

## 2024-11-08 NOTE — TELEPHONE ENCOUNTER
----- Message from Steff Hamm sent at 11/8/2024 10:19 AM EST -----  Regarding: RE: Entyvio update  Good morning,    I have good news! Maxine entyvio has been approved from 11/4/24-11/4/25 with authorization # [id number]. The infusion center should be reaching out to her shortly to schedule her appointments.    Thank you,  Steff  ----- Message -----  From: Ping Pedro MD  Sent: 11/4/2024   7:26 PM EST  To: Herman Piña MA; Steff Hamm PharmETHAN; #  Subject: RE: Entyvio update                               Talk to patient's PMD today.  She still complaining abdominal pain, frequent diarrhea.  Will send prednisone prescription while we are waiting for approval of Entyvio  from insurance.  Hui, please inform patient to  prednisone from pharmacy  ----- Message -----  From: Steff Hamm PharmETHAN  Sent: 11/4/2024   2:35 PM EST  To: Herman Piña MA; Ping Pedro MD; #  Subject: RE: Entyvio update                               I checked with the technician again today, no response from insurance yet. She said she will call the plan for an update if she doesn't hear back today.  ----- Message -----  From: Hui Alvarenga MA  Sent: 11/4/2024   2:20 PM EST  To: Herman Piña MA; Ping Pedro MD; #  Subject: RE: Entyvio update                                asked that I check on an update for approval?  ----- Message -----  From: Steff Hamm, PharmD  Sent: 10/30/2024  10:10 AM EST  To: Herman Piña MA; Hui Alvarenga MA  Subject: RE: Entyvio update                               As of right now the authorization is still pending with insurance. I will let you know as soon as it is approved!    Thank you,  Steff  ----- Message -----  From: Hui Alvarenga MA  Sent: 10/29/2024  12:06 PM EDT  To: Herman Piña MA; Steff Hamm, PharmD  Subject: RE: Entyvio update                               Any update? Sorry to ask again pt just called and asked. Thank you  -----  Message -----  From: Steff Hamm PharmD  Sent: 10/23/2024  11:30 AM EDT  To: Herman Piña MA; Hui Alvarenga MA  Subject: RE: Entyvio update                               Yes, I will zach as urgent and keep you both updated on if/when she is approved.     Thank you,  Steff  ----- Message -----  From: Hui Alvarenga MA  Sent: 10/23/2024   9:30 AM EDT  To: Herman Piña MA; Steff Hamm PharmD  Subject: RE: Entyvio update                               PT called regarding this again because she doesn't want to be without medication. If its possible to zach it as urgent that would be great thank you!  ----- Message -----  From: Steff Hamm PharmD  Sent: 10/22/2024  10:42 AM EDT  To: Herman Piña MA; Hui Alvarenga MA  Subject: RE: Entyvio update                               Hello!    Vivien's authorization for the entyvio infusions is currently pending. Did you need me to have the technician zach this as urgent?    Thank you,  Steff  ----- Message -----  From: Herman Piña MA  Sent: 10/22/2024  10:25 AM EDT  To: Steff Hamm PharmD; Hui Alvarenga MA  Subject: Entyvio update                                   Could you please send an update on the patients prior-auth for Entyvio?

## 2024-11-11 ENCOUNTER — DOCUMENTATION (OUTPATIENT)
Dept: INFUSION THERAPY | Facility: CLINIC | Age: 64
End: 2024-11-11
Payer: COMMERCIAL

## 2024-11-11 NOTE — PROGRESS NOTES
CLINICAL CLEARANCE FOR OUTPATIENT INFUSION      Patient to be scheduled for New Start of Entyvio infusions.    For Diagnosis: Crohn's    Dosing is 300mg on weeks ( 0, 2, 6 (induction)) and then Every 56 days (maintenance)    Labs required prior to first treatment (please order if not ordered / completed):  T-Spot drawn/results:   Lab Results   Component Value Date    TBSIN Negative 03/01/2024        Baseline labs for review (as available):    LFT (baseline):   Lab Results   Component Value Date    ALT 19 12/19/2023    AST 26 12/19/2023    ALKPHOS 90 12/19/2023    BILITOT 1.1 12/19/2023        Last infusion received: NA (if continuation)   Due: ANYTIME     Induction and Maintenance Therapy Plans entered if needed? Yes (if no prescribing provider contacted regarding entering maintenance plan as needed)    Okay to schedule for treatment as ordered by prescribing provider

## 2024-11-25 ENCOUNTER — APPOINTMENT (OUTPATIENT)
Dept: INFUSION THERAPY | Facility: CLINIC | Age: 64
End: 2024-11-25
Payer: COMMERCIAL

## 2024-11-25 VITALS
TEMPERATURE: 97.3 F | SYSTOLIC BLOOD PRESSURE: 107 MMHG | WEIGHT: 144.4 LBS | HEART RATE: 60 BPM | BODY MASS INDEX: 27.28 KG/M2 | DIASTOLIC BLOOD PRESSURE: 65 MMHG | RESPIRATION RATE: 17 BRPM

## 2024-11-25 DIAGNOSIS — K50.118 CROHN'S DISEASE OF LARGE INTESTINE WITH OTHER COMPLICATION: ICD-10-CM

## 2024-11-25 DIAGNOSIS — K50.119 CROHN'S DISEASE OF LARGE INTESTINE WITH COMPLICATION (MULTI): ICD-10-CM

## 2024-11-25 LAB
ALBUMIN SERPL BCP-MCNC: 3.8 G/DL (ref 3.4–5)
ALP SERPL-CCNC: 97 U/L (ref 33–136)
ALT SERPL W P-5'-P-CCNC: 18 U/L (ref 7–45)
AST SERPL W P-5'-P-CCNC: 18 U/L (ref 9–39)
BASOPHILS # BLD AUTO: 0.02 X10*3/UL (ref 0–0.1)
BASOPHILS NFR BLD AUTO: 0.3 %
BILIRUB DIRECT SERPL-MCNC: 0.1 MG/DL (ref 0–0.3)
BILIRUB SERPL-MCNC: 0.4 MG/DL (ref 0–1.2)
CRP SERPL-MCNC: 0.47 MG/DL
EOSINOPHIL # BLD AUTO: 0.08 X10*3/UL (ref 0–0.7)
EOSINOPHIL NFR BLD AUTO: 1.3 %
ERYTHROCYTE [DISTWIDTH] IN BLOOD BY AUTOMATED COUNT: 13.4 % (ref 11.5–14.5)
HCT VFR BLD AUTO: 40.9 % (ref 36–46)
HGB BLD-MCNC: 13.2 G/DL (ref 12–16)
IMM GRANULOCYTES # BLD AUTO: 0.02 X10*3/UL (ref 0–0.7)
IMM GRANULOCYTES NFR BLD AUTO: 0.3 % (ref 0–0.9)
LYMPHOCYTES # BLD AUTO: 1.64 X10*3/UL (ref 1.2–4.8)
LYMPHOCYTES NFR BLD AUTO: 25.9 %
MCH RBC QN AUTO: 28.6 PG (ref 26–34)
MCHC RBC AUTO-ENTMCNC: 32.3 G/DL (ref 32–36)
MCV RBC AUTO: 89 FL (ref 80–100)
MONOCYTES # BLD AUTO: 0.37 X10*3/UL (ref 0.1–1)
MONOCYTES NFR BLD AUTO: 5.8 %
NEUTROPHILS # BLD AUTO: 4.21 X10*3/UL (ref 1.2–7.7)
NEUTROPHILS NFR BLD AUTO: 66.4 %
NRBC BLD-RTO: 0 /100 WBCS (ref 0–0)
PLATELET # BLD AUTO: 238 X10*3/UL (ref 150–450)
PROT SERPL-MCNC: 6.8 G/DL (ref 6.4–8.2)
RBC # BLD AUTO: 4.61 X10*6/UL (ref 4–5.2)
WBC # BLD AUTO: 6.3 X10*3/UL (ref 4.4–11.3)

## 2024-11-25 PROCEDURE — 86140 C-REACTIVE PROTEIN: CPT

## 2024-11-25 PROCEDURE — 96365 THER/PROPH/DIAG IV INF INIT: CPT | Performed by: NURSE PRACTITIONER

## 2024-11-25 PROCEDURE — 80076 HEPATIC FUNCTION PANEL: CPT

## 2024-11-25 PROCEDURE — 85025 COMPLETE CBC W/AUTO DIFF WBC: CPT

## 2024-11-25 RX ORDER — EPINEPHRINE 0.3 MG/.3ML
0.3 INJECTION SUBCUTANEOUS EVERY 5 MIN PRN
OUTPATIENT
Start: 2024-12-09

## 2024-11-25 RX ORDER — ALBUTEROL SULFATE 0.83 MG/ML
3 SOLUTION RESPIRATORY (INHALATION) AS NEEDED
OUTPATIENT
Start: 2024-12-09

## 2024-11-25 RX ORDER — DIPHENHYDRAMINE HYDROCHLORIDE 50 MG/ML
50 INJECTION INTRAMUSCULAR; INTRAVENOUS AS NEEDED
OUTPATIENT
Start: 2024-12-09

## 2024-11-25 RX ORDER — FAMOTIDINE 10 MG/ML
20 INJECTION INTRAVENOUS ONCE AS NEEDED
OUTPATIENT
Start: 2024-12-09

## 2024-11-25 ASSESSMENT — ENCOUNTER SYMPTOMS
MYALGIAS: 0
FREQUENCY: 0
NAUSEA: 0
ABDOMINAL PAIN: 0
WHEEZING: 0
WOUND: 0
NUMBNESS: 0
LIGHT-HEADEDNESS: 0
EYE PROBLEMS: 0
EXTREMITY WEAKNESS: 0
APPETITE CHANGE: 0
DIZZINESS: 0
VOICE CHANGE: 0
LEG SWELLING: 0
CONSTIPATION: 0
SHORTNESS OF BREATH: 0
COUGH: 0
CHILLS: 0
HEMATURIA: 0
DIARRHEA: 0
FATIGUE: 0
FEVER: 0
PALPITATIONS: 0
BLOOD IN STOOL: 0
DEPRESSION: 0
ARTHRALGIAS: 0
NERVOUS/ANXIOUS: 1
UNEXPECTED WEIGHT CHANGE: 0
VOMITING: 0
DYSURIA: 0
TROUBLE SWALLOWING: 0
HEADACHES: 0
SORE THROAT: 0

## 2024-11-25 NOTE — PROGRESS NOTES
[facility]   Infusion Clinic Note   Date: 2024   Name: Vivien Miramontes  : 1960   MRN: [id number]          Reason for Visit: New Patient Visit and OP Infusion (PT HERE FOR NEW START ENTYVIO 300MG/NEX APT: 2 WEEKS )         Today: We administered vedolizumab (Entyvio) 300 mg in sodium chloride 0.9% 255 mL IV.       Visit Type: INFUSION       Ordered By: MALIHA       Accompanied by:Child/Children       Diagnosis: Crohn's disease of large intestine with other complication    Crohn's disease of large intestine with complication (Multi)        Allergies:   Allergies as of 2024 - Reviewed 2024   Allergen Reaction Noted    Penicillin Unknown 2022    Penicillins Hives 10/01/2009          Current Medications has a current medication list which includes the following prescription(s): aspirin, carvedilol, clopidogrel, cyclobenzaprine, hydrochlorothiazide, ibuprofen, lisinopril, melatonin, metoprolol succinate xl, nitroglycerin, rosuvastatin, vedolizumab, vedolizumab, vitamin e, acetaminophen, ezetimibe, hydroxyzine hcl, lidocaine, ondansetron odt, pantoprazole, prednisone, and pregabalin.       Vitals:   Vitals:    24 0805 24 0917 24 0932   BP: 117/72 123/68 107/65   Pulse: 61 63 60   Resp: 16 16 17   Temp: 36.2 °C (97.2 °F) 36.2 °C (97.2 °F) 36.3 °C (97.3 °F)   SpO2: Comment: cold fingers and long polished fingernails Comment: long polished fingernails    Weight: 65.5 kg (144 lb 6.4 oz)               Infusion Pre-procedure Checklist:   - Allergies reviewed: yes   - Medications reviewed: yes       - Previous reaction to current treatment: n/a FIRST DOSE      Assess patient for the concerns below. Document provider notification as appropriate.  - Active or recent infection with/without current antibiotic use: no  - Recent or planned invasive dental work: no  - Recent or planned surgeries: no  - Recently received or plans to receive vaccinations:  no  - Has treatment related toxicities: no  - Is pregnant:  n/a      Pain: 9   - Is the pain different from normal: no   - Is your Doctor aware:  yes       Labs: Labs drawn and sent per order          Fall Risk Screening: Clay Fall Risk  History of Falling, Immediate or Within 3 Months: No  Secondary Diagnosis: No  Ambulatory Aid: Walks without aid/bedrest/nurse assist  Intravenous Therapy/Heparin Lock: No  Gait/Transferring: Normal/bedrest/immobile  Mental Status: Oriented to own ability  Clay Fall Risk Score: 0       Review Of Systems:  Review of Systems   Constitutional:  Negative for appetite change, chills, fatigue, fever and unexpected weight change.   HENT:   Negative for hearing loss, mouth sores, sore throat, tinnitus, trouble swallowing and voice change.    Eyes:  Negative for eye problems.   Respiratory:  Negative for cough, shortness of breath and wheezing.    Cardiovascular:  Negative for chest pain, leg swelling and palpitations.   Gastrointestinal:  Negative for abdominal pain, blood in stool, constipation, diarrhea, nausea and vomiting.        PT ADMITS TO 10 BMS PER DAY WHICH ARE LIQUID IN FORM.  PT ADMITS TO NOCTURNAL BMS BUT DENIES BLOOD AND MUCOUS.    Genitourinary:  Negative for dysuria, frequency and hematuria.    Musculoskeletal:  Negative for arthralgias and myalgias.   Skin:  Negative for itching, rash and wound.   Neurological:  Negative for dizziness, extremity weakness, headaches, light-headedness and numbness.   Psychiatric/Behavioral:  Negative for depression. The patient is nervous/anxious.          ROS completed? Yes      Infusion Readiness:  - Assessment Concerns Related to Infusion: No  - Provider notified: n/a      Document Below Only If Indicated:   New Patient Education:    NEW PATIENT MEDICATION EDUCATION PT PROVIDED WITH WRITTEN ([email or web address] PT EDUCATION SHEET) AND VERBAL EDUCATION REGARDING MEDICATION GIVEN. VERIFIED MEDICATION NAME WITH PATIENT AND DISCUSSED REASON FOR USE.  "BRIEFLY DISCUSSED HOW MEDICATION WORKS AND EDUCATED ON GOAL OF TREATMENT, FREQUENCY OF TREATMENT, ADVERSE RXN'S AND COMMON SIDE EFFECTS TO MONITOR FOR. INSTRUCTED PT TO ASSURE THAT ALL PROVIDERS INCLUDING DENTISTS ARE AWARE OF MEDICATION RECEIVED. DISCUSSED FLOW OF VISIT AND ORIENTED TO INFUSION CENTER. PT VERBALIZES UNDERSTANDING. CALL LIGHT PROVIDED AND PT AWARE TO ALERT STAFF OF ANY CONCERNS DURING TREATMENT.        Treatment Conditions & Drug Specific Questions:    Vedolizumab  (ENTYVIO)    (Unless otherwise specified on patient specific therapy plan):     TREATMENT CONDITIONS:  Unless otherwise specified on patient specific therapy plan HOLD and notify provider prior to proceeding with treatment if:   o Positive Hepatitis B Surface Ag  o Positive T-Spot  o Patient has signs or symptoms of Progressive Multifocal Leukoencephalopath (PML)     Lab Results   Component Value Date    HEPBSAG Nonreactive 03/01/2024      No results found for: \"NONUHFIRE\", \"NONUHSWGH\", \"NONUHFISH\", \"EXTHEPBSAG\"  Lab Results   Component Value Date    TBSIN Negative 03/01/2024      Lab Results   Component Value Date    ALT 19 12/19/2023    AST 26 12/19/2023    ALKPHOS 90 12/19/2023    BILITOT 1.1 12/19/2023        Labs reviewed and patient meets treatment conditions? Yes    DRUG SPECIFIC QUESTIONS:  Any signs or symptoms of Progressive Multifocal Leukoencephalopath (PML) which may include: memory loss, trouble thinking, loss of balance, difficulty talking or walking, loss of vision?  YES, PT HAS MEMORY LOSS AT BASELINE WHICH PT ATTRIBUTES TO AGE.     (If YES notify prescribing provider prior to proceeding)    REMINDERS:  Recommended Vitals/Observation:  Vitals: Monitor vitals at start of infusion, at 15 minutes and at completion of infusion.  Observation: First 3 infusions patient may leave 15 minutes post infusion. Subsequent maintenance infusions: if no reaction, may leave immediately post infusion.        Weight Based Drug " Calculations:    WEIGHT BASED DRUGS: NOT APPLICABLE / FLAT DOSE          Initiated By: Pro Ruiz RN

## 2024-11-25 NOTE — PATIENT INSTRUCTIONS
Today :We administered vedolizumab (Entyvio) 300 mg in sodium chloride 0.9% 255 mL IV.     For:   1. Crohn's disease of large intestine with other complication    2. Crohn's disease of large intestine with complication (Multi)         Your next appointment is due in:  2 WEEKS         Please read the  Medication Guide that was given to you and reviewed during todays visit.     (Tell all doctors including dentists that you are taking this medication)     Go to the emergency room or call 911 if:  -You have signs of allergic reaction:   -Rash, hives, itching.   -Swollen, blistered, peeling skin.   -Swelling of face, lips, mouth, tongue or throat.   -Tightness of chest, trouble breathing, swallowing or talking     Call your doctor:  - If IV / injection site gets red, warm, swollen, itchy or leaks fluid or pus.     (Leave dressing on your IV site for at least 2 hours and keep area clean and dry  - If you get sick or have symptoms of infection or are not feeling well for any reason.    (Wash your hands often, stay away from people who are sick)  - If you have side effects from your medication that do not go away or are bothersome.     (Refer to the teaching your nurse gave you for side effects to call your doctor about)    - Common side effects may include:  stuffy nose, headache, feeling tired, muscle aches, upset stomach  - Before receiving any vaccines     - Call the [facility] at   If:  - You get sick, are on antibiotics, have had a recent vaccine, have surgery or dental work and your doctor wants your visit rescheduled.  - You need to cancel and reschedule your visit for any reason. Call at least 2 days before your visit if you need to cancel.   - Your insurance changes before your next visit.    (We will need to get approval from your new insurance. This can take up to two weeks.)     The [facility] is opened Monday thru Friday. We are closed on weekends and holidays.   Voice mail  will take your call if the center is closed. If you leave a message please allow 24 hours for a call back during weekdays. If you leave a message on a weekend/holiday, we will call you back the next business day.    A pharmacist is available Monday - Friday from 8:30AM to 3:30PM to help answer any questions you may have about your prescriptions(s). Please call pharmacy at:    [facility]: [phone]  [facility]: [phone]  [facility]: [phone]

## 2024-12-03 ENCOUNTER — TELEPHONE (OUTPATIENT)
Dept: GASTROENTEROLOGY | Facility: CLINIC | Age: 64
End: 2024-12-03
Payer: COMMERCIAL

## 2024-12-09 ENCOUNTER — APPOINTMENT (OUTPATIENT)
Dept: INFUSION THERAPY | Facility: CLINIC | Age: 64
End: 2024-12-09
Payer: COMMERCIAL

## 2024-12-09 ENCOUNTER — TELEPHONE (OUTPATIENT)
Dept: GASTROENTEROLOGY | Facility: CLINIC | Age: 64
End: 2024-12-09

## 2024-12-09 NOTE — TELEPHONE ENCOUNTER
Patient called and said she was in the hosp recently and they said her kidneys had an allergic reaction to the Entivyo. They told pt to cancel her appointment for today for the infusions. Patient called to schedule an appointment with you to discuss however nothing till the end of Jan/Feb . Double book? Please advise

## 2024-12-20 ENCOUNTER — LAB (OUTPATIENT)
Dept: LAB | Facility: LAB | Age: 64
End: 2024-12-20
Payer: COMMERCIAL

## 2024-12-20 ENCOUNTER — OFFICE VISIT (OUTPATIENT)
Dept: GASTROENTEROLOGY | Facility: CLINIC | Age: 64
End: 2024-12-20
Payer: COMMERCIAL

## 2024-12-20 VITALS
WEIGHT: 145 LBS | BODY MASS INDEX: 27.38 KG/M2 | DIASTOLIC BLOOD PRESSURE: 81 MMHG | HEIGHT: 61 IN | HEART RATE: 71 BPM | SYSTOLIC BLOOD PRESSURE: 144 MMHG | TEMPERATURE: 97.7 F

## 2024-12-20 DIAGNOSIS — N17.9 AKI (ACUTE KIDNEY INJURY) (CMS-HCC): Primary | ICD-10-CM

## 2024-12-20 DIAGNOSIS — K50.919 CROHN'S DISEASE WITH COMPLICATION, UNSPECIFIED GASTROINTESTINAL TRACT LOCATION: ICD-10-CM

## 2024-12-20 DIAGNOSIS — N17.9 AKI (ACUTE KIDNEY INJURY) (CMS-HCC): ICD-10-CM

## 2024-12-20 DIAGNOSIS — R19.7 DIARRHEA, UNSPECIFIED TYPE: ICD-10-CM

## 2024-12-20 LAB
ANION GAP SERPL CALC-SCNC: 14 MMOL/L (ref 10–20)
BUN SERPL-MCNC: 14 MG/DL (ref 6–23)
CALCIUM SERPL-MCNC: 9.3 MG/DL (ref 8.6–10.6)
CHLORIDE SERPL-SCNC: 108 MMOL/L (ref 98–107)
CO2 SERPL-SCNC: 26 MMOL/L (ref 21–32)
CREAT SERPL-MCNC: 1.05 MG/DL (ref 0.5–1.05)
EGFRCR SERPLBLD CKD-EPI 2021: 59 ML/MIN/1.73M*2
GLUCOSE SERPL-MCNC: 73 MG/DL (ref 74–99)
POTASSIUM SERPL-SCNC: 4.7 MMOL/L (ref 3.5–5.3)
SODIUM SERPL-SCNC: 143 MMOL/L (ref 136–145)

## 2024-12-20 PROCEDURE — 3008F BODY MASS INDEX DOCD: CPT | Performed by: INTERNAL MEDICINE

## 2024-12-20 PROCEDURE — 1125F AMNT PAIN NOTED PAIN PRSNT: CPT | Performed by: INTERNAL MEDICINE

## 2024-12-20 PROCEDURE — 80048 BASIC METABOLIC PNL TOTAL CA: CPT

## 2024-12-20 PROCEDURE — 1159F MED LIST DOCD IN RCRD: CPT | Performed by: INTERNAL MEDICINE

## 2024-12-20 PROCEDURE — 99214 OFFICE O/P EST MOD 30 MIN: CPT | Performed by: INTERNAL MEDICINE

## 2024-12-20 RX ORDER — PREGABALIN 150 MG/1
1 CAPSULE ORAL
COMMUNITY
Start: 2024-09-30

## 2024-12-20 RX ORDER — DICYCLOMINE HYDROCHLORIDE 10 MG/1
10 CAPSULE ORAL 3 TIMES DAILY PRN
Qty: 30 CAPSULE | Refills: 1 | Status: SHIPPED | OUTPATIENT
Start: 2024-12-20 | End: 2025-12-20

## 2024-12-20 RX ORDER — AMLODIPINE BESYLATE 5 MG/1
1.5 TABLET ORAL
COMMUNITY
Start: 2024-11-05

## 2024-12-20 RX ORDER — DICYCLOMINE HYDROCHLORIDE 10 MG/1
10 CAPSULE ORAL 3 TIMES DAILY PRN
Qty: 30 CAPSULE | Refills: 1 | Status: SHIPPED
Start: 2024-12-20 | End: 2024-12-20 | Stop reason: SDUPTHER

## 2024-12-20 RX ORDER — CARVEDILOL 25 MG/1
1 TABLET ORAL
COMMUNITY
Start: 2024-12-05

## 2024-12-20 ASSESSMENT — ENCOUNTER SYMPTOMS
ABDOMINAL PAIN: 1
DIARRHEA: 1

## 2024-12-20 ASSESSMENT — PAIN SCALES - GENERAL: PAINLEVEL_OUTOF10: 8

## 2024-12-20 NOTE — PATIENT INSTRUCTIONS
Adequate hydration  Smoking cessation  Will check kidney function.  Take Bentyl.  Imodium as needed  Will communicate with nephrologist Dr. Newman then restart Entyvio  Follow-up with me in 3 to 4 months

## 2024-12-20 NOTE — PROGRESS NOTES
"Chief Complaint: Vivien Miramontes is a 64 y.o. female who presents for Follow-up (FOLLOW UP).  HPI  64-year-old female with history of CAD SP stent placement in May 2023-on aspirin and Plavix, NSTEMI, hypertension, hyperlipidemia, chron's disease-diagnosed in 2010, initially was on Humira then switched to  Stelara then to Entyvio came to GI clinic for follow-up visit .  She received 2 dose of Entyvio, however due to persistent diarrhea, abdominal pain, weakness, fatigue she was admitted to [facility].  She called to our office-\" her symptoms related to adverse reaction with Entyvio\".  I reviewed discharge summary .patient was found to have FLORENCE related to dehydration   Due to diarrhea which is her baseline symptoms  related to underlying Crohn's , might be concurrent IBS.  Doubt symptoms related to Entyvio adverse reaction .  I have talked to nephrologist-Dr. Newman who is out of country.  Dr. Newman will review her chart once back from vacation.  Colonoscopy by Dr. Jessica Hughes on 6/26/2024: Mild (in right colon ) to moderate erythema (transverse colon, descending colon, sigmoid colon )consistent with chron's colitis, TI appears to be normal, biopsy-chronic colitis in right colon.   T spot, hepatitis B surface antigen negative.  Hepatitis C antibody was positive however hepatitis C viral load was undetected.  She had bowel perforation due to complicated diverticulitis, had colostomy in 2009 at [facility] subsequently had reversal in 2010 .  Colonoscopy on 9/7/2021 by Dr. Man: Focal area of mild ulceration in the sigmoid colon otherwise the colon was entirely normal.colon prep was poor  Review of Systems   Gastrointestinal:  Positive for abdominal pain and diarrhea.     12 Point ROS negative outside of symptoms stated above in HPI    Past Medical History:   Diagnosis Date    Coronary artery disease     2012: cardiac cath with PCI, 6/2/23: cardiac cath with PCI x2 (LAD & LCx) intermediate " disease of a tortuous RCA - cardiac clearance requested    Crohn's disease (Multi)     bowel perforation 2009 s/p colostomy, reversed 2010    Depression     Hyperlipidemia     Hypertension     IBS (irritable bowel syndrome)     Lung nodules     nonconcerning per pulm note    Myocardial infarction (Multi)     Ulcerative colitis        Past Surgical History:   Procedure Laterality Date    ABDOMINAL HERNIA REPAIR  2018    with mesh    CARDIAC CATHETERIZATION  2012    PCI    CARDIAC CATHETERIZATION  2023    PCI x 2 (LAD and LCx)     SECTION, LOW TRANSVERSE  1987    COLONOSCOPY      COLOSTOMY  2009    REVISION / TAKEDOWN COLOSTOMY  2010    TUBAL LIGATION  1998    UMBILICAL HERNIA REPAIR      x 2       Family medical history includes stroke in mother.     reports that she has been smoking cigarettes. She has a 12.5 pack-year smoking history. She has never used smokeless tobacco. She reports that she does not currently use alcohol. She reports that she does not currently use drugs.    Allergies   Allergen Reactions    Penicillin Unknown    Penicillins Hives       Imaging  US renal complete    Result Date: 2024  * * *Final Report* * * DATE OF EXAM: Dec  6 2024  9:38AM   JESSIE   1055  -  US KIDNEY/BLADDER  / ACCESSION #  [id number] PROCEDURE REASON: Kidney failure, acute      * * * * Physician Interpretation * * * *  EXAMINATION:   RENAL ULTRASOUND   CLINICAL HISTORY: 64 years old Female with Kidney failure, acute TECHNIQUE:  Sonography of the kidneys and urinary bladder was performed.   Images were obtained and stored in a permanent archive. MQ:  UR_1 COMPARISON: CT 2024 RESULT:  Right Kidney:      -Renal length: 10.6 cm      -Parenchyma: Normal parenchymal echogenicity.  Normal parenchymal thickness.      -Collecting system: No hydronephrosis.      -Calculus: No echogenic, shadowing calculus.      -Lesion:  None. Left Kidney:      -Renal length: 10.2 cm      -Parenchyma: Normal parenchymal  echogenicity.  Normal parenchymal thickness.      -Collecting system: No hydronephrosis.      -Calculus: No echogenic, shadowing calculus.      -Lesion:  None. Bladder: Normal sonographic appearance.    IMPRESSION: Normal sonographic appearance of the kidneys and bladder. : RAMAN   Transcribe Date/Time: Dec  6 2024  9:40A Dictated by : RILEY GARCIA DO This examination was interpreted and the report reviewed and electronically signed by: RILEY GARCIA DO on Dec  6 2024  9:41AM  EST    CT abdomen pelvis wo IV contrast    Result Date: 12/5/2024  * * *Final Report* * * DATE OF EXAM: Dec  5 2024  2:51PM   [facility]   0531  -  CT ABD/PEL WO IVCON  / ACCESSION #  [id number] PROCEDURE REASON: Crohn's exacerbation      * * * * Physician Interpretation * * * *  EXAMINATION:  CT ABDOMEN AND PELVIS WITHOUT IV CONTRAST CLINICAL HISTORY:  Crohn's disease, presenting with abdominal pain, diarrhea and weakness, found to have severe acute kidney injury TECHNIQUE: Non-IV contrast imaging of the abdomen and pelvis was performed using standard technique, scanning from just above the dome of the diaphragm to the symphysis pubis.  Unenhanced imaging is limited for the evaluation of some intra-abdominal and pelvic pathology. MQ:  CTAPWO_3 Contrast: IV: None CT Radiation dose: Integrated Dose-length product (DLP) for this visit =   812 mGy*cm. CT Dose Reduction Employed: Automated exposure control (AEC) COMPARISON: CT chest 05/30/2023 RESULT: Abdomen / Pelvis: Liver: Unremarkable unenhanced liver. Biliary: Cholecystectomy. Spleen: No splenomegaly. Pancreas: Mildly dilated main pancreatic duct up to 7 mm (5:35), with probable divisum morphology. Adrenals: No mass. Kidneys: No calculus, hydronephrosis or finding to suggest a cyst or mass in the unenhanced kidney. GI Tract: Partial colectomy with colorectal anastomosis.  No bowel dilation.  Appendix is distended measuring up to 1.1 cm in diameter proximally, but 0.6-0.7 cm  distally, normal caliber.  No appendicolith or periappendiceal inflammatory changes. Lymph Nodes: No lymphadenopathy. Mesentery/peritoneum: No ascites. Retroperitoneum: No mass. Vasculature: Arterial atherosclerotic disease without aneurysm.   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 Pelvis: No mass or ascites. Bones/Soft Tissues: Lower abdominal mesh herniorrhaphy, without recurrent hernia.  Degenerative changes. Lower thorax: Unremarkable. Localizer images: No additional findings.    IMPRESSION: No acute process in the abdomen and pelvis. : PSCB   Transcribe Date/Time: Dec  5 2024  3:10P Dictated by : SAIDA VALENTE MD This examination was interpreted and the report reviewed and electronically signed by: KAREEM RENEE MD on Dec  5 2024  3:43PM  EST        Laboratory  No results found for this or any previous visit (from the past 96 hours).     Objective       Current Outpatient Medications:     acetaminophen (TylenoL) 325 mg tablet, Take 2 tablets (650 mg) by mouth every 6 hours if needed for mild pain (1 - 3) or fever (temp greater than 38.0 C)., Disp: 30 tablet, Rfl: 0    amLODIPine (Norvasc) 5 mg tablet, Take 1.5 tablets (7.5 mg) by mouth early in the morning.., Disp: , Rfl:     aspirin 81 mg EC tablet, Take 1 tablet (81 mg) by mouth once daily., Disp: , Rfl:     carvedilol (Coreg) 25 mg tablet, Take 1 tablet (25 mg) by mouth every 12 hours., Disp: , Rfl:     clopidogrel (Plavix) 75 mg tablet, Take 1 tablet (75 mg) by mouth once daily., Disp: , Rfl:     cyclobenzaprine (Flexeril) 10 mg tablet, Take 1 tablet (10 mg) by mouth 3 times a day as needed for muscle spasms (Pain)., Disp: 20 tablet, Rfl: 0    hydroCHLOROthiazide (HYDRODiuril) 25 mg tablet, Take 1 tablet (25 mg) by mouth once daily., Disp: , Rfl:     hydrOXYzine HCL (Atarax) 25 mg tablet, Take 1 tablet (25 mg) by mouth 3 times a day as needed for itching. May cause drowsiness, Disp: , Rfl:     lisinopril 40 mg  tablet, Take 1 tablet (40 mg) by mouth once daily. as directed, Disp: , Rfl:     metoprolol succinate XL (Toprol-XL) 50 mg 24 hr tablet, Take by mouth., Disp: , Rfl:     nitroglycerin (Nitrostat) 0.4 mg SL tablet, Place 1 tablet (0.4 mg) under the tongue. Dissolve 1 tablet under the tongue as needed for chest pain., Disp: , Rfl:     ondansetron ODT (Zofran-ODT) 4 mg disintegrating tablet, Take 1 tablet (4 mg) by mouth every 8 hours if needed for nausea or vomiting., Disp: 20 tablet, Rfl: 0    pregabalin (Lyrica) 150 mg capsule, Take 1 capsule (150 mg) by mouth 3 times a day., Disp: , Rfl:     rosuvastatin (Crestor) 40 mg tablet, Take 1 tablet (40 mg) by mouth once daily at bedtime., Disp: , Rfl:     vitamin E 180 mg (400 unit) capsule, Take 1 capsule (400 Units) by mouth once daily., Disp: , Rfl:     carvedilol (Coreg) 12.5 mg tablet, Take 2 tablets (25 mg) by mouth twice a day. (Patient not taking: Reported on 12/20/2024), Disp: , Rfl:     ezetimibe (Zetia) 10 mg tablet, Take 1 tablet (10 mg) by mouth once daily. (Patient not taking: Reported on 12/20/2024), Disp: , Rfl:     ibuprofen 600 mg tablet, Take 1 tablet (600 mg) by mouth every 6 hours if needed for mild pain (1 - 3) or fever (temp greater than 38.0 C). (Patient not taking: Reported on 12/20/2024), Disp: 30 tablet, Rfl: 0    lidocaine 4 % kit, Apply 1 patch topically once every 24 hours. Apply to affected area for 12hrs, remove for 12 hours, repeat with new patch the next day (Patient not taking: Reported on 12/20/2024), Disp: 10 kit, Rfl: 0    melatonin 5 mg tablet, Take 2 tablets (10 mg) by mouth once daily as needed. (Patient not taking: Reported on 12/20/2024), Disp: , Rfl:     pantoprazole (ProtoNix) 40 mg EC tablet, Take 1 tablet (40 mg) by mouth. (Patient not taking: Reported on 12/20/2024), Disp: , Rfl:     pregabalin (Lyrica) 75 mg capsule, Take 1 capsule (75 mg) by mouth 3 times a day. (Patient not taking: Reported on 12/20/2024), Disp: , Rfl:     " vedolizumab (Entyvio) 300 mg injection, Infuse 300 mg into a venous catheter every 8 (eight) weeks.  For Maintenance: every 8 weeks (Patient not taking: Reported on 12/20/2024), Disp: 5 mL, Rfl: 2    vedolizumab (Entyvio) 300 mg injection, Infuse 300 mg into a venous catheter see administration instructions.  For Induction:  0, 2 and 6 weeks (Patient not taking: Reported on 12/20/2024), Disp: 15 mL, Rfl: 0    Last Recorded Vitals  Blood pressure 144/81, pulse 71, temperature 36.5 °C (97.7 °F), temperature source Temporal, height 1.549 m (5' 1\"), weight 65.8 kg (145 lb).    Physical Exam  Cardiovascular:      Rate and Rhythm: Normal rate and regular rhythm.   Pulmonary:      Effort: Pulmonary effort is normal. No respiratory distress.      Breath sounds: Normal breath sounds.   Abdominal:      General: Abdomen is flat. Bowel sounds are normal.      Palpations: Abdomen is soft.      Tenderness: There is abdominal tenderness.      Comments: Mild tenderness in lower abdomen   Neurological:      Mental Status: She is alert and oriented to person, place, and time.         Assessment/Plan   Crohn's colitis- started on Entyvio.  She was on Humira, stelera in the past.  As no remission with the Stelara(IL 12 and 23 blocker ) as a result switch to Skyrizi (IL 23 blocker) is not a great idea.    Doubt allergic reaction to Entyvio  S/P colostomy in 2009 subsequently had reversal in 2010  Hepatitis C antibody positive: Spontaneous clearance versus false positive antibody.  Smoker-trying to stop smoking  GERD       Adequate hydration  Smoking cessation  Will check kidney function.  Take Bentyl.  Imodium as needed  Will communicate with nephrologist Dr. Newman again then restart Entyvio  Follow-up with me in 3 to 4 months     "

## 2025-01-06 ENCOUNTER — APPOINTMENT (OUTPATIENT)
Dept: INFUSION THERAPY | Facility: CLINIC | Age: 65
End: 2025-01-06
Payer: COMMERCIAL

## 2025-01-06 VITALS
RESPIRATION RATE: 16 BRPM | HEART RATE: 67 BPM | SYSTOLIC BLOOD PRESSURE: 110 MMHG | TEMPERATURE: 97.2 F | DIASTOLIC BLOOD PRESSURE: 69 MMHG

## 2025-01-06 DIAGNOSIS — K50.119 CROHN'S DISEASE OF LARGE INTESTINE WITH COMPLICATION (MULTI): Primary | ICD-10-CM

## 2025-01-06 DIAGNOSIS — K50.118 CROHN'S DISEASE OF LARGE INTESTINE WITH OTHER COMPLICATION: ICD-10-CM

## 2025-01-06 PROCEDURE — 96365 THER/PROPH/DIAG IV INF INIT: CPT | Performed by: REGISTERED NURSE

## 2025-01-06 RX ORDER — DIPHENHYDRAMINE HYDROCHLORIDE 50 MG/ML
50 INJECTION INTRAMUSCULAR; INTRAVENOUS AS NEEDED
OUTPATIENT
Start: 2025-01-20

## 2025-01-06 RX ORDER — ACETAMINOPHEN 325 MG/1
650 TABLET ORAL ONCE
Status: DISPENSED | OUTPATIENT
Start: 2025-01-06

## 2025-01-06 RX ORDER — FAMOTIDINE 10 MG/ML
20 INJECTION INTRAVENOUS ONCE AS NEEDED
OUTPATIENT
Start: 2025-01-20

## 2025-01-06 RX ORDER — ALBUTEROL SULFATE 0.83 MG/ML
3 SOLUTION RESPIRATORY (INHALATION) AS NEEDED
OUTPATIENT
Start: 2025-01-20

## 2025-01-06 RX ORDER — EPINEPHRINE 0.3 MG/.3ML
0.3 INJECTION SUBCUTANEOUS EVERY 5 MIN PRN
OUTPATIENT
Start: 2025-01-20

## 2025-01-06 ASSESSMENT — ENCOUNTER SYMPTOMS
FEVER: 0
DIARRHEA: 1
MYALGIAS: 1
WOUND: 0
NUMBNESS: 0
VOMITING: 0
FATIGUE: 0
TROUBLE SWALLOWING: 0
DEPRESSION: 0
BACK PAIN: 1
HEADACHES: 0
CONSTIPATION: 0
CONFUSION: 0
BLOOD IN STOOL: 0
ADENOPATHY: 0
SHORTNESS OF BREATH: 0
NERVOUS/ANXIOUS: 0
LEG SWELLING: 0
EYE PROBLEMS: 1
ABDOMINAL PAIN: 1
COUGH: 1
SORE THROAT: 0
DIZZINESS: 0
DECREASED CONCENTRATION: 0
APPETITE CHANGE: 0
DYSURIA: 0
ARTHRALGIAS: 1
NAUSEA: 0
PALPITATIONS: 0

## 2025-01-06 NOTE — PROGRESS NOTES
"[facility]   Infusion Clinic Note   Date: 2025   Name: Vivien Miramontes  : 1960   MRN: [id number]          Reason for Visit: OP Infusion (PT. HERE FOR ENTYVIO 300 MG IV INFUSION./\"WEEK # 2\"./INFUSION DELAYED R/T HOSPITALIZATION.)         Today: We administered vedolizumab (Entyvio) 300 mg in sodium chloride 0.9% 255 mL IV.       Visit Type: INFUSION       Ordered By: DR. JETT       Accompanied by:Self       Diagnosis: Crohn's disease of large intestine with complication (Multi)    Crohn's disease of large intestine with other complication        Allergies:   Allergies as of 2025 - Reviewed 2025   Allergen Reaction Noted    Penicillin Unknown 2022    Penicillins Hives 10/01/2009          Current Medications has a current medication list which includes the following prescription(s): acetaminophen, amlodipine, aspirin, carvedilol, clopidogrel, cyclobenzaprine, hydrochlorothiazide, lisinopril, metoprolol succinate xl, nitroglycerin, pregabalin, rosuvastatin, vedolizumab, vedolizumab, vitamin e, dicyclomine, hydroxyzine hcl, and melatonin, and the following Facility-Administered Medications: acetaminophen.       Vitals:   Vitals:    25 1300 25 1444   BP: 157/81 110/69   Pulse: 62 67   Resp: 16 16   Temp: 36.1 °C (96.9 °F) 36.2 °C (97.2 °F)             Infusion Pre-procedure Checklist:   - Allergies reviewed: yes   - Medications reviewed: yes       - Previous reaction to current treatment: n/a      Assess patient for the concerns below. Document provider notification as appropriate.  - Active or recent infection with/without current antibiotic use: no  - Recent or planned invasive dental work: PT. STATES SHE DOES NEED DENTAL WORK, DISCUSSED TIMING AND RATIONALE  WITH PT.  - Recent or planned surgeries: no  - Recently received or plans to receive vaccinations: no  - Has treatment related toxicities: no  - Is pregnant:  no      Pain: 8, CHRONIC LOW " BACK AND LEFT HIP PAIN.   - Is the pain different from normal: no   - Is your Doctor aware:  yes       Labs: UNABLE TO DRAW HFP FROM IV, PT. WILL GO TO LAB.          Fall Risk Screening: Clay Fall Risk  History of Falling, Immediate or Within 3 Months: No  Secondary Diagnosis: Yes (SPINAL STENOSIS)  Ambulatory Aid: Crutches/cane/walker (PT. USING CANE TODAY.)  Intravenous Therapy/Heparin Lock: Yes  Gait/Transferring: Impaired  (R/T LOW BACK AND LEFT HIP PAIN.)  Mental Status: Oriented to own ability  Clay Fall Risk Score: 70       Review Of Systems:  Review of Systems   Constitutional:  Negative for appetite change, fatigue and fever.   HENT:   Negative for hearing loss, mouth sores, sore throat and trouble swallowing.    Eyes:  Positive for eye problems.        PT. FEELS HER VISION IS GETTING WORSE, NEEDS EYE EXAM.   Respiratory:  Positive for cough. Negative for shortness of breath.         PT. ADMITS TO DRY COUGH, NO FEVER, NO COLD SYMPTOMS.   Cardiovascular:  Negative for chest pain, leg swelling and palpitations.        HX OF HTN,HLD, CAD.  STENT PLACED DECEMBER 2023.     Gastrointestinal:  Positive for abdominal pain and diarrhea. Negative for blood in stool, constipation, nausea and vomiting.        PT. WITH [facility] HERE FOR WEEK 2 OF ENTYVIO INDUCTION.  PT. LATE FOR INFUSION R/T HOSPITALIZATION R/T [facility]. FIRST DOSE ON 11.25.2024.  DR. JETT NOTIFIED, WILL COUNT THIS AS WEEK 2.  PT. ADMITS TO CONSTANT DIARRHEA, 8-12 PER DAY.  DENIES BLOOD,  ADMITS TO SOME MUCUS, NOCTURNAL STOOLS.   ADMITS TO NEAR CONSTANT PAIN ACROSS LOWER ABD.  PT. HAS HAD COLOSTOMY IN PAST/   Genitourinary:  Negative for dysuria.    Musculoskeletal:  Positive for arthralgias, back pain and myalgias.        HX OF SPINAL STENOSIS,  ADMITS TO CHRONIC LOW BACK AND LEFT HIP PAIN,  USING CANE THIS DAY.  RATES PAIN AN 8/10 CURRENTLY.  RICCARDO KISER DID ORDER TYLENOL 650 MG FOR COMFORT MEASURE.   Skin:  Negative for itching, rash and wound.  "  Neurological:  Negative for dizziness, headaches and numbness.   Hematological:  Negative for adenopathy.   Psychiatric/Behavioral:  Negative for confusion, decreased concentration and depression. The patient is not nervous/anxious.          ROS completed? Yes      Infusion Readiness:  - Assessment Concerns Related to Infusion: DISCUSSED WITH MD, PHARMACIST REGARDING BEING LATE FOR INFUSION. WILL COUNT THIS INFUSION \"WEEK 2\".  - Provider notified: yes      Document Below Only If Indicated:   New Patient Education:    N/A (returning patient for continuation of therapy. Ongoing education provided as needed.)        Treatment Conditions & Drug Specific Questions:    Vedolizumab  (ENTYVIO)    (Unless otherwise specified on patient specific therapy plan):     TREATMENT CONDITIONS:  Unless otherwise specified on patient specific therapy plan HOLD and notify provider prior to proceeding with treatment if:   o Positive Hepatitis B Surface Ag  o Positive T-Spot  o Patient has signs or symptoms of Progressive Multifocal Leukoencephalopath (PML)     Lab Results   Component Value Date    HEPBSAG Nonreactive 03/01/2024      No results found for: \"NONUHFIRE\", \"NONUHSWGH\", \"NONUHFISH\", \"EXTHEPBSAG\"  Lab Results   Component Value Date    TBSIN Negative 03/01/2024      Lab Results   Component Value Date    ALT 18 11/25/2024    AST 18 11/25/2024    ALKPHOS 97 11/25/2024    BILITOT 0.4 11/25/2024        Labs reviewed and patient meets treatment conditions? Yes    DRUG SPECIFIC QUESTIONS:  Any signs or symptoms of Progressive Multifocal Leukoencephalopath (PML) which may include: memory loss, trouble thinking, loss of balance, difficulty talking or walking, loss of vision?  No    (If YES notify prescribing provider prior to proceeding)    REMINDERS:  Recommended Vitals/Observation:  Vitals: Monitor vitals at start of infusion and at completion of infusion.  Observation: First 3 infusions patient may leave 15 minutes post infusion. " Subsequent maintenance infusions: if no reaction, may leave immediately post infusion. PT. DISCHARGED 15 MINUTES POST INFUSION WITH SON, NO COMPLAINTS.        Weight Based Drug Calculations:    WEIGHT BASED DRUGS: NOT APPLICABLE / FLAT DOSE          Initiated By: Tracy Maldonado RN

## 2025-01-06 NOTE — PATIENT INSTRUCTIONS
Today :We administered vedolizumab (Entyvio) 300 mg in sodium chloride 0.9% 255 mL IV.     For:   1. Crohn's disease of large intestine with complication (Multi)    2. Crohn's disease of large intestine with other complication         Your next appointment is due in: 4 WEEKS FROM TODAY.     Please read the  Medication Guide that was given to you and reviewed during todays visit.     (Tell all doctors including dentists that you are taking this medication)     Go to the emergency room or call 911 if:  -You have signs of allergic reaction:   -Rash, hives, itching.   -Swollen, blistered, peeling skin.   -Swelling of face, lips, mouth, tongue or throat.   -Tightness of chest, trouble breathing, swallowing or talking     Call your doctor:  - If IV / injection site gets red, warm, swollen, itchy or leaks fluid or pus.     (Leave dressing on your IV site for at least 2 hours and keep area clean and dry  - If you get sick or have symptoms of infection or are not feeling well for any reason.    (Wash your hands often, stay away from people who are sick)  - If you have side effects from your medication that do not go away or are bothersome.     (Refer to the teaching your nurse gave you for side effects to call your doctor about)    - Common side effects may include:  stuffy nose, headache, feeling tired, muscle aches, upset stomach  - Before receiving any vaccines     - Call the [facility] at   If:  - You get sick, are on antibiotics, have had a recent vaccine, have surgery or dental work and your doctor wants your visit rescheduled.  - You need to cancel and reschedule your visit for any reason. Call at least 2 days before your visit if you need to cancel.   - Your insurance changes before your next visit.    (We will need to get approval from your new insurance. This can take up to two weeks.)     The [facility] is opened Monday thru Friday. We are closed on weekends and holidays.   Voice  mail will take your call if the center is closed. If you leave a message please allow 24 hours for a call back during weekdays. If you leave a message on a weekend/holiday, we will call you back the next business day.    A pharmacist is available Monday - Friday from 8:30AM to 3:30PM to help answer any questions you may have about your prescriptions(s). Please call pharmacy at:    [facility]: [phone]  [facility]: [phone]  [facility]: [phone]

## 2025-02-04 ENCOUNTER — APPOINTMENT (OUTPATIENT)
Dept: INFUSION THERAPY | Facility: CLINIC | Age: 65
End: 2025-02-04
Payer: COMMERCIAL

## 2025-02-04 VITALS
WEIGHT: 145.39 LBS | RESPIRATION RATE: 18 BRPM | HEART RATE: 84 BPM | DIASTOLIC BLOOD PRESSURE: 70 MMHG | BODY MASS INDEX: 27.47 KG/M2 | TEMPERATURE: 97.8 F | SYSTOLIC BLOOD PRESSURE: 133 MMHG

## 2025-02-04 DIAGNOSIS — K50.119 CROHN'S DISEASE OF LARGE INTESTINE WITH COMPLICATION (MULTI): ICD-10-CM

## 2025-02-04 DIAGNOSIS — K50.118 CROHN'S DISEASE OF LARGE INTESTINE WITH OTHER COMPLICATION: ICD-10-CM

## 2025-02-04 PROCEDURE — 96365 THER/PROPH/DIAG IV INF INIT: CPT | Performed by: NURSE PRACTITIONER

## 2025-02-04 RX ORDER — ALBUTEROL SULFATE 0.83 MG/ML
3 SOLUTION RESPIRATORY (INHALATION) AS NEEDED
OUTPATIENT
Start: 2025-02-17

## 2025-02-04 RX ORDER — FAMOTIDINE 10 MG/ML
20 INJECTION INTRAVENOUS ONCE AS NEEDED
OUTPATIENT
Start: 2025-02-17

## 2025-02-04 RX ORDER — DIPHENHYDRAMINE HYDROCHLORIDE 50 MG/ML
50 INJECTION INTRAMUSCULAR; INTRAVENOUS AS NEEDED
OUTPATIENT
Start: 2025-02-17

## 2025-02-04 RX ORDER — EPINEPHRINE 0.3 MG/.3ML
0.3 INJECTION SUBCUTANEOUS EVERY 5 MIN PRN
OUTPATIENT
Start: 2025-02-17

## 2025-02-04 ASSESSMENT — ENCOUNTER SYMPTOMS
FEVER: 0
COUGH: 0
NERVOUS/ANXIOUS: 0
DIZZINESS: 0
ABDOMINAL PAIN: 1
CONSTIPATION: 0
NUMBNESS: 0
SHORTNESS OF BREATH: 0
WOUND: 0
HEADACHES: 0
VOICE CHANGE: 0
HEMATURIA: 0
UNEXPECTED WEIGHT CHANGE: 0
LIGHT-HEADEDNESS: 0
NAUSEA: 0
DIARRHEA: 1
EYE PROBLEMS: 0
TROUBLE SWALLOWING: 0
EXTREMITY WEAKNESS: 0
APPETITE CHANGE: 0
WHEEZING: 0
DYSURIA: 0
MYALGIAS: 1
FATIGUE: 0
SORE THROAT: 0
ARTHRALGIAS: 1
VOMITING: 0
LEG SWELLING: 0
FREQUENCY: 0
BLOOD IN STOOL: 0
PALPITATIONS: 0
DEPRESSION: 0
CHILLS: 0

## 2025-02-04 NOTE — PROGRESS NOTES
[facility]   Infusion Clinic Note   Date: 2025   Name: Vivien Miramontes  : 1960   MRN: [id number]          Reason for Visit: OP Infusion and Follow-up (PT HERE FOR ENTYVIO 300 MG INFUSION/NEXT APPT: 56 DAYS)         Today: We administered vedolizumab (Entyvio) 300 mg in sodium chloride 0.9% 255 mL IV.       Visit Type: INFUSION       Ordered By: Ping Pedro MD        Accompanied by: Self       Diagnosis: Crohn's disease of large intestine with other complication    Crohn's disease of large intestine with complication (Multi)        Allergies:   Allergies as of 2025 - Reviewed 2025   Allergen Reaction Noted    Penicillin Unknown 2022    Penicillins Hives 10/01/2009          Current Medications: has a current medication list which includes the following prescription(s): acetaminophen, amlodipine, aspirin, carvedilol, clopidogrel, cyclobenzaprine, dicyclomine, hydrochlorothiazide, hydroxyzine hcl, lisinopril, melatonin, metoprolol succinate xl, nitroglycerin, pregabalin, rosuvastatin, vedolizumab, vedolizumab, and vitamin e, and the following Facility-Administered Medications: acetaminophen.       Vitals:   Vitals:    25 1315 25 1418 25 1442   BP: 136/79 125/76 133/70   Pulse: 62 67 84   Resp: 16 16 18   Temp: 36 °C (96.8 °F) 36.7 °C (98.1 °F) 36.6 °C (97.8 °F)   TempSrc: Temporal Temporal    Weight: 65.9 kg (145 lb 6.3 oz)               Infusion Pre-procedure Checklist:   - Allergies & Medications reviewed: yes       - Previous reaction to current treatment: no      Assess patient for the concerns below. Document provider notification as appropriate.  - Active or recent infection with/without current antibiotic use: no  - Recent or planned invasive dental work: no  - Recent or planned surgeries: no  - Recently received or plans to receive vaccinations: no  - Has treatment related toxicities: no  - Any chance you may be pregnant:  n/a       Pain: 8, ABDOMINAL PAIN   - Is the pain different from normal: no   - Is your Doctor aware:  yes       Labs: N/A          Fall Risk Screening: Clay Fall Risk  History of Falling, Immediate or Within 3 Months: No  Secondary Diagnosis: No  Ambulatory Aid: Crutches/cane/walker  Intravenous Therapy/Heparin Lock: Yes  Gait/Transferring: Impaired   Mental Status: Oriented to own ability  Clay Fall Risk Score: 55       Review Of Systems:  Review of Systems   Constitutional:  Negative for appetite change, chills, fatigue, fever and unexpected weight change.   HENT:   Negative for hearing loss, mouth sores, sore throat, tinnitus, trouble swallowing and voice change.    Eyes:  Negative for eye problems.        HX OF GLASSES   Respiratory:  Negative for cough, shortness of breath and wheezing.    Cardiovascular:  Negative for chest pain, leg swelling and palpitations.   Gastrointestinal:  Positive for abdominal pain and diarrhea. Negative for blood in stool, constipation, nausea and vomiting.        PT WITH A DX OF IBD REPORTS #  10 LOOSE  BM'S PER DAY. denies NOTING BLOOD/MUCUS TO STOOLS.  reports C/O OF ABDOMINAL PAIN AND/OR BOUTS OF NOCTURNAL STOOLING.     Genitourinary:  Negative for dysuria, frequency and hematuria.    Musculoskeletal:  Positive for arthralgias and myalgias.        BASELINE   Skin:  Negative for itching, rash and wound.   Neurological:  Negative for dizziness, extremity weakness, headaches, light-headedness and numbness.   Psychiatric/Behavioral:  Negative for depression. The patient is not nervous/anxious.          ROS completed? Yes      Infusion Readiness:  - Assessment Concerns Related to Infusion: No  - Provider notified: n/a      Document Below Only If Indicated:   New Patient Education:    N/A (returning patient for continuation of therapy. Ongoing education provided as needed.)        Treatment Conditions & Drug Specific Questions:    Vedolizumab  (ENTYVIO)    (Unless otherwise specified on  "patient specific therapy plan):     TREATMENT CONDITIONS:  Unless otherwise specified on patient specific therapy plan HOLD and notify provider prior to proceeding with treatment if:   o Positive Hepatitis B Surface Ag  o Positive T-Spot  o Patient has signs or symptoms of Progressive Multifocal Leukoencephalopath (PML)     Lab Results   Component Value Date    HEPBSAG Nonreactive 03/01/2024      No results found for: \"NONUHFIRE\", \"NONUHSWGH\", \"NONUHFISH\", \"EXTHEPBSAG\"  Lab Results   Component Value Date    TBSIN Negative 03/01/2024      Lab Results   Component Value Date    ALT 18 11/25/2024    AST 18 11/25/2024    ALKPHOS 97 11/25/2024    BILITOT 0.4 11/25/2024        Labs reviewed and patient meets treatment conditions? Yes    DRUG SPECIFIC QUESTIONS:  Any signs or symptoms of Progressive Multifocal Leukoencephalopath (PML) which may include: memory loss, trouble thinking, loss of balance, difficulty talking or walking, loss of vision?  No    (If YES notify prescribing provider prior to proceeding)    REMINDERS:  Recommended Vitals/Observation:  Vitals: Monitor vitals at start of infusion and at completion of infusion.  Observation: First 3 infusions patient may leave 15 minutes post infusion. Subsequent maintenance infusions: if no reaction, may leave immediately post infusion.        Weight Based Drug Calculations:    WEIGHT BASED DRUGS: NOT APPLICABLE / FLAT DOSE          Initiated By: Yumiko Helm RN        "

## 2025-02-04 NOTE — PATIENT INSTRUCTIONS
Today :We administered vedolizumab (Entyvio) 300 mg in sodium chloride 0.9% 255 mL IV.     For:   1. Crohn's disease of large intestine with other complication    2. Crohn's disease of large intestine with complication (Multi)         Your next appointment is due in:  56 DAYS        Please read the  Medication Guide that was given to you and reviewed during todays visit.     (Tell all doctors including dentists that you are taking this medication)     Go to the emergency room or call 911 if:  -You have signs of allergic reaction:   -Rash, hives, itching.   -Swollen, blistered, peeling skin.   -Swelling of face, lips, mouth, tongue or throat.   -Tightness of chest, trouble breathing, swallowing or talking     Call your doctor:  - If IV / injection site gets red, warm, swollen, itchy or leaks fluid or pus.     (Leave dressing on your IV site for at least 2 hours and keep area clean and dry  - If you get sick or have symptoms of infection or are not feeling well for any reason.    (Wash your hands often, stay away from people who are sick)  - If you have side effects from your medication that do not go away or are bothersome.     (Refer to the teaching your nurse gave you for side effects to call your doctor about)    - Common side effects may include:  stuffy nose, headache, feeling tired, muscle aches, upset stomach  - Before receiving any vaccines     - [facility] at   If:  - You get sick, are on antibiotics, have had a recent vaccine, have surgery or dental work and your doctor wants your visit rescheduled.  - You need to cancel and reschedule your visit for any reason. Call at least 2 days before your visit if you need to cancel.   - Your insurance changes before your next visit.    (We will need to get approval from your new insurance. This can take up to two weeks.)     [facility] is opened Monday thru Friday. We are closed on weekends and holidays.   Voice mail  will take your call if the center is closed. If you leave a message please allow 24 hours for a call back during weekdays. If you leave a message on a weekend/holiday, we will call you back the next business day.    A pharmacist is available Monday - Friday from 8:30AM to 3:30PM to help answer any questions you may have about your prescriptions(s). Please call pharmacy at:    [facility]: [phone]  [facility]: [phone]  [facility]: [phone]

## 2025-04-01 ENCOUNTER — APPOINTMENT (OUTPATIENT)
Dept: INFUSION THERAPY | Facility: CLINIC | Age: 65
End: 2025-04-01
Payer: COMMERCIAL

## 2025-04-01 VITALS
SYSTOLIC BLOOD PRESSURE: 133 MMHG | HEART RATE: 76 BPM | DIASTOLIC BLOOD PRESSURE: 73 MMHG | BODY MASS INDEX: 28.01 KG/M2 | RESPIRATION RATE: 17 BRPM | WEIGHT: 148.26 LBS | TEMPERATURE: 96.6 F

## 2025-04-01 DIAGNOSIS — K50.118 CROHN'S DISEASE OF LARGE INTESTINE WITH OTHER COMPLICATION: ICD-10-CM

## 2025-04-01 PROCEDURE — 96365 THER/PROPH/DIAG IV INF INIT: CPT | Performed by: NURSE PRACTITIONER

## 2025-04-01 RX ORDER — EPINEPHRINE 0.3 MG/.3ML
0.3 INJECTION SUBCUTANEOUS EVERY 5 MIN PRN
OUTPATIENT
Start: 2025-05-27

## 2025-04-01 RX ORDER — FAMOTIDINE 10 MG/ML
20 INJECTION, SOLUTION INTRAVENOUS ONCE AS NEEDED
OUTPATIENT
Start: 2025-05-27

## 2025-04-01 RX ORDER — ALBUTEROL SULFATE 0.83 MG/ML
3 SOLUTION RESPIRATORY (INHALATION) AS NEEDED
OUTPATIENT
Start: 2025-05-27

## 2025-04-01 RX ORDER — DIPHENHYDRAMINE HYDROCHLORIDE 50 MG/ML
50 INJECTION, SOLUTION INTRAMUSCULAR; INTRAVENOUS AS NEEDED
OUTPATIENT
Start: 2025-05-27

## 2025-04-01 ASSESSMENT — ENCOUNTER SYMPTOMS
DEPRESSION: 0
MYALGIAS: 1
EYE PROBLEMS: 0
LEG SWELLING: 0
VOICE CHANGE: 0
PALPITATIONS: 0
CHILLS: 0
ARTHRALGIAS: 1
NUMBNESS: 0
VOMITING: 0
NERVOUS/ANXIOUS: 0
HEADACHES: 0
BLOOD IN STOOL: 0
WOUND: 0
DIZZINESS: 0
SHORTNESS OF BREATH: 0
COUGH: 0
DIARRHEA: 1
TROUBLE SWALLOWING: 0
FREQUENCY: 0
NAUSEA: 0
WHEEZING: 0
FATIGUE: 0
CONSTIPATION: 0
ABDOMINAL PAIN: 1
FEVER: 0
LIGHT-HEADEDNESS: 0
APPETITE CHANGE: 0
DYSURIA: 0
UNEXPECTED WEIGHT CHANGE: 0
HEMATURIA: 0
SORE THROAT: 0
EXTREMITY WEAKNESS: 0

## 2025-04-01 NOTE — PROGRESS NOTES
[facility]   Infusion Clinic Note   Date: 2025   Name: Vivien Miramontes  : 1960   MRN: [id number]         Reason for Visit: OP Infusion (PT HERE FOR Q8 WEEK ENTYVIO 300 MG INFUSION)         Today: We administered vedolizumab (Entyvio) 300 mg in sodium chloride 0.9% 255 mL IV.       Ordered By: Ping Pedro MD       For a Diagnosis of: Crohn's disease of large intestine with other complication       At today's visit patient accompanied by: FRIEND      Today's Vitals:   Vitals:    25 1314 25 1352   BP: 113/71 115/72   Pulse: 80 76   Resp: 16 16   Temp: 35.9 °C (96.6 °F) 35.8 °C (96.4 °F)   TempSrc: Temporal    SpO2:  Comment: N/A ARTIFICIAL NAILS   Weight: 67.3 kg (148 lb 4.2 oz)              Pre - Treatment Checklist:      - Previous reaction to current treatment: no      (Assess patient for the concerns below. Document provider notification as appropriate).  - Active or recent infection with/without current antibiotic use: no  - Recent or planned invasive dental work: no  - Recent or planned surgeries: no  - Recently received or plans to receive vaccinations: no  - Has treatment related toxicities: no  - Any chance may be pregnant:  n/a      Pain: 4 GENERAL ACHES AND PAINS    - Is the pain different from normal: no   - Is prescribing Doctor aware:  yes      Labs:  UNABLE TO DRAW LABS FROM IV       Fall Risk Screening: Clay Fall Risk  History of Falling, Immediate or Within 3 Months: No  Secondary Diagnosis: No  Ambulatory Aid: Walks without aid/bedrest/nurse assist  Intravenous Therapy/Heparin Lock: Yes  Gait/Transferring: Normal/bedrest/immobile  Mental Status: Oriented to own ability  Clay Fall Risk Score: 20       Review Of Systems:  Review of Systems   Constitutional:  Negative for appetite change, chills, fatigue, fever and unexpected weight change.   HENT:   Negative for hearing loss, mouth sores, sore throat, tinnitus, trouble swallowing and voice  "change.    Eyes:  Negative for eye problems.        HX OF GLASSES   Respiratory:  Negative for cough, shortness of breath and wheezing.    Cardiovascular:  Negative for chest pain, leg swelling and palpitations.   Gastrointestinal:  Positive for abdominal pain and diarrhea. Negative for blood in stool, constipation, nausea and vomiting.        ADMITS TO 7-10 BMS PER DAY. PT STATES STOOL FREQUENCY INCREASES ABOUT WEEK 5 AFTER GETTING ENTYVIO.   ADMITS TO PAIN WITH STOOLS, DENIES BLOOD OR MUCOUS.   ADMITS TO NOCTURNAL STOOLING.    Genitourinary:  Negative for dysuria, frequency and hematuria.    Musculoskeletal:  Positive for arthralgias and myalgias.        BASELINE - BACK AND HIPS   Skin:  Negative for itching, rash and wound.   Neurological:  Negative for dizziness, extremity weakness, headaches, light-headedness and numbness.   Psychiatric/Behavioral:  Negative for depression. The patient is not nervous/anxious.          Infusion Readiness:  - Assessment Concerns Related to Infusion: No  - Provider notified: n/a      New Patient Education:    N/A (returning patient for continuation of therapy. Ongoing education provided as needed.)        Treatment Conditions & Drug Specific Questions:    Vedolizumab  (ENTYVIO)    (Unless otherwise specified on patient specific therapy plan):     TREATMENT CONDITIONS:  Unless otherwise specified on patient specific therapy plan HOLD and notify provider prior to proceeding with treatment if:   o Positive T-Spot  o Patient has signs or symptoms of Progressive Multifocal Leukoencephalopath (PML)     Lab Results   Component Value Date    HEPBSAG Nonreactive 03/01/2024      No results found for: \"NONUHFIRE\", \"NONUHSWGH\", \"NONUHFISH\", \"EXTHEPBSAG\"  Lab Results   Component Value Date    TBSIN Negative 03/01/2024      Lab Results   Component Value Date    ALT 18 11/25/2024    AST 18 11/25/2024    ALKPHOS 97 11/25/2024    BILITOT 0.4 11/25/2024        Patient meets treatment conditions? " Yes    DRUG SPECIFIC QUESTIONS:  Any signs or symptoms of Progressive Multifocal Leukoencephalopath (PML) which may include: memory loss, trouble thinking, loss of balance, difficulty talking or walking, loss of vision?  No    (If YES notify prescribing provider prior to proceeding)    REMINDERS:  Recommended Vitals/Observation:  Vitals: Monitor vitals at start of infusion and at completion of infusion.  Observation: First 3 infusions patient may leave 15 minutes post infusion. Subsequent maintenance infusions: if no reaction, may leave immediately post infusion.        Weight Based Drug Calculations:    WEIGHT BASED DRUGS: NOT APPLICABLE / FLAT DOSE       Post Treatment: Patient tolerated treatment without issue and was discharged in no apparent distress.      Note Authored / Patient Cared for By: Denae James RN

## 2025-04-01 NOTE — PATIENT INSTRUCTIONS
Today :We administered vedolizumab (Entyvio) 300 mg in sodium chloride 0.9% 255 mL IV.     For:   1. Crohn's disease of large intestine with other complication         Your next appointment is due in:  8 WEEKS         Please read the  Medication Guide that was given to you and reviewed during todays visit.     (Tell all doctors including dentists that you are taking this medication)     Go to the emergency room or call 911 if:  -You have signs of allergic reaction:   -Rash, hives, itching.   -Swollen, blistered, peeling skin.   -Swelling of face, lips, mouth, tongue or throat.   -Tightness of chest, trouble breathing, swallowing or talking     Call your doctor:  - If IV / injection site gets red, warm, swollen, itchy or leaks fluid or pus.     (Leave dressing on your IV site for at least 2 hours and keep area clean and dry  - If you get sick or have symptoms of infection or are not feeling well for any reason.    (Wash your hands often, stay away from people who are sick)  - If you have side effects from your medication that do not go away or are bothersome.     (Refer to the teaching your nurse gave you for side effects to call your doctor about)    - Common side effects may include:  stuffy nose, headache, feeling tired, muscle aches, upset stomach  - Before receiving any vaccines     - Call [facility] at   If:  - You get sick, are on antibiotics, have had a recent vaccine, have surgery or dental work and your doctor wants your visit rescheduled.  - You need to cancel and reschedule your visit for any reason. Call at least 2 days before your visit if you need to cancel.   - Your insurance changes before your next visit.    (We will need to get approval from your new insurance. This can take up to two weeks.)     [facility] is opened Monday thru Friday. We are closed on weekends and holidays.   Voice mail will take your call if the center is closed. If you leave a message  please allow 24 hours for a call back during weekdays. If you leave a message on a weekend/holiday, we will call you back the next business day.    A pharmacist is available Monday - Friday from 8:30AM to 3:30PM to help answer any questions you may have about your prescriptions(s). Please call pharmacy at:    [facility]: [phone]  [facility]: [phone]  [facility]: [phone]

## 2025-04-24 ENCOUNTER — HOSPITAL ENCOUNTER (OUTPATIENT)
Dept: RADIOLOGY | Facility: HOSPITAL | Age: 65
Discharge: HOME | End: 2025-04-24
Payer: COMMERCIAL

## 2025-04-24 ENCOUNTER — PHARMACY VISIT (OUTPATIENT)
Dept: PHARMACY | Facility: CLINIC | Age: 65
End: 2025-04-24
Payer: COMMERCIAL

## 2025-04-24 ENCOUNTER — OFFICE VISIT (OUTPATIENT)
Dept: ORTHOPEDIC SURGERY | Facility: HOSPITAL | Age: 65
End: 2025-04-24
Payer: COMMERCIAL

## 2025-04-24 DIAGNOSIS — M48.061 DEGENERATIVE LUMBAR SPINAL STENOSIS: ICD-10-CM

## 2025-04-24 DIAGNOSIS — M54.50 LUMBAR PAIN: ICD-10-CM

## 2025-04-24 DIAGNOSIS — M48.062 LUMBAR STENOSIS WITH NEUROGENIC CLAUDICATION: ICD-10-CM

## 2025-04-24 DIAGNOSIS — M54.16 LUMBAR RADICULOPATHY, CHRONIC: Primary | ICD-10-CM

## 2025-04-24 PROCEDURE — RXMED WILLOW AMBULATORY MEDICATION CHARGE

## 2025-04-24 PROCEDURE — 99214 OFFICE O/P EST MOD 30 MIN: CPT | Performed by: ORTHOPAEDIC SURGERY

## 2025-04-24 PROCEDURE — 72120 X-RAY BEND ONLY L-S SPINE: CPT

## 2025-04-24 PROCEDURE — 1159F MED LIST DOCD IN RCRD: CPT | Performed by: ORTHOPAEDIC SURGERY

## 2025-04-24 RX ORDER — METHOCARBAMOL 500 MG/1
500 TABLET, FILM COATED ORAL 3 TIMES DAILY PRN
Qty: 90 TABLET | Refills: 0 | Status: SHIPPED | OUTPATIENT
Start: 2025-04-24 | End: 2025-05-24

## 2025-04-24 NOTE — PROGRESS NOTES
Chief Complaint: Low back pain and left leg pain and weakness.    HPI: Vivien Miramontes is a 64 y.o. year old female patient with past medical history of hypertension, coronary artery status post stents on aspirin and Plavix, hyperlipidemia, Crohn's, colitis, IBS who was seen in my clinic today with 1 year history of low back pain and left leg pain.  No associated trauma or injury that she can recall.  That 1 day she was getting up from a sitting position and felt the pain down her legs causing her left leg to give out.  Since then she has been using a cane for ambulation.  She complains of pain into her left groin circumferential thigh Into her foot.  Otherwise asymptomatic on the right side.  No bowel incontinence but she does have intermittent urinary incontinence and this has been going on for about a month.  No saddle anesthesia.  No history of lumbar surgery.    She has never done any formal physical therapy for this has not had any spinal injection.  She is try gabapentin and currently on Lyrica.    She is on Plavix for anticoagulation.    She does smoke half a pack a day.  She is here with her friend today.    Review of Systems    All other systems have been reviewed and are negative for complaint. All pertinent positive and negative as listed in history of present illness.    Medical History[1]     Surgical History[2]     RX Allergies[3]     Medications Ordered Prior to Encounter[4]       PE:   General: Patient appears well-nourished and well-developed in no acute distress, Alert and Oriented x3  Psych: Pleasant mood and affect  HEENT: Extraocular muscles intact, pupils equal and round. Sclerae anicteric   Cardio: extremities warm and well perfused  Resp: unlabored symmetric breathing  Skin: no open wounds or rash  Musculoskeletal/Neuro Exam: Antalgic gait with a cane.  Mild tenderness to palpation along the midline lumbar spine.  Positive straight leg raise on the left negative on the right. Tight hamstrings  bilaterally.  No groin pain with ROM of the hip joints with IR and ER.     Lower extremity    Motor: Right leg with 5 out of 5 motor strength with hip flexion, knee extension, ankle dorsiflexion plantarflexion and EHL against resistance.  Left leg with 4 out of 5 motor strength with hip flexion, knee extension, ankle dorsiflexion plantarflexion EHL against resistance limited by pain down the leg.  Sensation to light touch intact along L2 to S1 distribution bilaterally        Imaging:    I personally reviewed xray of the lumbar spine obtained in clinic today. AP, Lateral, flexion and extension xrays were reviewed. No evidence of acute fracture or dislocation.  No spondylolisthesis.  No dynamic instability with flexion and extension view.  Spondylotic changes specifically between L4-L5 disc base narrowing facet arthrosis    I reviewed an MRI of the lumbar spine from January 9, 2024 which shows multilevel degenerative changes worse at L4-L5 where there is moderate central canal stenosis and mild L3-L4 central canal stenosis secondary to hypertrophic facet and ligamentum flavum and broad disc bulge.      A/P: Vivien Miramontes is a 64 y.o. year old female patient with lumbar stenosis and left lumbar radiculopathy.  I reviewed the images with her in clinic today.  I discussed surgical versus nonsurgical intervention.  Nonsurgical including physical therapy and epidural steroid injection and medical management to help with pain.  I also discussed surgical option to decompress the neural elements to help with her radicular leg pain.  At this point patient is not interested in any type of surgical intervention.  I think that she would benefit with starting with can some conservative treatment since she has not done so yet.  She was given referral for physical therapy as well as pain management.  I also strongly recommend tobacco cessation and she was given referral to help with that as well.     After our discussion, the  patient articulated understanding of the plan and felt that all questions had been answered satisfactorily. The patient was pleased with the visit and very appreciative for the care rendered.    **Please excuse any errors in grammar or translation related to this dictation. Voice recognition software was utilized to prepare this document. **                Korina Leal MD    Department of Orthopaedic Surgery  [facility]  [email or web address]        [1]   Past Medical History:  Diagnosis Date    Coronary artery disease     2012: cardiac cath with PCI, 23: cardiac cath with PCI x2 (LAD & LCx) intermediate disease of a tortuous RCA - cardiac clearance requested    Crohn's disease (Multi)     bowel perforation  s/p colostomy, reversed     Depression     Hyperlipidemia     Hypertension     IBS (irritable bowel syndrome)     Lung nodules     nonconcerning per pulm note    Myocardial infarction (Multi)     Ulcerative colitis    [2]   Past Surgical History:  Procedure Laterality Date    ABDOMINAL HERNIA REPAIR  2018    with mesh    CARDIAC CATHETERIZATION      PCI    CARDIAC CATHETERIZATION  2023    PCI x 2 (LAD and LCx)     SECTION, LOW TRANSVERSE      COLONOSCOPY      COLOSTOMY      REVISION / TAKEDOWN COLOSTOMY      TUBAL LIGATION  1998    UMBILICAL HERNIA REPAIR      x 2   [3]   Allergies  Allergen Reactions    Penicillin Unknown    Penicillins Hives   [4]   Current Outpatient Medications on File Prior to Visit   Medication Sig Dispense Refill    acetaminophen (TylenoL) 325 mg tablet Take 2 tablets (650 mg) by mouth every 6 hours if needed for mild pain (1 - 3) or fever (temp greater than 38.0 C). 30 tablet 0    amLODIPine (Norvasc) 5 mg tablet Take 1.5 tablets (7.5 mg) by mouth early in the morning..      aspirin 81 mg EC tablet Take 1 tablet (81 mg) by mouth once daily.      carvedilol (Coreg) 25 mg tablet  Take 1 tablet (25 mg) by mouth every 12 hours.      clopidogrel (Plavix) 75 mg tablet Take 1 tablet (75 mg) by mouth once daily.      dicyclomine (Bentyl) 10 mg capsule Take 1 capsule (10 mg) by mouth 3 times a day as needed (Diarrhea/abdominal cramp). 30 capsule 1    hydroCHLOROthiazide (HYDRODiuril) 25 mg tablet Take 1 tablet (25 mg) by mouth once daily.      hydrOXYzine HCL (Atarax) 25 mg tablet Take 1 tablet (25 mg) by mouth 3 times a day as needed for itching. May cause drowsiness      lisinopril 40 mg tablet Take 1 tablet (40 mg) by mouth once daily. as directed      melatonin 5 mg tablet Take 2 tablets (10 mg) by mouth once daily as needed.      metoprolol succinate XL (Toprol-XL) 50 mg 24 hr tablet Take by mouth.      nitroglycerin (Nitrostat) 0.4 mg SL tablet Place 1 tablet (0.4 mg) under the tongue. Dissolve 1 tablet under the tongue as needed for chest pain.      pregabalin (Lyrica) 150 mg capsule Take 1 capsule (150 mg) by mouth 3 times a day.      rosuvastatin (Crestor) 40 mg tablet Take 1 tablet (40 mg) by mouth once daily at bedtime.      vedolizumab (Entyvio) 300 mg injection Infuse 300 mg into a venous catheter every 8 (eight) weeks.  For Maintenance: every 8 weeks 5 mL 2    vedolizumab (Entyvio) 300 mg injection Infuse 300 mg into a venous catheter see administration instructions.  For Induction:  0, 2 and 6 weeks 15 mL 0    vitamin E 180 mg (400 unit) capsule Take 1 capsule (400 Units) by mouth once daily.      [DISCONTINUED] cyclobenzaprine (Flexeril) 10 mg tablet Take 1 tablet (10 mg) by mouth 3 times a day as needed for muscle spasms (Pain). 20 tablet 0     Current Facility-Administered Medications on File Prior to Visit   Medication Dose Route Frequency Provider Last Rate Last Admin    acetaminophen (Tylenol) tablet 650 mg  650 mg oral Once Leatha Salazar, APRN-CNP

## 2025-04-24 NOTE — LETTER
April 24, 2025     Scooby Martinez MD  [address] [address]    Patient: Vivien Miramontes   YOB: 1960   Date of Visit: 4/24/2025       Dear Dr. Scooby Martinez MD:    Thank you for referring Vivien Miramontes to me for evaluation. Below are my notes for this consultation.  If you have questions, please do not hesitate to call me. I look forward to following your patient along with you.       Sincerely,     Korina Leal MD      CC: No Recipients  ______________________________________________________________________________________    Chief Complaint: Low back pain and left leg pain and weakness.    HPI: Vivien Miramontes is a 64 y.o. year old female patient with past medical history of hypertension, coronary artery status post stents on aspirin and Plavix, hyperlipidemia, Crohn's, colitis, IBS who was seen in my clinic today with 1 year history of low back pain and left leg pain.  No associated trauma or injury that she can recall.  That 1 day she was getting up from a sitting position and felt the pain down her legs causing her left leg to give out.  Since then she has been using a cane for ambulation.  She complains of pain into her left groin circumferential thigh Into her foot.  Otherwise asymptomatic on the right side.  No bowel incontinence but she does have intermittent urinary incontinence and this has been going on for about a month.  No saddle anesthesia.  No history of lumbar surgery.    She has never done any formal physical therapy for this has not had any spinal injection.  She is try gabapentin and currently on Lyrica.    She is on Plavix for anticoagulation.    She does smoke half a pack a day.  She is here with her friend today.    Review of Systems    All other systems have been reviewed and are negative for complaint. All pertinent positive and negative as listed in history of present illness.    Medical History[1]     Surgical History[2]     RX Allergies[3]      Medications Ordered Prior to Encounter[4]       PE:   General: Patient appears well-nourished and well-developed in no acute distress, Alert and Oriented x3  Psych: Pleasant mood and affect  HEENT: Extraocular muscles intact, pupils equal and round. Sclerae anicteric   Cardio: extremities warm and well perfused  Resp: unlabored symmetric breathing  Skin: no open wounds or rash  Musculoskeletal/Neuro Exam: Antalgic gait with a cane.  Mild tenderness to palpation along the midline lumbar spine.  Positive straight leg raise on the left negative on the right. Tight hamstrings bilaterally.  No groin pain with ROM of the hip joints with IR and ER.     Lower extremity    Motor: Right leg with 5 out of 5 motor strength with hip flexion, knee extension, ankle dorsiflexion plantarflexion and EHL against resistance.  Left leg with 4 out of 5 motor strength with hip flexion, knee extension, ankle dorsiflexion plantarflexion EHL against resistance limited by pain down the leg.  Sensation to light touch intact along L2 to S1 distribution bilaterally        Imaging:    I personally reviewed xray of the lumbar spine obtained in clinic today. AP, Lateral, flexion and extension xrays were reviewed. No evidence of acute fracture or dislocation.  No spondylolisthesis.  No dynamic instability with flexion and extension view.  Spondylotic changes specifically between L4-L5 disc base narrowing facet arthrosis    I reviewed an MRI of the lumbar spine from January 9, 2024 which shows multilevel degenerative changes worse at L4-L5 where there is moderate central canal stenosis and mild L3-L4 central canal stenosis secondary to hypertrophic facet and ligamentum flavum and broad disc bulge.      A/P: Vivien Miramontes is a 64 y.o. year old female patient with lumbar stenosis and left lumbar radiculopathy.  I reviewed the images with her in clinic today.  I discussed surgical versus nonsurgical intervention.  Nonsurgical including physical  therapy and epidural steroid injection and medical management to help with pain.  I also discussed surgical option to decompress the neural elements to help with her radicular leg pain.  At this point patient is not interested in any type of surgical intervention.  I think that she would benefit with starting with can some conservative treatment since she has not done so yet.  She was given referral for physical therapy as well as pain management.  I also strongly recommend tobacco cessation and she was given referral to help with that as well.     After our discussion, the patient articulated understanding of the plan and felt that all questions had been answered satisfactorily. The patient was pleased with the visit and very appreciative for the care rendered.    **Please excuse any errors in grammar or translation related to this dictation. Voice recognition software was utilized to prepare this document. **                Korina Leal MD    Department of Orthopaedic Surgery  [facility]  [email or web address]        [1]  Past Medical History:  Diagnosis Date   • Coronary artery disease     2012: cardiac cath with PCI, 23: cardiac cath with PCI x2 (LAD & LCx) intermediate disease of a tortuous RCA - cardiac clearance requested   • Crohn's disease (Multi)     bowel perforation  s/p colostomy, reversed    • Depression    • Hyperlipidemia    • Hypertension    • IBS (irritable bowel syndrome)    • Lung nodules     nonconcerning per pulm note   • Myocardial infarction (Multi)    • Ulcerative colitis    [2]  Past Surgical History:  Procedure Laterality Date   • ABDOMINAL HERNIA REPAIR      with mesh   • CARDIAC CATHETERIZATION      PCI   • CARDIAC CATHETERIZATION  2023    PCI x 2 (LAD and LCx)   •  SECTION, LOW TRANSVERSE     • COLONOSCOPY     • COLOSTOMY     • REVISION / TAKEDOWN COLOSTOMY     • TUBAL  LIGATION  1998   • UMBILICAL HERNIA REPAIR      x 2   [3]  Allergies  Allergen Reactions   • Penicillin Unknown   • Penicillins Hives   [4]  Current Outpatient Medications on File Prior to Visit   Medication Sig Dispense Refill   • acetaminophen (TylenoL) 325 mg tablet Take 2 tablets (650 mg) by mouth every 6 hours if needed for mild pain (1 - 3) or fever (temp greater than 38.0 C). 30 tablet 0   • amLODIPine (Norvasc) 5 mg tablet Take 1.5 tablets (7.5 mg) by mouth early in the morning..     • aspirin 81 mg EC tablet Take 1 tablet (81 mg) by mouth once daily.     • carvedilol (Coreg) 25 mg tablet Take 1 tablet (25 mg) by mouth every 12 hours.     • clopidogrel (Plavix) 75 mg tablet Take 1 tablet (75 mg) by mouth once daily.     • dicyclomine (Bentyl) 10 mg capsule Take 1 capsule (10 mg) by mouth 3 times a day as needed (Diarrhea/abdominal cramp). 30 capsule 1   • hydroCHLOROthiazide (HYDRODiuril) 25 mg tablet Take 1 tablet (25 mg) by mouth once daily.     • hydrOXYzine HCL (Atarax) 25 mg tablet Take 1 tablet (25 mg) by mouth 3 times a day as needed for itching. May cause drowsiness     • lisinopril 40 mg tablet Take 1 tablet (40 mg) by mouth once daily. as directed     • melatonin 5 mg tablet Take 2 tablets (10 mg) by mouth once daily as needed.     • metoprolol succinate XL (Toprol-XL) 50 mg 24 hr tablet Take by mouth.     • nitroglycerin (Nitrostat) 0.4 mg SL tablet Place 1 tablet (0.4 mg) under the tongue. Dissolve 1 tablet under the tongue as needed for chest pain.     • pregabalin (Lyrica) 150 mg capsule Take 1 capsule (150 mg) by mouth 3 times a day.     • rosuvastatin (Crestor) 40 mg tablet Take 1 tablet (40 mg) by mouth once daily at bedtime.     • vedolizumab (Entyvio) 300 mg injection Infuse 300 mg into a venous catheter every 8 (eight) weeks.  For Maintenance: every 8 weeks 5 mL 2   • vedolizumab (Entyvio) 300 mg injection Infuse 300 mg into a venous catheter see administration instructions.  For  Induction:  0, 2 and 6 weeks 15 mL 0   • vitamin E 180 mg (400 unit) capsule Take 1 capsule (400 Units) by mouth once daily.     • [DISCONTINUED] cyclobenzaprine (Flexeril) 10 mg tablet Take 1 tablet (10 mg) by mouth 3 times a day as needed for muscle spasms (Pain). 20 tablet 0     Current Facility-Administered Medications on File Prior to Visit   Medication Dose Route Frequency Provider Last Rate Last Admin   • acetaminophen (Tylenol) tablet 650 mg  650 mg oral Once Leatha Salazar, APRN-CNP

## 2025-05-06 ENCOUNTER — OFFICE VISIT (OUTPATIENT)
Dept: PAIN MEDICINE | Facility: HOSPITAL | Age: 65
End: 2025-05-06
Payer: COMMERCIAL

## 2025-05-06 DIAGNOSIS — M48.062 LUMBAR STENOSIS WITH NEUROGENIC CLAUDICATION: ICD-10-CM

## 2025-05-06 DIAGNOSIS — M48.061 DEGENERATIVE LUMBAR SPINAL STENOSIS: ICD-10-CM

## 2025-05-06 PROCEDURE — 1125F AMNT PAIN NOTED PAIN PRSNT: CPT | Performed by: ANESTHESIOLOGY

## 2025-05-06 PROCEDURE — 99214 OFFICE O/P EST MOD 30 MIN: CPT | Performed by: ANESTHESIOLOGY

## 2025-05-06 PROCEDURE — 99204 OFFICE O/P NEW MOD 45 MIN: CPT | Performed by: ANESTHESIOLOGY

## 2025-05-06 PROCEDURE — 1159F MED LIST DOCD IN RCRD: CPT | Performed by: ANESTHESIOLOGY

## 2025-05-06 ASSESSMENT — PAIN SCALES - GENERAL: PAINLEVEL_OUTOF10: 7

## 2025-05-06 NOTE — PROGRESS NOTES
Chief Complaint   Patient presents with    Back Pain    Extremity Pain        HPI   Ms. Miramontes is a 64-year-old female with a history of back and leg pain who is referred by Dr. Korina Leal with orthopedic spine surgery.  She was recommended to pursue conservative measures.  The patient would not be a surgical candidate at this time due to the fact she has not tried physical therapy recently and also because she continues to smoke.    The patient notes that she has had back pain for quite some time.  She says that over the past 90 days or so the pain is no longer relieved with medications or conservative measures like it was in the past.  She says that her symptoms continue to progress and over the past 4 to 6 months she has had to use a cane for ambulation or a wheelchair if she goes somewhere and has to walk long distance.  She feels pain across the low back bilaterally and on the left side only has pain that radiates into the leg down to the calf and notes cramping in the foot.  She has physical therapy which is set to start later this month.  Right now she feels like she would not be able to participate in physical therapy due to the severe nature of her symptoms.  Pain is burning, sharp, tingling, cramping.  Pain is a 10 out of 10 at times.  Patient says that the pain disrupts her sleep and her day-to-day function.    Medication list includes Tylenol, methocarbamol, and pregabalin 150 mg 3 times daily.  Patient also has Plavix on her medication list.    ROS: 13 point review of systems is complete and is negative listed above in HPI    Medical History[1]    Surgical History[2]    Family History[3]    Social History[4]    Medications Ordered Prior to Encounter[5]     RX Allergies[6]       Imaging:  Narrative & Impression   Interpreted By:  Eliel Tan,  and Lianet Willams   STUDY:  MR LUMBAR SPINE WO IV CONTRAST;  1/9/2024 5:52 pm      INDICATION:  Signs/Symptoms:BLE weakness, acute onset, difficulty  walking.      COMPARISON:  Same day CT of the lumbar spine from 12:40 p.m.  CT abdomen pelvis 09/21/2023      ACCESSION NUMBER(S):  [id number]      ORDERING CLINICIAN:  CARLITA RAMOS      TECHNIQUE:  Sagittal T1, T2, STIR, axial T1 and T2 weighted images of the lumbar  spine were acquired.      FINDINGS:  Alignment: No spondylolisthesis or facet widening.      Vertebrae/Intervertebral Discs: Vertebral body heights are  maintained.Mild diffuse nonspecific heterogeneity of the bone marrow  without focal marrow replacing lesion. Signal changes compatible with  disc desiccation are present predominantly within the lower lumbar  spine. There is severe disc space height loss at L4-5 with more mild  disc space height loss at L3-4.      Conus: The lower thoracic cord appears unremarkable. The conus  terminates at L1. The more distal nerve fibers are unremarkable..      T12-L1:  There is no significant spinal canal or neural foraminal  stenosis.      L1-2: Mild-to-moderate bilateral facet arthropathy. No significant  posterior disc contour abnormality. There is no significant spinal  canal or neural foraminal stenosis.      L2-3: Moderate bilateral facet arthropathy with subchondral cystic  formation in the bilateral inferior articular processes of L2 as well  as fluid signal intensity in the joint spaces bilaterally. Thickening  of the right aspect of the ligamentum flavum. No significant  posterior disc contour abnormality. There is no significant spinal  canal or neural foraminal stenosis.      L3-4: Moderate bilateral facet arthropathy with increased fluid  signal in the joint spaces bilaterally. Thickening of the ligamentum  flavum. Moderate diffuse disc bulge causing mild spinal canal  stenosis with effacement of the anterior thecal sac as well as  mild-to-moderate bilateral neural foraminal stenosis.      L4-5: Moderate bilateral facet arthropathy with right-sided  thickening of the ligamentum flavum. There is diffuse  disc bulge  moderate predominantly right-sided spinal canal stenosis with  effacement of the anterior thecal sac and moderate  right-greater-than-left neural foraminal stenosis.      L5-S1: Moderate bilateral facet arthropathy. Mild diffuse  circumferential disc bulge without significant spinal canal stenosis.  No significant right-sided neural foraminal stenosis.  Mild-to-moderate left-sided neural foraminal stenosis.      The prevertebral and posterior paraspinous soft tissues are  unremarkable. No significant abnormality of the visualized abdomen.      IMPRESSION:  Multilevel degenerative changes of the lumbar spine as described  above worst at L4-5, where there is moderate spinal canal stenosis  with moderate right-greater-than-left neural foraminal stenosis, and  L3-4, where there is mild spinal canal stenosis with mild-to-moderate  bilateral neural foraminal stenosis.       Physical Exam:  Gen.: Patient appears to be stated age, fair hygiene  Eyes: Pupils are symmetric, conjunctiva is nonicteric and lids without obvious drooping or rash  ENT: Hearing is grossly intact, external ears and nose appear to be without deformity or rash. No lesions or masses noted.  Neck: No JVD noted, tracheal position is midline  Respiratory: No gasping or shortness of breath noted, no use of accessory muscles noted  Cardiovascular: Extremity show no edema or varicosities  Lymph: No lymphadenopathy noted in the anterior cervical regions bilaterally  Skin no rashes or open lesions or ulcers identified on the skin  Musculoskeletal: Ambulates with a cane, positive SLR on the left side only, intact sensation in the lower extremities and normal reflex.  Patient with decreased sensation in L4-5 on the left.  Neurologic: Cranial nerves II through XII are grossly intact  Psychiatric:  Patient is alert and oriented x3    Impression/Plan:  64-year-old female with a history of bilateral low back pain and left-sided radicular symptoms.  She  has MRI imaging which shows degenerative changes most significant at the L4-5 level which correlate with her symptoms.  She has symptoms of spinal stenosis.      -Will plan for bilateral L4 transforaminal.  Procedure, risk, benefits, alternatives reviewed.    -Encouraged to keep up with physical therapy and core strengthening.    -Meds as per prescribing physician.    -Discussed with the patient that ultimately she may need surgery however she will not be a surgical candidate if she continues to smoke.  Discussed smoking cessation and the importance of this going forward.  We discussed that there are no guarantees that her symptoms may be alleviated with conservative measures and she may ultimately need surgery.         [1]   Past Medical History:  Diagnosis Date    Coronary artery disease     2012: cardiac cath with PCI, 23: cardiac cath with PCI x2 (LAD & LCx) intermediate disease of a tortuous RCA - cardiac clearance requested    Crohn's disease (Multi)     bowel perforation 2009 s/p colostomy, reversed     Depression     Hyperlipidemia     Hypertension     IBS (irritable bowel syndrome)     Lung nodules     nonconcerning per pulm note    Myocardial infarction (Multi)     Ulcerative colitis    [2]   Past Surgical History:  Procedure Laterality Date    ABDOMINAL HERNIA REPAIR      with mesh    CARDIAC CATHETERIZATION      PCI    CARDIAC CATHETERIZATION  2023    PCI x 2 (LAD and LCx)     SECTION, LOW TRANSVERSE      COLONOSCOPY      COLOSTOMY  2009    REVISION / TAKEDOWN COLOSTOMY  2010    TUBAL LIGATION      UMBILICAL HERNIA REPAIR      x 2   [3]   Family History  Problem Relation Name Age of Onset    Stroke Mother      Hypertension Mother      Heart attack Mother      Heart attack Father      Diabetes Father      Multiple sclerosis Sister      Breast cancer Sister      Diabetes Brother     [4]   Social History  Tobacco Use    Smoking status: Every Day     Current packs/day:  0.25     Average packs/day: 0.3 packs/day for 50.0 years (12.5 ttl pk-yrs)     Types: Cigarettes    Smokeless tobacco: Never   Substance Use Topics    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 30 tablet 0    amLODIPine (Norvasc) 5 mg tablet Take 1.5 tablets (7.5 mg) by mouth early in the morning..      aspirin 81 mg EC tablet Take 1 tablet (81 mg) by mouth once daily.      carvedilol (Coreg) 25 mg tablet Take 1 tablet (25 mg) by mouth every 12 hours.      clopidogrel (Plavix) 75 mg tablet Take 1 tablet (75 mg) by mouth once daily.      dicyclomine (Bentyl) 10 mg capsule Take 1 capsule (10 mg) by mouth 3 times a day as needed (Diarrhea/abdominal cramp). 30 capsule 1    hydroCHLOROthiazide (HYDRODiuril) 25 mg tablet Take 1 tablet (25 mg) by mouth once daily.      hydrOXYzine HCL (Atarax) 25 mg tablet Take 1 tablet (25 mg) by mouth 3 times a day as needed for itching. May cause drowsiness      lisinopril 40 mg tablet Take 1 tablet (40 mg) by mouth once daily. as directed      melatonin 5 mg tablet Take 2 tablets (10 mg) by mouth once daily as needed.      methocarbamol (Robaxin) 500 mg tablet Take 1 tablet (500 mg) by mouth 3 times a day as needed for muscle spasms. 90 tablet 0    metoprolol succinate XL (Toprol-XL) 50 mg 24 hr tablet Take by mouth.      nitroglycerin (Nitrostat) 0.4 mg SL tablet Place 1 tablet (0.4 mg) under the tongue. Dissolve 1 tablet under the tongue as needed for chest pain.      pregabalin (Lyrica) 150 mg capsule Take 1 capsule (150 mg) by mouth 3 times a day.      rosuvastatin (Crestor) 40 mg tablet Take 1 tablet (40 mg) by mouth once daily at bedtime.      vedolizumab  (Entyvio) 300 mg injection Infuse 300 mg into a venous catheter every 8 (eight) weeks.  For Maintenance: every 8 weeks 5 mL 2    vedolizumab (Entyvio) 300 mg injection Infuse 300 mg into a venous catheter see administration instructions.  For Induction:  0, 2 and 6 weeks 15 mL 0    vitamin E 180 mg (400 unit) capsule Take 1 capsule (400 Units) by mouth once daily.       Current Facility-Administered Medications on File Prior to Visit   Medication Dose Route Frequency Provider Last Rate Last Admin    acetaminophen (Tylenol) tablet 650 mg  650 mg oral Once Leatha Salazar, APRN-CNP       [6]   Allergies  Allergen Reactions    Penicillin Unknown    Penicillins Hives

## 2025-05-20 ENCOUNTER — APPOINTMENT (OUTPATIENT)
Dept: PHYSICAL THERAPY | Facility: HOSPITAL | Age: 65
End: 2025-05-20
Payer: COMMERCIAL

## 2025-05-25 ENCOUNTER — CLINICAL SUPPORT (OUTPATIENT)
Dept: EMERGENCY MEDICINE | Facility: HOSPITAL | Age: 65
End: 2025-05-25
Payer: COMMERCIAL

## 2025-05-25 ENCOUNTER — APPOINTMENT (OUTPATIENT)
Dept: RADIOLOGY | Facility: HOSPITAL | Age: 65
End: 2025-05-25
Payer: COMMERCIAL

## 2025-05-25 ENCOUNTER — HOSPITAL ENCOUNTER (EMERGENCY)
Facility: HOSPITAL | Age: 65
Discharge: HOME | End: 2025-05-25
Attending: STUDENT IN AN ORGANIZED HEALTH CARE EDUCATION/TRAINING PROGRAM
Payer: COMMERCIAL

## 2025-05-25 VITALS
HEIGHT: 61 IN | WEIGHT: 143 LBS | BODY MASS INDEX: 27 KG/M2 | SYSTOLIC BLOOD PRESSURE: 126 MMHG | RESPIRATION RATE: 18 BRPM | DIASTOLIC BLOOD PRESSURE: 74 MMHG | OXYGEN SATURATION: 98 % | HEART RATE: 67 BPM | TEMPERATURE: 97.7 F

## 2025-05-25 DIAGNOSIS — M79.18 MUSCLE PAIN, MYOFASCIAL: Primary | ICD-10-CM

## 2025-05-25 LAB
ALBUMIN SERPL BCP-MCNC: 4 G/DL (ref 3.4–5)
ALP SERPL-CCNC: 146 U/L (ref 33–136)
ALT SERPL W P-5'-P-CCNC: 32 U/L (ref 7–45)
ANION GAP SERPL CALC-SCNC: 12 MMOL/L (ref 10–20)
AST SERPL W P-5'-P-CCNC: 22 U/L (ref 9–39)
ATRIAL RATE: 57 BPM
BASOPHILS # BLD AUTO: 0.06 X10*3/UL (ref 0–0.1)
BASOPHILS NFR BLD AUTO: 0.7 %
BILIRUB SERPL-MCNC: 0.4 MG/DL (ref 0–1.2)
BUN SERPL-MCNC: 9 MG/DL (ref 6–23)
CALCIUM SERPL-MCNC: 9.3 MG/DL (ref 8.6–10.6)
CARDIAC TROPONIN I PNL SERPL HS: 5 NG/L (ref 0–34)
CHLORIDE SERPL-SCNC: 106 MMOL/L (ref 98–107)
CO2 SERPL-SCNC: 25 MMOL/L (ref 21–32)
CREAT SERPL-MCNC: 0.77 MG/DL (ref 0.5–1.05)
EGFRCR SERPLBLD CKD-EPI 2021: 86 ML/MIN/1.73M*2
EOSINOPHIL # BLD AUTO: 0.21 X10*3/UL (ref 0–0.7)
EOSINOPHIL NFR BLD AUTO: 2.3 %
ERYTHROCYTE [DISTWIDTH] IN BLOOD BY AUTOMATED COUNT: 12.4 % (ref 11.5–14.5)
GLUCOSE SERPL-MCNC: 98 MG/DL (ref 74–99)
HCT VFR BLD AUTO: 39.9 % (ref 36–46)
HGB BLD-MCNC: 13.8 G/DL (ref 12–16)
IMM GRANULOCYTES # BLD AUTO: 0.03 X10*3/UL (ref 0–0.7)
IMM GRANULOCYTES NFR BLD AUTO: 0.3 % (ref 0–0.9)
LYMPHOCYTES # BLD AUTO: 2.32 X10*3/UL (ref 1.2–4.8)
LYMPHOCYTES NFR BLD AUTO: 25.9 %
MAGNESIUM SERPL-MCNC: 1.84 MG/DL (ref 1.6–2.4)
MCH RBC QN AUTO: 28.9 PG (ref 26–34)
MCHC RBC AUTO-ENTMCNC: 34.6 G/DL (ref 32–36)
MCV RBC AUTO: 84 FL (ref 80–100)
MONOCYTES # BLD AUTO: 0.61 X10*3/UL (ref 0.1–1)
MONOCYTES NFR BLD AUTO: 6.8 %
NEUTROPHILS # BLD AUTO: 5.72 X10*3/UL (ref 1.2–7.7)
NEUTROPHILS NFR BLD AUTO: 64 %
NRBC BLD-RTO: 0 /100 WBCS (ref 0–0)
P AXIS: 59 DEGREES
P OFFSET: 181 MS
P ONSET: 155 MS
PLATELET # BLD AUTO: 254 X10*3/UL (ref 150–450)
POTASSIUM SERPL-SCNC: 3.8 MMOL/L (ref 3.5–5.3)
PR INTERVAL: 136 MS
PROT SERPL-MCNC: 7.5 G/DL (ref 6.4–8.2)
Q ONSET: 223 MS
QRS COUNT: 9 BEATS
QRS DURATION: 102 MS
QT INTERVAL: 438 MS
QTC CALCULATION(BAZETT): 426 MS
QTC FREDERICIA: 430 MS
R AXIS: 33 DEGREES
RBC # BLD AUTO: 4.78 X10*6/UL (ref 4–5.2)
SODIUM SERPL-SCNC: 139 MMOL/L (ref 136–145)
T AXIS: 39 DEGREES
T OFFSET: 442 MS
VENTRICULAR RATE: 57 BPM
WBC # BLD AUTO: 9 X10*3/UL (ref 4.4–11.3)

## 2025-05-25 PROCEDURE — 99285 EMERGENCY DEPT VISIT HI MDM: CPT | Performed by: STUDENT IN AN ORGANIZED HEALTH CARE EDUCATION/TRAINING PROGRAM

## 2025-05-25 PROCEDURE — 83735 ASSAY OF MAGNESIUM: CPT

## 2025-05-25 PROCEDURE — 71101 X-RAY EXAM UNILAT RIBS/CHEST: CPT | Mod: RIGHT SIDE | Performed by: RADIOLOGY

## 2025-05-25 PROCEDURE — 80053 COMPREHEN METABOLIC PANEL: CPT

## 2025-05-25 PROCEDURE — 2500000001 HC RX 250 WO HCPCS SELF ADMINISTERED DRUGS (ALT 637 FOR MEDICARE OP)

## 2025-05-25 PROCEDURE — 85025 COMPLETE CBC W/AUTO DIFF WBC: CPT

## 2025-05-25 PROCEDURE — 93005 ELECTROCARDIOGRAM TRACING: CPT

## 2025-05-25 PROCEDURE — 84484 ASSAY OF TROPONIN QUANT: CPT

## 2025-05-25 PROCEDURE — 71101 X-RAY EXAM UNILAT RIBS/CHEST: CPT | Mod: RT

## 2025-05-25 PROCEDURE — 36415 COLL VENOUS BLD VENIPUNCTURE: CPT

## 2025-05-25 PROCEDURE — 2500000005 HC RX 250 GENERAL PHARMACY W/O HCPCS

## 2025-05-25 RX ORDER — CYCLOBENZAPRINE HCL 10 MG
5 TABLET ORAL ONCE
Status: COMPLETED | OUTPATIENT
Start: 2025-05-25 | End: 2025-05-25

## 2025-05-25 RX ORDER — ACETAMINOPHEN 325 MG/1
650 TABLET ORAL EVERY 6 HOURS PRN
Status: DISCONTINUED | OUTPATIENT
Start: 2025-05-25 | End: 2025-05-25 | Stop reason: HOSPADM

## 2025-05-25 RX ORDER — LIDOCAINE 560 MG/1
1 PATCH PERCUTANEOUS; TOPICAL; TRANSDERMAL ONCE
Status: DISCONTINUED | OUTPATIENT
Start: 2025-05-25 | End: 2025-05-25 | Stop reason: HOSPADM

## 2025-05-25 RX ADMIN — CYCLOBENZAPRINE HYDROCHLORIDE 5 MG: 10 TABLET, FILM COATED ORAL at 10:32

## 2025-05-25 RX ADMIN — ACETAMINOPHEN 650 MG: 325 TABLET ORAL at 10:32

## 2025-05-25 RX ADMIN — LIDOCAINE 1 PATCH: 4 PATCH TOPICAL at 10:32

## 2025-05-25 ASSESSMENT — COLUMBIA-SUICIDE SEVERITY RATING SCALE - C-SSRS
6. HAVE YOU EVER DONE ANYTHING, STARTED TO DO ANYTHING, OR PREPARED TO DO ANYTHING TO END YOUR LIFE?: NO
1. IN THE PAST MONTH, HAVE YOU WISHED YOU WERE DEAD OR WISHED YOU COULD GO TO SLEEP AND NOT WAKE UP?: NO
2. HAVE YOU ACTUALLY HAD ANY THOUGHTS OF KILLING YOURSELF?: NO

## 2025-05-25 ASSESSMENT — PAIN SCALES - GENERAL
PAINLEVEL_OUTOF10: 0 - NO PAIN
PAINLEVEL_OUTOF10: 10 - WORST POSSIBLE PAIN

## 2025-05-25 ASSESSMENT — PAIN - FUNCTIONAL ASSESSMENT
PAIN_FUNCTIONAL_ASSESSMENT: 0-10
PAIN_FUNCTIONAL_ASSESSMENT: 0-10

## 2025-05-25 ASSESSMENT — PAIN DESCRIPTION - PAIN TYPE: TYPE: ACUTE PAIN

## 2025-05-25 ASSESSMENT — PAIN DESCRIPTION - DESCRIPTORS: DESCRIPTORS: DISCOMFORT

## 2025-05-25 ASSESSMENT — PAIN DESCRIPTION - LOCATION: LOCATION: RIB CAGE

## 2025-05-25 ASSESSMENT — PAIN DESCRIPTION - ORIENTATION: ORIENTATION: RIGHT

## 2025-05-25 NOTE — ED PROVIDER NOTES
History of Present Illness     History provided by: Patient  Limitations to History: None  External Records Reviewed with Brief Summary: None    HPI:  Vivien Miramontes is a 64 y.o. female with past medical history significant for hypertension, hyperlipidemia, Crohn's, cholecystectomy, spinal stenosis, and coronary artery disease status post stenting in 2023 to circumflex and LAD who presents today for right rib pain.    Patient states that his rib pain started this last Wednesday after she woke up from sleep.  She denies any traumatic or inciting incident, states this pain is worsened with coughing or inhalation and generalized movement of her right arm.  At this time she is denying fever, chest pain, or shortness of breath.  She does note that she follows with chronic pain for lumbar stenosis, and sees them this upcoming Wednesday.  She was using her home pain medications including Robaxin and notes that the pain did not improve with this.    Physical Exam   Triage vitals:  T 36.5 °C (97.7 °F)  HR 67  /74  RR 18  O2 98 % None (Room air)    Physical Exam  Constitutional:       Appearance: Normal appearance.   Cardiovascular:      Rate and Rhythm: Normal rate and regular rhythm.      Pulses: Normal pulses.      Heart sounds: Normal heart sounds. No murmur heard.  Pulmonary:      Effort: Pulmonary effort is normal.      Breath sounds: Normal breath sounds.   Abdominal:      General: Abdomen is flat.      Palpations: Abdomen is soft.      Tenderness: There is abdominal tenderness.      Comments: RUQ tenderness to palpation   Musculoskeletal:         General: Tenderness present. No swelling or deformity. Normal range of motion.      Comments: Right 10-12 rib region   Skin:     General: Skin is warm and dry.   Neurological:      General: No focal deficit present.      Mental Status: She is alert and oriented to person, place, and time.   Psychiatric:         Mood and Affect: Mood normal.         Behavior: Behavior  normal.        Medical Decision Making & ED Course   Medical Decision Makin y.o. female with past medical history significant for hypertension, hyperlipidemia, Crohn's, cholecystectomy, spinal stenosis, and coronary artery disease status post stenting in  to circumflex and LAD who presents today for right rib pain.    Differential diagnosis includes rib fracture, thoracic radiculopathy, shingles, musculoskeletal pain.  Patient has a history of cholecystectomy so this can be excluded.  Patient does not have a current rash in the area excluding shingles, no fracture on x-ray, and no other thoracic spine pathology noted.    Workup included EKG, troponin, and right rib x-ray which all resulted with normal findings.  She was given Tylenol, lidocaine patch, and Flexeril which improved her symptoms.  Plan to discharge patient home if CMP is unremarkable.  She has follow-up with chronic pain and establish care.  She also has home pain medications including Robaxin already.    Differential diagnoses considered include but are not limited to: See above     Social Determinants of Health which Significantly Impact Care: None identified    EKG Independent Interpretation: EKG interpreted by myself. Please see ED Course for full interpretation.    Independent Result Review and Interpretation: Relevant laboratory and radiographic results were reviewed and independently interpreted by myself.  As necessary, they are commented on in the ED Course.    Chronic conditions affecting the patient's care: As documented above in [facility]    The patient was discussed with the following consultants/services: None    Care Considerations: As documented above in [facility]    ED Course:  ED Course as of 25 1203   Sun May 25, 2025   0957 Attending summary:  63 y/o F with PMHx HTN, CAD, Crohn's, hep C who is presenting for 5 days of R flank pain. Denies trauma. Worse with breathing, moving, laughing. No fevers, chills, n/v, urinary symptoms,  diarrhea, constipation. No dyspnea, but pain in flank hurts with deep breaths. No leg pain or numbness, retention or incontinence. Vitals unremarkable. Exam shows well-appearing female, reproducible R flank tenderness in paraspinal region of lower thoracic region, no midline vertebral tenderness. Mild RUQ tenderness, neg [place]. Pt has hx of cholecystetomy. No peritonitis. Neurovasc intact extremities.     I suspect this is musculoskeletal with her reproducible tenderness. However, without injury, will obtain EKG, labs including trop. No urinary symptoms concerning for pyelo. Description of pain less consistent with kidney stone. No gallbladder, no abd tenderness concerning for intraabd pathology. Based on this, do not believe she needs cross-sectional imaging. Not PERC neg due to age but normal vitals and no risk factors. Anticipate dc home ultimately with close PCP f/up.  [SS]   1004 Emergency Medicine Senior Resident Attestation:   Patient is a 64-year-old female with history of chronic hepatitis C, CAD, Crohn's, tobacco use disorder, NSTEMI, heart failure with preserved EF who presents with right sided rib pain.  She states it has been progressively worsening refractory to methocarbamol at home since Wednesday.  States she was sleeping and woke up with right sided posterior rib pain.  States it hurts when she moves her arm, presses over it and coughs and moves.  Denies any shortness of breath, chest pain, radiating back pain, abdominal pain, nausea, vomiting, urinary symptoms, numbness, tingling in extremities.  She states she is taking aspirin and Plavix.  Denies history of blood clots, leg swelling.  Denies any changes in bowel, bladder habits.  On examination she has reproducible tenderness to right subscapular region with palpable muscular knot, there is no overlying erythema, vesicular lesions concerning for skin infection, shingles.  She has no midline C, T, L-spine tenderness to palpation, strength and  sensation grossly intact and symmetric in all extremities.  She has no anterior chest wall pain, no significant abdominal tenderness, nondistended, no rebound or guarding.  Patient does states she has her gallbladder removed.  Lower suspicion for biliary disease.  Suspect likely musculoskeletal process however given her history of cardiac disease will obtain troponin and EKG.  Will provide multimodal pain regimen.  Patient has a low risk Wells score of 0, do not suspect PE at this time. CXR and right rib XR obtained. Anticipate discharge after workup and reevaluation with close PCP follow-up.     Ludivina Loredo, PGY-3, DO   [SA]   1012 EKG reviewed sinus bradycardia rate 57, ND interval 136 ms,  ms, QTc 426 ms, no acute ST segment elevations or depressions, normal axis, overall appears similar when compared to prior [SA]      ED Course User Index  [SA] Ludivina Loredo DO  [SS] Nette Tilley MD         Diagnoses as of 05/25/25 1203   Muscle pain, myofascial     Disposition   Discharge home    Procedures   Procedures    Patient seen and discussed with ED attending physician.    Romaine Frankel DO  Emergency Medicine      Romaine Frankel DO  Resident  05/25/25 1243

## 2025-05-27 ENCOUNTER — APPOINTMENT (OUTPATIENT)
Dept: INFUSION THERAPY | Facility: CLINIC | Age: 65
End: 2025-05-27
Payer: COMMERCIAL

## 2025-05-27 VITALS
RESPIRATION RATE: 16 BRPM | HEART RATE: 71 BPM | TEMPERATURE: 97.4 F | BODY MASS INDEX: 27.56 KG/M2 | WEIGHT: 145.83 LBS | DIASTOLIC BLOOD PRESSURE: 80 MMHG | SYSTOLIC BLOOD PRESSURE: 127 MMHG

## 2025-05-27 DIAGNOSIS — K50.118 CROHN'S DISEASE OF LARGE INTESTINE WITH OTHER COMPLICATION: ICD-10-CM

## 2025-05-27 PROCEDURE — 96365 THER/PROPH/DIAG IV INF INIT: CPT | Performed by: NURSE PRACTITIONER

## 2025-05-27 RX ORDER — FAMOTIDINE 10 MG/ML
20 INJECTION, SOLUTION INTRAVENOUS ONCE AS NEEDED
OUTPATIENT
Start: 2025-07-22

## 2025-05-27 RX ORDER — EPINEPHRINE 0.3 MG/.3ML
0.3 INJECTION SUBCUTANEOUS EVERY 5 MIN PRN
OUTPATIENT
Start: 2025-07-22

## 2025-05-27 RX ORDER — ALBUTEROL SULFATE 0.83 MG/ML
3 SOLUTION RESPIRATORY (INHALATION) AS NEEDED
OUTPATIENT
Start: 2025-07-22

## 2025-05-27 RX ORDER — DIPHENHYDRAMINE HYDROCHLORIDE 50 MG/ML
50 INJECTION, SOLUTION INTRAMUSCULAR; INTRAVENOUS AS NEEDED
OUTPATIENT
Start: 2025-07-22

## 2025-05-27 ASSESSMENT — ENCOUNTER SYMPTOMS
NAUSEA: 0
FATIGUE: 0
VOMITING: 0
HEMATURIA: 0
BRUISES/BLEEDS EASILY: 0
EXTREMITY WEAKNESS: 0
DYSURIA: 0
LEG SWELLING: 0
EYE PROBLEMS: 0
UNEXPECTED WEIGHT CHANGE: 0
DIZZINESS: 0
COUGH: 0
TROUBLE SWALLOWING: 0
VOICE CHANGE: 0
NERVOUS/ANXIOUS: 0
DIARRHEA: 0
SHORTNESS OF BREATH: 0
MYALGIAS: 0
SORE THROAT: 0
LIGHT-HEADEDNESS: 0
APPETITE CHANGE: 0
DEPRESSION: 0
WHEEZING: 0
CHILLS: 0
WOUND: 0
FEVER: 0
ARTHRALGIAS: 0
CONSTIPATION: 0
ABDOMINAL PAIN: 0
BLOOD IN STOOL: 0
PALPITATIONS: 0
HEADACHES: 0
FREQUENCY: 0
NUMBNESS: 0

## 2025-05-27 NOTE — PROGRESS NOTES
[facility]   Infusion Clinic Note   Date: May 27, 2025   Name: Vivien Miramontes  : 1960   MRN: [id number]         Reason for Visit: Follow-up and OP Infusion (PT HERE FOR ENTYVIO 300MG/NEXT APT: 8 WEEKS )         Today: We administered vedolizumab (Entyvio) 300 mg in sodium chloride 0.9% 255 mL IV.       Ordered By: Ping Pedro MD       For a Diagnosis of: Crohn's disease of large intestine with other complication       At today's visit patient accompanied by: FRIEND      Today's Vitals:   Vitals:    25 1436 25 1552   BP: 137/83 127/80   Pulse: 71 71   Resp: 16 16   Temp: 36.2 °C (97.1 °F) 36.3 °C (97.4 °F)   SpO2: Comment: long polished fingernails    Weight: 66.2 kg (145 lb 13.4 oz)              Pre - Treatment Checklist:      - Previous reaction to current treatment: no      (Assess patient for the concerns below. Document provider notification as appropriate).  - Active or recent infection with/without current antibiotic use: no  - Recent or planned invasive dental work: no  - Recent or planned surgeries: no  - Recently received or plans to receive vaccinations: no  - Has treatment related toxicities: no  - Any chance may be pregnant:  n/a      Pain: 9   - Is the pain different from normal: no   - Is prescribing Doctor aware:  yes      Labs: Reviewed       Fall Risk Screening: Clay Fall Risk  History of Falling, Immediate or Within 3 Months: No  Secondary Diagnosis: No  Ambulatory Aid: Walks without aid/bedrest/nurse assist  Intravenous Therapy/Heparin Lock: No  Gait/Transferring: Normal/bedrest/immobile  Mental Status: Oriented to own ability  Clay Fall Risk Score: 0       Review Of Systems:  Review of Systems   Constitutional:  Negative for appetite change, chills, fatigue, fever and unexpected weight change.   HENT:   Negative for hearing loss, mouth sores, sore throat, tinnitus, trouble swallowing and voice change.    Eyes:  Negative for eye problems.  "  Respiratory:  Negative for cough, shortness of breath and wheezing.    Cardiovascular:  Negative for chest pain, leg swelling and palpitations.   Gastrointestinal:  Negative for abdominal pain, blood in stool, constipation, diarrhea, nausea and vomiting.        PT REPORTS 10-12 BMS PER DAY  PT DENIES BLOOD AND MUCOUS IN STOOL    Genitourinary:  Negative for dysuria, frequency and hematuria.    Musculoskeletal:  Negative for arthralgias and myalgias.   Skin:  Negative for itching, rash and wound.   Neurological:  Negative for dizziness, extremity weakness, headaches, light-headedness and numbness.   Hematological:  Does not bruise/bleed easily.   Psychiatric/Behavioral:  Negative for depression. The patient is not nervous/anxious.          Infusion Readiness:  - Assessment Concerns Related to Infusion: No  - Provider notified: n/a      New Patient Education:    N/A (returning patient for continuation of therapy. Ongoing education provided as needed.)        Treatment Conditions & Drug Specific Questions:    Vedolizumab  (ENTYVIO)    (Unless otherwise specified on patient specific therapy plan):     TREATMENT CONDITIONS:  Unless otherwise specified on patient specific therapy plan HOLD and notify provider prior to proceeding with treatment if:   o Positive T-Spot  o Patient has signs or symptoms of Progressive Multifocal Leukoencephalopath (PML)     Lab Results   Component Value Date    HEPBSAG Nonreactive 03/01/2024      No results found for: \"NONUHFIRE\", \"NONUHSWGH\", \"NONUHFISH\"  Lab Results   Component Value Date    TBSIN Negative 03/01/2024      Lab Results   Component Value Date    ALT 32 05/25/2025    AST 22 05/25/2025    ALKPHOS 146 (H) 05/25/2025    BILITOT 0.4 05/25/2025        Patient meets treatment conditions? Yes    DRUG SPECIFIC QUESTIONS:  Any signs or symptoms of Progressive Multifocal Leukoencephalopath (PML) which may include: memory loss, trouble thinking, loss of balance, difficulty talking or " walking, loss of vision?  No    (If YES notify prescribing provider prior to proceeding)    REMINDERS:  Recommended Vitals/Observation:  Vitals: Monitor vitals at start of infusion and at completion of infusion.  Observation: First 3 infusions patient may leave 15 minutes post infusion. Subsequent maintenance infusions: if no reaction, may leave immediately post infusion.        Weight Based Drug Calculations:    WEIGHT BASED DRUGS: NOT APPLICABLE / FLAT DOSE       Post Treatment: Patient tolerated treatment without issue and was discharged in no apparent distress.      Note Authored / Patient Cared for By: Pro Ruiz RN

## 2025-05-27 NOTE — PATIENT INSTRUCTIONS
Today :We administered vedolizumab (Entyvio) 300 mg in sodium chloride 0.9% 255 mL IV.     For:   1. Crohn's disease of large intestine with other complication         Your next appointment is due in:  8 WEEKS        Please read the  Medication Guide that was given to you and reviewed during todays visit.     (Tell all doctors including dentists that you are taking this medication)     Go to the emergency room or call 911 if:  -You have signs of allergic reaction:   -Rash, hives, itching.   -Swollen, blistered, peeling skin.   -Swelling of face, lips, mouth, tongue or throat.   -Tightness of chest, trouble breathing, swallowing or talking     Call your doctor:  - If IV / injection site gets red, warm, swollen, itchy or leaks fluid or pus.     (Leave dressing on your IV site for at least 2 hours and keep area clean and dry  - If you get sick or have symptoms of infection or are not feeling well for any reason.    (Wash your hands often, stay away from people who are sick)  - If you have side effects from your medication that do not go away or are bothersome.     (Refer to the teaching your nurse gave you for side effects to call your doctor about)    - Common side effects may include:  stuffy nose, headache, feeling tired, muscle aches, upset stomach  - Before receiving any vaccines     - Call the [facility] at   If:  - You get sick, are on antibiotics, have had a recent vaccine, have surgery or dental work and your doctor wants your visit rescheduled.  - You need to cancel and reschedule your visit for any reason. Call at least 2 days before your visit if you need to cancel.   - Your insurance changes before your next visit.    (We will need to get approval from your new insurance. This can take up to two weeks.)     The [facility] is opened Monday thru Friday. We are closed on weekends and holidays.   Voice mail will take your call if the center is closed. If you leave a message  please allow 24 hours for a call back during weekdays. If you leave a message on a weekend/holiday, we will call you back the next business day.    A pharmacist is available Monday - Friday from 8:30AM to 3:30PM to help answer any questions you may have about your prescriptions(s). Please call pharmacy at:    [facility]: [phone]  [facility]: [phone]  [facility]: [phone]

## 2025-05-28 ENCOUNTER — TELEPHONE (OUTPATIENT)
Dept: GASTROENTEROLOGY | Facility: CLINIC | Age: 65
End: 2025-05-28
Payer: COMMERCIAL

## 2025-05-28 DIAGNOSIS — M54.16 LUMBAR RADICULOPATHY: ICD-10-CM

## 2025-05-28 NOTE — TELEPHONE ENCOUNTER
----- Message from Ping Pedro sent at 5/27/2025  6:35 PM EDT -----  Follow-up with me.  She thinks Lesley is not working

## 2025-06-16 ENCOUNTER — EVALUATION (OUTPATIENT)
Dept: PHYSICAL THERAPY | Facility: HOSPITAL | Age: 65
End: 2025-06-16
Payer: COMMERCIAL

## 2025-06-16 DIAGNOSIS — M48.062 LUMBAR STENOSIS WITH NEUROGENIC CLAUDICATION: ICD-10-CM

## 2025-06-16 DIAGNOSIS — M48.061 DEGENERATIVE LUMBAR SPINAL STENOSIS: Primary | ICD-10-CM

## 2025-06-16 DIAGNOSIS — R29.898 LEFT LEG WEAKNESS: ICD-10-CM

## 2025-06-16 PROCEDURE — 97110 THERAPEUTIC EXERCISES: CPT | Mod: GP

## 2025-06-16 PROCEDURE — 97161 PT EVAL LOW COMPLEX 20 MIN: CPT | Mod: GP

## 2025-06-16 ASSESSMENT — ENCOUNTER SYMPTOMS
DEPRESSION: 0
OCCASIONAL FEELINGS OF UNSTEADINESS: 0
LOSS OF SENSATION IN FEET: 0

## 2025-06-16 NOTE — PROGRESS NOTES
Physical Therapy Initial Evaluation    Patient Name:Vivien Miramontes  MRN:[id number]  Today's Date:6/16/2025  Referred by: Korina Leal  Time Calculation  Start Time: 0930  Stop Time: 1008  Time Calculation (min): 38 min  Evaluation PT Evaluation Time Entry  PT Evaluation (Low) Time Entry: 30  Therapeutic Procedure PT Therapeutic Procedures Time Entry  Therapeutic Exercise Time Entry: 8      Therapy Diagnosis  Problem List Items Addressed This Visit           ICD-10-CM    Lumbar stenosis with neurogenic claudication M48.062    Relevant Orders    Follow Up In Physical Therapy    Degenerative lumbar spinal stenosis - Primary M48.061    Relevant Orders    Follow Up In Physical Therapy     Other Visit Diagnoses         Codes      Left leg weakness     R29.898    Relevant Orders    Follow Up In Physical Therapy          Insurance  Visit number: 1   Approved number of visits: MN  Anmol required: No  Onset Date: 4/24/2025  Payor: [employer] COMPLETE / Plan: [employer] DUAL COMPLETE / Product Type: *No Product type* /     Precautions/Fall Risk:  Fall Risk: None based on professional judgment  Pacemaker: no    Seizures: No    Post Op Movement/Restrictions: No:    SUBJECTIVE  Chief complaint/reason for visit: Pt presents to today's session with referral for lumbar spinal stenosis. Notes pain has been going on since 2023 and has since gotten worse. Notes that she experiences numbness tingling and pain L LE. Notes she does experience L knee buckling. Notes that the pain is L sided back pain, down glute and side of L hip to knee. Notes pain will wake her up at night and it is painful to change positions but then she can fall back asleep. Notes she has SPC cane to ambulate but will also mainly use furniture and walls instead of the cane.   Mechanism of Injury:  unknown notes no known LIONEL, notes she just got up one day and her legs were not working.   Location of Pain: central low back pain, notes it goes  down L glute/hip   Current Pain Level (0-10): 7   High Pain Level (0-10): 10   Low Pain Level (0-10): 5  Pain Quality: aching and sharp  Pain Exacerbating Factors: movement, sitting, standing, walking  Pain Relieving Factors: medications prescribed pain medications    Medical Screening: Reviewed medical history form with patient and medical screening assessed.   Red Flags: none  Current Medical Management: X-rays  Prior Level of Function (PLOF)  Patient previously independent with all ADLs  Exercise/Physical Activity: No  Functional limitations: sitting, standing , walking , reaching, ascending stairs, descending stairs, sleeping, and lifting  Work Status: works at [employer]  Current Status: worsened   Patient Awareness: Patient is aware of  her diagnosis and prognosis.   Living Environment: house - a few steps into her house and then has first floor set up.   Social Support: lives with her friend  Personal Factors That May Impact Care: none    OBJECTIVE  Other Measures  Oswestry Disablity Index (KAREN): 26   Lower Extremity ROM: WNL unless documented below:  Unable to test, pt not able to go supine today's session.      Lower Extremity Strength:  Date: eval Myotome RIGHT LEFT   Hip Flexion L1,2 4/5 3+/5*   Knee Extension L3 5/5 3+/5*   Knee Flexion  4+/5 4-/5*   Ankle DF L4 5/5 5/5   Ankle PF S1 5/5 5/5     Lumbar ROM:  Date: eval Percentage    Flexion 50 in seated painful sharp    Extension 25 and painful more pressure in central low back.         Posture forward head, B rounded shoulders  Gait: L antalgic gait severe.   Neurological symptoms + L slump    ASSESSMENT  Patient is a 64 y.o. female who presents with complaint of low back pain, L LE pain, LE weakness, abnormal gait, limitations with ADLs of walking, bending, reaching, sleeping, and prolonged positioning that is constent with s/s of degenerative lumbar spinal stenosis . Standardized testing and measures administered today reveal that the patient has  multiple impairments in body structures and functions, activity limitations, and participation restrictions. These include subjective and objective findings such as pain, decreased ROM, decreased strength, decreased flexibility, and decreased function. The patient's impairments are likely influenced by mechanical dysfunction, deconditioning, degenerative changes, and pain . Skilled PT services are warranted in order to realize measurable and meaningful change in the above outcome measures and achieve improvements in the patient's functional status and individual goals.    Problem List: Activity limitations, Flexibility, Gait/locomotion, Pain, Posture, Range of motion, and Strength  Rehab Potential:fair  Clinical Presentation: Stable and/or uncomplicated characteristics   Evaluation Complexity: low    PLAN of Care  Goals: Goals set and discussed today.   Patient's Goal for Treatment: Relieve pain  Lumbar Spine Goals:  By discharge, patient will:  Demonstrate independence with home exercise program.  Demonstrate proper seated/standing posture for >/= 75% of the time without familiar s/s.   Tolerate increased exercise without adverse reaction.  Increase ROM of lumbar spine to >/=75% pain free in order to improve the ability to perform essential ADLs  Increase strength of BLE to >/=4+/5 in order to improve the ability to perform essential ADLs  Report decrease in pain by >= 2 points to meet MCID  Decrease score of Modified KAREN by > 6 points to meet the MCID  Ascend and descend 4-5 stairs without an increase in symptoms  Ambulate with LRAD without an increase in symptoms  Be able to sleep through the night without an increase in symptoms  Be able to perform the ADL of bending, lifting, reaching, and prolonged positioning without an increase or production of symptoms       Planned interventions include prn:  Therapeutic exercise, Manual therapy, Gait training, Therapeutic activity, Neuromuscular Re Education, E stim  unattended, Hot pack/Cold pack, and E stim attended  Frequency and duration: 1 time(s) a week, for  6 weeks.   Plan of care was developed with input and agreement by the patient.     Plan for next session: Review HEP, warm up on recumbent bike, assess supine exercises (LTR, SB sciatic glides), gentle LE strengthening as tolerated.     Treatment/Education/Resources Provided Today: Initial evaluation, instruction regarding home exercise program and patient education regarding diagnosis, prognosis, contributing factors, role of PT, and postural re-education  Response to Treatment: Improved knowledge and understanding of condition    [employer]:    Access Code: [email or web address]  URL: [email or web address]  Date: 06/16/2025  Prepared by: Faby Trinidad    Exercises  - Standing Distal Sciatic Nerve Mobilization on Step  - 1 x daily - 7 x weekly - 2 sets - 10 reps  - Seated Slump Nerve Glide  - 1 x daily - 7 x weekly - 2 sets - 10 reps

## 2025-06-22 NOTE — H&P
Pain Management H&P    History Of Present Illness  Vivien Miramontes is a 64 y.o. female presents for procedure state below. Endorses no changes in past medical history or medical health since last seen in clinic.      Past Medical History  She has a past medical history of Coronary artery disease, Crohn's disease (Multi), Depression, Hyperlipidemia, Hypertension, IBS (irritable bowel syndrome), Lung nodules, Myocardial infarction (Multi), and Ulcerative colitis.    Surgical History  She has a past surgical history that includes Colonoscopy; Colostomy (); Revision / takedown colostomy (); Cardiac catheterization (); Cardiac catheterization (2023);  section, low transverse (); Abdominal hernia repair (); Umbilical hernia repair; and Tubal ligation ().     Social History  She reports that she has been smoking cigarettes. She has a 12.5 pack-year smoking history. She has never used smokeless tobacco. She reports that she does not currently use alcohol. She reports that she does not currently use drugs.    Family History  Family History[1]     Allergies  Penicillin and Penicillins    Review of Symptoms:   Constitutional: Negative for chills, diaphoresis or fever  HENT: Negative for neck swelling  Eyes:.  Negative for eye pain  Respiratory:.  Negative for cough, shortness of breath or wheezing    Cardiovascular:.  Negative for chest pain or palpitations  Gastrointestinal:.  Negative for abdominal pain, nausea and vomiting  Genitourinary:.  Negative for urgency  Musculoskeletal:  Positive for back pain. Positive for joint pain. Denies falls within the past 3 months.  Skin: Negative for wounds or itching   Neurological: Negative for dizziness, seizures, loss of consciousness and weakness  Endo/Heme/Allergies: Does not bruise/bleed easily  Psychiatric/Behavioral: Negative for depression. The patient does not appear anxious.      Pre-sedation Evaluation  ASA class 2  Mallampati score 2      PHYSICAL EXAM  Vitals signs reviewed  Constitutional:       General: Not in acute distress     Appearance: Normal appearance. Not ill-appearing.  HENT:     Head: Normocephalic and atraumatic  Eyes:     Conjunctiva/sclera: Conjunctivae normal  Cardiovascular:     Rate and Rhythm: Normal rate and regular rhythm  Pulmonary:     Effort: No respiratory distress  Abdominal:     Palpations: Abdomen is soft  Musculoskeletal: SERNA  Skin:     General: Skin is warm and dry  Neurological:     General: No focal deficit present  Psychiatric:         Mood and Affect: Mood normal         Behavior: Behavior normal     Last Recorded Vitals  There were no vitals taken for this visit.    Relevant Results  Current Outpatient Medications   Medication Instructions    acetaminophen (TYLENOL) 650 mg, oral, Every 6 hours PRN    amLODIPine (Norvasc) 5 mg tablet 1.5 tablets, Daily (0630)    aspirin 81 mg, Daily    carvedilol (Coreg) 25 mg tablet 1 tablet, Every 12 hours scheduled (0630,1830)    clopidogrel (PLAVIX) 75 mg, Daily    dicyclomine (BENTYL) 10 mg, oral, 3 times daily PRN    hydroCHLOROthiazide (HYDRODIURIL) 25 mg, Daily    hydrOXYzine HCL (Atarax) 25 mg tablet 1 tablet, 3 times daily PRN    lisinopril 40 mg, Daily    melatonin 10 mg, Daily PRN    methocarbamol (ROBAXIN) 500 mg, oral, 3 times daily PRN    metoprolol succinate XL (Toprol-XL) 50 mg 24 hr tablet Take by mouth.    nitroglycerin (NITROSTAT) 0.4 mg    pregabalin (Lyrica) 150 mg capsule 1 capsule, 3 times daily (0900,1400,1900)    rosuvastatin (Crestor) 40 mg tablet 1 tablet, Nightly    vedolizumab (ENTYVIO) 300 mg, intravenous, Every 8 weeks, For Maintenance: every 8 weeks    vedolizumab (ENTYVIO) 300 mg, intravenous, See admin instructions, For Induction:  0, 2 and 6 weeks    vitamin E 400 Units, Daily         MR cervical spine wo IV contrast 03/06/2024    Narrative  Interpreted By:  Oly Arroyo,  STUDY:  MR CERVICAL SPINE WO IV  CONTRAST    INDICATION:  Signs/Symptoms:cervical myelopathy    COMPARISON:  None.    ACCESSION NUMBER(S):  [id number]    ORDERING CLINICIAN:  JAQUI THOMAS    TECHNIQUE:  Multiplanar multisequence MR imaging of the cervical spine was  performed without the administration of intravenous contrast,  according to standard protocol.    FINDINGS:  ALIGNMENT: Straightening of the usual cervical lordosis. Trace  retrolisthesis of C6 on C7 and trace anterolisthesis of C7 on T1.    VERTEBRAE: The vertebral bodies are normal in height. There is no  fracture or aggressive osseous lesion.    DISCS: Multilevel disc desiccation. Mild disc height loss at C5-6 and  C6-7.    CORD: There is no intrinsic spinal cord signal abnormality.    PARAVERTEBRAL SOFT TISSUES: The visualized paravertebral soft tissues  appear within normal limits.    EVALUATION OF INDIVIDUAL LEVELS:  C2-3: No disc herniation  spinal canal or neuroforaminal stenosis.    C3-4: No disc herniation  spinal canal or neuroforaminal stenosis.    C4-5: Tortuous course of the left vertebral artery which appears to  extend into the left neural foramen and may contact the exiting left  C5 nerve root (series 7, image 27). Otherwise, spinal canal and  neural foramina are unremarkable.    C5-6: Uncovertebral and facet hypertrophy mildly narrow bilateral  foramina. Spinal canal is patent.    C6-7: Mild retrolisthesis with disc osteophyte complex and facet  hypertrophy. Moderate bilateral foraminal stenosis and mild narrowing  of the spinal canal.    C7-T1: No spinal canal or neuroforaminal stenosis.    Impression  Degenerative changes in the lower cervical spine most prominent at  C6-7 where there is moderate narrowing of bilateral foramina and mild  narrowing of the spinal canal.    Tortuous course of the left vertebral artery, which appears to extend  into the left neural foramen at C4-5 and may contact the exiting left  C5 nerve root. This variant anatomy is nonspecific  and is often seen  in asymptomatic patients. However, C5 nerve root compression can not  be excluded. Correlation with dermatomal distribution recommended.    Signed by: Oly Arroyo 3/6/2024 2:27 PM  Dictation workstation:   [id number]      MR lumbar spine wo IV contrast 01/09/2024    Narrative  Interpreted By:  Eliel Tan,  and Lianet Willams  STUDY:  MR LUMBAR SPINE WO IV CONTRAST;  1/9/2024 5:52 pm    INDICATION:  Signs/Symptoms:BLE weakness, acute onset, difficulty walking.    COMPARISON:  Same day CT of the lumbar spine from 12:40 p.m.  CT abdomen pelvis 09/21/2023    ACCESSION NUMBER(S):  [id number]    ORDERING CLINICIAN:  CARLITA RAMOS    TECHNIQUE:  Sagittal T1, T2, STIR, axial T1 and T2 weighted images of the lumbar  spine were acquired.    FINDINGS:  Alignment: No spondylolisthesis or facet widening.    Vertebrae/Intervertebral Discs: Vertebral body heights are  maintained.Mild diffuse nonspecific heterogeneity of the bone marrow  without focal marrow replacing lesion. Signal changes compatible with  disc desiccation are present predominantly within the lower lumbar  spine. There is severe disc space height loss at L4-5 with more mild  disc space height loss at L3-4.    Conus: The lower thoracic cord appears unremarkable. The conus  terminates at L1. The more distal nerve fibers are unremarkable..    T12-L1:  There is no significant spinal canal or neural foraminal  stenosis.    L1-2: Mild-to-moderate bilateral facet arthropathy. No significant  posterior disc contour abnormality. There is no significant spinal  canal or neural foraminal stenosis.    L2-3: Moderate bilateral facet arthropathy with subchondral cystic  formation in the bilateral inferior articular processes of L2 as well  as fluid signal intensity in the joint spaces bilaterally. Thickening  of the right aspect of the ligamentum flavum. No significant  posterior disc contour abnormality. There is no significant spinal  canal or  neural foraminal stenosis.    L3-4: Moderate bilateral facet arthropathy with increased fluid  signal in the joint spaces bilaterally. Thickening of the ligamentum  flavum. Moderate diffuse disc bulge causing mild spinal canal  stenosis with effacement of the anterior thecal sac as well as  mild-to-moderate bilateral neural foraminal stenosis.    L4-5: Moderate bilateral facet arthropathy with right-sided  thickening of the ligamentum flavum. There is diffuse disc bulge  moderate predominantly right-sided spinal canal stenosis with  effacement of the anterior thecal sac and moderate  right-greater-than-left neural foraminal stenosis.    L5-S1: Moderate bilateral facet arthropathy. Mild diffuse  circumferential disc bulge without significant spinal canal stenosis.  No significant right-sided neural foraminal stenosis.  Mild-to-moderate left-sided neural foraminal stenosis.    The prevertebral and posterior paraspinous soft tissues are  unremarkable. No significant abnormality of the visualized abdomen.    Impression  Multilevel degenerative changes of the lumbar spine as described  above worst at L4-5, where there is moderate spinal canal stenosis  with moderate right-greater-than-left neural foraminal stenosis, and  L3-4, where there is mild spinal canal stenosis with mild-to-moderate  bilateral neural foraminal stenosis.    I personally reviewed the images/study and I agree with the findings  as stated by Imer Martini MD (resident) . This study was  interpreted at [place], [place].    MACRO:  None    Signed by: Eliel Tan 1/9/2024 6:40 PM  Dictation workstation:   [id number]     No image results found.       No diagnosis found.     ASSESSMENT/PLAN  Vivien Miramontes is a 64 y.o. female here for bilateral L4 transforaminal epidural steroid injection under fluoroscopy    Patient denies any recent antibiotic use or infections, Plavix stopped over 5 days ago, and  denies contrast or local anesthetic allergies     Risks, benefits, alternatives discussed. All questions answered to the best of my ability. Patient agrees to proceed.      Our plan is as follows:  - Proceed with aforementioned procedure          Bernardo Mascorro MD   Pain fellow          [1]   Family History  Problem Relation Name Age of Onset    Stroke Mother      Hypertension Mother      Heart attack Mother      Heart attack Father      Diabetes Father      Multiple sclerosis Sister      Breast cancer Sister      Diabetes Brother

## 2025-06-23 ENCOUNTER — HOSPITAL ENCOUNTER (OUTPATIENT)
Facility: HOSPITAL | Age: 65
Discharge: HOME | End: 2025-06-23
Payer: COMMERCIAL

## 2025-06-23 VITALS
HEART RATE: 69 BPM | TEMPERATURE: 97.5 F | WEIGHT: 144 LBS | SYSTOLIC BLOOD PRESSURE: 123 MMHG | RESPIRATION RATE: 16 BRPM | BODY MASS INDEX: 24.59 KG/M2 | OXYGEN SATURATION: 98 % | HEIGHT: 64 IN | DIASTOLIC BLOOD PRESSURE: 90 MMHG

## 2025-06-23 DIAGNOSIS — M54.16 LUMBAR RADICULOPATHY: ICD-10-CM

## 2025-06-23 PROCEDURE — 2500000004 HC RX 250 GENERAL PHARMACY W/ HCPCS (ALT 636 FOR OP/ED): Performed by: ANESTHESIOLOGY

## 2025-06-23 PROCEDURE — 2550000001 HC RX 255 CONTRASTS: Performed by: ANESTHESIOLOGY

## 2025-06-23 PROCEDURE — 2720000007 HC OR 272 NO HCPCS

## 2025-06-23 PROCEDURE — 64483 NJX AA&/STRD TFRM EPI L/S 1: CPT | Performed by: ANESTHESIOLOGY

## 2025-06-23 PROCEDURE — 64483 NJX AA&/STRD TFRM EPI L/S 1: CPT | Mod: 50 | Performed by: ANESTHESIOLOGY

## 2025-06-23 RX ORDER — LIDOCAINE HYDROCHLORIDE 5 MG/ML
INJECTION, SOLUTION INFILTRATION; INTRAVENOUS
Status: COMPLETED | OUTPATIENT
Start: 2025-06-23 | End: 2025-06-23

## 2025-06-23 RX ORDER — DEXAMETHASONE SODIUM PHOSPHATE 10 MG/ML
INJECTION INTRAMUSCULAR; INTRAVENOUS
Status: COMPLETED | OUTPATIENT
Start: 2025-06-23 | End: 2025-06-23

## 2025-06-23 RX ORDER — MIDAZOLAM HYDROCHLORIDE 1 MG/ML
INJECTION, SOLUTION INTRAMUSCULAR; INTRAVENOUS
Status: COMPLETED | OUTPATIENT
Start: 2025-06-23 | End: 2025-06-23

## 2025-06-23 RX ADMIN — DEXAMETHASONE SODIUM PHOSPHATE 10 MG: 10 INJECTION, SOLUTION INTRAMUSCULAR; INTRAVENOUS at 10:07

## 2025-06-23 RX ADMIN — LIDOCAINE HYDROCHLORIDE 8 ML: 5 INJECTION, SOLUTION INFILTRATION at 10:06

## 2025-06-23 RX ADMIN — MIDAZOLAM 2 MG: 1 INJECTION INTRAMUSCULAR; INTRAVENOUS at 10:00

## 2025-06-23 RX ADMIN — IOHEXOL 1.5 ML: 240 INJECTION, SOLUTION INTRATHECAL; INTRAVASCULAR; INTRAVENOUS; ORAL at 10:07

## 2025-06-23 ASSESSMENT — PAIN - FUNCTIONAL ASSESSMENT
PAIN_FUNCTIONAL_ASSESSMENT: 0-10
PAIN_FUNCTIONAL_ASSESSMENT: 0-10
PAIN_FUNCTIONAL_ASSESSMENT: WONG-BAKER FACES
PAIN_FUNCTIONAL_ASSESSMENT: WONG-BAKER FACES
PAIN_FUNCTIONAL_ASSESSMENT: 0-10

## 2025-06-23 ASSESSMENT — PAIN SCALES - GENERAL
PAINLEVEL_OUTOF10: 5 - MODERATE PAIN
PAINLEVEL_OUTOF10: 7
PAINLEVEL_OUTOF10: 4
PAINLEVEL_OUTOF10: 6
PAINLEVEL_OUTOF10: 7

## 2025-06-23 NOTE — DISCHARGE INSTRUCTIONS
DISCHARGE INSTRUCTIONS FOR INJECTIONS     You underwent bilateral lumbar transforaminal epidural steroid injection today    After most injections, it is recommended that you relax and limit your activity for the remainder of the day unless you have been told otherwise by your pain physician.  You should not drive a car, operate machinery, or make important legal decisions unless otherwise directed by your pain physician.  You may resume your normal activity, including exercise, tomorrow.      Keep a written pain diary of how much pain relief you experienced following the injection procedure and the length of time of pain relief you experienced pain relief. Following diagnostic injections like medial branch nerve blocks, sacroiliac joint blocks, stellate ganglion injections and other blocks, it is very important you record the specific amount of pain relief you experienced immediately after the injectionand how long it lasted. Your doctor will ask you for this information at your follow up visit.     For all injections, please keep the injection site dry and inspect the site for a couple of days. You may remove the Band-Aid the day of the injection at any time.     Some discomfort, bruising or slight swelling may occur at the injection site. This is not abnormal if it occurs.  If needed you may:    -Take over the counter medication such as Tylenol or Motrin.   -Apply an ice pack for 30 minutes, 2 to 3 times a day for the first 24 hours.     You may shower today; no soaking baths, hot tubs, whirlpools or swimming pools for two days.      If you are given steroids in your injection, it may take 3-5 days for the steroid medication to take effect. You may notice a worsening of your symptoms for 1-2 days after the injection. This is not abnormal.  You may use acetaminophen, ibuprofen, or prescription medication that your doctor may have prescribed for you if you need to do so.     A few common side effects of steroids  include facial flushing, sweating, restlessness, irritability,difficulty sleeping, increase in blood sugar, and increased blood pressure. If you have diabetes, please monitor your blood sugar at least once a day for at least 5 days. If you have poorly controlled high blood pressure, monitoryour blood pressure for at least 2 days and contact your primary care physician if these numbers are unusually high for you.      If you take aspirin or non-steroidal anti-inflammatory drugs (examples are Motrin, Advil, ibuprofen, Naprosyn, Voltaren, Relafen, etc.) you may restart these this evening, but stop taking it 3 days before your next appointment, unless instructed otherwiseby your physician.      You do not need to discontinue non-aspirin-containing pain medications prior to an injection (examples: Celebrex, tramadol, hydrocodone and acetaminophen).      If you take a blood thinning medication (Coumadin, Lovenox, Fragmin,Ticlid, Plavix, Pradaxa, etc.), please discuss this with your primary care physician/cardiologist and your pain physician. These medications MUST be discontinued before you can have an injection safely, without the risk of uncontrolled bleeding. If these medications are not discontinued for an appropriate period of time, you will not be able to receivean injection. Please adhere to instructions given to you about when to restart your blood thinning medication. If you have any questions please reach out to our team.    If you are taking Coumadin, please have your INR checked the morning of your procedure and bring the result to your appointment unless otherwise instructed. If your INR is over 1.2, your injection may need to be rescheduled to avoid uncontrolled bleeding from the needle placement.     Call [facility]  and ask for Pain Management at [phone] between 8am-4pm Monday - Friday if you are experiencing the following:    If you received an epidural or spinal injection:    -Headache that doesnot  go away with medicine, is worse when sitting or standing up, and is greatly relieved upon lying down.   -Severe pain worse than or different than your baseline pain.   -Chills or fever (101º F or greater).   -Drainage or signs of infection at the injection site     Go directly to the Emergency Department if you are experiencing the following and received an epidural or spinal injection:   -Abrupt weakness or progressive weakness in your legs that starts after you leave the clinic.   -Abrupt severe or worsening numbness in your legs.   -Inability to urinate after the injection or loss of bowel or bladder control without the urge to defecate or urinate.     If you have a clinical question that cannot wait until your next appointment, please call [phone] between 8am-4pm Monday - Friday or send a [employer] message. We do our best to return all non-emergency messages within 24 hours, Monday - Friday. A nurse or physician will return your message. You may also try calling and they will do their best to answer your question(s):  - Dr. Rolf Light's nurse ([phone])  - Dr. Chantal Ramon/Dr. Farias's nurse ([phone])  - Dr. Soo Espinosa/Sydnie's nurse ([phone])     If you need to cancel an appointment, please call the scheduling staff at [phone] during normal business hours or leave a message at least 24 hours in advance.     If you are going to be sedated for your next procedure, you MUST have responsible adult who can legally drive accompany you home. You cannot eat or drink for at least eight hours prior to the planned procedure if you are going to receive sedation. You may take your non-blood thinning medications with a small sip of water.

## 2025-07-11 ENCOUNTER — OFFICE VISIT (OUTPATIENT)
Dept: GASTROENTEROLOGY | Facility: CLINIC | Age: 65
End: 2025-07-11
Payer: COMMERCIAL

## 2025-07-11 VITALS
HEART RATE: 69 BPM | BODY MASS INDEX: 24.75 KG/M2 | TEMPERATURE: 97.9 F | SYSTOLIC BLOOD PRESSURE: 127 MMHG | HEIGHT: 64 IN | DIASTOLIC BLOOD PRESSURE: 76 MMHG | WEIGHT: 145 LBS

## 2025-07-11 DIAGNOSIS — K50.119 CROHN'S DISEASE OF LARGE INTESTINE WITH COMPLICATION (MULTI): Primary | ICD-10-CM

## 2025-07-11 PROCEDURE — 1159F MED LIST DOCD IN RCRD: CPT | Performed by: INTERNAL MEDICINE

## 2025-07-11 PROCEDURE — 3008F BODY MASS INDEX DOCD: CPT | Performed by: INTERNAL MEDICINE

## 2025-07-11 PROCEDURE — 99214 OFFICE O/P EST MOD 30 MIN: CPT | Performed by: INTERNAL MEDICINE

## 2025-07-11 PROCEDURE — 99212 OFFICE O/P EST SF 10 MIN: CPT | Performed by: INTERNAL MEDICINE

## 2025-07-11 ASSESSMENT — ENCOUNTER SYMPTOMS: DIARRHEA: 1

## 2025-07-11 NOTE — PATIENT INSTRUCTIONS
Continue Entyvia  Will check fecal calprotectin-please give stool sample to lab.  Take Bentyl(dicyclomine), Imodium as needed  Colonoscopy in 2 to 3 months.  Follow-up with me in 6 months

## 2025-07-11 NOTE — PROGRESS NOTES
Chief Complaint: Vivien Miramontes is a 64 y.o. female who presents for Follow-up.  HPI  64-year-old female with history of CAD SP stent placement in May 2023-on aspirin and Plavix, NSTEMI, hypertension, hyperlipidemia, chron's disease-diagnosed in 2010, initially was on Humira then switched to  Stelara then to Entyvio came to GI clinic for follow-up visit .  Question of reaction to Entyvio.  However, no adverse side effect after restarting Entyvio.  Persistent diarrhea,   I suspect concurrent IBS.  Will get stool fecal calprotectin to make sure there is no GI ongoing inflammation.   Kidney function subsequently normalized.  Labs on 5/25/2025 borderline alkaline phosphatase with normal transaminase, bilirubin.  Colonoscopy by Dr. Jessica Hughes on 6/26/2024: Mild (in right colon ) to moderate erythema (transverse colon, descending colon, sigmoid colon )consistent with chron's colitis, TI appears to be normal, biopsy-chronic colitis in right colon.   T spot, hepatitis B surface antigen negative.  Hepatitis C antibody was positive however hepatitis C viral load was undetected.  She had bowel perforation due to complicated diverticulitis, had colostomy in 2009 at [facility] subsequently had reversal in 2010 .  Colonoscopy on 9/7/2021 by Dr. Man: Focal area of mild ulceration in the sigmoid colon otherwise the colon was entirely normal.colon prep was poor  Review of Systems   Gastrointestinal:  Positive for diarrhea.     12 Point ROS negative outside of symptoms stated above in HPI    Medical History[1]    Surgical History[2]    Family medical history includes stroke in mother.     reports that she has been smoking cigarettes. She has a 15 pack-year smoking history. She has never used smokeless tobacco. She reports that she does not currently use alcohol. She reports that she does not currently use drugs.    RX Allergies[3]    Imaging  Imaging  No results found.    Cardiology, Vascular, and Other Imaging  No other imaging  "results found for the past 7 days        Laboratory  No results found for this or any previous visit (from the past 96 hours).     Objective     Current Medications[4]    Last Recorded Vitals  Blood pressure 127/76, pulse 69, temperature 36.6 °C (97.9 °F), height 1.626 m (5' 4\"), weight 65.8 kg (145 lb).    Physical Exam  HENT:      Mouth/Throat:      Mouth: Mucous membranes are moist.   Eyes:      General: No scleral icterus.  Cardiovascular:      Rate and Rhythm: Normal rate and regular rhythm.   Pulmonary:      Effort: Pulmonary effort is normal. No respiratory distress.      Breath sounds: Normal breath sounds.   Abdominal:      General: Bowel sounds are normal. There is no distension.      Palpations: Abdomen is soft.      Tenderness: There is no abdominal tenderness.   Skin:     Coloration: Skin is not jaundiced.   Neurological:      Mental Status: She is alert and oriented to person, place, and time.         Assessment/Plan   Crohn's colitis- on Entyvio.  She was on Humira, stelera in the past.  As no remission with the Stelara(IL 12 and 23 blocker ) as a result switch to Skyrizi (IL 23 blocker) is not a great idea.    Borderline alkaline phosphatase  S/P colostomy in 2009 subsequently had reversal in 2010  Hepatitis C antibody positive: Spontaneous clearance versus false positive antibody.  GERD      Continue Entyvia  Will check fecal calprotectin-please give stool sample to lab.  Take Bentyl(dicyclomine), Imodium as needed  Hold Plavix 5 days prior to colonoscopy.  Okay to continue aspirin.  Colonoscopy in 2 to 3 months at [facility].  Follow-up with me in 6 months         [1]   Past Medical History:  Diagnosis Date    Anemia     CHF (congestive heart failure) 2012    Chronic diarrhea 2009    Coronary artery disease     2012: cardiac cath with PCI, 6/2/23: cardiac cath with PCI x2 (LAD & LCx) intermediate disease of a tortuous RCA - cardiac clearance requested    Crohn's disease (Multi) 2010    bowel perforation " 2009 s/p colostomy, reversed 2010    Depression     Hyperlipidemia     Hypertension 2010    IBS (irritable bowel syndrome)     Lung nodules     nonconcerning per pulm note    Myocardial infarction (Multi) 2012    Ulcerative colitis 2010   [2]   Past Surgical History:  Procedure Laterality Date    ABDOMINAL HERNIA REPAIR  2018    with mesh    CARDIAC CATHETERIZATION      PCI    CARDIAC CATHETERIZATION  2023    PCI x 2 (LAD and LCx)     SECTION, LOW TRANSVERSE  1987    CHOLECYSTECTOMY  2017    COLON SURGERY  2009    COLONOSCOPY      COLOSTOMY  2009    REVISION / TAKEDOWN COLOSTOMY  2010    SMALL INTESTINE SURGERY  2009    TUBAL LIGATION  1998    UMBILICAL HERNIA REPAIR      x 2   [3]   Allergies  Allergen Reactions    Penicillin Unknown    Penicillins Hives   [4]   Current Outpatient Medications:     acetaminophen (TylenoL) 325 mg tablet, Take 2 tablets (650 mg) by mouth every 6 hours if needed for mild pain (1 - 3) or fever (temp greater than 38.0 C)., Disp: 30 tablet, Rfl: 0    amLODIPine (Norvasc) 5 mg tablet, Take 1.5 tablets (7.5 mg) by mouth early in the morning.., Disp: , Rfl:     aspirin 81 mg EC tablet, Take 1 tablet (81 mg) by mouth once daily., Disp: , Rfl:     carvedilol (Coreg) 25 mg tablet, Take 1 tablet (25 mg) by mouth every 12 hours., Disp: , Rfl:     clopidogrel (Plavix) 75 mg tablet, Take 1 tablet (75 mg) by mouth once daily., Disp: , Rfl:     hydroCHLOROthiazide (HYDRODiuril) 25 mg tablet, Take 1 tablet (25 mg) by mouth once daily., Disp: , Rfl:     hydrOXYzine HCL (Atarax) 25 mg tablet, Take 1 tablet (25 mg) by mouth 3 times a day as needed for itching. May cause drowsiness, Disp: , Rfl:     lisinopril 40 mg tablet, Take 1 tablet (40 mg) by mouth once daily. as directed, Disp: , Rfl:     nitroglycerin (Nitrostat) 0.4 mg SL tablet, Place 1 tablet (0.4 mg) under the tongue. Dissolve 1 tablet under the tongue as needed for chest pain., Disp: , Rfl:     pregabalin (Lyrica) 150 mg  capsule, Take 1 capsule (150 mg) by mouth 3 times a day., Disp: , Rfl:     rosuvastatin (Crestor) 40 mg tablet, Take 1 tablet (40 mg) by mouth once daily at bedtime., Disp: , Rfl:     vedolizumab (Entyvio) 300 mg injection, Infuse 300 mg into a venous catheter every 8 (eight) weeks.  For Maintenance: every 8 weeks, Disp: 5 mL, Rfl: 2    vitamin E 180 mg (400 unit) capsule, Take 1 capsule (400 Units) by mouth once daily., Disp: , Rfl:     methocarbamol (Robaxin) 500 mg tablet, Take 1 tablet (500 mg) by mouth 3 times a day as needed for muscle spasms., Disp: 90 tablet, Rfl: 0    metoprolol succinate XL (Toprol-XL) 50 mg 24 hr tablet, Take by mouth. (Patient not taking: Reported on 7/11/2025), Disp: , Rfl:     vedolizumab (Entyvio) 300 mg injection, Infuse 300 mg into a venous catheter see administration instructions.  For Induction:  0, 2 and 6 weeks, Disp: 15 mL, Rfl: 0    Current Facility-Administered Medications:     acetaminophen (Tylenol) tablet 650 mg, 650 mg, oral, Once, Leatha Salazar, APRN-CNP

## 2025-07-14 ENCOUNTER — TELEPHONE (OUTPATIENT)
Dept: GASTROENTEROLOGY | Facility: CLINIC | Age: 65
End: 2025-07-14
Payer: COMMERCIAL

## 2025-07-14 NOTE — TELEPHONE ENCOUNTER
----- Message from Ping Pedro sent at 7/11/2025 11:00 AM EDT -----  Colonoscopy at [facility]  in 2-3 months with MiraLAX prep

## 2025-07-22 ENCOUNTER — APPOINTMENT (OUTPATIENT)
Dept: INFUSION THERAPY | Facility: CLINIC | Age: 65
End: 2025-07-22
Payer: COMMERCIAL

## 2025-07-22 VITALS
DIASTOLIC BLOOD PRESSURE: 72 MMHG | WEIGHT: 143.3 LBS | SYSTOLIC BLOOD PRESSURE: 119 MMHG | RESPIRATION RATE: 16 BRPM | HEART RATE: 61 BPM | OXYGEN SATURATION: 98 % | TEMPERATURE: 97.6 F | BODY MASS INDEX: 24.6 KG/M2

## 2025-07-22 DIAGNOSIS — K50.118 CROHN'S DISEASE OF LARGE INTESTINE WITH OTHER COMPLICATION: ICD-10-CM

## 2025-07-22 PROCEDURE — 96365 THER/PROPH/DIAG IV INF INIT: CPT | Performed by: NURSE PRACTITIONER

## 2025-07-22 RX ORDER — ALBUTEROL SULFATE 0.83 MG/ML
3 SOLUTION RESPIRATORY (INHALATION) AS NEEDED
OUTPATIENT
Start: 2025-09-16

## 2025-07-22 RX ORDER — EPINEPHRINE 0.3 MG/.3ML
0.3 INJECTION SUBCUTANEOUS EVERY 5 MIN PRN
OUTPATIENT
Start: 2025-09-16

## 2025-07-22 RX ORDER — FAMOTIDINE 10 MG/ML
20 INJECTION, SOLUTION INTRAVENOUS ONCE AS NEEDED
OUTPATIENT
Start: 2025-09-16

## 2025-07-22 RX ORDER — DIPHENHYDRAMINE HYDROCHLORIDE 50 MG/ML
50 INJECTION, SOLUTION INTRAMUSCULAR; INTRAVENOUS AS NEEDED
OUTPATIENT
Start: 2025-09-16

## 2025-07-22 ASSESSMENT — ENCOUNTER SYMPTOMS
DIARRHEA: 0
EXTREMITY WEAKNESS: 0
ARTHRALGIAS: 0
FEVER: 0
SORE THROAT: 0
FREQUENCY: 0
UNEXPECTED WEIGHT CHANGE: 0
LIGHT-HEADEDNESS: 0
NUMBNESS: 0
APPETITE CHANGE: 0
VOICE CHANGE: 0
WOUND: 0
BLOOD IN STOOL: 0
EYE PROBLEMS: 0
CHILLS: 0
DIZZINESS: 0
BRUISES/BLEEDS EASILY: 0
ABDOMINAL PAIN: 0
FATIGUE: 0
HEMATURIA: 0
MYALGIAS: 0
NAUSEA: 0
LEG SWELLING: 0
WHEEZING: 0
VOMITING: 0
COUGH: 0
HEADACHES: 0
DYSURIA: 0
SHORTNESS OF BREATH: 0
CONSTIPATION: 0
PALPITATIONS: 0
TROUBLE SWALLOWING: 0

## 2025-07-22 NOTE — PATIENT INSTRUCTIONS
Today :We administered vedolizumab (Entyvio) 300 mg in sodium chloride 0.9% 255 mL IV.     For:   1. Crohn's disease of large intestine with other complication         Your next appointment is due in:  8 WEEKS        Please read the  Medication Guide that was given to you and reviewed during todays visit.     (Tell all doctors including dentists that you are taking this medication)     Go to the emergency room or call 911 if:  -You have signs of allergic reaction:   -Rash, hives, itching.   -Swollen, blistered, peeling skin.   -Swelling of face, lips, mouth, tongue or throat.   -Tightness of chest, trouble breathing, swallowing or talking     Call your doctor:  - If IV / injection site gets red, warm, swollen, itchy or leaks fluid or pus.     (Leave dressing on your IV site for at least 2 hours and keep area clean and dry  - If you get sick or have symptoms of infection or are not feeling well for any reason.    (Wash your hands often, stay away from people who are sick)  - If you have side effects from your medication that do not go away or are bothersome.     (Refer to the teaching your nurse gave you for side effects to call your doctor about)    - Common side effects may include:  stuffy nose, headache, feeling tired, muscle aches, upset stomach  - Before receiving any vaccines     - Call the [facility] at   If:  - You get sick, are on antibiotics, have had a recent vaccine, have surgery or dental work and your doctor wants your visit rescheduled.  - You need to cancel and reschedule your visit for any reason. Call at least 2 days before your visit if you need to cancel.   - Your insurance changes before your next visit.    (We will need to get approval from your new insurance. This can take up to two weeks.)     The [facility] is opened Monday thru Friday. We are closed on weekends and holidays.   Voice mail will take your call if the center is closed. If you leave a message  please allow 24 hours for a call back during weekdays. If you leave a message on a weekend/holiday, we will call you back the next business day.    A pharmacist is available Monday - Friday from 8:30AM to 3:30PM to help answer any questions you may have about your prescriptions(s). Please call pharmacy at:    [facility]: [phone]  [facility]: [phone]  [facility]: [phone]

## 2025-07-22 NOTE — PROGRESS NOTES
[facility]   Infusion Clinic Note   Date: 2025   Name: Vivien Miramontes  : 1960   MRN: [id number]         Reason for Visit: Follow-up and OP Infusion (PT HERE FOR ENTYVIO 300MG/NEXT APT: 8 WEEKS )         Today: We administered vedolizumab (Entyvio) 300 mg in sodium chloride 0.9% 255 mL IV.       Ordered By: Ping Pedro MD       For a Diagnosis of: Crohn's disease of large intestine with other complication       At today's visit patient accompanied by: FRIEND      Today's Vitals:   Vitals:    25 1409 25 1504   BP: 132/78 119/72   Pulse: 66 61   Resp: 18 16   Temp: 36.3 °C (97.3 °F) 36.4 °C (97.6 °F)   SpO2: Comment: long polished fiingernails 98%   Weight: 65 kg (143 lb 4.8 oz)              Pre - Treatment Checklist:      - Previous reaction to current treatment: no      (Assess patient for the concerns below. Document provider notification as appropriate).  - Active or recent infection with/without current antibiotic use: no  - Recent or planned invasive dental work: no  - Recent or planned surgeries: no  - Recently received or plans to receive vaccinations: no  - Has treatment related toxicities: no  - Any chance may be pregnant:  n/a      Pain: 7   - Is the pain different from normal: no   - Is prescribing Doctor aware:  yes      Labs: Reviewed       Fall Risk Screening: Clay Fall Risk  History of Falling, Immediate or Within 3 Months: No  Secondary Diagnosis: No  Ambulatory Aid: Crutches/cane/walker  Intravenous Therapy/Heparin Lock: No  Gait/Transferring: Normal/bedrest/immobile  Mental Status: Oriented to own ability  Clay Fall Risk Score: 15            Review Of Systems:  Review of Systems   Constitutional:  Negative for appetite change, chills, fatigue, fever and unexpected weight change.   HENT:   Negative for hearing loss, mouth sores, sore throat, tinnitus, trouble swallowing and voice change.    Eyes:  Negative for eye problems.         "WORSENING VISION   Respiratory:  Negative for cough, shortness of breath and wheezing.    Cardiovascular:  Negative for chest pain, leg swelling and palpitations.   Gastrointestinal:  Negative for abdominal pain, blood in stool, constipation, diarrhea, nausea and vomiting.        PT REPORTS 10-15 BMS PER DAY  PT DENIES BLOOD AND MUCOUS    Genitourinary:  Negative for dysuria, frequency and hematuria.    Musculoskeletal:  Negative for arthralgias and myalgias.   Skin:  Negative for itching, rash and wound.   Neurological:  Negative for dizziness, extremity weakness, headaches, light-headedness and numbness.   Hematological:  Does not bruise/bleed easily.         Infusion Readiness:  - Assessment Concerns Related to Infusion: No  - Provider notified: n/a      New Patient Education:    N/A (returning patient for continuation of therapy. Ongoing education provided as needed.)        Treatment Conditions & Drug Specific Questions:    Vedolizumab  (ENTYVIO)    (Unless otherwise specified on patient specific therapy plan):     TREATMENT CONDITIONS:  Unless otherwise specified on patient specific therapy plan HOLD and notify provider prior to proceeding with treatment if:   o Positive T-Spot  o Patient has signs or symptoms of Progressive Multifocal Leukoencephalopath (PML)     Lab Results   Component Value Date    HEPBSAG Nonreactive 03/01/2024      No results found for: \"NONUHFIRE\", \"NONUHSWGH\", \"NONUHFISH\"  Lab Results   Component Value Date    TBSIN Negative 03/01/2024      Lab Results   Component Value Date    ALT 32 05/25/2025    AST 22 05/25/2025    ALKPHOS 146 (H) 05/25/2025    BILITOT 0.4 05/25/2025        Patient meets treatment conditions? Yes    DRUG SPECIFIC QUESTIONS:  Any signs or symptoms of Progressive Multifocal Leukoencephalopath (PML) which may include: memory loss, trouble thinking, loss of balance, difficulty talking or walking, loss of vision?  No    (If YES notify prescribing provider prior to " proceeding)    REMINDERS:  Recommended Vitals/Observation:  Vitals: Monitor vitals at start of infusion and at completion of infusion.  Observation: First 3 infusions patient may leave 15 minutes post infusion. Subsequent maintenance infusions: if no reaction, may leave immediately post infusion.        Weight Based Drug Calculations:    WEIGHT BASED DRUGS: NOT APPLICABLE / FLAT DOSE       Post Treatment: Patient tolerated treatment without issue and was discharged in no apparent distress.      Note Authored / Patient Cared for By: Pro Ruiz RN

## 2025-07-30 ENCOUNTER — HOSPITAL ENCOUNTER (INPATIENT)
Facility: HOSPITAL | Age: 65
LOS: 4 days | Discharge: HOME | DRG: 386 | End: 2025-08-03
Attending: STUDENT IN AN ORGANIZED HEALTH CARE EDUCATION/TRAINING PROGRAM | Admitting: INTERNAL MEDICINE
Payer: COMMERCIAL

## 2025-07-30 ENCOUNTER — CLINICAL SUPPORT (OUTPATIENT)
Dept: EMERGENCY MEDICINE | Facility: HOSPITAL | Age: 65
DRG: 386 | End: 2025-07-30
Payer: COMMERCIAL

## 2025-07-30 ENCOUNTER — APPOINTMENT (OUTPATIENT)
Dept: RADIOLOGY | Facility: HOSPITAL | Age: 65
DRG: 386 | End: 2025-07-30
Payer: COMMERCIAL

## 2025-07-30 DIAGNOSIS — R10.84 GENERALIZED ABDOMINAL PAIN: ICD-10-CM

## 2025-07-30 DIAGNOSIS — R19.7 DIARRHEA, UNSPECIFIED TYPE: Primary | ICD-10-CM

## 2025-07-30 DIAGNOSIS — K50.10 CROHN'S DISEASE OF LARGE INTESTINE WITHOUT COMPLICATION (MULTI): ICD-10-CM

## 2025-07-30 DIAGNOSIS — R10.13 EPIGASTRIC PAIN: ICD-10-CM

## 2025-07-30 DIAGNOSIS — I25.110 CORONARY ARTERY DISEASE INVOLVING NATIVE CORONARY ARTERY OF NATIVE HEART WITH UNSTABLE ANGINA PECTORIS: ICD-10-CM

## 2025-07-30 DIAGNOSIS — S30.1XXA ABDOMINAL HEMATOMA: ICD-10-CM

## 2025-07-30 LAB
ALBUMIN SERPL BCP-MCNC: 4.2 G/DL (ref 3.4–5)
ALBUMIN SERPL BCP-MCNC: 4.5 G/DL (ref 3.4–5)
ALP SERPL-CCNC: 110 U/L (ref 33–136)
ALT SERPL W P-5'-P-CCNC: 32 U/L (ref 7–45)
ANION GAP BLDV CALCULATED.4IONS-SCNC: 14 MMOL/L (ref 10–25)
ANION GAP BLDV CALCULATED.4IONS-SCNC: 18 MMOL/L (ref 10–25)
ANION GAP SERPL CALC-SCNC: 16 MMOL/L (ref 10–20)
ANION GAP SERPL CALC-SCNC: 17 MMOL/L (ref 10–20)
AST SERPL W P-5'-P-CCNC: 26 U/L (ref 9–39)
BASE EXCESS BLDV CALC-SCNC: -12.1 MMOL/L (ref -2–3)
BASE EXCESS BLDV CALC-SCNC: -7.6 MMOL/L (ref -2–3)
BASOPHILS # BLD AUTO: 0.06 X10*3/UL (ref 0–0.1)
BASOPHILS NFR BLD AUTO: 0.6 %
BILIRUB SERPL-MCNC: 0.4 MG/DL (ref 0–1.2)
BODY TEMPERATURE: 37 DEGREES CELSIUS
BODY TEMPERATURE: 37 DEGREES CELSIUS
BUN SERPL-MCNC: 26 MG/DL (ref 6–23)
BUN SERPL-MCNC: 31 MG/DL (ref 6–23)
CA-I BLDV-SCNC: 1.26 MMOL/L (ref 1.1–1.33)
CA-I BLDV-SCNC: 1.27 MMOL/L (ref 1.1–1.33)
CALCIUM SERPL-MCNC: 9.3 MG/DL (ref 8.6–10.6)
CALCIUM SERPL-MCNC: 9.5 MG/DL (ref 8.6–10.6)
CARDIAC TROPONIN I PNL SERPL HS: 8 NG/L (ref 0–34)
CHLORIDE BLDV-SCNC: 105 MMOL/L (ref 98–107)
CHLORIDE BLDV-SCNC: 109 MMOL/L (ref 98–107)
CHLORIDE SERPL-SCNC: 108 MMOL/L (ref 98–107)
CHLORIDE SERPL-SCNC: 110 MMOL/L (ref 98–107)
CO2 SERPL-SCNC: 13 MMOL/L (ref 21–32)
CO2 SERPL-SCNC: 13 MMOL/L (ref 21–32)
CREAT SERPL-MCNC: 2.37 MG/DL (ref 0.5–1.05)
CREAT SERPL-MCNC: 2.51 MG/DL (ref 0.5–1.05)
CRP SERPL-MCNC: 0.37 MG/DL
EGFRCR SERPLBLD CKD-EPI 2021: 21 ML/MIN/1.73M*2
EGFRCR SERPLBLD CKD-EPI 2021: 22 ML/MIN/1.73M*2
EOSINOPHIL # BLD AUTO: 0.1 X10*3/UL (ref 0–0.7)
EOSINOPHIL NFR BLD AUTO: 1 %
ERYTHROCYTE [DISTWIDTH] IN BLOOD BY AUTOMATED COUNT: 13.9 % (ref 11.5–14.5)
ERYTHROCYTE [SEDIMENTATION RATE] IN BLOOD BY WESTERGREN METHOD: 31 MM/H (ref 0–30)
GLUCOSE BLDV-MCNC: 109 MG/DL (ref 74–99)
GLUCOSE BLDV-MCNC: 160 MG/DL (ref 74–99)
GLUCOSE SERPL-MCNC: 160 MG/DL (ref 74–99)
GLUCOSE SERPL-MCNC: 96 MG/DL (ref 74–99)
HCO3 BLDV-SCNC: 13.3 MMOL/L (ref 22–26)
HCO3 BLDV-SCNC: 16.6 MMOL/L (ref 22–26)
HCT VFR BLD AUTO: 43.3 % (ref 36–46)
HCT VFR BLD EST: 46 % (ref 36–46)
HCT VFR BLD EST: 47 % (ref 36–46)
HGB BLD-MCNC: 14.7 G/DL (ref 12–16)
HGB BLDV-MCNC: 15.2 G/DL (ref 12–16)
HGB BLDV-MCNC: 15.5 G/DL (ref 12–16)
IMM GRANULOCYTES # BLD AUTO: 0.05 X10*3/UL (ref 0–0.7)
IMM GRANULOCYTES NFR BLD AUTO: 0.5 % (ref 0–0.9)
INHALED O2 CONCENTRATION: 21 %
INHALED O2 CONCENTRATION: 21 %
LACTATE BLDV-SCNC: 1 MMOL/L (ref 0.4–2)
LACTATE BLDV-SCNC: 1.1 MMOL/L (ref 0.4–2)
LIPASE SERPL-CCNC: 36 U/L (ref 9–82)
LYMPHOCYTES # BLD AUTO: 2.2 X10*3/UL (ref 1.2–4.8)
LYMPHOCYTES NFR BLD AUTO: 22.8 %
MAGNESIUM SERPL-MCNC: 2.1 MG/DL (ref 1.6–2.4)
MCH RBC QN AUTO: 28.2 PG (ref 26–34)
MCHC RBC AUTO-ENTMCNC: 33.9 G/DL (ref 32–36)
MCV RBC AUTO: 83 FL (ref 80–100)
MONOCYTES # BLD AUTO: 0.78 X10*3/UL (ref 0.1–1)
MONOCYTES NFR BLD AUTO: 8.1 %
NEUTROPHILS # BLD AUTO: 6.46 X10*3/UL (ref 1.2–7.7)
NEUTROPHILS NFR BLD AUTO: 67 %
NRBC BLD-RTO: 0 /100 WBCS (ref 0–0)
OXYHGB MFR BLDV: 78.5 % (ref 45–75)
OXYHGB MFR BLDV: 89.9 % (ref 45–75)
PCO2 BLDV: 29 MM HG (ref 41–51)
PCO2 BLDV: 30 MM HG (ref 41–51)
PH BLDV: 7.27 PH (ref 7.33–7.43)
PH BLDV: 7.35 PH (ref 7.33–7.43)
PHOSPHATE SERPL-MCNC: 5.5 MG/DL (ref 2.5–4.9)
PLATELET # BLD AUTO: 275 X10*3/UL (ref 150–450)
PO2 BLDV: 53 MM HG (ref 35–45)
PO2 BLDV: 68 MM HG (ref 35–45)
POTASSIUM BLDV-SCNC: 3.6 MMOL/L (ref 3.5–5.3)
POTASSIUM BLDV-SCNC: 4.3 MMOL/L (ref 3.5–5.3)
POTASSIUM SERPL-SCNC: 3.4 MMOL/L (ref 3.5–5.3)
POTASSIUM SERPL-SCNC: 4.4 MMOL/L (ref 3.5–5.3)
PROT SERPL-MCNC: 7.7 G/DL (ref 6.4–8.2)
RBC # BLD AUTO: 5.21 X10*6/UL (ref 4–5.2)
SAO2 % BLDV: 85 % (ref 45–75)
SAO2 % BLDV: 93 % (ref 45–75)
SODIUM BLDV-SCNC: 133 MMOL/L (ref 136–145)
SODIUM BLDV-SCNC: 135 MMOL/L (ref 136–145)
SODIUM SERPL-SCNC: 134 MMOL/L (ref 136–145)
SODIUM SERPL-SCNC: 136 MMOL/L (ref 136–145)
TSH SERPL-ACNC: 0.54 MIU/L (ref 0.44–3.98)
WBC # BLD AUTO: 9.7 X10*3/UL (ref 4.4–11.3)

## 2025-07-30 PROCEDURE — 93005 ELECTROCARDIOGRAM TRACING: CPT

## 2025-07-30 PROCEDURE — 99285 EMERGENCY DEPT VISIT HI MDM: CPT | Performed by: NURSE PRACTITIONER

## 2025-07-30 PROCEDURE — 2550000001 HC RX 255 CONTRASTS: Performed by: STUDENT IN AN ORGANIZED HEALTH CARE EDUCATION/TRAINING PROGRAM

## 2025-07-30 PROCEDURE — 85025 COMPLETE CBC W/AUTO DIFF WBC: CPT | Performed by: NURSE PRACTITIONER

## 2025-07-30 PROCEDURE — 96360 HYDRATION IV INFUSION INIT: CPT

## 2025-07-30 PROCEDURE — 84132 ASSAY OF SERUM POTASSIUM: CPT | Performed by: NURSE PRACTITIONER

## 2025-07-30 PROCEDURE — 84302 ASSAY OF SWEAT SODIUM: CPT

## 2025-07-30 PROCEDURE — 83630 LACTOFERRIN FECAL (QUAL): CPT

## 2025-07-30 PROCEDURE — 2500000004 HC RX 250 GENERAL PHARMACY W/ HCPCS (ALT 636 FOR OP/ED)

## 2025-07-30 PROCEDURE — 83993 ASSAY FOR CALPROTECTIN FECAL: CPT

## 2025-07-30 PROCEDURE — 36415 COLL VENOUS BLD VENIPUNCTURE: CPT | Performed by: NURSE PRACTITIONER

## 2025-07-30 PROCEDURE — 84132 ASSAY OF SERUM POTASSIUM: CPT

## 2025-07-30 PROCEDURE — 87075 CULTR BACTERIA EXCEPT BLOOD: CPT

## 2025-07-30 PROCEDURE — 2500000004 HC RX 250 GENERAL PHARMACY W/ HCPCS (ALT 636 FOR OP/ED): Performed by: NURSE PRACTITIONER

## 2025-07-30 PROCEDURE — 87493 C DIFF AMPLIFIED PROBE: CPT

## 2025-07-30 PROCEDURE — 83690 ASSAY OF LIPASE: CPT | Performed by: NURSE PRACTITIONER

## 2025-07-30 PROCEDURE — 74177 CT ABD & PELVIS W/CONTRAST: CPT

## 2025-07-30 PROCEDURE — 93010 ELECTROCARDIOGRAM REPORT: CPT

## 2025-07-30 PROCEDURE — 87329 GIARDIA AG IA: CPT

## 2025-07-30 PROCEDURE — 99223 1ST HOSP IP/OBS HIGH 75: CPT

## 2025-07-30 PROCEDURE — 83735 ASSAY OF MAGNESIUM: CPT | Performed by: NURSE PRACTITIONER

## 2025-07-30 PROCEDURE — 84484 ASSAY OF TROPONIN QUANT: CPT | Performed by: NURSE PRACTITIONER

## 2025-07-30 PROCEDURE — 87328 CRYPTOSPORIDIUM AG IA: CPT

## 2025-07-30 PROCEDURE — 1100000001 HC PRIVATE ROOM DAILY

## 2025-07-30 PROCEDURE — 2500000002 HC RX 250 W HCPCS SELF ADMINISTERED DRUGS (ALT 637 FOR MEDICARE OP, ALT 636 FOR OP/ED)

## 2025-07-30 PROCEDURE — 36415 COLL VENOUS BLD VENIPUNCTURE: CPT | Performed by: STUDENT IN AN ORGANIZED HEALTH CARE EDUCATION/TRAINING PROGRAM

## 2025-07-30 PROCEDURE — 2500000001 HC RX 250 WO HCPCS SELF ADMINISTERED DRUGS (ALT 637 FOR MEDICARE OP)

## 2025-07-30 PROCEDURE — 86140 C-REACTIVE PROTEIN: CPT | Performed by: STUDENT IN AN ORGANIZED HEALTH CARE EDUCATION/TRAINING PROGRAM

## 2025-07-30 PROCEDURE — 87506 IADNA-DNA/RNA PROBE TQ 6-11: CPT

## 2025-07-30 PROCEDURE — 99285 EMERGENCY DEPT VISIT HI MDM: CPT | Mod: 25 | Performed by: STUDENT IN AN ORGANIZED HEALTH CARE EDUCATION/TRAINING PROGRAM

## 2025-07-30 PROCEDURE — 84443 ASSAY THYROID STIM HORMONE: CPT

## 2025-07-30 PROCEDURE — 85652 RBC SED RATE AUTOMATED: CPT | Performed by: STUDENT IN AN ORGANIZED HEALTH CARE EDUCATION/TRAINING PROGRAM

## 2025-07-30 RX ORDER — LIDOCAINE 560 MG/1
1 PATCH PERCUTANEOUS; TOPICAL; TRANSDERMAL DAILY PRN
Status: DISCONTINUED | OUTPATIENT
Start: 2025-07-30 | End: 2025-08-02

## 2025-07-30 RX ORDER — DEXTROSE 50 % IN WATER (D50W) INTRAVENOUS SYRINGE
25
Status: DISCONTINUED | OUTPATIENT
Start: 2025-07-30 | End: 2025-08-03 | Stop reason: HOSPADM

## 2025-07-30 RX ORDER — ACETAMINOPHEN 325 MG/1
650 TABLET ORAL EVERY 6 HOURS PRN
Status: DISCONTINUED | OUTPATIENT
Start: 2025-07-30 | End: 2025-08-03 | Stop reason: HOSPADM

## 2025-07-30 RX ORDER — HYDROCHLOROTHIAZIDE 25 MG/1
25 TABLET ORAL DAILY
Status: DISCONTINUED | OUTPATIENT
Start: 2025-07-30 | End: 2025-08-03 | Stop reason: HOSPADM

## 2025-07-30 RX ORDER — AMLODIPINE BESYLATE 5 MG/1
7.5 TABLET ORAL
Status: DISCONTINUED | OUTPATIENT
Start: 2025-07-31 | End: 2025-08-03 | Stop reason: HOSPADM

## 2025-07-30 RX ORDER — HEPARIN SODIUM 5000 [USP'U]/ML
5000 INJECTION, SOLUTION INTRAVENOUS; SUBCUTANEOUS EVERY 8 HOURS SCHEDULED
Status: DISCONTINUED | OUTPATIENT
Start: 2025-07-30 | End: 2025-08-03 | Stop reason: HOSPADM

## 2025-07-30 RX ORDER — CARVEDILOL 25 MG/1
25 TABLET ORAL
Status: DISCONTINUED | OUTPATIENT
Start: 2025-07-30 | End: 2025-08-03 | Stop reason: HOSPADM

## 2025-07-30 RX ORDER — MORPHINE SULFATE 4 MG/ML
2 INJECTION INTRAVENOUS ONCE
Status: DISCONTINUED | OUTPATIENT
Start: 2025-07-30 | End: 2025-07-31

## 2025-07-30 RX ORDER — ROSUVASTATIN CALCIUM 40 MG/1
40 TABLET, COATED ORAL NIGHTLY
Status: DISCONTINUED | OUTPATIENT
Start: 2025-07-30 | End: 2025-07-31 | Stop reason: DRUGHIGH

## 2025-07-30 RX ORDER — ONDANSETRON HYDROCHLORIDE 2 MG/ML
4 INJECTION, SOLUTION INTRAVENOUS ONCE
Status: DISCONTINUED | OUTPATIENT
Start: 2025-07-30 | End: 2025-07-31

## 2025-07-30 RX ORDER — PREGABALIN 75 MG/1
75 CAPSULE ORAL 2 TIMES DAILY
Status: DISCONTINUED | OUTPATIENT
Start: 2025-07-30 | End: 2025-08-02

## 2025-07-30 RX ORDER — DEXTROSE 50 % IN WATER (D50W) INTRAVENOUS SYRINGE
12.5
Status: DISCONTINUED | OUTPATIENT
Start: 2025-07-30 | End: 2025-08-03 | Stop reason: HOSPADM

## 2025-07-30 RX ORDER — CLOPIDOGREL BISULFATE 75 MG/1
75 TABLET ORAL DAILY
Status: DISCONTINUED | OUTPATIENT
Start: 2025-07-31 | End: 2025-07-31

## 2025-07-30 RX ORDER — LISINOPRIL 10 MG/1
40 TABLET ORAL DAILY
Status: DISCONTINUED | OUTPATIENT
Start: 2025-07-30 | End: 2025-08-03 | Stop reason: HOSPADM

## 2025-07-30 RX ORDER — ASPIRIN 81 MG/1
81 TABLET ORAL DAILY
Status: DISCONTINUED | OUTPATIENT
Start: 2025-07-31 | End: 2025-08-03 | Stop reason: HOSPADM

## 2025-07-30 RX ORDER — FAMOTIDINE 10 MG/ML
20 INJECTION, SOLUTION INTRAVENOUS ONCE
Status: DISCONTINUED | OUTPATIENT
Start: 2025-07-30 | End: 2025-07-31

## 2025-07-30 RX ORDER — SODIUM CHLORIDE, SODIUM LACTATE, POTASSIUM CHLORIDE, CALCIUM CHLORIDE 600; 310; 30; 20 MG/100ML; MG/100ML; MG/100ML; MG/100ML
100 INJECTION, SOLUTION INTRAVENOUS CONTINUOUS
Status: ACTIVE | OUTPATIENT
Start: 2025-07-30 | End: 2025-07-31

## 2025-07-30 RX ADMIN — PREGABALIN 75 MG: 75 CAPSULE ORAL at 20:05

## 2025-07-30 RX ADMIN — ROSUVASTATIN CALCIUM 40 MG: 20 TABLET, FILM COATED ORAL at 20:05

## 2025-07-30 RX ADMIN — IOHEXOL 75 ML: 350 INJECTION, SOLUTION INTRAVENOUS at 15:55

## 2025-07-30 RX ADMIN — SODIUM CHLORIDE, SODIUM LACTATE, POTASSIUM CHLORIDE, AND CALCIUM CHLORIDE 75 ML/HR: .6; .31; .03; .02 INJECTION, SOLUTION INTRAVENOUS at 14:34

## 2025-07-30 RX ADMIN — HEPARIN SODIUM 5000 UNITS: 5000 INJECTION, SOLUTION INTRAVENOUS; SUBCUTANEOUS at 21:49

## 2025-07-30 RX ADMIN — SODIUM CHLORIDE, SODIUM LACTATE, POTASSIUM CHLORIDE, AND CALCIUM CHLORIDE 1000 ML: 600; 310; 30; 20 INJECTION, SOLUTION INTRAVENOUS at 10:31

## 2025-07-30 ASSESSMENT — PAIN - FUNCTIONAL ASSESSMENT
PAIN_FUNCTIONAL_ASSESSMENT: 0-10

## 2025-07-30 ASSESSMENT — PAIN SCALES - GENERAL
PAINLEVEL_OUTOF10: 0 - NO PAIN
PAINLEVEL_OUTOF10: 0 - NO PAIN
PAINLEVEL_OUTOF10: 9

## 2025-07-30 NOTE — ED PROVIDER NOTES
HPI   Chief Complaint   Patient presents with    Diarrhea    Dehydration       HPI  This  is a 65 year old female with past medical history significant for crohn's disease (on Entyvio every two months), HTN, hyperlipidemia, CAD S/P stent,  spinal stenosis, and cholecystectomy presents to the ED today with generalized abdominal pain, non-bloody diarrhea and inability to tolerate solids since last Thursday.   She denies nausea, vomiting, dysuria, fever and/or chills. She endorses chest pain but she says that she always have chest pain and this is not new.   Of note, her BP was low in triage and repeat in the room was 90/60 however she has no dizziness or lightheadedness appears well and non-toxic.        Patient History   Medical History[1]  Surgical History[2]  Family History[3]  Social History[4]    Physical Exam   ED Triage Vitals [07/30/25 1002]   Temperature Heart Rate Respirations BP   36.5 °C (97.7 °F) 70 16 82/53      Pulse Ox Temp src Heart Rate Source Patient Position   99 % -- -- --      BP Location FiO2 (%)     -- --       Physical Exam  Constitutional:       General: She is not in acute distress.     Appearance: Normal appearance. She is not ill-appearing or toxic-appearing.   HENT:      Head: Normocephalic and atraumatic.      Mouth/Throat:      Mouth: Mucous membranes are dry.     Eyes:      Extraocular Movements: Extraocular movements intact.      Pupils: Pupils are equal, round, and reactive to light.       Cardiovascular:      Rate and Rhythm: Normal rate and regular rhythm.   Pulmonary:      Effort: Pulmonary effort is normal.      Breath sounds: Normal breath sounds.   Abdominal:      Palpations: Abdomen is soft.      Tenderness: There is abdominal tenderness. There is no guarding or rebound.      Comments: Generalized tenderness      Musculoskeletal:         General: Normal range of motion.     Skin:     General: Skin is warm and dry.     Neurological:      General: No focal deficit present.       Mental Status: She is alert and oriented to person, place, and time.     Psychiatric:         Mood and Affect: Mood normal.         Behavior: Behavior normal.           ED Course & MDM   ED Course as of 07/30/25 1817 Wed Jul 30, 2025   1107 Emergency Medicine Attending Attestation:     [unfilled]    This patient was seen by the advanced practice provider.  I have personally performed a substantive portion of the encounter.  I have seen and examined the patient; agree with the workup, evaluation, MDM, management and diagnosis.  The care plan has been discussed.      I personally saw the patient and made/approved the management plan and take responsibility for the patient management.    History: Patient is a 65-year-old female with a past medical history of Crohn's disease (on Entyvio) who presents the emergency department with 1 week history of nonbloody diarrhea and poor p.o. intake.  She denies any fever, denies any nausea/vomiting, denies any new symptoms.  Does report some chronic mid chest pain that is unchanged from her prior not noted today.  Exam: On exam patient is sitting comfortably in the gurney no acute distress.  She is hypotensive at 80/52 but mentating appropriately.  Patient has clear breath sounds bilaterally with symmetric chest rise.  Patient has diffuse abdominal pain without rebound tenderness or guarding.  Patient's abdomen is soft.  Patient has a regular heart rate.  MDM: Patient presented to the emergency room with a 1 week history of nonbloody diarrhea as well as poor p.o. intake with hypotension.  I suspect a significant level of dehydration as well as possible Crohn's flare.  Will treat her symptoms accordingly, obtain imaging and potentially reach out to GI pending workup.         I independently interpreted patient's EKG and agree with the above mentioned interpretation.      Juwan Johnson MD   [AM]      ED Course User Index  [AM] Juwan Johnson MD         Diagnoses as of  07/30/25 1817   Diarrhea, unspecified type                 No data recorded                                 Medical Decision Making  This  is a 65 year old female with past medical history significant for crohn's disease (on Entyvio every two months), HTN, hyperlipidemia, CAD S/P stent,  spinal stenosis, and cholecystectomy presents to the ED today with generalized abdominal pain, non-bloody diarrhea and inability to tolerate solids since last Thursday. She does not have any nausea, vomiting, dysuria, fever and/or chills. Endorses chest pain but states that this chronic in nature.   She was hypotensive in triage but repeat in the room was 90/60 and she appears quite well with no tachycardia or tachypnea. Has no fever upon arrival to the ED.         She was given 1L of LR , I also ordered pepcid & Zofran which she refused. Morphine 2 mg was ordered but discussed with nursing to give it when BP is improved after IV fluid. Labs showed non-concerning electrolytes but Cr of 2.37 & eGFR of 22 which presumed to be more of an FLORENCE pictures with her not tolerating much PO for the past few days. LR at 75 ml/hr was started. I also spoke with radiology and a note was placed to proceed with CT abdomen/pelvis with IV contrast. At the time of sign out, her CT was pending but she was accepted to medicine with FLORENCE and Crohn's flare.   The patient was staffed with Dr. Johnson.     Procedure  Procedures       [1]   Past Medical History:  Diagnosis Date    Anemia     CHF (congestive heart failure) 2012    Chronic diarrhea 2009    Coronary artery disease     2012: cardiac cath with PCI, 6/2/23: cardiac cath with PCI x2 (LAD & LCx) intermediate disease of a tortuous RCA - cardiac clearance requested    Crohn's disease (Multi) 2010    bowel perforation 2009 s/p colostomy, reversed 2010    Depression     Hyperlipidemia     Hypertension 2010    IBS (irritable bowel syndrome)     Lung nodules     nonconcerning per pulm note    Myocardial  infarction (Multi) 2012    Ulcerative colitis 2010   [2]   Past Surgical History:  Procedure Laterality Date    ABDOMINAL HERNIA REPAIR  2018    with mesh    CARDIAC CATHETERIZATION  2012    PCI    CARDIAC CATHETERIZATION  2023    PCI x 2 (LAD and LCx)     SECTION, LOW TRANSVERSE  1987    CHOLECYSTECTOMY  2017    COLON SURGERY  2009    COLONOSCOPY      COLOSTOMY  2009    REVISION / TAKEDOWN COLOSTOMY  2010    SMALL INTESTINE SURGERY  2009    TUBAL LIGATION      UMBILICAL HERNIA REPAIR      x 2   [3]   Family History  Problem Relation Name Age of Onset    Stroke Mother      Hypertension Mother      Heart attack Mother      Heart attack Father      Diabetes Father      Multiple sclerosis Sister      Breast cancer Sister      Diabetes Brother      Alcohol abuse Brother Romaine Joy 20 - 29   [4]   Social History  Tobacco Use    Smoking status: Every Day     Current packs/day: 0.25     Average packs/day: 0.3 packs/day for 58.3 years (15.0 ttl pk-yrs)     Types: Cigarettes    Smokeless tobacco: Never   Vaping Use    Vaping status: Not on file   Substance Use Topics    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

## 2025-07-30 NOTE — Clinical Note
PMhx of chronic hep C, CAD, Crohn's (on entyvio), smoker, NSTEMI, HFpEF who presented to the ED for abdominal pain, diarrhea and generalized weakness. Found to have FLORENCE prompting admission for further treatment and nephrology consultation. Treated with IVF

## 2025-07-30 NOTE — PROGRESS NOTES
Pharmacy Medication History Review    Vivien Miramontes is a 65 y.o. female admitted for Diarrhea, unspecified. Pharmacy reviewed the patient's [id number] medications and allergies for accuracy.    Medications ADDED:  None  Medications CHANGED:  None  Medications REMOVED:   Metoprolol succinate  50mg     The list below reflects the updated PTA list.   Prior to Admission Medications   Prescriptions Last Dose Informant   acetaminophen (TylenoL) 325 mg tablet 7/29/2025 Self   Sig: Take 2 tablets (650 mg) by mouth every 6 hours if needed for mild pain (1 - 3) or fever (temp greater than 38.0 C).   amLODIPine (Norvasc) 5 mg tablet 7/29/2025 Self   Sig: Take 1.5 tablets (7.5 mg) by mouth early in the morning..   aspirin 81 mg EC tablet 7/29/2025 Self   Sig: Take 1 tablet (81 mg) by mouth once daily.   carvedilol (Coreg) 25 mg tablet 7/29/2025 Self   Sig: Take 1 tablet (25 mg) by mouth every 12 hours.   clopidogrel (Plavix) 75 mg tablet 7/29/2025 Self   Sig: Take 1 tablet (75 mg) by mouth once daily.  PT reports taking, last filled 2/25/25 for 90 tablets.    hydrOXYzine HCL (Atarax) 25 mg tablet 7/29/2025 Self   Sig: Take 1 tablet (25 mg) by mouth 3 times a day as needed for itching. May cause drowsiness  PT reports taking, but has no recent fill history.    hydroCHLOROthiazide (HYDRODiuril) 25 mg tablet 7/29/2025 Self   Sig: Take 1 tablet (25 mg) by mouth once daily.   lisinopril 40 mg tablet 7/29/2025 Self   Sig: Take 1 tablet (40 mg) by mouth once daily. as directed   methocarbamol (Robaxin) 500 mg tablet 7/29/2025 Self   Sig: Take 1 tablet (500 mg) by mouth 3 times a day as needed for muscle spasms.  PT reports taking, last filled 4/24/25 for 90 tablets.    nitroglycerin (Nitrostat) 0.4 mg SL tablet  Self   Sig: Place 1 tablet (0.4 mg) under the tongue. Dissolve 1 tablet under the tongue as needed for chest pain.   pregabalin (Lyrica) 150 mg capsule 7/29/2025 Self   Sig: Take 1 capsule (150 mg) by mouth 3 times a  "day.   rosuvastatin (Crestor) 40 mg tablet 7/29/2025 Self   Sig: Take 1 tablet (40 mg) by mouth once daily at bedtime.   vedolizumab (Entyvio) 300 mg injection  Self   Sig: Infuse 300 mg into a venous catheter every 8 (eight) weeks.  For Maintenance: every 8 weeks   vedolizumab (Entyvio) 300 mg injection  Self   Sig: Infuse 300 mg into a venous catheter see administration instructions.  For Induction:  0, 2 and 6 weeks   vitamin E 180 mg (400 unit) capsule 7/29/2025 Self   Sig: Take 1 capsule (400 Units) by mouth once daily.      Facility-Administered Medications Last Administration Doses Remaining   acetaminophen (Tylenol) tablet 650 mg None recorded 1           The list below reflects the updated allergy list. Please review each documented allergy for additional clarification and justification.  Allergies  Reviewed by Elif Newell on 7/30/2025        Severity Reactions Comments    Penicillin Not Specified Unknown     Penicillins Low Hives             Patient declines M2B at discharge.     Sources:   -Patient interview, Good historian   -Outpatient pharmacy dispense history  -[facility]  -Care everywhere  -Chart review      Additional Comments:  None      ELIF NEWELL  Pharmacy Technician  07/30/25     Secure Chat preferred   If no response call [id number] or [facility] \"Med Rec\"    "

## 2025-07-30 NOTE — PROGRESS NOTES
Emergency Department Transition of Care Note       Signout   I received Vivien Miramontes in signout from .  Please see the ED Provider Note for all HPI, PE and MDM up to the time of signout at 3 pm.  This is in addition to the primary record.    This is a 65 year old female with past medical history significant for crohn's disease (on Entyvio every two months), HTN, hyperlipidemia, CAD S/P stent, spinal stenosis, and cholecystectomy presents to the ED today with generalized abdominal pain, non-bloody diarrhea and inability to tolerate solids since last Thursday.     At the time of signout we were awaiting:  Admit to medicine    ED Course & Medical Decision Making   Medical Decision Making:  Under my care, patient has been vitally stable.  Patient is currently pending CT abdomen pelvis.  Patient does have a new FLORENCE.  Patient has been admitted to general medicine prior to the CT scan however we will watch for readings.    ED Course:  ED Course as of 07/30/25 1520   Wed Jul 30, 2025   1107 Emergency Medicine Attending Attestation:     [unfilled]    This patient was seen by the advanced practice provider.  I have personally performed a substantive portion of the encounter.  I have seen and examined the patient; agree with the workup, evaluation, MDM, management and diagnosis.  The care plan has been discussed.      I personally saw the patient and made/approved the management plan and take responsibility for the patient management.    History: Patient is a 65-year-old female with a past medical history of Crohn's disease (on Entyvio) who presents the emergency department with 1 week history of nonbloody diarrhea and poor p.o. intake.  She denies any fever, denies any nausea/vomiting, denies any new symptoms.  Does report some chronic mid chest pain that is unchanged from her prior not noted today.  Exam: On exam patient is sitting comfortably in the gurney no acute distress.  She is hypotensive at 80/52 but mentating  appropriately.  Patient has clear breath sounds bilaterally with symmetric chest rise.  Patient has diffuse abdominal pain without rebound tenderness or guarding.  Patient's abdomen is soft.  Patient has a regular heart rate.  MDM: Patient presented to the emergency room with a 1 week history of nonbloody diarrhea as well as poor p.o. intake with hypotension.  I suspect a significant level of dehydration as well as possible Crohn's flare.  Will treat her symptoms accordingly, obtain imaging and potentially reach out to GI pending workup.         I independently interpreted patient's EKG and agree with the above mentioned interpretation.      Juwan Johnson MD   [AM]      ED Course User Index  [AM] Juwan Johnson MD         Diagnoses as of 07/30/25 1520   Diarrhea, unspecified type       Disposition   As a result of their workup, the patient will require admission to the hospital.  The patient was informed of her diagnosis.  The patient was given the opportunity to ask questions and I answered them. The patient agreed to be admitted to the hospital.    Procedures   Procedures    Patient seen and discussed with ED attending physician.    Lu Baptiste MD  Emergency Medicine

## 2025-07-30 NOTE — ED TRIAGE NOTES
"Patient states he has been having diarrhea since Friday. States she has not eaten since then. Denies emesis. States it \"gonzalez sorta\" feels like a chron's flare    Gets infusions for chrons. Last one 7/22.   "

## 2025-07-30 NOTE — SIGNIFICANT EVENT
Patient has an FLORENCE due not tolerating much PO for the past few days. She is receiving IV fluid and due to her crohn's we feel that CT with IV contrast is necessary for an accurate and timely diagnosis and the benefits outweighs the risks.   We will continue with IV hydration to minimize further renal insufficiency.

## 2025-07-30 NOTE — SIGNIFICANT EVENT
Senior Resident Staffing Note    Please refer to excellent PGY-1 history and physical note for further details.    HPI:  Vivien Miramontes is a 65 y.o. female presenting with  diffuse abdominal pain and diarrhea beginning 7/24 after most recent entyvia infusion which she reports was on 7/22. Patient states that in the couple of days following infusion she had nausea, without vomiting and profuse diarrhea reporting up to 9+ episodes in one day. She states that her abdominal pain was initially present at the LLQ before encompassing her entire abdomen. She denies any blood stool, or mucous present during bowel movements. There have been no changes to her diet, fevers, chills, weight loss, sick contacts, or recent travel. She reports that what she is experiencing is similar to chron's flares she has had in the past.  Patient also endorses loss of appetite. Her last attempt at eating was yesterday when she had a few spoons of clam chowder.        GI Hx   - Bowel perferatopm due to complicated diverticulitis 2009   - Chrons disease diagnosed 2010  - Initially on Humira-->Stelara ( no remission) -->Entyvio (last infusion   - Colonoscopy 2021, Mild ulceration in sigmoid colon otherwise normal. -Colonoscopy  6/26/2024: Mild (in right colon ) to moderate erythema (transverse colon, descending colon, sigmoid colon )consistent with chron's colitis, TI appears to be normal, biopsy-chronic colitis in right colon.       Vitals notable for BP of 80/52     Labs notable for Bicarb 13, K4.4 , Creatinine 2.37 baseline 0.77. BUN 26.      Imaging notable for CT A/P: Fluid filled loops, without bowel wall thickening or adjacent changes to correlate with diarrhea history. No perf, no pneumoperitoneium.     Physical exam:  General: NAD  HEENT: normocephalic, atraumatic. PERRL.  Respiratory: CTAB. No crackles or wheeze. Normal WOB on RA.  Cardiac: RRR, no m/g/r. No JVD  Abdomen: Hyperactive bowel sounds, tenderness to palpation throughout.  Soft with no rebound tenderness or guarding.   palpation. No palpable mass. No hepatosplenomegaly  Extremities: No LE edema  Skin: warm, dry. No rash or wound  Neuro: A&Ox3. Moving extremities equally. No gross focal deficit  Psych: appropriate mood and affect    Assessment and Plan:  Vivien Miramontes is a 65 y.o. female with PMhx of chronic hep C, CAD, Crohn's (on entyvio), smoker, NSTEMI, HFpEF presenting to the ED with diffuse abdominal pain, large volume diarrhea, and generalized weakness. CT AP only notable for fluid filled bowel loops without stranding or bowel wall thickening, and patient was without leukocytosis, fever chills and other clinical signs of infection. Etiology of profuse diarrhea is likely related to mild chron's flare but cannot rule out infection at this time given patient is receiving entyvia infusions. ESR was elevated to 31 and Rui protectin/Lactoferrin are pending. It is less likely a anatomical progression of chrons including strictures/abscess given absence of findings on imaging. Stool pathogen, O&P, and C.Diff are currently pending. Will resuscitate patient's GI losses at this time, and await infectious workup to consider starting IV methylpred and looperamide. Patient's FLORENCE is likely prerenal in etiology given massive GI losses will pursue additional workup if no improvement on repeat RFP. Will consult Gastroenterology team in the AM to discuss need for scope out of concern for CMV colitis.     #Diarrhea   # Chrohn's disease on Entivya   :: GI hx as stated in above HPI   :: CT only notable for fluid filled bowel loops. No evidence of perf.  :: TSH normal 0.37   :: Lactate 1.0   :: Lipase 36   - Stool O&P   - Stool Pathogen   - Calprotectin and Lactoferrin pending   - Stool Osms pending  - Blood Cultures   - Will await infectious workup before starting IV methylpred   - NPO at midnight clear liquids for now   - Will consider consult with GI team in the AM out of concern for CMV colitis.          # FLORENCE likely due to prerenal etiology   - Creatinine 2. 37 BL ~0.7   - Currently receiving maintenance fluids   Plan:  - Monitor RFP's  - Avoid nephrotoxic agents  - Continue IVF hydration     #HFpEF   # CAD s/p PCI   :: Echo completed 2023: 1. Left ventricular systolic function is normal with a 55% estimated ejection fraction. The right ventricle is normal in size. There is normal right ventricular global systolic function.    2. Basal and mid inferolateral wall is abnormal.  - Continue Aspirin and Statin, Plavix   - Holding anihypertensives at this time given soft BP's    #Lumbar spinal Stenosis   ::150 mg Lyrica TID for pain   - Will start with 75 mg BID increase as kidney function improves  - Lidocaine patches   - Acetaminophen as needed       Diet: Clears NPO at midnight   DVT prophylaxis: Sub q hep given CrCL<30  Code status: Full code   NOK: Hiwot Miramontes [phone]    Patient and plan to be formally staffed with attending physician in AM.    Juan Ferguson MD  Internal Medicine PGY-2

## 2025-07-30 NOTE — H&P
History Of Present Illness  Lucía Miramontes is a 65 year old female with past medical history significant for Crohn's disease (on Entyvio every two months), HTN, HLD, CAD s/p stent, NSTEMI, HFpEF, smoker, spinal stenosis, and cholecystectomy presents to the ED today with generalized abdominal pain, non-bloody diarrhea and inability to tolerate solids since last Thursday. Hypotensive at presentation (BP 82/53), and founded with new FLORENCE prompting admission for further treatment.     She reports having abdominal pain and non-bloody diarrhea that began shortly after her last after her last Entyvio injection 07/22 with large volume stools occurring more than 9 times daily. She has been unable to tolerate solid foods. Her pain started in her LLQ but is now diffuse across her abdomen. She states that this is not new to her, as she feels this way every 6 months, although the previous injection before July was without complications like her current sequale. She has not noticed any blood or mucous in her stool. She has been unable to eat solid foods, has been nauseous, but has not had any vomiting episodes.     She denies any changes to her diet, travel, recent sickness or sick contacts. No Abx recently. She did have a steroid injection in May for management of her pain due to Lumbar spinal stenosis.      ED Course:     Vital Signs: °C 97.7/ BP 82/53/ HR 70/ RR 16/ 99% on RA     Labs:   CBC: WBC 9.7, Hgb 14.7, plt 275   BMP: Na 136, K 4.4 , Cl 110, HCO3 13, BUN 26, Cr 2.37, glu 96   LFT: Ca 9.5, tprot 7.7, alb 4.5, alkphos 110, AST 26, ALT 32, tbili 0.4,   Mg 2.10,   CRP 0.37, Trop 8, ESR 31   EKG: old anterior infarcts, otherwise no concerning findings    Imaging: CT AP wiith IV contrast with no evidence of wall thickening or inflammatory changes    On admission, received LR bolus 1000mL and LR infusion 100 mL/hr.     Past Medical History      She has a past medical history of Anemia, CHF (congestive heart failure) (2012),  Chronic diarrhea (), Coronary artery disease, Crohn's disease (Multi) (), Depression, Hyperlipidemia, Hypertension (), IBS (irritable bowel syndrome), Lung nodules, Myocardial infarction (Multi) (), and Ulcerative colitis ().      Surgical History  She has a past surgical history that includes Colonoscopy; Colostomy (); Revision / takedown colostomy (); Cardiac catheterization (); Cardiac catheterization (2023);  section, low transverse (); Abdominal hernia repair (); Umbilical hernia repair; Tubal ligation (); Cholecystectomy (); Colon surgery (); and Small intestine surgery ().     Social History  She reports that she has been smoking cigarettes. She has a 15 pack-year smoking history. She has never used smokeless tobacco. She reports that she does not currently use alcohol. She reports that she does not currently use drugs.    Family History  Family History[1]    Outpatient Medications  Current Outpatient Medications   Medication Instructions    acetaminophen (TYLENOL) 650 mg, oral, Every 6 hours PRN    amLODIPine (Norvasc) 5 mg tablet 1.5 tablets, Daily (0630)    aspirin 81 mg, Daily    carvedilol (Coreg) 25 mg tablet 1 tablet, Every 12 hours scheduled (0630,1830)    clopidogrel (PLAVIX) 75 mg, Daily    hydroCHLOROthiazide (HYDRODIURIL) 25 mg, Daily    hydrOXYzine HCL (Atarax) 25 mg tablet 1 tablet, 3 times daily PRN    lisinopril 40 mg, Daily    methocarbamol (ROBAXIN) 500 mg, oral, 3 times daily PRN    nitroglycerin (NITROSTAT) 0.4 mg    pregabalin (Lyrica) 150 mg capsule 1 capsule, 3 times daily (0900,1400,1900)    rosuvastatin (Crestor) 40 mg tablet 1 tablet, Nightly    vedolizumab (ENTYVIO) 300 mg, intravenous, Every 8 weeks, For Maintenance: every 8 weeks    vedolizumab (ENTYVIO) 300 mg, intravenous, See admin instructions, For Induction:  0, 2 and 6 weeks    vitamin E 400 Units, Daily        Allergies  Penicillin and  Penicillins        Objectives        Last Recorded Vitals  Temperature:  [36.5 °C (97.7 °F)] 36.5 °C (97.7 °F)  Heart Rate:  [58-70] 58  Respirations:  [16-19] 16  BP: ()/(52-76) 99/69  SpO2 Readings from Last 1 Encounters:   07/30/25 96%     Physical Exam  Constitutional:       General: She is not in acute distress.     Appearance: Normal appearance.     Cardiovascular:      Rate and Rhythm: Normal rate and regular rhythm.      Pulses: Normal pulses.      Heart sounds: Normal heart sounds.   Pulmonary:      Effort: Pulmonary effort is normal.      Breath sounds: Normal breath sounds.   Abdominal:      General: There is no distension, hypoactive bowel sounds,      Palpations: Abdomen is soft.      Tenderness: There is abdominal tenderness.      Comments: Band-like tenderness upon palpation across abdomen   MSK  No LE edema  Skin:     General: Skin is warm, dry, decreased skin turgot  Neurological:      Mental Status: She is alert and oriented to person, place, and time. No Focal deficits, able to move all extremities    Relevant Results  Results for orders placed or performed during the hospital encounter of 07/30/25 (from the past 24 hours)   CBC and Auto Differential   Result Value Ref Range    WBC 9.7 4.4 - 11.3 x10*3/uL    nRBC 0.0 0.0 - 0.0 /100 WBCs    RBC 5.21 (H) 4.00 - 5.20 x10*6/uL    Hemoglobin 14.7 12.0 - 16.0 g/dL    Hematocrit 43.3 36.0 - 46.0 %    MCV 83 80 - 100 fL    MCH 28.2 26.0 - 34.0 pg    MCHC 33.9 32.0 - 36.0 g/dL    RDW 13.9 11.5 - 14.5 %    Platelets 275 150 - 450 x10*3/uL    Neutrophils % 67.0 40.0 - 80.0 %    Immature Granulocytes %, Automated 0.5 0.0 - 0.9 %    Lymphocytes % 22.8 13.0 - 44.0 %    Monocytes % 8.1 2.0 - 10.0 %    Eosinophils % 1.0 0.0 - 6.0 %    Basophils % 0.6 0.0 - 2.0 %    Neutrophils Absolute 6.46 1.20 - 7.70 x10*3/uL    Immature Granulocytes Absolute, Automated 0.05 0.00 - 0.70 x10*3/uL    Lymphocytes Absolute 2.20 1.20 - 4.80 x10*3/uL    Monocytes Absolute 0.78  0.10 - 1.00 x10*3/uL    Eosinophils Absolute 0.10 0.00 - 0.70 x10*3/uL    Basophils Absolute 0.06 0.00 - 0.10 x10*3/uL   Magnesium   Result Value Ref Range    Magnesium 2.10 1.60 - 2.40 mg/dL   Comprehensive metabolic panel   Result Value Ref Range    Glucose 96 74 - 99 mg/dL    Sodium 136 136 - 145 mmol/L    Potassium 4.4 3.5 - 5.3 mmol/L    Chloride 110 (H) 98 - 107 mmol/L    Bicarbonate 13 (L) 21 - 32 mmol/L    Anion Gap 17 10 - 20 mmol/L    Urea Nitrogen 26 (H) 6 - 23 mg/dL    Creatinine 2.37 (H) 0.50 - 1.05 mg/dL    eGFR 22 (L) >60 mL/min/1.73m*2    Calcium 9.5 8.6 - 10.6 mg/dL    Albumin 4.5 3.4 - 5.0 g/dL    Alkaline Phosphatase 110 33 - 136 U/L    Total Protein 7.7 6.4 - 8.2 g/dL    AST 26 9 - 39 U/L    Bilirubin, Total 0.4 0.0 - 1.2 mg/dL    ALT 32 7 - 45 U/L   Lipase   Result Value Ref Range    Lipase 36 9 - 82 U/L   Blood Gas Venous Full Panel   Result Value Ref Range    POCT pH, Venous 7.35 7.33 - 7.43 pH    POCT pCO2, Venous 30 (L) 41 - 51 mm Hg    POCT pO2, Venous 53 (H) 35 - 45 mm Hg    POCT SO2, Venous 85 (H) 45 - 75 %    POCT Oxy Hemoglobin, Venous 78.5 (H) 45.0 - 75.0 %    POCT Hematocrit Calculated, Venous 47.0 (H) 36.0 - 46.0 %    POCT Sodium, Venous 135 (L) 136 - 145 mmol/L    POCT Potassium, Venous 4.3 3.5 - 5.3 mmol/L    POCT Chloride, Venous 109 (H) 98 - 107 mmol/L    POCT Ionized Calicum, Venous 1.26 1.10 - 1.33 mmol/L    POCT Glucose, Venous 109 (H) 74 - 99 mg/dL    POCT Lactate, Venous 1.0 0.4 - 2.0 mmol/L    POCT Base Excess, Venous -7.6 (L) -2.0 - 3.0 mmol/L    POCT HCO3 Calculated, Venous 16.6 (L) 22.0 - 26.0 mmol/L    POCT Hemoglobin, Venous 15.5 12.0 - 16.0 g/dL    POCT Anion Gap, Venous 14.0 10.0 - 25.0 mmol/L    Patient Temperature 37.0 degrees Celsius    FiO2 21 %   Troponin I, High Sensitivity   Result Value Ref Range    Troponin I, High Sensitivity (CMC) 8 0 - 34 ng/L   C-reactive protein   Result Value Ref Range    C-Reactive Protein 0.37 <1.00 mg/dL        Microbiology and  Culture  No results found for the last 120 days.      Imaging    CT AP 07/30  INDICATION: abdominal pain/ crohn's   IMPRESSION  Fluid-filled small and large bowel without evidence of  obstruction, wall thickening, or adjacent inflammatory changes, to be  correlated with history of diarrhea.  2. Colonic diverticulosis without evidence of diverticulitis.  3. Other incidental/chronic findings as above.    Inpatient Medications  Scheduled Medications[2]  Continuous Medications[3]  PRN Medications[4]        Assessment/Plan     65 year old female with past medical history significant for Crohn's disease (on Entyvio every two months), HTN, HLD, CAD s/p stent, NSTEMI, HFpEF, smoker, spinal stenosis, and cholecystectomy presents to the ED hypotensive with 1-week history of generalized abdominal pain, non-bloody diarrhea and inability to tolerate solids, complicated with new FLORENCE. On admission, received LR bolus 1000mL and LR infusion 100 mL/hr.     #Crohn's Disease Flare vs Viral Etiology  -Crohn's disease flare vs viral infection, higher suspicions for mild crohn's flare given moderately elevated CRP (0.37), ESR (8), reassuring CT AP on admission without evidence of wall thickening or inflammatory changes.   -has had 1-week hx of abdominal pain across upper abdomen, non-bloody, non-mucous diarrhea, nausea without emesis  -Crohn's disease was diagnosed in 2010, initially was on Humira then switched to Arnold Conley, recently started on Entyvio 300 mg injection every 2 months (last injection self-reported 07/22)  -s/p Colonoscopy screening 07/11  -s/p colostomy in 2009 subsequently had reversal in 2010    -Followed by Dr. Ping Allen at  (last appt 07/11) with next follow up in 6 months, and colonoscopy in 2-3 months  -Treatment regimen: Vitamin E 180 mg capsule, Entyvio 300 mg q2 months  -PLAN    -On maintenance fluids     -Encourage oral hydration    -NPO midnight for possible scope to rule out CMV colities     -Holding Loperamide and Methylpred at this time, will consider if infectious etiology is  ruled out    -Pending blood Cx, Stool Pathogen O&P, Calprotectin stool, electrolyte stool, C diff panel, ESR, and UA    -Continue Vitamin E      #HFpEF  -Last echo [facility]/06/2024 [facility] EF 52%, normal RV size and RV systolic function  -Home regimen: Carvedilol 25 mg q12h, Nitroglycerin  -Plan    -Hold Carvedilol, Nitroglycerin in the setting of soft BP (BP on admission 82/53)      #HTN  -Hypotensive on arrival (BP 82/53), but patient is conversational, moderate-appearing, no headaches, dizzyness, SOB, and no acute distress.  -Home regimen:  Amlodipine 5 mg tab (takes 1.5 mg tab every morning), Hydrochlorothiazide 25 mg tab every day, Lisinopril 40 mg qD  -Plan    -Hold Amlodipine, Hydrochlorothiazide in the setting of soft BP on admission  (BP on admission 82/53)        #HLD  -Home regimen: Rouvastatin 40mg every day, Aspirin 81 mg EC tab QD, Plavix 75 mg tab QD  -Plan    -continue Rouvastatin, Aspirin, Plavix      #Lumbar spinal stenosis  -s/p bilateral L4 transforaminal epidural steroid injection under fluoroscopy in 06/23. -followed by [facility], Dr. Angie Bansal  -Home regimen: Pregablin 150mg cap BID, Acetaminophen 325 mg 2 tab q6h, Robaxin 500 mg TID PRN  -Plan    -Hold Robaxin    -Will give lower dose Pregablin in setting of her FLORENCE    -Multimodal pain control as needed, with lidocaine patches, acetaminophen        ##FEN  Fluids: Replete PRN  Electrolytes: Replete PRN  Nutrition: Adult diet Clear Liquid     ##Prophylaxis  Antimicrobials: This patient does not have an active medication from one of the medication groupers.  DVT PPX: SubQ Heparin  Bowel Regimen: nothing     ##Social  Oxygen: On room air  Disposition:   Code Status: Full Code  NOK:   Extended Emergency Contact Information  Primary Emergency Contact: Puneet Miramontes III  Address: [address]  [place] [place]  Mobile Phone: [phone]  Relation: Child  Secondary Emergency Contact: FRANDY MAYO JR  Mobile Phone: [phone]  Relation: Spouse  Preferred language: English   needed? No        Aminah Pozo MD  Internal Medicine Prelim         [1]   Family History  Problem Relation Name Age of Onset    Stroke Mother      Hypertension Mother      Heart attack Mother      Heart attack Father      Diabetes Father      Multiple sclerosis Sister      Breast cancer Sister      Diabetes Brother      Alcohol abuse Brother Romaine Joy 20 - 29   [2] acetaminophen, 650 mg, oral, Once  famotidine, 20 mg, intravenous, Once  heparin (porcine), 5,000 Units, subcutaneous, q8h KEVIN  morphine, 2 mg, intravenous, Once  ondansetron, 4 mg, intravenous, Once     [3] lactated Ringer's, 75 mL/hr, Last Rate: 75 mL/hr (07/30/25 1434)     [4]

## 2025-07-31 ENCOUNTER — APPOINTMENT (OUTPATIENT)
Dept: RADIOLOGY | Facility: HOSPITAL | Age: 65
DRG: 386 | End: 2025-07-31
Payer: COMMERCIAL

## 2025-07-31 LAB
ALBUMIN SERPL BCP-MCNC: 4 G/DL (ref 3.4–5)
ALBUMIN SERPL BCP-MCNC: 4.4 G/DL (ref 3.4–5)
ALBUMIN SERPL BCP-MCNC: 4.4 G/DL (ref 3.4–5)
ANION GAP BLDV CALCULATED.4IONS-SCNC: 16 MMOL/L (ref 10–25)
ANION GAP SERPL CALC-SCNC: 15 MMOL/L (ref 10–20)
ANION GAP SERPL CALC-SCNC: 17 MMOL/L (ref 10–20)
ANION GAP SERPL CALC-SCNC: 18 MMOL/L (ref 10–20)
ATRIAL RATE: 58 BPM
BASE EXCESS BLDV CALC-SCNC: -12.6 MMOL/L (ref -2–3)
BASOPHILS # BLD AUTO: 0.03 X10*3/UL (ref 0–0.1)
BASOPHILS NFR BLD AUTO: 0.3 %
BODY TEMPERATURE: 37 DEGREES CELSIUS
BUN SERPL-MCNC: 33 MG/DL (ref 6–23)
BUN SERPL-MCNC: 34 MG/DL (ref 6–23)
BUN SERPL-MCNC: 37 MG/DL (ref 6–23)
C COLI+JEJ+UPSA DNA STL QL NAA+PROBE: NOT DETECTED
C DIF TOX TCDA+TCDB STL QL NAA+PROBE: NOT DETECTED
CA-I BLDV-SCNC: 1.16 MMOL/L (ref 1.1–1.33)
CALCIUM SERPL-MCNC: 9.1 MG/DL (ref 8.6–10.6)
CALCIUM SERPL-MCNC: 9.5 MG/DL (ref 8.6–10.6)
CALCIUM SERPL-MCNC: 9.8 MG/DL (ref 8.6–10.6)
CHLORIDE BLDV-SCNC: 102 MMOL/L (ref 98–107)
CHLORIDE SERPL-SCNC: 106 MMOL/L (ref 98–107)
CO2 SERPL-SCNC: 14 MMOL/L (ref 21–32)
CO2 SERPL-SCNC: 15 MMOL/L (ref 21–32)
CO2 SERPL-SCNC: 15 MMOL/L (ref 21–32)
CREAT SERPL-MCNC: 2.81 MG/DL (ref 0.5–1.05)
CREAT SERPL-MCNC: 3.16 MG/DL (ref 0.5–1.05)
CREAT SERPL-MCNC: 3.55 MG/DL (ref 0.5–1.05)
EC STX1 GENE STL QL NAA+PROBE: NOT DETECTED
EC STX2 GENE STL QL NAA+PROBE: NOT DETECTED
EGFRCR SERPLBLD CKD-EPI 2021: 14 ML/MIN/1.73M*2
EGFRCR SERPLBLD CKD-EPI 2021: 16 ML/MIN/1.73M*2
EGFRCR SERPLBLD CKD-EPI 2021: 18 ML/MIN/1.73M*2
EOSINOPHIL # BLD AUTO: 0.09 X10*3/UL (ref 0–0.7)
EOSINOPHIL NFR BLD AUTO: 0.8 %
ERYTHROCYTE [DISTWIDTH] IN BLOOD BY AUTOMATED COUNT: 14 % (ref 11.5–14.5)
GLUCOSE BLDV-MCNC: 81 MG/DL (ref 74–99)
GLUCOSE SERPL-MCNC: 83 MG/DL (ref 74–99)
GLUCOSE SERPL-MCNC: 83 MG/DL (ref 74–99)
GLUCOSE SERPL-MCNC: 98 MG/DL (ref 74–99)
HCO3 BLDV-SCNC: 13.8 MMOL/L (ref 22–26)
HCT VFR BLD AUTO: 47.8 % (ref 36–46)
HCT VFR BLD EST: ABNORMAL %
HGB BLD-MCNC: 15.4 G/DL (ref 12–16)
HGB BLDV-MCNC: >23 G/DL (ref 12–16)
IMM GRANULOCYTES # BLD AUTO: 0.05 X10*3/UL (ref 0–0.7)
IMM GRANULOCYTES NFR BLD AUTO: 0.4 % (ref 0–0.9)
INHALED O2 CONCENTRATION: 21 %
LACTATE BLDV-SCNC: 2.3 MMOL/L (ref 0.4–2)
LYMPHOCYTES # BLD AUTO: 2.38 X10*3/UL (ref 1.2–4.8)
LYMPHOCYTES NFR BLD AUTO: 21 %
MAGNESIUM SERPL-MCNC: 1.88 MG/DL (ref 1.6–2.4)
MAGNESIUM SERPL-MCNC: 2.03 MG/DL (ref 1.6–2.4)
MCH RBC QN AUTO: 28.1 PG (ref 26–34)
MCHC RBC AUTO-ENTMCNC: 32.2 G/DL (ref 32–36)
MCV RBC AUTO: 87 FL (ref 80–100)
MONOCYTES # BLD AUTO: 0.79 X10*3/UL (ref 0.1–1)
MONOCYTES NFR BLD AUTO: 7 %
NEUTROPHILS # BLD AUTO: 8.01 X10*3/UL (ref 1.2–7.7)
NEUTROPHILS NFR BLD AUTO: 70.5 %
NOROVIRUS GI + GII RNA STL NAA+PROBE: NOT DETECTED
NRBC BLD-RTO: 0 /100 WBCS (ref 0–0)
OSMOLALITY SERPL: 294 MOSM/KG (ref 280–300)
OXYHGB MFR BLDV: ABNORMAL %
P AXIS: 49 DEGREES
P OFFSET: 206 MS
P ONSET: 143 MS
PCO2 BLDV: 33 MM HG (ref 41–51)
PH BLDV: 7.23 PH (ref 7.33–7.43)
PHOSPHATE SERPL-MCNC: 6 MG/DL (ref 2.5–4.9)
PHOSPHATE SERPL-MCNC: 6.3 MG/DL (ref 2.5–4.9)
PHOSPHATE SERPL-MCNC: 6.5 MG/DL (ref 2.5–4.9)
PLATELET # BLD AUTO: 295 X10*3/UL (ref 150–450)
PO2 BLDV: 46 MM HG (ref 35–45)
POTASSIUM BLDV-SCNC: 3.4 MMOL/L (ref 3.5–5.3)
POTASSIUM SERPL-SCNC: 4 MMOL/L (ref 3.5–5.3)
POTASSIUM SERPL-SCNC: 4.2 MMOL/L (ref 3.5–5.3)
POTASSIUM SERPL-SCNC: 4.3 MMOL/L (ref 3.5–5.3)
PR INTERVAL: 164 MS
Q ONSET: 225 MS
QRS COUNT: 10 BEATS
QRS DURATION: 98 MS
QT INTERVAL: 414 MS
QTC CALCULATION(BAZETT): 406 MS
QTC FREDERICIA: 409 MS
R AXIS: 79 DEGREES
RBC # BLD AUTO: 5.48 X10*6/UL (ref 4–5.2)
RV RNA STL NAA+PROBE: NOT DETECTED
SALMONELLA DNA STL QL NAA+PROBE: NOT DETECTED
SAO2 % BLDV: ABNORMAL %
SHIGELLA DNA SPEC QL NAA+PROBE: NOT DETECTED
SODIUM BLDV-SCNC: 128 MMOL/L (ref 136–145)
SODIUM SERPL-SCNC: 132 MMOL/L (ref 136–145)
SODIUM SERPL-SCNC: 133 MMOL/L (ref 136–145)
SODIUM SERPL-SCNC: 135 MMOL/L (ref 136–145)
T AXIS: 14 DEGREES
T OFFSET: 432 MS
V CHOLERAE DNA STL QL NAA+PROBE: NOT DETECTED
VENTRICULAR RATE: 58 BPM
WBC # BLD AUTO: 11.4 X10*3/UL (ref 4.4–11.3)
Y ENTEROCOL DNA STL QL NAA+PROBE: NOT DETECTED

## 2025-07-31 PROCEDURE — 36416 COLLJ CAPILLARY BLOOD SPEC: CPT

## 2025-07-31 PROCEDURE — 83735 ASSAY OF MAGNESIUM: CPT

## 2025-07-31 PROCEDURE — 71045 X-RAY EXAM CHEST 1 VIEW: CPT | Performed by: RADIOLOGY

## 2025-07-31 PROCEDURE — 71045 X-RAY EXAM CHEST 1 VIEW: CPT

## 2025-07-31 PROCEDURE — 2500000004 HC RX 250 GENERAL PHARMACY W/ HCPCS (ALT 636 FOR OP/ED)

## 2025-07-31 PROCEDURE — 2500000001 HC RX 250 WO HCPCS SELF ADMINISTERED DRUGS (ALT 637 FOR MEDICARE OP)

## 2025-07-31 PROCEDURE — 83930 ASSAY OF BLOOD OSMOLALITY: CPT

## 2025-07-31 PROCEDURE — 84132 ASSAY OF SERUM POTASSIUM: CPT

## 2025-07-31 PROCEDURE — 85025 COMPLETE CBC W/AUTO DIFF WBC: CPT

## 2025-07-31 PROCEDURE — 36415 COLL VENOUS BLD VENIPUNCTURE: CPT

## 2025-07-31 PROCEDURE — 2500000002 HC RX 250 W HCPCS SELF ADMINISTERED DRUGS (ALT 637 FOR MEDICARE OP, ALT 636 FOR OP/ED)

## 2025-07-31 PROCEDURE — 2500000004 HC RX 250 GENERAL PHARMACY W/ HCPCS (ALT 636 FOR OP/ED): Performed by: INTERNAL MEDICINE

## 2025-07-31 PROCEDURE — 87799 DETECT AGENT NOS DNA QUANT: CPT

## 2025-07-31 PROCEDURE — 1100000001 HC PRIVATE ROOM DAILY

## 2025-07-31 RX ORDER — SODIUM BICARBONATE 650 MG/1
1300 TABLET ORAL ONCE
Status: COMPLETED | OUTPATIENT
Start: 2025-07-31 | End: 2025-07-31

## 2025-07-31 RX ORDER — SODIUM BICARBONATE 650 MG/1
650 TABLET ORAL 2 TIMES DAILY
Status: DISCONTINUED | OUTPATIENT
Start: 2025-07-31 | End: 2025-07-31

## 2025-07-31 RX ORDER — SODIUM BICARBONATE 650 MG/1
1300 TABLET ORAL 2 TIMES DAILY
Status: DISCONTINUED | OUTPATIENT
Start: 2025-08-01 | End: 2025-08-01

## 2025-07-31 RX ORDER — PANTOPRAZOLE SODIUM 40 MG/10ML
40 INJECTION, POWDER, LYOPHILIZED, FOR SOLUTION INTRAVENOUS DAILY
Status: DISCONTINUED | OUTPATIENT
Start: 2025-07-31 | End: 2025-08-01 | Stop reason: CLARIF

## 2025-07-31 RX ORDER — SODIUM CHLORIDE, SODIUM LACTATE, POTASSIUM CHLORIDE, CALCIUM CHLORIDE 600; 310; 30; 20 MG/100ML; MG/100ML; MG/100ML; MG/100ML
250 INJECTION, SOLUTION INTRAVENOUS CONTINUOUS
Status: DISCONTINUED | OUTPATIENT
Start: 2025-07-31 | End: 2025-08-01

## 2025-07-31 RX ORDER — TRAMADOL HYDROCHLORIDE 50 MG/1
50 TABLET, FILM COATED ORAL ONCE
Status: COMPLETED | OUTPATIENT
Start: 2025-07-31 | End: 2025-07-31

## 2025-07-31 RX ORDER — ROSUVASTATIN CALCIUM 10 MG/1
10 TABLET, COATED ORAL NIGHTLY
Status: DISCONTINUED | OUTPATIENT
Start: 2025-07-31 | End: 2025-08-03 | Stop reason: HOSPADM

## 2025-07-31 RX ORDER — DICYCLOMINE HYDROCHLORIDE 10 MG/1
10 CAPSULE ORAL 4 TIMES DAILY
Status: DISCONTINUED | OUTPATIENT
Start: 2025-07-31 | End: 2025-08-01

## 2025-07-31 RX ADMIN — PREGABALIN 75 MG: 75 CAPSULE ORAL at 21:55

## 2025-07-31 RX ADMIN — SODIUM BICARBONATE 1300 MG: 650 TABLET ORAL at 22:15

## 2025-07-31 RX ADMIN — SODIUM CHLORIDE, SODIUM LACTATE, POTASSIUM CHLORIDE, AND CALCIUM CHLORIDE 250 ML/HR: .6; .31; .03; .02 INJECTION, SOLUTION INTRAVENOUS at 22:01

## 2025-07-31 RX ADMIN — SODIUM BICARBONATE 650 MG: 650 TABLET ORAL at 21:56

## 2025-07-31 RX ADMIN — ROSUVASTATIN CALCIUM 10 MG: 10 TABLET, FILM COATED ORAL at 21:55

## 2025-07-31 RX ADMIN — SODIUM CHLORIDE, SODIUM LACTATE, POTASSIUM CHLORIDE, AND CALCIUM CHLORIDE 100 ML/HR: .6; .31; .03; .02 INJECTION, SOLUTION INTRAVENOUS at 12:53

## 2025-07-31 RX ADMIN — DICYCLOMINE HYDROCHLORIDE 10 MG: 10 CAPSULE ORAL at 12:53

## 2025-07-31 RX ADMIN — ASPIRIN 81 MG: 81 TABLET, COATED ORAL at 08:45

## 2025-07-31 RX ADMIN — PANTOPRAZOLE SODIUM 40 MG: 40 INJECTION, POWDER, FOR SOLUTION INTRAVENOUS at 08:45

## 2025-07-31 RX ADMIN — DICYCLOMINE HYDROCHLORIDE 10 MG: 10 CAPSULE ORAL at 21:56

## 2025-07-31 RX ADMIN — TRAMADOL HYDROCHLORIDE 50 MG: 50 TABLET, COATED ORAL at 04:50

## 2025-07-31 RX ADMIN — ACETAMINOPHEN 650 MG: 325 TABLET ORAL at 04:04

## 2025-07-31 RX ADMIN — DICYCLOMINE HYDROCHLORIDE 10 MG: 10 CAPSULE ORAL at 17:10

## 2025-07-31 RX ADMIN — SODIUM BICARBONATE 650 MG: 650 TABLET ORAL at 08:44

## 2025-07-31 RX ADMIN — PREGABALIN 75 MG: 75 CAPSULE ORAL at 08:44

## 2025-07-31 SDOH — SOCIAL STABILITY: SOCIAL INSECURITY: WITHIN THE LAST YEAR, HAVE YOU BEEN HUMILIATED OR EMOTIONALLY ABUSED IN OTHER WAYS BY YOUR PARTNER OR EX-PARTNER?: NO

## 2025-07-31 SDOH — SOCIAL STABILITY: SOCIAL INSECURITY: HAS ANYONE EVER THREATENED TO HURT YOUR FAMILY OR YOUR PETS?: NO

## 2025-07-31 SDOH — SOCIAL STABILITY: SOCIAL INSECURITY: WERE YOU ABLE TO COMPLETE ALL THE BEHAVIORAL HEALTH SCREENINGS?: YES

## 2025-07-31 SDOH — SOCIAL STABILITY: SOCIAL INSECURITY: DO YOU FEEL UNSAFE GOING BACK TO THE PLACE WHERE YOU ARE LIVING?: NO

## 2025-07-31 SDOH — SOCIAL STABILITY: SOCIAL INSECURITY
WITHIN THE LAST YEAR, HAVE YOU BEEN RAPED OR FORCED TO HAVE ANY KIND OF SEXUAL ACTIVITY BY YOUR PARTNER OR EX-PARTNER?: NO

## 2025-07-31 SDOH — SOCIAL STABILITY: SOCIAL NETWORK
IN A TYPICAL WEEK, HOW MANY TIMES DO YOU TALK ON THE PHONE WITH FAMILY, FRIENDS, OR NEIGHBORS?: MORE THAN THREE TIMES A WEEK

## 2025-07-31 SDOH — ECONOMIC STABILITY: INCOME INSECURITY: IN THE PAST 12 MONTHS HAS THE ELECTRIC, GAS, OIL, OR WATER COMPANY THREATENED TO SHUT OFF SERVICES IN YOUR HOME?: NO

## 2025-07-31 SDOH — ECONOMIC STABILITY: FOOD INSECURITY: WITHIN THE PAST 12 MONTHS, YOU WORRIED THAT YOUR FOOD WOULD RUN OUT BEFORE YOU GOT THE MONEY TO BUY MORE.: NEVER TRUE

## 2025-07-31 SDOH — ECONOMIC STABILITY: FOOD INSECURITY: WITHIN THE PAST 12 MONTHS, THE FOOD YOU BOUGHT JUST DIDN'T LAST AND YOU DIDN'T HAVE MONEY TO GET MORE.: NEVER TRUE

## 2025-07-31 SDOH — SOCIAL STABILITY: SOCIAL NETWORK
DO YOU BELONG TO ANY CLUBS OR ORGANIZATIONS SUCH AS CHURCH GROUPS, UNIONS, FRATERNAL OR ATHLETIC GROUPS, OR SCHOOL GROUPS?: YES

## 2025-07-31 SDOH — SOCIAL STABILITY: SOCIAL NETWORK: HOW OFTEN DO YOU ATTEND CHURCH OR RELIGIOUS SERVICES?: MORE THAN 4 TIMES PER YEAR

## 2025-07-31 SDOH — SOCIAL STABILITY: SOCIAL NETWORK: HOW OFTEN DO YOU GET TOGETHER WITH FRIENDS OR RELATIVES?: THREE TIMES A WEEK

## 2025-07-31 SDOH — SOCIAL STABILITY: SOCIAL NETWORK: HOW OFTEN DO YOU ATTEND MEETINGS OF THE CLUBS OR ORGANIZATIONS YOU BELONG TO?: MORE THAN 4 TIMES PER YEAR

## 2025-07-31 SDOH — SOCIAL STABILITY: SOCIAL INSECURITY: HAVE YOU HAD ANY THOUGHTS OF HARMING ANYONE ELSE?: NO

## 2025-07-31 SDOH — HEALTH STABILITY: PHYSICAL HEALTH
HOW OFTEN DO YOU NEED TO HAVE SOMEONE HELP YOU WHEN YOU READ INSTRUCTIONS, PAMPHLETS, OR OTHER WRITTEN MATERIAL FROM YOUR DOCTOR OR PHARMACY?: NEVER

## 2025-07-31 SDOH — SOCIAL STABILITY: SOCIAL INSECURITY
WITHIN THE LAST YEAR, HAVE YOU BEEN KICKED, HIT, SLAPPED, OR OTHERWISE PHYSICALLY HURT BY YOUR PARTNER OR EX-PARTNER?: NO

## 2025-07-31 SDOH — SOCIAL STABILITY: SOCIAL INSECURITY: WITHIN THE LAST YEAR, HAVE YOU BEEN AFRAID OF YOUR PARTNER OR EX-PARTNER?: NO

## 2025-07-31 SDOH — SOCIAL STABILITY: SOCIAL INSECURITY: ARE YOU MARRIED, WIDOWED, DIVORCED, SEPARATED, NEVER MARRIED, OR LIVING WITH A PARTNER?: PATIENT DECLINED

## 2025-07-31 SDOH — HEALTH STABILITY: MENTAL HEALTH
DO YOU FEEL STRESS - TENSE, RESTLESS, NERVOUS, OR ANXIOUS, OR UNABLE TO SLEEP AT NIGHT BECAUSE YOUR MIND IS TROUBLED ALL THE TIME - THESE DAYS?: NOT AT ALL

## 2025-07-31 SDOH — SOCIAL STABILITY: SOCIAL INSECURITY: HAVE YOU HAD THOUGHTS OF HARMING ANYONE ELSE?: NO

## 2025-07-31 SDOH — SOCIAL STABILITY: SOCIAL INSECURITY: ARE YOU OR HAVE YOU BEEN THREATENED OR ABUSED PHYSICALLY, EMOTIONALLY, OR SEXUALLY BY ANYONE?: NO

## 2025-07-31 SDOH — SOCIAL STABILITY: SOCIAL INSECURITY: DO YOU FEEL ANYONE HAS EXPLOITED OR TAKEN ADVANTAGE OF YOU FINANCIALLY OR OF YOUR PERSONAL PROPERTY?: NO

## 2025-07-31 SDOH — SOCIAL STABILITY: SOCIAL INSECURITY: ABUSE: ADULT

## 2025-07-31 SDOH — SOCIAL STABILITY: SOCIAL INSECURITY: ARE THERE ANY APPARENT SIGNS OF INJURIES/BEHAVIORS THAT COULD BE RELATED TO ABUSE/NEGLECT?: NO

## 2025-07-31 SDOH — SOCIAL STABILITY: SOCIAL INSECURITY: DOES ANYONE TRY TO KEEP YOU FROM HAVING/CONTACTING OTHER FRIENDS OR DOING THINGS OUTSIDE YOUR HOME?: NO

## 2025-07-31 ASSESSMENT — LIFESTYLE VARIABLES
SUBSTANCE_ABUSE_PAST_12_MONTHS: NO
HOW OFTEN DO YOU HAVE 6 OR MORE DRINKS ON ONE OCCASION: NEVER
SKIP TO QUESTIONS 9-10: 1
PRESCIPTION_ABUSE_PAST_12_MONTHS: NO
HOW MANY STANDARD DRINKS CONTAINING ALCOHOL DO YOU HAVE ON A TYPICAL DAY: PATIENT DOES NOT DRINK
AUDIT-C TOTAL SCORE: 0
AUDIT-C TOTAL SCORE: 0
HOW OFTEN DO YOU HAVE A DRINK CONTAINING ALCOHOL: NEVER

## 2025-07-31 ASSESSMENT — COGNITIVE AND FUNCTIONAL STATUS - GENERAL
DAILY ACTIVITIY SCORE: 24
MOBILITY SCORE: 24
DAILY ACTIVITIY SCORE: 24
PATIENT BASELINE BEDBOUND: NO
MOBILITY SCORE: 24

## 2025-07-31 ASSESSMENT — ACTIVITIES OF DAILY LIVING (ADL)
PATIENT'S MEMORY ADEQUATE TO SAFELY COMPLETE DAILY ACTIVITIES?: YES
HEARING - LEFT EAR: FUNCTIONAL
JUDGMENT_ADEQUATE_SAFELY_COMPLETE_DAILY_ACTIVITIES: YES
LACK_OF_TRANSPORTATION: NO
WALKS IN HOME: INDEPENDENT
GROOMING: INDEPENDENT
ADEQUATE_TO_COMPLETE_ADL: YES
BATHING: INDEPENDENT
HEARING - RIGHT EAR: FUNCTIONAL
DRESSING YOURSELF: INDEPENDENT
TOILETING: INDEPENDENT
FEEDING YOURSELF: INDEPENDENT
LACK_OF_TRANSPORTATION: NO

## 2025-07-31 ASSESSMENT — PAIN SCALES - GENERAL
PAINLEVEL_OUTOF10: 7
PAINLEVEL_OUTOF10: 0 - NO PAIN
PAINLEVEL_OUTOF10: 7

## 2025-07-31 ASSESSMENT — PAIN DESCRIPTION - LOCATION: LOCATION: ABDOMEN

## 2025-07-31 ASSESSMENT — PAIN - FUNCTIONAL ASSESSMENT: PAIN_FUNCTIONAL_ASSESSMENT: 0-10

## 2025-07-31 NOTE — CARE PLAN
The patient's goals for the shift include      The clinical goals for the shift include      Over the shift, the patient did not make progress toward the following goals.   Problem: Pain - Adult  Goal: Verbalizes/displays adequate comfort level or baseline comfort level  Outcome: Progressing     Problem: Safety - Adult  Goal: Free from fall injury  Outcome: Progressing     Problem: Discharge Planning  Goal: Discharge to home or other facility with appropriate resources  Outcome: Progressing     Problem: Chronic Conditions and Co-morbidities  Goal: Patient's chronic conditions and co-morbidity symptoms are monitored and maintained or improved  Outcome: Progressing     Problem: Nutrition  Goal: Nutrient intake appropriate for maintaining nutritional needs  Outcome: Progressing

## 2025-07-31 NOTE — PROGRESS NOTES
"   07/31/25 1336   Discharge Planning   Living Arrangements Alone   Support Systems Spouse/significant other;Family members   Assistance Needed none   Type of Residence Private residence   Expected Discharge Disposition Home   Does the patient need discharge transport arranged? Yes   Financial Resource Strain   How hard is it for you to pay for the very basics like food, housing, medical care, and heating? Not very   Housing Stability   In the last 12 months, was there a time when you were not able to pay the mortgage or rent on time? N   At any time in the past 12 months, were you homeless or living in a shelter (including now)? N   Transportation Needs   In the past 12 months, has lack of transportation kept you from medical appointments or from getting medications? no   In the past 12 months, has lack of transportation kept you from meetings, work, or from getting things needed for daily living? No   Patient Choice   Provider Choice list and CMS website (https://medicare.gov/care-compare#search) for post-acute Quality and Resource Measure Data were provided and reviewed with: Patient   Intensity of Service   Intensity of Service 0-30 min     PCP: Dr. Scooby Martinez at [facility]   DATE OF LAST VISIT: \"last month\"  PHARMACY: [facility]   RECENT FALLS: pt denies   EQUIPMENT USED IN HOME: cane   HOME O2/CPAP/NEBS: N/A   TRANSPORT HOME: friend   CURRENT HC: N/A    Address, phone and emergency contact information verified. Pt states she was independent prior to admission-denies any DC needs at this time. All questions and concerns answered. Will continue to follow as needed.   "

## 2025-07-31 NOTE — PROGRESS NOTES
Vivien Miramontes is a 65 y.o. female admitted on 7/30/2025 and on day 1 of admission presenting with generalized abdominal pain, non-bloody diarrhea and inability to tolerate solids . Patient is admitted for FLORENCE and hypotension.    Subjective     Patient required one time dose Tramadol for pain. Went up on fluids to 150cc/hr froom VBG results showing 7.23/33/46/2.2. This morning, patient's pain is more controlled, still having diarrhea but slightly less. Tolerating her clear liquid diet.        Objective     Last Recorded Vitals  Temp:  [36.5 °C (97.7 °F)-36.6 °C (97.9 °F)] 36.6 °C (97.9 °F)  Heart Rate:  [58-71] 63  Resp:  [14-20] 16  BP: ()/(52-82) 99/65  SpO2 Readings from Last 1 Encounters:   07/30/25 100%       Intake/Output last 3 Shifts:  I/O this shift:  In: 1050 [I.V.:1050]  Out: -   No intake/output data recorded.        Physical Exam  Constitutional:       General: She is not in acute distress.     Appearance: Normal appearance.      Cardiovascular:      Rate and Rhythm: Normal rate and regular rhythm.      Pulses: Normal pulses.      Heart sounds: Normal heart sounds.   Pulmonary:      Effort: Pulmonary effort is normal.      Breath sounds: Normal breath sounds.   Abdominal:      General: There is no distension, hypoactive bowel sounds,      Palpations: Abdomen is soft.      Tenderness: There is abdominal tenderness.      Comments: Tenderness across upper abdomen upon moderate palpation   MSK  No LE edema  Skin:     General: Skin is warm, less dry, improved skin turgor  Neurological:      Mental Status: She is alert and oriented to person, place, and time. No Focal deficits, able to move all extremities      Relevant Results    CMP  Results from last 7 days   Lab Units 07/30/25  1946 07/30/25  1019   SODIUM mmol/L 134* 136   POTASSIUM mmol/L 3.4* 4.4   CO2 mmol/L 13* 13*   ANION GAP mmol/L 16 17   BUN mg/dL 31* 26*   CREATININE mg/dL 2.51* 2.37*   GLUCOSE mg/dL 160* 96   EGFR mL/min/1.73m*2 21* 22*    CALCIUM mg/dL 9.3 9.5   MAGNESIUM mg/dL  --  2.10   PHOSPHORUS mg/dL 5.5*  --       CBC  Results from last 72 hours   Lab Units 07/30/25  1019   WBC AUTO x10*3/uL 9.7   HEMOGLOBIN g/dL 14.7   HEMATOCRIT % 43.3   MCV fL 83   PLATELETS AUTO x10*3/uL 275   NEUTROS PCT AUTO % 67.0   LYMPHS PCT AUTO % 22.8   MONOS PCT AUTO % 8.1   EOS PCT AUTO % 1.0      Liver Panel  Results from last 7 days   Lab Units 07/30/25  1019   ALT U/L 32   AST U/L 26   ALK PHOS U/L 110        Urinalysis  Lab Results   Component Value Date    LEUKOCYTESU 75 Shmuel/µL (A) 03/10/2024    NITRITEU NEGATIVE 03/10/2024    WBCU 6-10 (A) 03/10/2024    RBCU 3-5 03/10/2024    BLOODU NEGATIVE 03/10/2024    PROTUR 10 (TRACE) 03/10/2024    GLUCOSEU Normal 03/10/2024    KETONESU TRACE (A) 03/10/2024    HYALCASTU 1+ (A) 03/10/2024        Other Labs  Results from last 7 days   Lab Units 07/30/25  1946 07/30/25  1235 07/30/25  1019   CRP mg/dL  --  0.37  --    SED RATE mm/h 31*  --   --    WBC AUTO x10*3/uL  --   --  9.7   HEMOGLOBIN g/dL  --   --  14.7         Results from last 72 hours   Lab Units 07/31/25  0423 07/30/25  1946 07/30/25  1019   POCT PH, VENOUS pH 7.23* 7.27* 7.35   POCT PCO2, VENOUS mm Hg 33* 29* 30*   POCT PO2, VENOUS mm Hg 46* 68* 53*      -Pending blood Cx, Stool osmalities, Lactoferritin Calprotectin stool (baseline 10.5 10 mo ago), electrolyte stool, UA, giardia antigen    -Pending CMV/EBV serologies, Urine electrolytes    Microbiology and Culture  -Stool pathogen O&P negative (Campylo, Salmonella, Shigella, Vibrio, Yersinia, Shiga Toxin 1/2, Norovirus, Rotavirus)  -Blood Cultures pending        Imaging  -CXR to evaluate lungs and fluid status pending    Inpatient Medications  Scheduled Medications[1]  Continuous Medications[2]  PRN Medications[3]             Assessment/Plan     65 year old female with past medical history significant for Crohn's disease (on Entyvio every two months), HTN, HLD, CAD s/p stent, NSTEMI, HFpEF, smoker, spinal  stenosis, and cholecystectomy presents to the ED hypotensive with 1-week history of generalized abdominal pain, non-bloody diarrhea and inability to tolerate solids, complicated with new FLORENCE. On admission, received LR bolus 1000mL and LR infusion 100 mL/hr. Today received another infusion 100 mL/hr LR, shortly changed to 150cc/hr this morning.    Updates :  -GI consult: pending their recs and intervention on possible scoping  -Holding Plavix in anticipation of potential EGD/Colonoscopy tomorrow)  -Pending CMV/EBV serologies, Urine electrolytes  -Started Dicyclomine (Bentyl)  10 mg capsule QID for diarrhea  -Started oral bicarb 650 BID to replenish from GI loss and acidotic state (VB.23/33/46/2.2)  -Urine electrolytes, urine osmolalities pending to rule out FLORENCE etiology  -CXR ordered to evaluate lungs and volume status given patient with HFpEF receiving maintenance fluids  -C diff panel negative, Stool pathogen O&P negative (Campylo, Salmonella, Shigella, Vibrio, Yersinia, Shiga Toxin 1/2, Norovirus, Rotavirus)      #Diarrhea  #Crohn's Disease Flare vs Viral Etiology  -Crohn's disease flare vs viral infection, higher suspicions for mild crohn's flare given moderately elevated CRP (0.37), ESR (31), and CT AP on admission without evidence of wall thickening or inflammatory changes, findings often commonly seen in active Crohn's disease. However, cannot rule out infection, especially given increased WBC count today (11.4) compared to on admission (9.7)  -Symptoms of abdominal pain across upper abdomen, non-bloody, non-mucous diarrhea, shortly after Entyvio shot   -Crohn's disease was diagnosed in , failed multiple biologic agents including Humira and Stelara. Started pm Entyvio 300 mg injection every 2 months (last injection self-reported )  -Hx of bowel perforation due to complicated diverticulitis    -s/p colostomy in  subsequently had reversal in     -s/p Colonoscopy  showing   mild ulceration in sigmoid colon otherwise normal. Colonoscopy  2024: Mild (in right colon ) to moderate erythema (transverse colon, descending colon, sigmoid colon) consistent with chron's colitis, TI appears to be normal, biopsy-chronic colitis in right colon.   -Followed by Dr. Ping Allen at  (last appt ) with next follow up in 6 months, and colonoscopy in 2-3 months  -Home regimen: Vitamin E 180 mg capsule, Entyvio 300 mg q2 months  -C diff panel negative  , Stool pathogen O&P negative (Campylo, Salmonella, Shigella, Vibrio, Yersinia, Shiga Toxin 1/2, Norovirus, Rotavirus)  -PLAN    -On maintenance fluids to help replace GI loss from excessive diarrhea    -Strict Is/Os     -Daily CBCs    -Encourage oral hydration    -GI consulted, pending their recs and possible intervention to scope for CMV colitiis    -NPO midnight in anticipation to possible scope    -Holding Loperamide and Methylpred since admission , will consider if infectious etiology is ruled out    -Pending blood Cx, Stool osmalities, Lactoferritin Calprotectin stool (baseline 10.5 10 mo ago), electrolyte stool, UA, giardia antigen    -Pending CMV/EBV serologies, Urine electrolytes    -Continue Vitamin E    -Can give Dicyclomine (Bentyl) for diarrhea 10 mg capsule QID    #FLORENCE -likely prerenal etiology  -Patient with large-volume, non-bloody, non-emesis loose diarrhea since last reported Entyvio shot  for txt of Crohns  -Creatinine 2. 37 BL ~0.7   -On admission, received LR bolus 1000mL and LR infusion 100 mL/hr. Today received another infusion 100 mL/hr LR. Went up on fluids to 150cc/hr from VBG results on  showing 7.//46/2.2.   -PLAN:  -Monitor RFP's, Mg, bicarb  -Avoid nephrotoxic agents  -Continue IVF hydration  -Oral bicarb 650 BID to replenish from GI loss and acidotic state (VB./46/2.2  -Urine electrolytes, urine osmolalities pending to rule out FLORENCE etiology    #HFpEF  -Last echo 2024  ([facility]): EF 52%, normal RV size and RV systolic function.  -Home regimen: Carvedilol 25 mg q12h, Nitroglycerin, Aspirin 81 mg EC tab every day, Plavix 75 mg tab qd  -PLAN    -Hold Carvedilol, Nitroglycerin in the setting of soft BP (BP on admission 82/53)    -Holding Plavix from 07/31 (not indicated, stent not frequent, and in anticipation of potential EGD/Colonoscopy tomorrow)  -CXR ordered to evaluate lungs and volume status given patient with HFpEF receiving maintenance fluids      #HTN  -Hypotensive on arrival (BP 82/53), but patient is conversational, moderate-appearing, no headaches, dizzyness, SOB, and no acute distress.  -Home regimen:  Amlodipine 5 mg tab (takes 1.5 mg tab every morning), Hydrochlorothiazide 25 mg tab every day, Lisinopril 40 mg qD  -PLAN    -Hold Amlodipine, Hydrochlorothiazide in the setting of soft BP on admission  (BP on admission 82/53)    -Orthostats        #HLD  -Home regimen: Rouvastatin 40mg every day  -PLAN    -continue Rouvastatin      #Lumbar spinal stenosis  -s/p bilateral L4 transforaminal epidural steroid injection under fluoroscopy in 06/23. -followed by [facility] Pain, Dr. Angie Bansal  -Home regimen: Pregablin 150mg cap BID, Acetaminophen 325 mg 2 tab q6h, Robaxin 500 mg TID PRN  -PLAN    -Hold Robaxin    -Lpwer dose Pregablin 75mg (in setting of her new FLORENCE)    -Multimodal pain control as needed, with lidocaine patches, acetaminophen        ##FEN  Fluids: Replete PRN  Electrolytes: Replete PRN  Nutrition: Adult diet Clear Liquid     ##Prophylaxis  Antimicrobials: This patient does not have an active medication from one of the medication groupers.  DVT PPX: SubQ Heparin  Bowel Regimen: Pantaprazole     ##Social  Oxygen: On room air  Disposition:   Code Status: Full Code  NOK:   Extended Emergency Contact Information  Primary Emergency Contact: Kulwinder PRIEST Puneet  Address: [address] of [facility]  [place]  Phone: [phone]  Relation: Child  Secondary Emergency Contact: FRANDY MAYO JR  Mobile Phone: [phone]  Relation: Spouse  Preferred language: English   needed? No      Patient discussed with attending physician, Aiden Baker MD *.               [1] [Held by provider] amLODIPine, 7.5 mg, oral, Daily  aspirin, 81 mg, oral, Daily  [Held by provider] carvedilol, 25 mg, oral, q12h KEVIN  clopidogrel, 75 mg, oral, Daily  famotidine, 20 mg, intravenous, Once  [Held by provider] heparin (porcine), 5,000 Units, subcutaneous, q8h KEVIN  [Held by provider] hydroCHLOROthiazide, 25 mg, oral, Daily  [Held by provider] lisinopril, 40 mg, oral, Daily  morphine, 2 mg, intravenous, Once  ondansetron, 4 mg, intravenous, Once  pregabalin, 75 mg, oral, BID  rosuvastatin, 40 mg, oral, Nightly  [2] lactated Ringer's, 75 mL/hr, Last Rate: 75 mL/hr (07/31/25 0434)  [3] PRN medications: acetaminophen, dextrose, dextrose, glucagon, glucagon, lidocaine

## 2025-07-31 NOTE — CONSULTS
Gastroenterology Consult Service INITIAL CONSULT Note  Department of Gastroenterology & Hepatology  [facility]    [facility]  July 31, 2025   Patient: Vivien Miramontes    Medical Record: [id number]    Reason for Consult: Concerns for Crohn's flare     Subjective   Vivien Miramontes is a 65 y.o. female with a history of Crohn's colitis diagnosed in 2010, diverticulitis c/b bowel perforation requiring colostomy in 2009 (reversed in 2010), who presents with a one week of worsening abdominal pain, nonbloody diarrhea, and inability to tolerate solid foods. She reports diffuse upper abdominal pain that is associated with loose stools occurring more than 10 times daily (around her baseline), nausea without vomiting, and poor oral intake for 7 days. She denies any blood or mucus in stool, tenesmus, recent antibiotic use, sick contacts, or travel.    These symptoms are similar to prior flares she reports occurring every 6 months, most recently after her last Entyvio (vedolizumab) infusion on 07/22/2025. Notably, she underwent a colonoscopy 13 months ago (06/26/2024) which showed mild patchy inflammation in the ascending colon and moderate chronic inflammatory changes and scarring throughout the colon (transverse, descending, sigmoid, and rectum). Biopsies were taken, which revealed chronic colitis with cryptitis in the right colon, remaining pathology normal.     Initial labs on admission showed FLORENCE with an elevated creatinine of 2.51, and an ESR of 31 (normal <30), with normal CRP at 0.37. CT A/P on 07/30/2025 showed fluid filled small and large bowel without obstruction, inflammation, or wall thickening, but noted circumferential submucosal fat deposition at the ileocecal valve. Stool studies including pathogen panel and C. diff PCR were negative. Pending studies include fecal calprotectin, fecal lactoferrin, O&P, giardia antigen. Prior fecal calprotectin measured 12/2024 was  normal.      In the past, the patient has been treated with multiple IBD therapies including Humira and Stelara without achieving remission. Due to significant cardiac comorbidities Rinvoq was not considered, and she was switched to Entyvio on 10/2024.  She follows with Dr. Miguel Pedro at  for her IBD care.  GI has been consulted to assess for Crohn's flare.      12 point ROS otherwise negative, unless indicated above.     Past Medical History:  Medical History[1]    Home Medications  Prescriptions Prior to Admission[2]    Surgical History:  Surgical History[3]    Allergies:  Allergies[4]    Social History:    Social History     Socioeconomic History    Marital status:      Spouse name: Not on file    Number of children: Not on file    Years of education: Not on file    Highest education level: Not on file   Occupational History    Not on file   Tobacco Use    Smoking status: Every Day     Current packs/day: 0.25     Average packs/day: 0.3 packs/day for 58.3 years (15.0 ttl pk-yrs)     Types: Cigarettes    Smokeless tobacco: Never   Vaping Use    Vaping status: Not on file   Substance and Sexual Activity    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    Sexual activity: Not Currently   Other Topics Concern    Not on file   Social History Narrative    Not on file     Social Drivers of Health     Financial Resource Strain: Low Risk  (7/31/2025)    Overall Financial Resource Strain (CARDIA)     Difficulty of Paying Living Expenses: Not very hard   Food Insecurity: No Food Insecurity (7/31/2025)    Hunger Vital Sign     Worried About Running Out of Food in the Last Year: Never true     Ran Out of Food in the Last Year: Never true   Transportation Needs: No Transportation Needs (7/31/2025)    PRAPARE - Transportation     Lack of Transportation (Medical): No     Lack of Transportation (Non-Medical): No   Physical Activity: Not on file    Stress: No Stress Concern Present (7/31/2025)    [facility] - Occupational Stress Questionnaire     Feeling of Stress: Not at all   Social Connections: Unknown (7/31/2025)    Social Connection and Isolation Panel     Frequency of Communication with Friends and Family: More than three times a week     Frequency of Social Gatherings with Friends and Family: Three times a week     Attends [employer] Services: More than 4 times per year     Active Member of Clubs or Organizations: Yes     Attends Club or Organization Meetings: More than 4 times per year     Marital Status: Patient declined   Intimate Partner Violence: Not At Risk (7/31/2025)    Humiliation, Afraid, Rape, and Kick questionnaire     Fear of Current or Ex-Partner: No     Emotionally Abused: No     Physically Abused: No     Sexually Abused: No   Housing Stability: Unknown (7/31/2025)    Housing Stability Vital Sign     Unable to Pay for Housing in the Last Year: No     Number of Times Moved in the Last Year: Not on file     Homeless in the Last Year: No       Family History:  Family History[5]  No family history of GI or liver disease.     Objective     Vitals:    Vitals:    07/30/25 2303 07/30/25 2305 07/31/25 0532 07/31/25 1255   BP: 104/66 104/66 99/65 110/53   BP Location:  Right arm     Patient Position:  Sitting  Lying   Pulse: 63 63 63 65   Resp:  16 16 18   Temp:  36.6 °C (97.9 °F) 36.4 °C (97.5 °F) 36.6 °C (97.9 °F)   TempSrc:  Temporal Temporal    SpO2: 100% 100%  93%   Weight: 62.6 kg (138 lb 0.1 oz) 62.6 kg (138 lb 0.1 oz)     Height: (!) 1.524 m (5') (!) 1.524 m (5')       Failed to redirect to the Timeline version of the [employer] SmartLink.    Intake/Output Summary (Last 24 hours) at 7/31/2025 1713  Last data filed at 7/31/2025 1506  Gross per 24 hour   Intake 2000 ml   Output --   Net 2000 ml       Physical Exam  Gen: well appearing, in no acute distress  HEENT: PEERL, EOMI, sclera anicteric, no conjunctival  injection,   Resp: CTAB, normal breath sounds, no wheezing/crackles/ rales  CV: RRR, normal S1/S2,   GI: Epigastric and RUQ tenderness to moderate palpation with some guarding, non-distended, normal BSs in all 4 quadrants, no rebound  MSK: warm and well perfused, no edema  Neuro: AAOx3, no focal deficits  Skin: warm and dry, no lesions, no rashes     Labs:   Lab Results   Component Value Date    WBC 11.4 (H) 07/31/2025    HGB 15.4 07/31/2025    HCT 47.8 (H) 07/31/2025    MCV 87 07/31/2025     07/31/2025     Lab Results   Component Value Date    GLUCOSE 98 07/31/2025    CALCIUM 9.5 07/31/2025     (L) 07/31/2025    K 4.2 07/31/2025    CO2 15 (L) 07/31/2025     07/31/2025    BUN 33 (H) 07/31/2025    CREATININE 3.16 (H) 07/31/2025     Lab Results   Component Value Date    ALT 32 07/30/2025    AST 26 07/30/2025    ALKPHOS 110 07/30/2025    BILITOT 0.4 07/30/2025     Lab Results   Component Value Date    INR 1.0 01/09/2024    INR 1.0 12/28/2017    PROTIME 11.2 01/09/2024    PROTIME 11.3 12/28/2017       Imaging:     === 07/30/25 ===    CT ABDOMEN PELVIS W IV CONTRAST    - Impression -  1. Fluid-filled small and large bowel without evidence of  obstruction, wall thickening, or adjacent inflammatory changes, to be  correlated with history of diarrhea.  2. Circumferential submucosal fat deposition involving the ileocecal  valve, which can be seen in setting of Crohn's disease.  3. Colonic diverticulosis without evidence of diverticulitis.  4. Moderate-to-severe calcific and noncalcified atherosclerotic  disease of the abdominal aorta and branching vasculature with no  hemodynamically significant stenosis or occlusion.  5. Additional incidental/chronic findings as above.    I personally reviewed the images/study and I agree with the findings  as stated by Brad Santos DO PGY-3. This study was interpreted at  [place], [place].    MACRO:  None.    Signed by:  Jimy Salazar 7/30/2025 9:20 PM  Dictation workstation:   [id number]  === 03/06/24 ===    MR CERVICAL SPINE WO CONTRAST    - Impression -  Degenerative changes in the lower cervical spine most prominent at  C6-7 where there is moderate narrowing of bilateral foramina and mild  narrowing of the spinal canal.    Tortuous course of the left vertebral artery, which appears to extend  into the left neural foramen at C4-5 and may contact the exiting left  C5 nerve root. This variant anatomy is nonspecific and is often seen  in asymptomatic patients. However, C5 nerve root compression can not  be excluded. Correlation with dermatomal distribution recommended.    Signed by: Oly Arroyo 3/6/2024 2:27 PM  Dictation workstation:   [id number]    GI procedures:    Colonoscopy 6/26/2024  Impression  The terminal ileum appeared normal. Performed random biopsy using biopsy forceps.  Mild abnormal mucosa in the ascending colon, consistent with Crohn's disease; performed cold forceps biopsy  Moderate abnormal mucosa in the transverse colon, descending colon, sigmoid colon and rectum, consistent with scarring from Crohn's disease; performed cold forceps biopsy to rule out active disease  Healthy signs of previous surgery in the rectosigmoid  Findings  The terminal ileum appeared normal. Performed random biopsy using biopsy forceps.  Mild, patchy abnormal mucosa with erosions in the ascending colon/cecum, consistent with mild Crohn's disease; performed cold forceps biopsy  Moderate, patchy scarring and pseudopolyps in the transverse colon, descending colon, sigmoid colon and rectum, consistent with known Crohn's disease; performed cold forceps biopsy to rule out active colitis. There were scattered small pseudopolyps. No evidence of active disease.;  Healthy signs of previous surgery in the rectosigmoid    Assessment & Plan   65F with a history of Crohn’s colitis complicated by prior bowel perforation and colostomy reversal,  presenting with potential Crohn’s flare including worsening abdominal pain and poor oral intake. Symptoms are similar to prior flares and occurred shortly after her most recent vedolizumab infusion. Colonoscopy from 06/2024 showed chronic inflammatory changes with focal cryptitis but no active colitis. Imaging reveals no acute obstruction or active inflammation but findings consistent with chronic disease at the ileocecal valve. Initial labs show FLORENCE and stool studies thus far are negative for infection. Patient has failed multiple biologics including Humira and Stelara, and precluded from Rinvoq due to cardiac risks, currently on vedolizumab since late 2024.     Recommendations:   - NPO at midnight   - EGD tomorrow   - Follow up pending studies - fecal calprotectin, lactoferrin, O&P, giardia antigen  - If stool studies indicate bowel inflammation, we will then CT enterography to evaluate for small bowel Crohn's disease    Patient discussed with attending, Dr. Giovana Cooper. Preliminary recommendations provided; final recommendations will follow in the attending attestation.       Anthony Najera MD, PhD  Gastroenterology Fellow, PGY-7  [facility]   Division of Gastroenterology and Liver Disease      Thank you for the consultation. Gastroenterology will continue to the follow the patient.   Please do not hesitate to contact me on Haiku or page [phone] if there are any further questions between the weekday hours of 7 AM - 5 PM.   If there is an urgent concern during the weekend, after-hours, or holidays; then please [place] [phone]. Thank you.    SIGNATURE: Anthony Najera MD PATIENT NAME: Vivien Miramontes   DATE: July 31, 2025 MRN: [id number]            [1]   Past Medical History:  Diagnosis Date    Anemia     CHF (congestive heart failure) 2012    Chronic diarrhea 2009    Coronary artery disease     2012: cardiac cath with PCI, 6/2/23: cardiac cath with PCI x2 (LAD &  LCx) intermediate disease of a tortuous RCA - cardiac clearance requested    Crohn's disease (Multi) 2010    bowel perforation 2009 s/p colostomy, reversed 2010    Depression     Hyperlipidemia     Hypertension 2010    IBS (irritable bowel syndrome)     Lung nodules     nonconcerning per pulm note    Myocardial infarction (Multi) 2012    Ulcerative colitis 2010   [2]   Facility-Administered Medications Prior to Admission   Medication Dose Route Frequency Provider Last Rate Last Admin    acetaminophen (Tylenol) tablet 650 mg  650 mg oral Once Leatha M [place], APRN-CNP         Medications Prior to Admission   Medication Sig Dispense Refill Last Dose/Taking    acetaminophen (TylenoL) 325 mg tablet Take 2 tablets (650 mg) by mouth every 6 hours if needed for mild pain (1 - 3) or fever (temp greater than 38.0 C). 30 tablet 0 7/29/2025    amLODIPine (Norvasc) 5 mg tablet Take 1.5 tablets (7.5 mg) by mouth early in the morning..   7/29/2025    aspirin 81 mg EC tablet Take 1 tablet (81 mg) by mouth once daily.   7/29/2025    carvedilol (Coreg) 25 mg tablet Take 1 tablet (25 mg) by mouth every 12 hours.   7/29/2025    clopidogrel (Plavix) 75 mg tablet Take 1 tablet (75 mg) by mouth once daily.   7/29/2025    hydroCHLOROthiazide (HYDRODiuril) 25 mg tablet Take 1 tablet (25 mg) by mouth once daily.   7/29/2025    hydrOXYzine HCL (Atarax) 25 mg tablet Take 1 tablet (25 mg) by mouth 3 times a day as needed for itching. May cause drowsiness   7/29/2025    lisinopril 40 mg tablet Take 1 tablet (40 mg) by mouth once daily. as directed   7/29/2025    methocarbamol (Robaxin) 500 mg tablet Take 1 tablet (500 mg) by mouth 3 times a day as needed for muscle spasms. 90 tablet 0 7/29/2025    pregabalin (Lyrica) 150 mg capsule Take 1 capsule (150 mg) by mouth 3 times a day.   7/29/2025    rosuvastatin (Crestor) 40 mg tablet Take 1 tablet (40 mg) by mouth once daily at bedtime.   7/29/2025    vitamin E 180 mg (400 unit) capsule Take 1  capsule (400 Units) by mouth once daily.   2025    nitroglycerin (Nitrostat) 0.4 mg SL tablet Place 1 tablet (0.4 mg) under the tongue. Dissolve 1 tablet under the tongue as needed for chest pain.       vedolizumab (Entyvio) 300 mg injection Infuse 300 mg into a venous catheter every 8 (eight) weeks.  For Maintenance: every 8 weeks 5 mL 2     vedolizumab (Entyvio) 300 mg injection Infuse 300 mg into a venous catheter see administration instructions.  For Induction:  0, 2 and 6 weeks 15 mL 0    [3]   Past Surgical History:  Procedure Laterality Date    ABDOMINAL HERNIA REPAIR  2018    with mesh    CARDIAC CATHETERIZATION      PCI    CARDIAC CATHETERIZATION  2023    PCI x 2 (LAD and LCx)     SECTION, LOW TRANSVERSE  1987    CHOLECYSTECTOMY  2017    COLON SURGERY  2009    COLONOSCOPY      COLOSTOMY  2009    REVISION / TAKEDOWN COLOSTOMY  2010    SMALL INTESTINE SURGERY  2009    TUBAL LIGATION      UMBILICAL HERNIA REPAIR      x 2   [4]   Allergies  Allergen Reactions    Penicillin Unknown    Penicillins Hives   [5]   Family History  Problem Relation Name Age of Onset    Stroke Mother      Hypertension Mother      Heart attack Mother      Heart attack Father      Diabetes Father      Multiple sclerosis Sister      Breast cancer Sister      Diabetes Brother      Alcohol abuse Brother Romaine Joy 20 -

## 2025-07-31 NOTE — CARE PLAN
Problem: Skin  Goal: Participates in plan/prevention/treatment measures  7/31/2025 0113 by Pretty Duran RN  Outcome: Progressing  7/31/2025 0113 by Pretty Duran RN  Flowsheets (Taken 7/31/2025 0113)  Participates in plan/prevention/treatment measures:   Elevate heels   Increase activity/out of bed for meals  Goal: Prevent/manage excess moisture  7/31/2025 0113 by Pretty Duran RN  Outcome: Progressing  7/31/2025 0113 by Pretty Duran RN  Flowsheets (Taken 7/31/2025 0113)  Prevent/manage excess moisture:   Monitor for/manage infection if present   Moisturize dry skin  Goal: Prevent/minimize sheer/friction injuries  7/31/2025 0113 by Pretty Duran RN  Outcome: Progressing  7/31/2025 0113 by Pretty Duran RN  Flowsheets (Taken 7/31/2025 0113)  Prevent/minimize sheer/friction injuries: Increase activity/out of bed for meals  Goal: Promote/optimize nutrition  7/31/2025 0113 by Pretty Duran RN  Outcome: Progressing  7/31/2025 0113 by Pretty Duran RN  Flowsheets (Taken 7/31/2025 0113)  Promote/optimize nutrition:   Offer water/supplements/favorite foods   Consume > 50% meals/supplements   Monitor/record intake including meals   The patient's goals for the shift include      The clinical goals for the shift include      Over the shift, the patient did not make progress toward the following goals.

## 2025-08-01 ENCOUNTER — APPOINTMENT (OUTPATIENT)
Dept: GASTROENTEROLOGY | Facility: HOSPITAL | Age: 65
DRG: 386 | End: 2025-08-01
Payer: COMMERCIAL

## 2025-08-01 LAB
ALBUMIN SERPL BCP-MCNC: 3.4 G/DL (ref 3.4–5)
ALBUMIN SERPL BCP-MCNC: 3.6 G/DL (ref 3.4–5)
ALBUMIN SERPL BCP-MCNC: 4.3 G/DL (ref 3.4–5)
ANION GAP SERPL CALC-SCNC: 13 MMOL/L (ref 10–20)
ANION GAP SERPL CALC-SCNC: 14 MMOL/L (ref 10–20)
ANION GAP SERPL CALC-SCNC: 15 MMOL/L (ref 10–20)
BASE EXCESS BLDV CALC-SCNC: -13.5 MMOL/L (ref -2–3)
BASOPHILS # BLD AUTO: 0.04 X10*3/UL (ref 0–0.1)
BASOPHILS NFR BLD AUTO: 0.4 %
BODY TEMPERATURE: 37 DEGREES CELSIUS
BUN SERPL-MCNC: 38 MG/DL (ref 6–23)
BUN SERPL-MCNC: 41 MG/DL (ref 6–23)
BUN SERPL-MCNC: 42 MG/DL (ref 6–23)
CALCIUM SERPL-MCNC: 8.4 MG/DL (ref 8.6–10.6)
CALCIUM SERPL-MCNC: 8.7 MG/DL (ref 8.6–10.6)
CALCIUM SERPL-MCNC: 9.2 MG/DL (ref 8.6–10.6)
CHLORIDE SERPL-SCNC: 110 MMOL/L (ref 98–107)
CHLORIDE SERPL-SCNC: 113 MMOL/L (ref 98–107)
CHLORIDE SERPL-SCNC: 114 MMOL/L (ref 98–107)
CMV DNA SERPL NAA+PROBE-LOG IU: NORMAL {LOG_IU}/ML
CO2 SERPL-SCNC: 15 MMOL/L (ref 21–32)
CO2 SERPL-SCNC: 15 MMOL/L (ref 21–32)
CO2 SERPL-SCNC: 16 MMOL/L (ref 21–32)
CREAT SERPL-MCNC: 1.99 MG/DL (ref 0.5–1.05)
CREAT SERPL-MCNC: 2.01 MG/DL (ref 0.5–1.05)
CREAT SERPL-MCNC: 2.93 MG/DL (ref 0.5–1.05)
CRYPTOSP AG STL QL IA: NEGATIVE
EBV DNA SPEC NAA+PROBE-LOG#: NORMAL {LOG_COPIES}/ML
EGFRCR SERPLBLD CKD-EPI 2021: 17 ML/MIN/1.73M*2
EGFRCR SERPLBLD CKD-EPI 2021: 27 ML/MIN/1.73M*2
EGFRCR SERPLBLD CKD-EPI 2021: 27 ML/MIN/1.73M*2
EOSINOPHIL # BLD AUTO: 0.16 X10*3/UL (ref 0–0.7)
EOSINOPHIL NFR BLD AUTO: 1.5 %
ERYTHROCYTE [DISTWIDTH] IN BLOOD BY AUTOMATED COUNT: 14.1 % (ref 11.5–14.5)
G LAMBLIA AG STL QL IA: NEGATIVE
GLUCOSE SERPL-MCNC: 62 MG/DL (ref 74–99)
GLUCOSE SERPL-MCNC: 85 MG/DL (ref 74–99)
GLUCOSE SERPL-MCNC: 94 MG/DL (ref 74–99)
HCO3 BLDV-SCNC: 11.1 MMOL/L (ref 22–26)
HCT VFR BLD AUTO: 39.1 % (ref 36–46)
HGB BLD-MCNC: 12.5 G/DL (ref 12–16)
IMM GRANULOCYTES # BLD AUTO: 0.05 X10*3/UL (ref 0–0.7)
IMM GRANULOCYTES NFR BLD AUTO: 0.5 % (ref 0–0.9)
INHALED O2 CONCENTRATION: 21 %
LABORATORY COMMENT REPORT: NOT DETECTED
LABORATORY COMMENT REPORT: NOT DETECTED
LACTATE SERPL-SCNC: 1.3 MMOL/L (ref 0.4–2)
LACTOFERRIN STL QL IA: NEGATIVE
LYMPHOCYTES # BLD AUTO: 1.56 X10*3/UL (ref 1.2–4.8)
LYMPHOCYTES NFR BLD AUTO: 15.1 %
MAGNESIUM SERPL-MCNC: 1.77 MG/DL (ref 1.6–2.4)
MCH RBC QN AUTO: 28.2 PG (ref 26–34)
MCHC RBC AUTO-ENTMCNC: 32 G/DL (ref 32–36)
MCV RBC AUTO: 88 FL (ref 80–100)
MONOCYTES # BLD AUTO: 0.79 X10*3/UL (ref 0.1–1)
MONOCYTES NFR BLD AUTO: 7.6 %
NEUTROPHILS # BLD AUTO: 7.73 X10*3/UL (ref 1.2–7.7)
NEUTROPHILS NFR BLD AUTO: 74.9 %
NRBC BLD-RTO: 0 /100 WBCS (ref 0–0)
OXYHGB MFR BLDV: ABNORMAL %
PCO2 BLDV: 23 MM HG (ref 41–51)
PH BLDV: 7.29 PH (ref 7.33–7.43)
PHOSPHATE SERPL-MCNC: 3.6 MG/DL (ref 2.5–4.9)
PHOSPHATE SERPL-MCNC: 4.2 MG/DL (ref 2.5–4.9)
PHOSPHATE SERPL-MCNC: 4.7 MG/DL (ref 2.5–4.9)
PLATELET # BLD AUTO: 218 X10*3/UL (ref 150–450)
PO2 BLDV: 54 MM HG (ref 35–45)
POTASSIUM SERPL-SCNC: 3.5 MMOL/L (ref 3.5–5.3)
POTASSIUM SERPL-SCNC: 4.2 MMOL/L (ref 3.5–5.3)
POTASSIUM SERPL-SCNC: 5 MMOL/L (ref 3.5–5.3)
RBC # BLD AUTO: 4.44 X10*6/UL (ref 4–5.2)
SAO2 % BLDV: ABNORMAL %
SODIUM SERPL-SCNC: 136 MMOL/L (ref 136–145)
SODIUM SERPL-SCNC: 136 MMOL/L (ref 136–145)
SODIUM SERPL-SCNC: 140 MMOL/L (ref 136–145)
WBC # BLD AUTO: 10.3 X10*3/UL (ref 4.4–11.3)

## 2025-08-01 PROCEDURE — 7100000009 HC PHASE TWO TIME - INITIAL BASE CHARGE

## 2025-08-01 PROCEDURE — 0DB78ZX EXCISION OF STOMACH, PYLORUS, VIA NATURAL OR ARTIFICIAL OPENING ENDOSCOPIC, DIAGNOSTIC: ICD-10-PCS | Performed by: INTERNAL MEDICINE

## 2025-08-01 PROCEDURE — 2500000004 HC RX 250 GENERAL PHARMACY W/ HCPCS (ALT 636 FOR OP/ED)

## 2025-08-01 PROCEDURE — 80069 RENAL FUNCTION PANEL: CPT

## 2025-08-01 PROCEDURE — 85025 COMPLETE CBC W/AUTO DIFF WBC: CPT

## 2025-08-01 PROCEDURE — 83605 ASSAY OF LACTIC ACID: CPT | Performed by: INTERNAL MEDICINE

## 2025-08-01 PROCEDURE — 84100 ASSAY OF PHOSPHORUS: CPT

## 2025-08-01 PROCEDURE — 3700000012 HC SEDATION LEVEL 5+ TIME - INITIAL 15 MINUTES 5/> YEARS

## 2025-08-01 PROCEDURE — 7100000010 HC PHASE TWO TIME - EACH INCREMENTAL 1 MINUTE

## 2025-08-01 PROCEDURE — G0500 MOD SEDAT ENDO SERVICE >5YRS: HCPCS | Performed by: INTERNAL MEDICINE

## 2025-08-01 PROCEDURE — 83735 ASSAY OF MAGNESIUM: CPT

## 2025-08-01 PROCEDURE — 43239 EGD BIOPSY SINGLE/MULTIPLE: CPT | Performed by: INTERNAL MEDICINE

## 2025-08-01 PROCEDURE — 99222 1ST HOSP IP/OBS MODERATE 55: CPT | Performed by: STUDENT IN AN ORGANIZED HEALTH CARE EDUCATION/TRAINING PROGRAM

## 2025-08-01 PROCEDURE — 2500000004 HC RX 250 GENERAL PHARMACY W/ HCPCS (ALT 636 FOR OP/ED): Performed by: INTERNAL MEDICINE

## 2025-08-01 PROCEDURE — 36415 COLL VENOUS BLD VENIPUNCTURE: CPT

## 2025-08-01 PROCEDURE — 88305 TISSUE EXAM BY PATHOLOGIST: CPT | Mod: TC,SUR | Performed by: INTERNAL MEDICINE

## 2025-08-01 PROCEDURE — 0DB98ZX EXCISION OF DUODENUM, VIA NATURAL OR ARTIFICIAL OPENING ENDOSCOPIC, DIAGNOSTIC: ICD-10-PCS | Performed by: INTERNAL MEDICINE

## 2025-08-01 PROCEDURE — 1100000001 HC PRIVATE ROOM DAILY

## 2025-08-01 PROCEDURE — 36415 COLL VENOUS BLD VENIPUNCTURE: CPT | Performed by: INTERNAL MEDICINE

## 2025-08-01 PROCEDURE — 2500000002 HC RX 250 W HCPCS SELF ADMINISTERED DRUGS (ALT 637 FOR MEDICARE OP, ALT 636 FOR OP/ED)

## 2025-08-01 PROCEDURE — 82805 BLOOD GASES W/O2 SATURATION: CPT

## 2025-08-01 PROCEDURE — 99233 SBSQ HOSP IP/OBS HIGH 50: CPT

## 2025-08-01 PROCEDURE — 2500000001 HC RX 250 WO HCPCS SELF ADMINISTERED DRUGS (ALT 637 FOR MEDICARE OP)

## 2025-08-01 RX ORDER — PANTOPRAZOLE SODIUM 40 MG/1
40 TABLET, DELAYED RELEASE ORAL DAILY
Status: DISCONTINUED | OUTPATIENT
Start: 2025-08-02 | End: 2025-08-03 | Stop reason: HOSPADM

## 2025-08-01 RX ORDER — SODIUM BICARBONATE 650 MG/1
1300 TABLET ORAL 3 TIMES DAILY
Status: DISCONTINUED | OUTPATIENT
Start: 2025-08-01 | End: 2025-08-03 | Stop reason: HOSPADM

## 2025-08-01 RX ORDER — MIDAZOLAM HYDROCHLORIDE 1 MG/ML
INJECTION, SOLUTION INTRAMUSCULAR; INTRAVENOUS AS NEEDED
Status: DISCONTINUED | OUTPATIENT
Start: 2025-08-01 | End: 2025-08-01 | Stop reason: HOSPADM

## 2025-08-01 RX ORDER — FENTANYL CITRATE 50 UG/ML
INJECTION, SOLUTION INTRAMUSCULAR; INTRAVENOUS AS NEEDED
Status: DISCONTINUED | OUTPATIENT
Start: 2025-08-01 | End: 2025-08-01 | Stop reason: HOSPADM

## 2025-08-01 RX ORDER — SODIUM CHLORIDE, SODIUM LACTATE, POTASSIUM CHLORIDE, CALCIUM CHLORIDE 600; 310; 30; 20 MG/100ML; MG/100ML; MG/100ML; MG/100ML
100 INJECTION, SOLUTION INTRAVENOUS CONTINUOUS
Status: ACTIVE | OUTPATIENT
Start: 2025-08-01 | End: 2025-08-02

## 2025-08-01 RX ORDER — DICYCLOMINE HYDROCHLORIDE 10 MG/1
10 CAPSULE ORAL EVERY 6 HOURS PRN
Status: DISCONTINUED | OUTPATIENT
Start: 2025-08-01 | End: 2025-08-03 | Stop reason: HOSPADM

## 2025-08-01 RX ORDER — SODIUM CHLORIDE, SODIUM LACTATE, POTASSIUM CHLORIDE, CALCIUM CHLORIDE 600; 310; 30; 20 MG/100ML; MG/100ML; MG/100ML; MG/100ML
150 INJECTION, SOLUTION INTRAVENOUS CONTINUOUS
Status: DISCONTINUED | OUTPATIENT
Start: 2025-08-01 | End: 2025-08-01

## 2025-08-01 RX ADMIN — SODIUM BICARBONATE 1300 MG: 650 TABLET ORAL at 15:00

## 2025-08-01 RX ADMIN — PANTOPRAZOLE SODIUM 40 MG: 40 INJECTION, POWDER, FOR SOLUTION INTRAVENOUS at 09:17

## 2025-08-01 RX ADMIN — DICYCLOMINE HYDROCHLORIDE 10 MG: 10 CAPSULE ORAL at 09:17

## 2025-08-01 RX ADMIN — ASPIRIN 81 MG: 81 TABLET, COATED ORAL at 09:17

## 2025-08-01 RX ADMIN — FENTANYL CITRATE 25 MCG: 50 INJECTION, SOLUTION INTRAMUSCULAR; INTRAVENOUS at 11:32

## 2025-08-01 RX ADMIN — MIDAZOLAM 1 MG: 1 INJECTION INTRAMUSCULAR; INTRAVENOUS at 11:29

## 2025-08-01 RX ADMIN — SODIUM CHLORIDE, SODIUM LACTATE, POTASSIUM CHLORIDE, AND CALCIUM CHLORIDE 100 ML/HR: .6; .31; .03; .02 INJECTION, SOLUTION INTRAVENOUS at 18:13

## 2025-08-01 RX ADMIN — PREGABALIN 75 MG: 75 CAPSULE ORAL at 09:17

## 2025-08-01 RX ADMIN — MIDAZOLAM 1 MG: 1 INJECTION INTRAMUSCULAR; INTRAVENOUS at 11:32

## 2025-08-01 RX ADMIN — ROSUVASTATIN CALCIUM 10 MG: 10 TABLET, FILM COATED ORAL at 20:46

## 2025-08-01 RX ADMIN — PREGABALIN 75 MG: 75 CAPSULE ORAL at 20:46

## 2025-08-01 RX ADMIN — SODIUM BICARBONATE 1300 MG: 650 TABLET ORAL at 20:46

## 2025-08-01 RX ADMIN — ACETAMINOPHEN 650 MG: 325 TABLET ORAL at 20:46

## 2025-08-01 RX ADMIN — FENTANYL CITRATE 25 MCG: 50 INJECTION, SOLUTION INTRAMUSCULAR; INTRAVENOUS at 11:29

## 2025-08-01 RX ADMIN — SODIUM BICARBONATE 1300 MG: 650 TABLET ORAL at 09:17

## 2025-08-01 ASSESSMENT — COGNITIVE AND FUNCTIONAL STATUS - GENERAL
DAILY ACTIVITIY SCORE: 24
MOBILITY SCORE: 24

## 2025-08-01 ASSESSMENT — PAIN SCALES - GENERAL
PAINLEVEL_OUTOF10: 7
PAINLEVEL_OUTOF10: 0 - NO PAIN
PAINLEVEL_OUTOF10: 0 - NO PAIN
PAINLEVEL_OUTOF10: 5 - MODERATE PAIN
PAINLEVEL_OUTOF10: 0 - NO PAIN

## 2025-08-01 ASSESSMENT — PAIN - FUNCTIONAL ASSESSMENT
PAIN_FUNCTIONAL_ASSESSMENT: 0-10

## 2025-08-01 NOTE — CARE PLAN
The patient's goals for the shift include pt will remain HDS this shift    The clinical goals for the shift include patient will not have any injury or fall during the shift    Over the shift, the patient made progress towards goals.

## 2025-08-01 NOTE — PROGRESS NOTES
Vivien Miramontes is a 65 y.o. female admitted on 7/30/2025 and on day 2 of admission presenting with 1-week history of generalized abdominal pain, non-bloody diarrhea and inability to tolerate solids, complicated with new FLORENCE and hypotension.    Subjective     Overnight, patient was hypotensive 84/50s, given a fluid bolus, put on maintence fluids.   Patient making very little urine, thus bladder scanned ordered that showed 17ccs. Made NPO at midnight because will be scoped (EGD) per GI.    This morning, she is conversational. No SOB, chest pain, palpitations, dizziness. No new concerns.      Objective     Last Recorded Vitals  Temp:  [36.4 °C (97.5 °F)-36.6 °C (97.9 °F)] 36.6 °C (97.9 °F)  Heart Rate:  [56-65] 58  Resp:  [10-18] 16  BP: ()/(50-76) 102/71  SpO2 Readings from Last 1 Encounters:   08/01/25 98%       Intake/Output last 3 Shifts:  I/O this shift:  In: -   Out: 400 [Urine:400]  I/O last 3 completed shifts:  In: 2541.7 (40.6 mL/kg) [I.V.:2541.7 (40.6 mL/kg)]  Out: - (0 mL/kg)   Weight: 62.6 kg     Daily net: +1,491.7    Relevant Results    Lactate 1.3, VBG (7.29/23/54/11.1) and -13.5 base excess    CMP  Results from last 7 days   Lab Units 08/01/25  0737 07/31/25  1810 07/31/25  0859 07/31/25  0423   SODIUM mmol/L 136 133* 132* 135*   POTASSIUM mmol/L 3.5 4.3 4.2 4.0   CO2 mmol/L 16* 14* 15* 15*   ANION GAP mmol/L 14 17 15 18   BUN mg/dL 42* 37* 33* 34*   CREATININE mg/dL 2.93* 3.55* 3.16* 2.81*   GLUCOSE mg/dL 94 83 98 83   EGFR mL/min/1.73m*2 17* 14* 16* 18*   CALCIUM mg/dL 8.4* 9.1 9.5 9.8   MAGNESIUM mg/dL 1.77 1.88  --  2.03   PHOSPHORUS mg/dL 4.7 6.3* 6.0* 6.5*      CBC  Results from last 72 hours   Lab Units 08/01/25  0737 07/31/25  0423 07/30/25  1019   WBC AUTO x10*3/uL 10.3 11.4* 9.7   HEMOGLOBIN g/dL 12.5 15.4 14.7   HEMATOCRIT % 39.1 47.8* 43.3   MCV fL 88 87 83   PLATELETS AUTO x10*3/uL 218 295 275   NEUTROS PCT AUTO % 74.9 70.5 67.0   LYMPHS PCT AUTO % 15.1 21.0 22.8   MONOS PCT AUTO % 7.6  7.0 8.1   EOS PCT AUTO % 1.5 0.8 1.0         Other Labs  Results from last 7 days   Lab Units 08/01/25  0737 07/31/25  0423 07/30/25  1946 07/30/25  1235 07/30/25  1019   CRP mg/dL  --   --   --  0.37  --    SED RATE mm/h  --   --  31*  --   --    WBC AUTO x10*3/uL 10.3 11.4*  --   --  9.7   HEMOGLOBIN g/dL 12.5 15.4  --   --  14.7         Results from last 72 hours   Lab Units 08/01/25  0007 07/31/25  0423 07/30/25  1946   POCT PH, VENOUS pH 7.29* 7.23* 7.27*   POCT PCO2, VENOUS mm Hg 23* 33* 29*   POCT PO2, VENOUS mm Hg 54* 46* 68*        -Pending blood Cx, Stool osmalities, Lactoferritin Calprotectin stool (baseline 10.5 10 mo ago), electrolyte stool, UA, giardia antigen     -Pending CMV/EBV serologies, Urine electrolytes      Microbiology and Culture    Lab Results   Component Value Date    URINECULTURE No growth 03/10/2024    BLOODCULT No growth at 1 day 07/30/2025    BLOODCULT No growth at 1 day 07/30/2025         Physical Exam  Consitutional: awake, alert  Cardiovascular: normal heart sounds, normal rate and rhythm, 2+ palpable pedal pulses  Pulmonary: lungs much clearer, non-labored breathing  Abdominal: normoactive bowel sounds  MSK: posterior and anterior lateral compartments of right extremity softer  Neurological: alert, oriented x3, no focal neurologic deficits    Constitutional:       General: She is not in acute distress.     Appearance: Normal appearance.      Cardiovascular:      Rate and Rhythm: Normal rate and regular rhythm.      Pulses: Normal pulses.      Heart sounds: Normal heart sounds.   Pulmonary:      Effort: Pulmonary effort is normal.      Breath sounds: Normal breath sounds.   Abdominal:      General: There is no distension, hypoactive bowel sounds,      Palpations: Abdomen is soft.      Tenderness: There is abdominal tenderness.      Comments: Tenderness across upper abdomen upon moderate palpation   MSK  No LE edema  Skin:     General: Skin is warm, less dry, improved skin  turgor  Neurological:      Mental Status: She is alert and oriented to person, place, and time. No Focal deficits, able to move all extremities         Imaging  XR chest 1 view  Result Date: 2025  1. Trace right and small left pleural effusions with associated bibasilar atelectasis.   I personally reviewed the images/study and I agree with the findings as stated. This study was interpreted by radiology resident Jorge Serrano D.O. at [facility], [place], [place].   Signed by: Raphael Dillon 2025 2:52 PM Dictation workstation:   [id number]         Inpatient Medications  Scheduled Medications[1]  Continuous Medications[2]  PRN Medications[3]             Assessment/Plan     65 year old female with past medical history significant for Crohn's disease (on Entyvio every two months), HTN, HLD, CAD s/p stent, NSTEMI, HFpEF, smoker, spinal stenosis, and cholecystectomy presents to the ED hypotensive with 1-week history of generalized abdominal pain, non-bloody diarrhea and inability to tolerate solids, complicated with new FLORENCE. On admission, received LR bolus 1000mL and LR infusion 100 mL/hr. Today received another infusion 100 mL/hr LR, shortly changed to 150cc/hr this morning.     Updates :  -GI consult: Plavix held, as team will perform EGD. If pending stool studies show bowel inflammation will do colonoscopy and CT enterography to evaluate for IBD flare, f/up on pending studies (fecal calprotectin, lactoferrin, O&P, giardia,  stool osmalities)  -Pending CMV/EBV serologies, urine electrolytes  -Started Dicyclomine (Bentyl)  10 mg capsule QID for diarrhea , switched to PRN as pt not using it, and anticholinergic effects may worsen pt's existing FLORENCE     -Started oral bicarb 650 BID to replenish from GI loss and acidotic state (VB.23/33/46/2.2)  -Urine electrolytes, urine osmolalities pending to rule out FLORENCE etiology  -CXR ordered  to evaluate lungs and volume  status given patient with HFpEF receiving maintenance fluids shows trace right and small left pleural effusions with associated bibasilar atelectasis  -Blood Cultures show NGTD        #Diarrhea  #Crohn's Disease Flare vs Viral Etiology  -Crohn's disease flare vs viral infection, higher suspicions for mild crohn's flare given moderately elevated CRP (0.37), ESR (31), and CT AP on admission without evidence of wall thickening or inflammatory changes, findings often commonly seen in active Crohn's disease. However, cannot rule out infection, especially given increased WBC count today (11.4) compared to on admission (9.7)  -Symptoms of abdominal pain across upper abdomen, non-bloody, non-mucous diarrhea, shortly after Entyvio shot 07/22  -Crohn's disease was diagnosed in 2010, failed multiple biologic agents including Humira and Stelara. Started pm Entyvio 300 mg injection every 2 months (last injection self-reported 07/22)  -Hx of bowel perforation due to complicated diverticulitis 2009   -s/p colostomy in 2009 subsequently had reversal in 2010    -s/p Colonoscopy 2021 showing  mild ulceration in sigmoid colon otherwise normal. Colonoscopy  6/26/2024: Mild (in right colon ) to moderate erythema (transverse colon, descending colon, sigmoid colon) consistent with chron's colitis, TI appears to be normal, biopsy-chronic colitis in right colon.   -Followed by Dr. Ping Allen at  (last appt 07/11) with next follow up in 6 months, and colonoscopy in 2-3 months  -Home regimen: Vitamin E 180 mg capsule, Entyvio 300 mg q2 months  -Ruling out etiologies contributing to excessive diarrhea: C diff panel negative  07/31, infectious workup negative (for Campylo, Salmonella, Shigella, Vibrio, Yersinia, Shiga Toxin 1/2, Norovirus, Rotavirus), blood cultures NGTD  -PLAN    -On maintenance fluids to help replace GI loss from excessive diarrhea    -Strict Is/Os     -Daily CBCs    -Encourage oral hydration; continue Vitamin E     -Pending Stool osmalities, Lactoferritin Calprotectin stool (baseline 10.5 10 mo ago), electrolyte stool, UA, giardia antigen,  CMV/EBV serologies, Urine electrolytes    -GI consulted, will scope patient to rule perform an EGD to rule out lesions in upper GI as Crohn's can affect entire GI tract. If pending stool studies show bowel inflammation will plan to do colonoscopy and CT enterography to evaluate for IBD flare    -Holding Loperamide and Methylpred since admission , will consider if infectious etiology is ruled out    -Can give Dicyclomine (Bentyl) for diarrhea 10 mg capsule PRN (switched from QID to PRN as pt not using it, and anticholinergic effects may worsen pt's existing FLORENCE)     #FLORENCE -likely prerenal etiology  -Patient with large-volume, non-bloody, non-emesis loose diarrhea since last reported Entyvio shot  for txt of Crohns  -Creatinine 2. 37 BL ~0.7   -On admission, received LR bolus 1000mL and LR infusion 100 mL/hr. Today received another infusion 100 mL/hr LR. Went up on fluids to 150cc/hr from VBG results on  showing 7.23/33/46/2.2.   -PLAN:  -Monitor RFP's, Mg, bicarb in setting of patient with profuse diarrhea  -Avoid nephrotoxic agents  -Continue IVF hydration  -Oral bicarb 650 BID to replenish from GI loss and acidotic state (VB.23/33/46/2.2  -Urine electrolytes, urine osmolalities pending to rule out FLORENCE etiology     #HFpEF  -Last echo 2024 ([facility] EF 52%, normal RV size and RV systolic function.  -Home regimen: Carvedilol 25 mg q12h, Nitroglycerin, Aspirin 81 mg EC tab every day, Plavix 75 mg tab qd  -PLAN    -Hold Carvedilol, Nitroglycerin in the setting of soft BP (BP on admission 82/53)    -Holding Plavix from  (not indicated, stent not frequent, and in anticipation of potential EGD/Colonoscopy tomorrow)  -CXR ordered  to evaluate lungs and volume status given patient with HFpEF receiving maintenance fluids shows trace right and  small left pleural effusions with associated bibasilar atelectasis  -BNP = 68, normal        #HTN  -Hypotensive on arrival (BP 82/53), but patient is conversational, moderate-appearing, no headaches, dizzyness, SOB, and no acute distress.  -Home regimen:  Amlodipine 5 mg tab (takes 1.5 mg tab every morning), Hydrochlorothiazide 25 mg tab every day, Lisinopril 40 mg qD  -PLAN    -Hold Amlodipine, Hydrochlorothiazide in the setting of soft BP on admission  (BP on admission 82/53)    -Orthostats         #HLD  -Home regimen: Rouvastatin 40mg every day  -PLAN    -continue Rouvastatin        #Lumbar spinal stenosis  -s/p bilateral L4 transforaminal epidural steroid injection under fluoroscopy in 06/23. -followed by Chronic Pain, Dr. Angie Bansal  -Home regimen: Pregablin 150mg cap BID, Acetaminophen 325 mg 2 tab q6h, Robaxin 500 mg TID PRN  -PLAN    -Hold Robaxin    -Lower dose Pregablin 75mg (in setting of her new FLORENCE)    -Multimodal pain control as needed, with lidocaine patches, acetaminophen          ##FEN  Fluids: Replete PRN  Electrolytes: Replete PRN  Nutrition: Currently NPO for EGD      ##Prophylaxis  Antimicrobials: This patient does not have an active medication from one of the medication groupers.  DVT PPX: SubQ Heparin  Bowel Regimen: Pantaprazole     ##Social  Oxygen: On room air  Disposition: Future planning for home. Will need discharge transport  Code Status: Full Code  NOK:   Extended Emergency Contact Information  Primary Emergency Contact: Puneet Mayo III  Address: [address] [place] [address] of [place]  Mobile Phone: [phone]  Relation: Child  Secondary Emergency Contact: FRANDY MAYO JR  Mobile Phone: [phone]  Relation: Spouse  Preferred language: English   needed? No        Patient discussed with attending physician, Aiden Baker MD *.            [1] [Held by provider] amLODIPine, 7.5 mg, oral, Daily  aspirin, 81 mg, oral, Daily  [Held  by provider] carvedilol, 25 mg, oral, q12h KEVIN  [Held by provider] heparin (porcine), 5,000 Units, subcutaneous, q8h KEVIN  [Held by provider] hydroCHLOROthiazide, 25 mg, oral, Daily  [Held by provider] lisinopril, 40 mg, oral, Daily  pantoprazole, 40 mg, intravenous, Daily  pregabalin, 75 mg, oral, BID  rosuvastatin, 10 mg, oral, Nightly  sodium bicarbonate, 1,300 mg, oral, TID     [2] lactated Ringer's, 150 mL/hr     [3] PRN medications: acetaminophen, dextrose, dextrose, dicyclomine, glucagon, glucagon, lidocaine

## 2025-08-01 NOTE — PROGRESS NOTES
Occupational Therapy                 Therapy Communication Note    Patient Name: Vivien Miramontes  MRN: [id number]  Department: [place] [address]  Room: 2007/2007-A  Today's Date: 8/1/2025     Discipline: Occupational Therapy    Missed Visit:   OFF UNIT AT A MEDICAL PROCEDURE FOR EGD INTERVENTION    Missed Visit Reason:  CONFLICT OF SERVICES    Missed Time: Attempt    Comment:

## 2025-08-01 NOTE — PROGRESS NOTES
08/01/25 1011   Rapid Rounds   Attendance Provider;Care Transitions   Expected Discharge Disposition Home   Today we still await: Procedure (Comment)  (plan for EGD today with GI)   Review at Escalation Rounds No escalation needed

## 2025-08-01 NOTE — DISCHARGE INSTRUCTIONS
Ms. Miramontes,  You presented to  ED with 1 week history of diarrhea.  Initial workup ruled out infectious causes, we then consulted the stomach doctors to further evaluate you.  You underwent a procedure called EGD, biopsies were taken to rule out celiac disease, biopsy results are pending.  Additionally, the diarrhea was believed to be due to a Crohn's flare, which you are scheduled for a colonoscopy in the outpatient setting.  You underwent imaging studies (CT enterography) to rule out other manifestations of Crohn's disease.  While inpatient, your blood pressure was low therefore we held some of your home medications.  Please take Coreg 12.5 mg twice daily and lisinopril 10 mg daily, and hold all other blood pressure meds.  Follow-up appointments have been arranged with your gastroenterologist (stomach doctor) and your primary care physician.  Please make sure you do not miss these appointments.      Follow up appointments:  Primary care: It is recommended that you follow up with your primary care provider (Dr. Martinez) once discharged. Please make sure to schedule this appointment within 1 weeks of discharge from the hospital.   GI: 8/25/25    Please see the first few pages of your discharge instructions for complete follow up visit information, including dates, times, locations and provider information.

## 2025-08-01 NOTE — H&P
History Of Present Illness  Vivien Miramontes is a 65 y.o. female presenting here for inpatient EGD for upper abdominal pain, nausea, and diarrhea in patient with known Crohn's colitis.     Past Medical History  Medical History[1]  Surgical History  Surgical History[2]  Social History  She reports that she has been smoking cigarettes. She has a 15 pack-year smoking history. She has never used smokeless tobacco. She reports that she does not currently use alcohol. She reports that she does not currently use drugs.    Family History  Family History[3]     Allergies  Allergies[4]    Pre-sedation Evaluation:  ASA Classification - ASA 3 - Patient with moderate systemic disease with functional limitations  Mallampati Score - II (hard and soft palate, upper portion of tonsils and uvula visible)    Physical Exam  HENT:      Mouth/Throat:      Mouth: Mucous membranes are moist.     Eyes:      Conjunctiva/sclera: Conjunctivae normal.       Cardiovascular:      Rate and Rhythm: Normal rate.   Pulmonary:      Effort: Pulmonary effort is normal.   Abdominal:      Palpations: Abdomen is soft.     Skin:     General: Skin is warm.     Neurological:      Mental Status: She is oriented to person, place, and time.     Psychiatric:         Mood and Affect: Mood normal.          Last Recorded Vitals  Blood pressure 113/67, pulse 59, temperature 36.5 °C (97.7 °F), temperature source Temporal, resp. rate 17, height (!) 1.524 m (5'), weight 62.6 kg (138 lb 0.1 oz), SpO2 100%.     Assessment/Plan   inpatient EGD for upper abdominal pain, nausea, and diarrhea in patient with known Crohn's colitis     PTA/Current Medications:  Prescriptions Prior to Admission[5]  Current Medications[6]  Jersey Chicas MD       [1]   Past Medical History:  Diagnosis Date    Anemia     CHF (congestive heart failure) 2012    Chronic diarrhea 2009    Coronary artery disease     2012: cardiac cath with PCI, 6/2/23: cardiac cath with PCI x2 (LAD & LCx) intermediate  disease of a tortuous RCA - cardiac clearance requested    Crohn's disease (Multi)     bowel perforation 2009 s/p colostomy, reversed 2010    Depression     Hyperlipidemia     Hypertension 2010    IBS (irritable bowel syndrome)     Lung nodules     nonconcerning per pulm note    Myocardial infarction (Multi)     Ulcerative colitis 2010   [2]   Past Surgical History:  Procedure Laterality Date    ABDOMINAL HERNIA REPAIR  2018    with mesh    CARDIAC CATHETERIZATION      PCI    CARDIAC CATHETERIZATION  2023    PCI x 2 (LAD and LCx)     SECTION, LOW TRANSVERSE  1987    CHOLECYSTECTOMY  2017    COLON SURGERY  2009    COLONOSCOPY      COLOSTOMY  2009    REVISION / TAKEDOWN COLOSTOMY  2010    SMALL INTESTINE SURGERY  2009    TUBAL LIGATION  1998    UMBILICAL HERNIA REPAIR      x 2   [3]   Family History  Problem Relation Name Age of Onset    Stroke Mother      Hypertension Mother      Heart attack Mother      Heart attack Father      Diabetes Father      Multiple sclerosis Sister      Breast cancer Sister      Diabetes Brother      Alcohol abuse Brother Romaine Joy 20 - 29   [4]   Allergies  Allergen Reactions    Penicillin Unknown    Penicillins Hives   [5]   Facility-Administered Medications Prior to Admission   Medication Dose Route Frequency Provider Last Rate Last Admin    acetaminophen (Tylenol) tablet 650 mg  650 mg oral Once Leatha M Martin, APRN-CNP         Medications Prior to Admission   Medication Sig Dispense Refill Last Dose/Taking    acetaminophen (TylenoL) 325 mg tablet Take 2 tablets (650 mg) by mouth every 6 hours if needed for mild pain (1 - 3) or fever (temp greater than 38.0 C). 30 tablet 0 2025    amLODIPine (Norvasc) 5 mg tablet Take 1.5 tablets (7.5 mg) by mouth early in the morning..   2025    aspirin 81 mg EC tablet Take 1 tablet (81 mg) by mouth once daily.   2025    carvedilol (Coreg) 25 mg tablet Take 1 tablet (25 mg) by mouth every 12 hours.    7/29/2025    clopidogrel (Plavix) 75 mg tablet Take 1 tablet (75 mg) by mouth once daily.   7/29/2025    hydroCHLOROthiazide (HYDRODiuril) 25 mg tablet Take 1 tablet (25 mg) by mouth once daily.   7/29/2025    hydrOXYzine HCL (Atarax) 25 mg tablet Take 1 tablet (25 mg) by mouth 3 times a day as needed for itching. May cause drowsiness   7/29/2025    lisinopril 40 mg tablet Take 1 tablet (40 mg) by mouth once daily. as directed   7/29/2025    methocarbamol (Robaxin) 500 mg tablet Take 1 tablet (500 mg) by mouth 3 times a day as needed for muscle spasms. 90 tablet 0 7/29/2025    pregabalin (Lyrica) 150 mg capsule Take 1 capsule (150 mg) by mouth 3 times a day.   7/29/2025    rosuvastatin (Crestor) 40 mg tablet Take 1 tablet (40 mg) by mouth once daily at bedtime.   7/29/2025    vitamin E 180 mg (400 unit) capsule Take 1 capsule (400 Units) by mouth once daily.   7/29/2025    nitroglycerin (Nitrostat) 0.4 mg SL tablet Place 1 tablet (0.4 mg) under the tongue. Dissolve 1 tablet under the tongue as needed for chest pain.       vedolizumab (Entyvio) 300 mg injection Infuse 300 mg into a venous catheter every 8 (eight) weeks.  For Maintenance: every 8 weeks 5 mL 2     vedolizumab (Entyvio) 300 mg injection Infuse 300 mg into a venous catheter see administration instructions.  For Induction:  0, 2 and 6 weeks 15 mL 0    [6]   Current Facility-Administered Medications   Medication Dose Route Frequency Provider Last Rate Last Admin    acetaminophen (Tylenol) tablet 650 mg  650 mg oral q6h PRN Juan Ferguson MD   650 mg at 07/31/25 0404    [Held by provider] amLODIPine (Norvasc) tablet 7.5 mg  7.5 mg oral Daily Juan Ferguson MD        aspirin EC tablet 81 mg  81 mg oral Daily Juan Ferguson MD   81 mg at 08/01/25 0917    [Held by provider] carvedilol (Coreg) tablet 25 mg  25 mg oral q12h [facility] Juan Ferguson MD        dextrose 50 % injection 12.5 g  12.5 g intravenous q15 min PRN Juan Ferguson MD         dextrose 50 % injection 25 g  25 g intravenous q15 min PRN Juan Ferguson MD        dicyclomine (Bentyl) capsule 10 mg  10 mg oral 4x daily Juan Ferguson MD   10 mg at 08/01/25 0917    glucagon (Glucagen) injection 1 mg  1 mg intramuscular q15 min PRN Juan Ferguson MD        glucagon (Glucagen) injection 1 mg  1 mg intramuscular q15 min PRN Juan Ferguson MD        [Held by provider] heparin (porcine) injection 5,000 Units  5,000 Units subcutaneous q8h KEVIN Juan Ferguson MD   5,000 Units at 07/30/25 2149    [Held by provider] hydroCHLOROthiazide (HYDRODiuril) tablet 25 mg  25 mg oral Daily Juan Ferguson MD        lactated Ringer's infusion  150 mL/hr intravenous Continuous Vince KHAN MD        lidocaine 4 % patch 1 patch  1 patch transdermal Daily PRN Juan Ferguson MD        [Held by provider] lisinopril tablet 40 mg  40 mg oral Daily Juan Ferguson MD        pantoprazole (Protonix) injection 40 mg  40 mg intravenous Daily Juan Ferguson MD   40 mg at 08/01/25 0917    pregabalin (Lyrica) capsule 75 mg  75 mg oral BID Juan Ferguson MD   75 mg at 08/01/25 0917    rosuvastatin (Crestor) tablet 10 mg  10 mg oral Nightly Aminah Pozo MD   10 mg at 07/31/25 2155    sodium bicarbonate tablet 1,300 mg  1,300 mg oral TID Althea Carlson MD MPH   1,300 mg at 08/01/25 0917

## 2025-08-01 NOTE — PROGRESS NOTES
[facility]  [facility] [place]  CONSULT FOLLOW-UP  August 1, 2025     Reason For Consult: abdominal pain, history of Crohn's disease    History Of Present Illness  Vivien Miramontes is a 65 y.o. female presenting with abdominal pain and early satiety in the setting of known Crohn's disease.    SUBJECTIVE  Patient states that symptoms remain unchanged. She is s/p EGD with results below.    OBJECTIVE  Last Recorded Vitals  Blood pressure 102/71, pulse 58, temperature 36.6 °C (97.9 °F), resp. rate 16, height (!) 1.524 m (5'), weight 62.6 kg (138 lb 0.1 oz), SpO2 98%.      Intake/Output Summary (Last 24 hours) at 8/1/2025 1442  Last data filed at 8/1/2025 1034  Gross per 24 hour   Intake 1491.67 ml   Output 400 ml   Net 1091.67 ml          Physical Exam  General: well-nourished, no acute distress  HEENT: EOM intact, no scleral icterus, moist MM  Respiratory: nonlabored breathing on room air  Cardiovascular: Regular rate and rhythm  Abdomen: Soft, nontender, nondistended  Extremities: no edema, no asterixis  Neuro: alert and oriented, moves all 4 extremities with no focal deficits    Medications  Current Medications[1]    Labs  Lab Results   Component Value Date    WBC 10.3 08/01/2025    HGB 12.5 08/01/2025    HCT 39.1 08/01/2025    MCV 88 08/01/2025     08/01/2025     Lab Results   Component Value Date    GLUCOSE 94 08/01/2025    CALCIUM 8.4 (L) 08/01/2025     08/01/2025    K 3.5 08/01/2025    CO2 16 (L) 08/01/2025     (H) 08/01/2025    BUN 42 (H) 08/01/2025    CREATININE 2.93 (H) 08/01/2025     Lab Results   Component Value Date    ALT 32 07/30/2025    AST 26 07/30/2025    ALKPHOS 110 07/30/2025    BILITOT 0.4 07/30/2025     Lab Results   Component Value Date    INR 1.0 01/09/2024    INR 1.0 12/28/2017    PROTIME 11.2 01/09/2024    PROTIME 11.3 12/28/2017     Imaging:   CT ABDOMEN PELVIS W IV CONTRAST 7/30/25  - Impression -  1. Fluid-filled small and large  bowel without evidence of obstruction, wall thickening, or adjacent inflammatory changes, to be correlated with history of diarrhea.  2. Circumferential submucosal fat deposition involving the ileocecal valve, which can be seen in setting of Crohn's disease.  3. Colonic diverticulosis without evidence of diverticulitis.  4. Moderate-to-severe calcific and noncalcified atherosclerotic disease of the abdominal aorta and branching vasculature with no hemodynamically significant stenosis or occlusion.    GI Procedures:  Esophagogastroduodenoscopy (EGD) 08/01/2025  Findings  The esophagus appeared normal. No evidence of esophagitis, hiatal hernia, or Ayala's esophagus.  Regular Z-line 35 cm from the incisors  The fundus of the stomach, body of the stomach, greater curve of the stomach, lesser curve of the stomach, incisura and pylorus appeared normal.  Mild, patchy erythematous mucosa in the antrum, suggestive of gastritis; performed cold forceps biopsy to rule out H. pylori  Moderate, patchy erythematous mucosa in the duodenal bulb, 1st part of the duodenum and 2nd part of the duodenum; performed cold forceps biopsy    Colonoscopy 6/26/2024  Impression  The terminal ileum appeared normal. Performed random biopsy using biopsy forceps.  Mild abnormal mucosa in the ascending colon, consistent with Crohn's disease; performed cold forceps biopsy  Moderate abnormal mucosa in the transverse colon, descending colon, sigmoid colon and rectum, consistent with scarring from Crohn's disease; performed cold forceps biopsy to rule out active disease  Healthy signs of previous surgery in the rectosigmoid    Pathology:  A. TERMINAL ILEUM, BIOPSY:            -Small intestinal mucosa with no significant histopathologic findings.    B.  RIGHT COLON, RANDOM BIOPSY:            -Colon mucosa with chronic colitis with focal activity.  See note.            -No dysplasia identified.     Note: The biopsy shows colon mucosa with chronic  inflammatory changes with only focal activity consisting of cryptitis.  No granulomas are seen.      C. COLON - TRANSVERSE, BIOPSY:            -Colon mucosa with no significant histopathologic findings.       D. COLON - DESCENDING, BIOPSY:            -Colon mucosa with no significant histopathologic findings.      E. RECTUM, BIOPSY:            -Colon mucosa with no significant histopathologic findings.    ASSESSMENT/PLAN:  Vivien Miramontes is a 65 y.o. female  with a history of ileocolonic Crohn's disease and history of sigmoid diverticulitis and perforation (colostomy with subsequent reversal) who presents with worsening abdominal pain, for which GI is consulted.  Patient states that her symptoms are similar to prior flares and occurred shortly after her most recent vedolizumab infusion, though she states that she has never felt particularly well, or symptom free since starting vedolizumab. Imaging reveals no acute obstruction or active inflammation but findings consistent with chronic disease at the ileocecal valve. Initial labs show FLORENCE and stool studies thus far are negative for infection.  Her initial presentation is not consistent with Crohn's flare.    She is now s/p EGD which was notable for gastritis and duodenitis. Biopsies to evaluate for H pylori, celiac disease and upper GI Crohn's disease were obtained.    Recommendations:  -Await pathology and stool studies  -Please send celiac serologies with next set of labs  -Please obtain CT enterography to better assess small bowel for possible involvement of Crohn's  -Pain control and anti-emetics per primary team  - pt is scheduled for outpt colonoscopy end of this month 8/25    Patient was seen and discussed with Dr. Cooper.    Recommendations communicated with the primary team via secure chat.  Thank you for this consult. Gastroenterology will continue to follow.  -During weekday hours of 7am-5pm please do not hesitate to contact me on Lookback Chat or page [phone] if  there are any further questions between the weekday hours of 7 AM - 5 PM.   -After hours, on weekends, and on holidays, please page the [place] [phone]. Thank you.      Regina Mcgrath MD  Gastroenterology and Hepatology Fellow  [facility]         [1]   Current Facility-Administered Medications:     acetaminophen (Tylenol) tablet 650 mg, 650 mg, oral, q6h PRN, Juan Ferguson MD, 650 mg at 07/31/25 0404    [Held by provider] amLODIPine (Norvasc) tablet 7.5 mg, 7.5 mg, oral, Daily, Juan Ferguson MD    aspirin EC tablet 81 mg, 81 mg, oral, Daily, Juan Ferguson MD, 81 mg at 08/01/25 0917    [Held by provider] carvedilol (Coreg) tablet 25 mg, 25 mg, oral, q12h KEVIN, Juan Ferguson MD    dextrose 50 % injection 12.5 g, 12.5 g, intravenous, q15 min PRN, Juan Ferguson MD    dextrose 50 % injection 25 g, 25 g, intravenous, q15 min PRN, Juan Ferguson MD    dicyclomine (Bentyl) capsule 10 mg, 10 mg, oral, q6h PRN, Aminah Pozo MD    glucagon (Glucagen) injection 1 mg, 1 mg, intramuscular, q15 min PRN, Juan Ferguson MD    glucagon (Glucagen) injection 1 mg, 1 mg, intramuscular, q15 min PRN, Juan Ferguson MD    [Held by provider] heparin (porcine) injection 5,000 Units, 5,000 Units, subcutaneous, q8h KEVIN, Juan Ferguson MD, 5,000 Units at 07/30/25 2149    [Held by provider] hydroCHLOROthiazide (HYDRODiuril) tablet 25 mg, 25 mg, oral, Daily, Juan Ferguson MD    lactated Ringer's infusion, 150 mL/hr, intravenous, Continuous, Vince KHAN MD    lidocaine 4 % patch 1 patch, 1 patch, transdermal, Daily PRN, Juan Ferguson MD    [Held by provider] lisinopril tablet 40 mg, 40 mg, oral, Daily, Juan Ferguson MD    pantoprazole (Protonix) injection 40 mg, 40 mg, intravenous, Daily, Juan Ferguson MD, 40 mg at 08/01/25 0917    pregabalin (Lyrica) capsule 75 mg, 75 mg, oral, BID, Juan Ferguson MD, 75 mg at 08/01/25 0917    rosuvastatin  (Crestor) tablet 10 mg, 10 mg, oral, Nightly, Aminah Pozo MD, 10 mg at 07/31/25 5834    sodium bicarbonate tablet 1,300 mg, 1,300 mg, oral, TID, Althea Carlson MD MPH, 1,300 mg at 08/01/25 0961

## 2025-08-01 NOTE — CARE PLAN
The patient's goals for the shift include pt will remain HDS this shift    The clinical goals for the shift include Patient will not have any injury or fall during the shift    Over the shift, the patient did not make progress toward the following goals.   Problem: Pain - Adult  Goal: Verbalizes/displays adequate comfort level or baseline comfort level  Outcome: Progressing     Problem: Safety - Adult  Goal: Free from fall injury  Outcome: Progressing     Problem: Discharge Planning  Goal: Discharge to home or other facility with appropriate resources  Outcome: Progressing     Problem: Chronic Conditions and Co-morbidities  Goal: Patient's chronic conditions and co-morbidity symptoms are monitored and maintained or improved  Outcome: Progressing     Problem: Nutrition  Goal: Nutrient intake appropriate for maintaining nutritional needs  Outcome: Progressing     Problem: Skin  Goal: Participates in plan/prevention/treatment measures  Outcome: Progressing  Flowsheets (Taken 8/1/2025 0116)  Participates in plan/prevention/treatment measures:   Increase activity/out of bed for meals   Elevate heels  Goal: Prevent/manage excess moisture  Outcome: Progressing  Flowsheets (Taken 8/1/2025 0116)  Prevent/manage excess moisture: Monitor for/manage infection if present  Goal: Prevent/minimize sheer/friction injuries  Outcome: Progressing  Flowsheets (Taken 8/1/2025 0116)  Prevent/minimize sheer/friction injuries: Increase activity/out of bed for meals  Goal: Promote/optimize nutrition  Outcome: Progressing  Flowsheets (Taken 8/1/2025 0116)  Promote/optimize nutrition:   Offer water/supplements/favorite foods   Consume > 50% meals/supplements   Monitor/record intake including meals

## 2025-08-02 LAB
ALBUMIN SERPL BCP-MCNC: 3.7 G/DL (ref 3.4–5)
ANION GAP SERPL CALC-SCNC: 11 MMOL/L (ref 10–20)
APPEARANCE UR: CLEAR
BASOPHILS # BLD AUTO: 0.04 X10*3/UL (ref 0–0.1)
BASOPHILS NFR BLD AUTO: 0.6 %
BILIRUB UR STRIP.AUTO-MCNC: NEGATIVE MG/DL
BUN SERPL-MCNC: 23 MG/DL (ref 6–23)
CALCIUM SERPL-MCNC: 9.1 MG/DL (ref 8.6–10.6)
CARDIAC TROPONIN I PNL SERPL HS: 10 NG/L (ref 0–34)
CHLORIDE SERPL-SCNC: 115 MMOL/L (ref 98–107)
CHLORIDE UR-SCNC: 49 MMOL/L
CHLORIDE/CREATININE (MMOL/G) IN URINE: 88 MMOL/G CREAT (ref 38–318)
CO2 SERPL-SCNC: 21 MMOL/L (ref 21–32)
COLOR UR: COLORLESS
CREAT SERPL-MCNC: 1.04 MG/DL (ref 0.5–1.05)
CREAT UR-MCNC: 55.5 MG/DL (ref 20–320)
EGFRCR SERPLBLD CKD-EPI 2021: 59 ML/MIN/1.73M*2
EOSINOPHIL # BLD AUTO: 0.19 X10*3/UL (ref 0–0.7)
EOSINOPHIL NFR BLD AUTO: 2.8 %
ERYTHROCYTE [DISTWIDTH] IN BLOOD BY AUTOMATED COUNT: 14 % (ref 11.5–14.5)
GLIADIN PEPTIDE IGA SER IA-ACNC: <1 U/ML
GLUCOSE SERPL-MCNC: 103 MG/DL (ref 74–99)
GLUCOSE UR STRIP.AUTO-MCNC: NORMAL MG/DL
HCT VFR BLD AUTO: 38.9 % (ref 36–46)
HGB BLD-MCNC: 12.4 G/DL (ref 12–16)
IMM GRANULOCYTES # BLD AUTO: 0.03 X10*3/UL (ref 0–0.7)
IMM GRANULOCYTES NFR BLD AUTO: 0.4 % (ref 0–0.9)
KETONES UR STRIP.AUTO-MCNC: NEGATIVE MG/DL
LEUKOCYTE ESTERASE UR QL STRIP.AUTO: NEGATIVE
LYMPHOCYTES # BLD AUTO: 2.06 X10*3/UL (ref 1.2–4.8)
LYMPHOCYTES NFR BLD AUTO: 30.1 %
MAGNESIUM SERPL-MCNC: 1.81 MG/DL (ref 1.6–2.4)
MAGNESIUM SERPL-MCNC: 2.01 MG/DL (ref 1.6–2.4)
MCH RBC QN AUTO: 28.2 PG (ref 26–34)
MCHC RBC AUTO-ENTMCNC: 31.9 G/DL (ref 32–36)
MCV RBC AUTO: 88 FL (ref 80–100)
MONOCYTES # BLD AUTO: 0.63 X10*3/UL (ref 0.1–1)
MONOCYTES NFR BLD AUTO: 9.2 %
NEUTROPHILS # BLD AUTO: 3.9 X10*3/UL (ref 1.2–7.7)
NEUTROPHILS NFR BLD AUTO: 56.9 %
NITRITE UR QL STRIP.AUTO: NEGATIVE
NRBC BLD-RTO: 0 /100 WBCS (ref 0–0)
OSMOLALITY UR: 292 MOSM/KG (ref 200–1200)
PH UR STRIP.AUTO: 6 [PH]
PHOSPHATE SERPL-MCNC: 1.6 MG/DL (ref 2.5–4.9)
PLATELET # BLD AUTO: 251 X10*3/UL (ref 150–450)
POTASSIUM SERPL-SCNC: 3.9 MMOL/L (ref 3.5–5.3)
POTASSIUM UR-SCNC: 9 MMOL/L
POTASSIUM/CREAT UR-RTO: 16 MMOL/G CREAT
PROT UR STRIP.AUTO-MCNC: NEGATIVE MG/DL
RBC # BLD AUTO: 4.4 X10*6/UL (ref 4–5.2)
RBC # UR STRIP.AUTO: NEGATIVE MG/DL
SODIUM SERPL-SCNC: 143 MMOL/L (ref 136–145)
SODIUM UR-SCNC: 47 MMOL/L
SODIUM/CREAT UR-RTO: 85 MMOL/G CREAT
SP GR UR STRIP.AUTO: 1.01
TTG IGA SER IA-ACNC: <1 U/ML
UROBILINOGEN UR STRIP.AUTO-MCNC: NORMAL MG/DL
WBC # BLD AUTO: 6.9 X10*3/UL (ref 4.4–11.3)

## 2025-08-02 PROCEDURE — 2500000001 HC RX 250 WO HCPCS SELF ADMINISTERED DRUGS (ALT 637 FOR MEDICARE OP)

## 2025-08-02 PROCEDURE — 99231 SBSQ HOSP IP/OBS SF/LOW 25: CPT | Performed by: STUDENT IN AN ORGANIZED HEALTH CARE EDUCATION/TRAINING PROGRAM

## 2025-08-02 PROCEDURE — 83516 IMMUNOASSAY NONANTIBODY: CPT

## 2025-08-02 PROCEDURE — 83935 ASSAY OF URINE OSMOLALITY: CPT

## 2025-08-02 PROCEDURE — 83735 ASSAY OF MAGNESIUM: CPT

## 2025-08-02 PROCEDURE — 1100000001 HC PRIVATE ROOM DAILY

## 2025-08-02 PROCEDURE — 80069 RENAL FUNCTION PANEL: CPT

## 2025-08-02 PROCEDURE — 2500000004 HC RX 250 GENERAL PHARMACY W/ HCPCS (ALT 636 FOR OP/ED)

## 2025-08-02 PROCEDURE — 2500000005 HC RX 250 GENERAL PHARMACY W/O HCPCS

## 2025-08-02 PROCEDURE — 84484 ASSAY OF TROPONIN QUANT: CPT

## 2025-08-02 PROCEDURE — 82436 ASSAY OF URINE CHLORIDE: CPT

## 2025-08-02 PROCEDURE — 85025 COMPLETE CBC W/AUTO DIFF WBC: CPT

## 2025-08-02 PROCEDURE — 81003 URINALYSIS AUTO W/O SCOPE: CPT | Performed by: NURSE PRACTITIONER

## 2025-08-02 PROCEDURE — 99233 SBSQ HOSP IP/OBS HIGH 50: CPT

## 2025-08-02 PROCEDURE — 36415 COLL VENOUS BLD VENIPUNCTURE: CPT

## 2025-08-02 PROCEDURE — 2500000002 HC RX 250 W HCPCS SELF ADMINISTERED DRUGS (ALT 637 FOR MEDICARE OP, ALT 636 FOR OP/ED)

## 2025-08-02 RX ORDER — LIDOCAINE HYDROCHLORIDE 10 MG/ML
5 INJECTION, SOLUTION INFILTRATION; PERINEURAL ONCE
Status: DISCONTINUED | OUTPATIENT
Start: 2025-08-02 | End: 2025-08-03 | Stop reason: HOSPADM

## 2025-08-02 RX ORDER — PREGABALIN 75 MG/1
150 CAPSULE ORAL 2 TIMES DAILY
Status: DISCONTINUED | OUTPATIENT
Start: 2025-08-02 | End: 2025-08-03 | Stop reason: HOSPADM

## 2025-08-02 RX ORDER — LIDOCAINE 560 MG/1
1 PATCH PERCUTANEOUS; TOPICAL; TRANSDERMAL DAILY
Status: DISCONTINUED | OUTPATIENT
Start: 2025-08-02 | End: 2025-08-03 | Stop reason: HOSPADM

## 2025-08-02 RX ORDER — SODIUM CHLORIDE 0.9 % (FLUSH) 0.9 %
10 SYRINGE (ML) INJECTION AS NEEDED
Status: DISCONTINUED | OUTPATIENT
Start: 2025-08-02 | End: 2025-08-03 | Stop reason: HOSPADM

## 2025-08-02 RX ORDER — SIMETHICONE 80 MG
80 TABLET,CHEWABLE ORAL EVERY 8 HOURS PRN
Status: DISCONTINUED | OUTPATIENT
Start: 2025-08-02 | End: 2025-08-03 | Stop reason: HOSPADM

## 2025-08-02 RX ORDER — MAGNESIUM SULFATE HEPTAHYDRATE 40 MG/ML
2 INJECTION, SOLUTION INTRAVENOUS ONCE
Status: DISCONTINUED | OUTPATIENT
Start: 2025-08-02 | End: 2025-08-03 | Stop reason: HOSPADM

## 2025-08-02 RX ORDER — CALCIUM CARBONATE 200(500)MG
500 TABLET,CHEWABLE ORAL 4 TIMES DAILY PRN
Status: DISCONTINUED | OUTPATIENT
Start: 2025-08-02 | End: 2025-08-03 | Stop reason: HOSPADM

## 2025-08-02 RX ADMIN — ACETAMINOPHEN 650 MG: 325 TABLET ORAL at 08:48

## 2025-08-02 RX ADMIN — SODIUM BICARBONATE 1300 MG: 650 TABLET ORAL at 18:49

## 2025-08-02 RX ADMIN — CALCIUM CARBONATE 1 TABLET: 500 TABLET, CHEWABLE ORAL at 05:08

## 2025-08-02 RX ADMIN — LIDOCAINE 4% 1 PATCH: 40 PATCH TOPICAL at 18:49

## 2025-08-02 RX ADMIN — PANTOPRAZOLE SODIUM 40 MG: 40 TABLET, DELAYED RELEASE ORAL at 08:48

## 2025-08-02 RX ADMIN — SODIUM BICARBONATE 1300 MG: 650 TABLET ORAL at 20:06

## 2025-08-02 RX ADMIN — SODIUM BICARBONATE 1300 MG: 650 TABLET ORAL at 08:48

## 2025-08-02 RX ADMIN — SIMETHICONE 80 MG: 80 TABLET, CHEWABLE ORAL at 05:08

## 2025-08-02 RX ADMIN — PREGABALIN 75 MG: 75 CAPSULE ORAL at 08:48

## 2025-08-02 RX ADMIN — PREGABALIN 150 MG: 75 CAPSULE ORAL at 20:33

## 2025-08-02 RX ADMIN — ASPIRIN 81 MG: 81 TABLET, COATED ORAL at 08:48

## 2025-08-02 RX ADMIN — ROSUVASTATIN CALCIUM 10 MG: 10 TABLET, FILM COATED ORAL at 20:34

## 2025-08-02 ASSESSMENT — PAIN - FUNCTIONAL ASSESSMENT
PAIN_FUNCTIONAL_ASSESSMENT: 0-10

## 2025-08-02 ASSESSMENT — PAIN SCALES - GENERAL
PAINLEVEL_OUTOF10: 0 - NO PAIN
PAINLEVEL_OUTOF10: 0 - NO PAIN
PAINLEVEL_OUTOF10: 9

## 2025-08-02 ASSESSMENT — COGNITIVE AND FUNCTIONAL STATUS - GENERAL
MOBILITY SCORE: 24
DAILY ACTIVITIY SCORE: 24

## 2025-08-02 ASSESSMENT — PAIN DESCRIPTION - LOCATION: LOCATION: ABDOMEN

## 2025-08-02 NOTE — PROGRESS NOTES
[facility]  [facility] [place]  CONSULT FOLLOW-UP  August 2, 2025     Reason For Consult: abdominal pain, history of Crohn's disease    History Of Present Illness  Vivien Miramontes is a 65 y.o. female presenting with abdominal pain and early satiety in the setting of known Crohn's disease.    SUBJECTIVE  Patient feeling well today. She is hungry and asking to go home. No bowel movements overnight.    OBJECTIVE  Last Recorded Vitals  Blood pressure 112/66, pulse 73, temperature 36.6 °C (97.8 °F), temperature source Temporal, resp. rate 16, height (!) 1.524 m (5'), weight 62.6 kg (138 lb 0.1 oz), SpO2 100%.      Intake/Output Summary (Last 24 hours) at 8/2/2025 1012  Last data filed at 8/2/2025 0512  Gross per 24 hour   Intake 978.33 ml   Output 700 ml   Net 278.33 ml          Physical Exam  General: well-nourished, no acute distress  HEENT: EOM intact, no scleral icterus, moist MM  Respiratory: nonlabored breathing on room air  Cardiovascular: Regular rate and rhythm  Abdomen: Soft, nontender, nondistended  Extremities: no edema, no asterixis  Neuro: alert and oriented, moves all 4 extremities with no focal deficits    Medications  Current Medications[1]    Labs  Lab Results   Component Value Date    WBC 10.3 08/01/2025    HGB 12.5 08/01/2025    HCT 39.1 08/01/2025    MCV 88 08/01/2025     08/01/2025     Lab Results   Component Value Date    GLUCOSE 85 08/01/2025    CALCIUM 9.2 08/01/2025     08/01/2025    K 4.2 08/01/2025    CO2 15 (L) 08/01/2025     (H) 08/01/2025    BUN 38 (H) 08/01/2025    CREATININE 1.99 (H) 08/01/2025     Lab Results   Component Value Date    ALT 32 07/30/2025    AST 26 07/30/2025    ALKPHOS 110 07/30/2025    BILITOT 0.4 07/30/2025     Lab Results   Component Value Date    INR 1.0 01/09/2024    INR 1.0 12/28/2017    PROTIME 11.2 01/09/2024    PROTIME 11.3 12/28/2017     Imaging:   CT ABDOMEN PELVIS W IV CONTRAST 7/30/25  - Impression  -  1. Fluid-filled small and large bowel without evidence of obstruction, wall thickening, or adjacent inflammatory changes, to be correlated with history of diarrhea.  2. Circumferential submucosal fat deposition involving the ileocecal valve, which can be seen in setting of Crohn's disease.  3. Colonic diverticulosis without evidence of diverticulitis.  4. Moderate-to-severe calcific and noncalcified atherosclerotic disease of the abdominal aorta and branching vasculature with no hemodynamically significant stenosis or occlusion.    GI Procedures:  Esophagogastroduodenoscopy (EGD) 08/01/2025  Findings  The esophagus appeared normal. No evidence of esophagitis, hiatal hernia, or Ayala's esophagus.  Regular Z-line 35 cm from the incisors  The fundus of the stomach, body of the stomach, greater curve of the stomach, lesser curve of the stomach, incisura and pylorus appeared normal.  Mild, patchy erythematous mucosa in the antrum, suggestive of gastritis; performed cold forceps biopsy to rule out H. pylori  Moderate, patchy erythematous mucosa in the duodenal bulb, 1st part of the duodenum and 2nd part of the duodenum; performed cold forceps biopsy    Colonoscopy 6/26/2024  Impression  The terminal ileum appeared normal. Performed random biopsy using biopsy forceps.  Mild abnormal mucosa in the ascending colon, consistent with Crohn's disease; performed cold forceps biopsy  Moderate abnormal mucosa in the transverse colon, descending colon, sigmoid colon and rectum, consistent with scarring from Crohn's disease; performed cold forceps biopsy to rule out active disease  Healthy signs of previous surgery in the rectosigmoid    Pathology:  A. TERMINAL ILEUM, BIOPSY:            -Small intestinal mucosa with no significant histopathologic findings.    B.  RIGHT COLON, RANDOM BIOPSY:            -Colon mucosa with chronic colitis with focal activity.  See note.            -No dysplasia identified.     Note: The biopsy  shows colon mucosa with chronic inflammatory changes with only focal activity consisting of cryptitis.  No granulomas are seen.      C. COLON - TRANSVERSE, BIOPSY:            -Colon mucosa with no significant histopathologic findings.       D. COLON - DESCENDING, BIOPSY:            -Colon mucosa with no significant histopathologic findings.      E. RECTUM, BIOPSY:            -Colon mucosa with no significant histopathologic findings.    ASSESSMENT/PLAN:  Vivien Miramontes is a 65 y.o. female  with a history of ileocolonic Crohn's disease and history of sigmoid diverticulitis and perforation (colostomy with subsequent reversal) who presents with worsening abdominal pain, for which GI is consulted.  Patient states that her symptoms are similar to prior flares and occurred shortly after her most recent vedolizumab infusion, though she states that she has never felt particularly well, or symptom free since starting vedolizumab. Imaging reveals no acute obstruction or active inflammation but findings consistent with chronic disease at the ileocecal valve. Initial labs show FLORENCE and stool studies thus far are negative for infection.  Her initial presentation is not consistent with Crohn's flare.    She is now s/p EGD which was notable for gastritis and duodenitis. Biopsies to evaluate for H pylori, celiac disease and upper GI Crohn's disease were obtained.    Today, patient is feeling well and is asking for regular diet. No bowel movements in 24 hours.    Recommendations:  -Await pathology and stool studies  -Follow up celiac serologies  -Please obtain CT enterography to better assess small bowel for possible involvement of Crohn's  -Pain control and anti-emetics per primary team  -Advance diet is tolerated.  -Pt is scheduled for outpt colonoscopy end of this month 8/25    Patient was seen and discussed with Dr. Cooper.    Recommendations communicated with the primary team via secure chat.  Thank you for this consult.  Gastroenterology will continue to follow.  -During weekday hours of 7am-5pm please do not hesitate to contact me on Epic Chat or page [phone] if there are any further questions between the weekday hours of 7 AM - 5 PM.   -After hours, on weekends, and on holidays, please page the [place] fellow at [phone]. Thank you.      Regina Mcgrath MD  Gastroenterology and Hepatology Fellow  [facility]           [1]   Current Facility-Administered Medications:     acetaminophen (Tylenol) tablet 650 mg, 650 mg, oral, q6h PRN, Juan Ferguson MD, 650 mg at 08/02/25 0848    [Held by provider] amLODIPine (Norvasc) tablet 7.5 mg, 7.5 mg, oral, Daily, Juan Ferguson MD    aspirin EC tablet 81 mg, 81 mg, oral, Daily, Juan Ferguson MD, 81 mg at 08/02/25 0848    calcium carbonate (Tums) 500 mg (200 mg elemental) chewable tablet 1 tablet, 500 mg of calcium carbonate, oral, 4x daily PRN, Vince KHAN MD, 1 tablet at 08/02/25 0508    [Held by provider] carvedilol (Coreg) tablet 25 mg, 25 mg, oral, [facility]12h [facility]    dextrose 50 % injection 12.5 g, 12.5 g, intravenous, q15 min PRN, Juan Ferguson MD    dextrose 50 % injection 25 g, 25 g, intravenous, q15 min PRN, Juan Ferguson MD    dicyclomine (Bentyl) capsule 10 mg, 10 mg, oral, q6h PRN, Aminah Pozo MD    glucagon (Glucagen) injection 1 mg, 1 mg, intramuscular, q15 min PRN, Juan Ferguson MD    glucagon (Glucagen) injection 1 mg, 1 mg, intramuscular, q15 min PRN, Juan Ferguson MD    [Held by provider] heparin (porcine) injection 5,000 Units, 5,000 Units, subcutaneous, q8h [facility], Juan Ferguson MD, 5,000 Units at 07/30/25 8777    [Held by provider] hydroCHLOROthiazide (HYDRODiuril) tablet 25 mg, 25 mg, oral, Daily, Juan Ferguson MD    lidocaine 4 % patch 1 patch, 1 patch, transdermal, Daily PRN, Juan Ferguson MD    [Held by provider] lisinopril tablet 40 mg, 40 mg, oral, Daily, Juan Ferguson MD     magnesium sulfate 2 g in sterile water for injection 50 mL, 2 g, intravenous, Once, Juan Ferguson MD, Stopped at 08/02/25 0852    pantoprazole (ProtoNix) EC tablet 40 mg, 40 mg, oral, Daily, Aminah Pozo MD, 40 mg at 08/02/25 0848    pregabalin (Lyrica) capsule 75 mg, 75 mg, oral, BID, Juan Ferguson MD, 75 mg at 08/02/25 0848    rosuvastatin (Crestor) tablet 10 mg, 10 mg, oral, Nightly, Aminah Pozo MD, 10 mg at 08/01/25 2046    simethicone (Mylicon) chewable tablet 80 mg, 80 mg, oral, q8h PRN, Vince KHAN MD, 80 mg at 08/02/25 0508    sodium bicarbonate tablet 1,300 mg, 1,300 mg, oral, TID, Althea Carlson MD MPH, 1,300 mg at 08/02/25 0848

## 2025-08-02 NOTE — HOSPITAL COURSE
Hospital Course  65 year old female with past medical history significant for Crohn's disease (on Entyvio every two months), HTN, HLD, CAD s/p stent, NSTEMI, HFpEF, smoker, spinal stenosis, and cholecystectomy presents to the ED hypotensive with 1-week history of generalized abdominal pain, non-bloody diarrhea and inability to tolerate solids.    Her vitals on admission significant for BP 82/53 and admission labs were notable for bicarb 13, BUN 26, Cr 2.37. She was admitted on 07/30 due to poor PO intake and new FLORENCE. She received LR bolus 1000mL and LR infusion 100 mL/hr. A work up was began to rule out Crohn's flare vs infectious etiologies vs IBS-D vs Celiac that included a CT AP, ESR/CRP inflammatory markers, Stool pathogen O&P, Fecal calprotectin and lactoferritin,  stool electrolytes, C diff and infectious panel, blood cultures. CT AP was only notable for fluid filled bowel loops without stranding or bowel wall thickening.  Inflammatory markers ESR (31) and CRP (37) were moderately elevated. C diff, blood cultures, Calprotectin and Lactoferritin negative, and infectious panel unremarkable. Deamidated Gliadin Antibody IgA and Tissue transglutaminase IgA negative. Tissue transglutaminase IgG and Deamidated Gliadin Antibody IgG celiac serologies still pending. Stool O&P and stool electrolytes still pending. EGD completed 07/01 revealed gastritis and patchy erythematous mucosa in the bulb, 1st, and 2nd part of duodenum. Obtained biopsy to rule out H pylori with results still pending. Suspect diarrhea likely due to Crohns flare. Colonoscopy scheduled in outpatient setting 08/25. Underwent CT Enterography 08/03, negative for any acute processes or strictures and for extra-manifestations of Crohns.     FLORENCE and decreased sodium bicarb on admission likely in setting of volume loss from profuse diarrhea. FLORENCE now resolved with last Bun/Cr improved to 23/1.04 and sodium bicarb stabilized to 21.    Per hypotension, held  Amlodipine, Hydrochlorothiazide, and reduced Carvedilol to 12.5 mg BID and Lisinopril to 10 mg. BP on discharge 142/81. Will follow up with PCP for BP regimen.    Patient is discharged in stable condition.

## 2025-08-02 NOTE — PROGRESS NOTES
Daily Progress Note    Vivien Miramontes is a 65 y.o. female on day 3 of admission presenting with Diarrhea, unspecified.    Subjective   - Patient w/o continued episodes of diarrhea since completion of EGD.  - Will trial solids today       10 point ROS performed and negative unless stated above.          Objective   Weight: 62.6 kg (138 lb) (07/30/25 1002)    Daily Weight  07/30/25 : 62.6 kg (138 lb 0.1 oz)      Last Recorded Vitals  Heart Rate:  [73]   Temp:  [36.6 °C (97.8 °F)]   Resp:  [16]   BP: (112)/(66)   SpO2:  [100 %]      Physical Exam  Constitutional: NAD  Respiratory: CTAB. No wheezes, rales, or rhonchi. Normal respiratory effort.  Cardiovascular: RRR, No murmurs, gallops, or rubs  Abdominal: Soft with minimal tenderness diffusely   Neuro: AAOx , CN II-XII grossly intact  MSK: No LE edema bilaterally, moving extremities well. Sternal pain on palpation   Skin: Warm, dry. Well perfused  Psych: Appropriate mood and affect      Intake/Output last 3 Shifts:  I/O last 3 completed shifts:  In: 1520 (24.3 mL/kg) [I.V.:1520 (24.3 mL/kg)]  Out: 900 (14.4 mL/kg) [Urine:900 (0.4 mL/kg/hr)]  Weight: 62.6 kg     Medications  Scheduled medications  Scheduled Medications[1]  Continuous medications  Continuous Medications[2]  PRN medications  PRN Medications[3]       Relevant Results    Labs  Results from last 7 days   Lab Units 08/01/25  1720 08/01/25  1536 08/01/25  0737 07/31/25  1810 07/31/25  0859   SODIUM mmol/L 140 136 136 133* 132*   POTASSIUM mmol/L 4.2 5.0 3.5 4.3 4.2   CHLORIDE mmol/L 114* 113* 110* 106 106   CO2 mmol/L 15* 15* 16* 14* 15*   BUN mg/dL 38* 41* 42* 37* 33*   CREATININE mg/dL 1.99* 2.01* 2.93* 3.55* 3.16*   CALCIUM mg/dL 9.2 8.7 8.4* 9.1 9.5      Results from last 7 days   Lab Units 08/01/25  0737 07/31/25  0423 07/30/25  1019   HEMOGLOBIN g/dL 12.5 15.4 14.7   WBC AUTO x10*3/uL 10.3 11.4* 9.7   PLATELETS AUTO x10*3/uL 218 295 275   HEMATOCRIT % 39.1 47.8* 43.3     Results from last 7 days   Lab  Units 07/30/25  1019   ALK PHOS U/L 110   BILIRUBIN TOTAL mg/dL 0.4   PROTEIN TOTAL g/dL 7.7   ALT U/L 32   AST U/L 26          Lab Results   Component Value Date    LACTATE 1.3 08/01/2025       Results from last 7 days   Lab Units 08/01/25  1720 08/01/25  1536 08/01/25  0737 07/31/25  1810 07/31/25  0859   GLUCOSE mg/dL 85 62* 94 83 98       Imaging  CT abdomen and pelvis:   IMPRESSION:  1. Fluid-filled small and large bowel without evidence of  obstruction, wall thickening, or adjacent inflammatory changes, to be  correlated with history of diarrhea.  2. Circumferential submucosal fat deposition involving the ileocecal  valve, which can be seen in setting of Crohn's disease.  3. Colonic diverticulosis without evidence of diverticulitis.  4. Moderate-to-severe calcific and noncalcified atherosclerotic  disease of the abdominal aorta and branching vasculature with no  hemodynamically significant stenosis or occlusion.  5. Additional incidental/chronic findings as above.    CXR completed 7/31     IMPRESSION:  1. Trace right and small left pleural effusions with associated  bibasilar atelectasis.                 Assessment/Plan   Assessment & Plan  Diarrhea, unspecified    Diarrhea, unspecified type    65 year old female with past medical history significant for Crohn's disease (on Entyvio every two months), HTN, HLD, CAD s/p stent, NSTEMI, HFpEF, smoker, spinal stenosis, and cholecystectomy presents for severe diarrhea. Infectious workup has been unremarkable, etiology of diarrhea is likely related to chron's flare, vs IBS-D, and celiac. Celiac panel is currently pending and will pursue CT Enterography once creatinine has improved. Patient without continued diarrhea, nausea or vomiting so will trial solid diet today. There were concerns for decreased output but patient now with normal output since resolution of her diarrhea. Patient's bicarb levels remain low but likely a result of profuse diarrhea, will continue to  monitor given decreased output.     Updates  - Patient reports no new diarrhea   - Trial solids  - Awaiting RFP (if stable) will pursue CT enterography           #Diarrhea Chron's flare vs IBS-D vs Celiac  #Chron's disease    :: Infectious stool workup negative   :: EGD completed 8/1, biopsy pending   :: Bowel perferatopm due to complicated diverticulitis 2009   :: Chrons disease diagnosed 2010  :: Initially on Humira-->Stelara ( no :: Colonoscopy 2021, Mild ulceration in sigmoid colon otherwise normal. -Colonoscopy  6/26/2024: Mild (in right colon ) to moderate erythema (transverse colon, descending colon, sigmoid colon )consistent with chron's colitis, TI appears to be normal, biopsy-chronic colitis in right colon.   :: ESR elevated at 31   Plan   - Will encourage PO intake, as diarrhea has stopped   - CT Enterography pending creatinine levels   - Advancing diet as tolerated  - Following Celiac panel   - Awaiting pathology results from biopsy   - GI following   - Sodium Bicarb 1300 TID, will adjust pending AM bicarb     #FLORENCE -likely prerenal etiology  -Patient with large-volume, non-bloody, non-emesis loose diarrhea since last   -Creatinine 1.99 8/1 BL ~0.7   -PLAN:  - Daily RFP's  -Avoid nephrotoxic agents       #HFpEF  -Last echo 08/06/2024 ([facility]): EF 52%, normal RV size and RV systolic function.  -Home regimen: Carvedilol 25 mg q12h, Nitroglycerin, Aspirin 81 mg EC tab every day, Plavix 75 mg tab qd  -PLAN    -Hold Carvedilol, Nitroglycerin in the setting of soft BP     -Holding Plavix from 07/31, will restart pending CBC         #HTN  -Hypotensive on arrival (BP 82/53), but patient is conversational, moderate-appearing, no headaches, dizzyness, SOB, and no acute distress.  -Home regimen:  Amlodipine 5 mg tab (takes 1.5 mg tab every morning), Hydrochlorothiazide 25 mg tab every day, Lisinopril 40 mg qD  -PLAN    -Hold Amlodipine, Hydrochlorothiazide in the setting of soft BP on admission   (BP on admission 82/53)    -Orthostats         #HLD  -Home regimen: Rouvastatin 40mg every day  -PLAN    -continue Rouvastatin        #Lumbar spinal stenosis  -s/p bilateral L4 transforaminal epidural steroid injection under fluoroscopy in 06/23. -followed by Chronic Pain, Dr. Angie Bansal  -Home regimen: Pregablin 150mg cap BID, Acetaminophen 325 mg 2 tab q6h, Robaxin 500 mg TID PRN  -PLAN    -Hold Robaxin    -Lower dose Pregablin 75mg (in setting of her new FLORENCE)    -Multimodal pain control as needed, with lidocaine patches, acetaminophen           F : as needed  E: replete lytes prn, K>4, Mg >2  N: NPO/regular/renal/diabetic/2g Na  A:   DVT prophylaxis: heparin sq/Lovenox sq/SCDs  GI prophylaxis: PPI  Code Status: Full code (discussed with patient at bedside upon admission)   NOK: Miramontes ZAYDA Puneet [phone]           Juan Ferguson MD  PGY-2 Internal Medicine Resident           [1] [Held by provider] amLODIPine, 7.5 mg, oral, Daily  aspirin, 81 mg, oral, Daily  [Held by provider] carvedilol, 25 mg, oral, q12h KEVIN  [Held by provider] heparin (porcine), 5,000 Units, subcutaneous, q8h KEVIN  [Held by provider] hydroCHLOROthiazide, 25 mg, oral, Daily  [Held by provider] lisinopril, 40 mg, oral, Daily  magnesium sulfate, 2 g, intravenous, Once  pantoprazole, 40 mg, oral, Daily  pregabalin, 75 mg, oral, BID  rosuvastatin, 10 mg, oral, Nightly  sodium bicarbonate, 1,300 mg, oral, TID  [2]    [3] PRN medications: acetaminophen, calcium carbonate, dextrose, dextrose, dicyclomine, glucagon, glucagon, lidocaine, simethicone

## 2025-08-02 NOTE — CARE PLAN
Problem: Pain - Adult  Goal: Verbalizes/displays adequate comfort level or baseline comfort level  Outcome: Progressing     Problem: Safety - Adult  Goal: Free from fall injury  Outcome: Progressing     Problem: Discharge Planning  Goal: Discharge to home or other facility with appropriate resources  Outcome: Progressing     Problem: Chronic Conditions and Co-morbidities  Goal: Patient's chronic conditions and co-morbidity symptoms are monitored and maintained or improved  Outcome: Progressing     Problem: Nutrition  Goal: Nutrient intake appropriate for maintaining nutritional needs  Outcome: Progressing     Problem: Skin  Goal: Participates in plan/prevention/treatment measures  Outcome: Progressing  Goal: Prevent/manage excess moisture  Outcome: Progressing  Goal: Prevent/minimize sheer/friction injuries  Outcome: Progressing  Goal: Promote/optimize nutrition  Outcome: Progressing   The patient's goals for the shift include pt will remain HDS this shift    The clinical goals for the shift include maintain safety

## 2025-08-02 NOTE — SIGNIFICANT EVENT
Night team called for chest pain. Patient evaluated at bedside, not in acute distress. Endorsing 9.5/10 chest pain that woke her up from sleep, located in the center of the chest and radiating to the right side of the chest as well as epigastric region of the abdomen. Says it worsens when she moves around or laughs but not necessarily with deep breaths. Unable to say quality of the pain. Has had similar pain in the past, though unable to give aggravating or relieving factors and has not had the pain during this admission. Has had heartburn in the past, notes that this feels different to her. No fevers/chills, no palpitations, no dyspnea or new cough. Not diaphoretic, not tachypneic, regular rate and rhythm on exam. Pain is not reproducible. Heart and lung sounds unchanged from baseline exam. Low suspicion for PE. Will r/o ACS. Ordered EKG and trops. Will continue to monitor clinical condition.    Addendum: EKG with no signs of ischemia, pending official read. Trops pending. Per EGD report, patient does have gastritis and duodenitis, though no ulcers. Low suspicion for cardiac etiology at this time. On re-examination, patient continued to endorse significant pain, though was not in clear distress. Will give simethicone and calcium carbonate tablet d/t suspicion for GI etiology. Will f/u trops to r/o ACS.

## 2025-08-02 NOTE — SIGNIFICANT EVENT
16:30   Notified that patient was not in room. Talked with nursing staff who stated patient left the floor with her belongings to go outside. Patient seen smoking a cigarette outside upon arrival which she put out promptly. Discussed overall care plan with her and friend. Explained that we needed CT Enterography for further work up over her diarrhea. Also expressed concern about her soft BP's even while off of anti hypertensive's. Patient understood that all of these tests needed to be completed and agreed to return to hospital bed. Counseled her on smoking cessation and offered nicotine replacement which she refused.

## 2025-08-02 NOTE — NURSING NOTE
1630-Patient family member came to  asking to see where patient is, Patient not in room. Patient found at the front of [facility] outside. Her IV is out. Stating that she is in chronic pain and the doctors are not doing much for her and she wants to go home. Patient was dressed in her own street clothes and had all personal belongings with her. She did not want to come back into the building with the nurse. MD was headed outside to talk with the patient and family member.   1715- patient back on the floor in her room after talking with the doctor.

## 2025-08-03 ENCOUNTER — APPOINTMENT (OUTPATIENT)
Dept: RADIOLOGY | Facility: HOSPITAL | Age: 65
DRG: 386 | End: 2025-08-03
Payer: COMMERCIAL

## 2025-08-03 VITALS
HEIGHT: 60 IN | SYSTOLIC BLOOD PRESSURE: 142 MMHG | WEIGHT: 138.01 LBS | HEART RATE: 74 BPM | TEMPERATURE: 97 F | DIASTOLIC BLOOD PRESSURE: 85 MMHG | RESPIRATION RATE: 17 BRPM | OXYGEN SATURATION: 96 % | BODY MASS INDEX: 27.09 KG/M2

## 2025-08-03 LAB
BACTERIA BLD CULT: NORMAL
BACTERIA BLD CULT: NORMAL
MAGNESIUM SERPL-MCNC: 1.62 MG/DL (ref 1.6–2.4)

## 2025-08-03 PROCEDURE — 2500000004 HC RX 250 GENERAL PHARMACY W/ HCPCS (ALT 636 FOR OP/ED)

## 2025-08-03 PROCEDURE — 2550000001 HC RX 255 CONTRASTS: Performed by: INTERNAL MEDICINE

## 2025-08-03 PROCEDURE — 74177 CT ABD & PELVIS W/CONTRAST: CPT

## 2025-08-03 PROCEDURE — 2500000001 HC RX 250 WO HCPCS SELF ADMINISTERED DRUGS (ALT 637 FOR MEDICARE OP)

## 2025-08-03 PROCEDURE — 99239 HOSP IP/OBS DSCHRG MGMT >30: CPT

## 2025-08-03 PROCEDURE — 36415 COLL VENOUS BLD VENIPUNCTURE: CPT

## 2025-08-03 PROCEDURE — 83735 ASSAY OF MAGNESIUM: CPT

## 2025-08-03 PROCEDURE — 2500000005 HC RX 250 GENERAL PHARMACY W/O HCPCS

## 2025-08-03 RX ORDER — LISINOPRIL 10 MG/1
10 TABLET ORAL DAILY
Qty: 30 TABLET | Refills: 2 | Status: SHIPPED
Start: 2025-08-03 | End: 2025-11-01

## 2025-08-03 RX ORDER — SIMETHICONE 80 MG
80 TABLET,CHEWABLE ORAL EVERY 8 HOURS PRN
Qty: 30 TABLET | Refills: 0 | Status: SHIPPED
Start: 2025-08-03

## 2025-08-03 RX ORDER — CARVEDILOL 25 MG/1
12.5 TABLET ORAL
Qty: 30 TABLET | Refills: 2 | Status: SHIPPED
Start: 2025-08-03 | End: 2025-08-07 | Stop reason: HOSPADM

## 2025-08-03 RX ORDER — DICYCLOMINE HYDROCHLORIDE 10 MG/1
10 CAPSULE ORAL EVERY 6 HOURS PRN
Qty: 40 CAPSULE | Refills: 0 | Status: SHIPPED
Start: 2025-08-03 | End: 2025-09-02

## 2025-08-03 RX ORDER — CLOPIDOGREL BISULFATE 75 MG/1
75 TABLET ORAL DAILY
Status: DISCONTINUED | OUTPATIENT
Start: 2025-08-03 | End: 2025-08-03 | Stop reason: HOSPADM

## 2025-08-03 RX ADMIN — IOHEXOL 75 ML: 350 INJECTION, SOLUTION INTRAVENOUS at 08:51

## 2025-08-03 RX ADMIN — PREGABALIN 150 MG: 75 CAPSULE ORAL at 09:14

## 2025-08-03 RX ADMIN — LIDOCAINE 4% 1 PATCH: 40 PATCH TOPICAL at 09:15

## 2025-08-03 RX ADMIN — PANTOPRAZOLE SODIUM 40 MG: 40 TABLET, DELAYED RELEASE ORAL at 09:15

## 2025-08-03 RX ADMIN — ASPIRIN 81 MG: 81 TABLET, COATED ORAL at 09:14

## 2025-08-03 RX ADMIN — ACETAMINOPHEN 650 MG: 325 TABLET ORAL at 09:14

## 2025-08-03 RX ADMIN — SODIUM BICARBONATE 1300 MG: 650 TABLET ORAL at 09:14

## 2025-08-03 RX ADMIN — CLOPIDOGREL BISULFATE 75 MG: 75 TABLET, FILM COATED ORAL at 12:43

## 2025-08-03 ASSESSMENT — PAIN SCALES - GENERAL: PAINLEVEL_OUTOF10: 10 - WORST POSSIBLE PAIN

## 2025-08-03 ASSESSMENT — PAIN DESCRIPTION - LOCATION: LOCATION: ABDOMEN

## 2025-08-03 ASSESSMENT — PAIN - FUNCTIONAL ASSESSMENT: PAIN_FUNCTIONAL_ASSESSMENT: 0-10

## 2025-08-03 NOTE — NURSING NOTE
Nabor consulted by bedside RN for PIV placement. Nabor RN to bedside to assess. Patient's left upper extremity swollen from a prior IV infiltrate, patient refused assessment of that extremity. Patient's right upper extremity, no vessels noted for IV cannulation. Bedside RN made aware, patient will need alternative access.

## 2025-08-03 NOTE — CARE PLAN
The patient's goals for the shift include pt will remain HDS this shift    The clinical goals for the shift include pt will not leave the floor this shift.    Over the shift, the patient did not make progress toward the following goals. Barriers to progression include . Recommendations to address these barriers include .

## 2025-08-03 NOTE — PROGRESS NOTES
"Vivien Miramontes is a 65 y.o. female admitted on 7/30/2025 and on day 4 of admission  presenting with diarrhea unspecified.     Subjective            Objective     Last Recorded Vitals  Temp:  [36.3 °C (97.3 °F)-36.5 °C (97.7 °F)] 36.5 °C (97.7 °F)  Heart Rate:  [67-76] 67  Resp:  [17] 17  BP: (141-142)/(81-86) 142/81  SpO2 Readings from Last 1 Encounters:   08/03/25 98%       Intake/Output last 3 Shifts:  I/O this shift:  In: -   Out: 700 [Urine:700]  I/O last 3 completed shifts:  In: 978.3 (15.6 mL/kg) [I.V.:978.3 (15.6 mL/kg)]  Out: 900 (14.4 mL/kg) [Urine:900 (0.4 mL/kg/hr)]  Weight: 62.6 kg     Relevant Results    CMP  Results from last 7 days   Lab Units 08/02/25  1409 08/02/25  0456 08/01/25  1720 08/01/25  1536 08/01/25  0737   SODIUM mmol/L 143  --  140 136 136   POTASSIUM mmol/L 3.9  --  4.2 5.0 3.5   CO2 mmol/L 21  --  15* 15* 16*   ANION GAP mmol/L 11  --  15 13 14   BUN mg/dL 23  --  38* 41* 42*   CREATININE mg/dL 1.04  --  1.99* 2.01* 2.93*   GLUCOSE mg/dL 103*  --  85 62* 94   EGFR mL/min/1.73m*2 59*  --  27* 27* 17*   CALCIUM mg/dL 9.1  --  9.2 8.7 8.4*   MAGNESIUM mg/dL 2.01 1.81  --   --  1.77   PHOSPHORUS mg/dL 1.6*  --  3.6 4.2 4.7      CBC  Results from last 72 hours   Lab Units 08/02/25  1409 08/01/25  0737   WBC AUTO x10*3/uL 6.9 10.3   HEMOGLOBIN g/dL 12.4 12.5   HEMATOCRIT % 38.9 39.1   MCV fL 88 88   PLATELETS AUTO x10*3/uL 251 218   NEUTROS PCT AUTO % 56.9 74.9   LYMPHS PCT AUTO % 30.1 15.1   MONOS PCT AUTO % 9.2 7.6   EOS PCT AUTO % 2.8 1.5      Liver Panel  Results from last 7 days   Lab Units 07/30/25  1019   ALT U/L 32   AST U/L 26   ALK PHOS U/L 110            No lab exists for component: \"PT\"  Urinalysis  Lab Results   Component Value Date    LEUKOCYTESU NEGATIVE 08/02/2025    NITRITEU NEGATIVE 08/02/2025    WBCU 6-10 (A) 03/10/2024    RBCU 3-5 03/10/2024    BLOODU NEGATIVE 08/02/2025    PROTUR NEGATIVE 08/02/2025    GLUCOSEU Normal 08/02/2025    KETONESU NEGATIVE 08/02/2025    " HYALCASTU 1+ (A) 03/10/2024        Other Labs  Results from last 7 days   Lab Units 08/02/25  1409 08/01/25  0737 07/31/25  0423 07/30/25  1946 07/30/25  1235   CRP mg/dL  --   --   --   --  0.37   SED RATE mm/h  --   --   --  31*  --    WBC AUTO x10*3/uL 6.9 10.3 11.4*  --   --    HEMOGLOBIN g/dL 12.4 12.5 15.4  --   --          Results from last 72 hours   Lab Units 08/01/25  0007   POCT PH, VENOUS pH 7.29*   POCT PCO2, VENOUS mm Hg 23*   POCT PO2, VENOUS mm Hg 54*      ***    Microbiology and Culture  No results found for the last 120 days.    Lab Results   Component Value Date    URINECULTURE No growth 03/10/2024    BLOODCULT No growth at 3 days 07/30/2025    BLOODCULT No growth at 3 days 07/30/2025       Physical Exam  Physical Exam  Constitutional:       Appearance: Normal appearance.     Cardiovascular:      Rate and Rhythm: Normal rate and regular rhythm.   Pulmonary:      Effort: Pulmonary effort is normal.      Breath sounds: Normal breath sounds.   Abdominal:      General: There is no distension.      Palpations: Abdomen is soft.      Tenderness: There is abdominal tenderness.      Comments: Very slight tenderness to palpation, although patient is in significantly less pain, does not wince or react as strongly as she did on admission.     Skin:     General: Skin is warm.     Neurological:      General: No focal deficit present.      Mental Status: She is alert.         Imaging  XR chest 1 view  Result Date: 7/31/2025  1. Trace right and small left pleural effusions with associated bibasilar atelectasis.   I personally reviewed the images/study and I agree with the findings as stated. This study was interpreted by radiology resident Jorge Serrano D.O. at [facility], [place], [place].   Signed by: Raphael Dillon 7/31/2025 2:52 PM Dictation workstation:   [id number]    CT abdomen pelvis w IV contrast  Result Date: 7/30/2025  1. Fluid-filled small and large bowel without  evidence of obstruction, wall thickening, or adjacent inflammatory changes, to be correlated with history of diarrhea. 2. Circumferential submucosal fat deposition involving the ileocecal valve, which can be seen in setting of Crohn's disease. 3. Colonic diverticulosis without evidence of diverticulitis. 4. Moderate-to-severe calcific and noncalcified atherosclerotic disease of the abdominal aorta and branching vasculature with no hemodynamically significant stenosis or occlusion. 5. Additional incidental/chronic findings as above.   I personally reviewed the images/study and I agree with the findings as stated by Brad Santos DO PGY-3. This study was interpreted at [place], [place].   MACRO: None.   Signed by: Jimy Salazar 7/30/2025 9:20 PM Dictation workstation:   [id number]       Inpatient Medications  Scheduled Medications[1]  Continuous Medications[2]  PRN Medications[3]             Assessment/Plan     65 year old female with past medical history significant for Crohn's disease (on Entyvio every two months), HTN, HLD, CAD s/p stent, NSTEMI, HFpEF, smoker, spinal stenosis, and cholecystectomy presents for severe diarrhea. Infectious workup has been unremarkable, etiology of diarrhea is likely related to chron's flare, vs IBS-D, and celiac. Celiac panel is currently pending and will pursue CT Enterography once creatinine has improved. Patient without continued diarrhea, nausea or vomiting so will trial solid diet today. There were concerns for decreased output but patient now with normal output since resolution of her diarrhea. Patient's bicarb levels remain low but likely a result of profuse diarrhea, will continue to monitor given decreased output.      Updates 08/03  -CT enterography not done because patient had no IV access. Midline access order placed by night team             #Diarrhea Chron's flare vs IBS-D vs Celiac  #Chron's disease    :: Infectious stool  workup negative   :: EGD completed 8/1, biopsy pending   :: Bowel perferatopm due to complicated diverticulitis 2009   :: Chrons disease diagnosed 2010  :: Initially on Humira-->Stelara ( no :: Colonoscopy 2021, Mild ulceration in sigmoid colon otherwise normal. -Colonoscopy  6/26/2024: Mild (in right colon ) to moderate erythema (transverse colon, descending colon, sigmoid colon )consistent with chron's colitis, TI appears to be normal, biopsy-chronic colitis in right colon.   :: ESR elevated at 31   Plan   - Will encourage PO intake, as diarrhea has stopped   - CT Enterography pending creatinine levels   - Advancing diet as tolerated  - Following Celiac panel   - Awaiting pathology results from biopsy   - GI following   - Sodium Bicarb 1300 TID, will adjust pending AM bicarb      #FLORENCE -likely prerenal etiology  -Patient with large-volume, non-bloody, non-emesis loose diarrhea since last   -Creatinine 1.99 8/1 BL ~0.7   -PLAN:  - Daily RFP's  -Avoid nephrotoxic agents        #HFpEF  -Last echo 08/06/2024 ([facility]): EF 52%, normal RV size and RV systolic function.  -Home regimen: Carvedilol 25 mg q12h, Nitroglycerin, Aspirin 81 mg EC tab every day, Plavix 75 mg tab qd  -PLAN    -Hold Carvedilol, Nitroglycerin in the setting of soft BP     -Holding Plavix from 07/31, will restart pending CBC         #HTN  -Hypotensive on arrival (BP 82/53), but patient is conversational, moderate-appearing, no headaches, dizzyness, SOB, and no acute distress.  -Home regimen:  Amlodipine 5 mg tab (takes 1.5 mg tab every morning), Hydrochlorothiazide 25 mg tab every day, Lisinopril 40 mg qD  -PLAN    -Hold Amlodipine, Hydrochlorothiazide in the setting of soft BP on admission  (BP on admission 82/53)    -Orthostats         #HLD  -Home regimen: Rouvastatin 40mg every day  -PLAN    -continue Rouvastatin        #Lumbar spinal stenosis  -s/p bilateral L4 transforaminal epidural steroid injection under fluoroscopy in  06/23. -followed by Chronic Pain, Dr. Angie Bansal  -Home regimen: Pregablin 150mg cap BID, Acetaminophen 325 mg 2 tab q6h, Robaxin 500 mg TID PRN  -PLAN    -Hold Robaxin    -Lower dose Pregablin 75mg (in setting of her new FLORENCE)    -Multimodal pain control as needed, with lidocaine patches, acetaminophen             F : as needed  E: replete lytes prn, K>4, Mg >2  N: NPO/regular/renal/diabetic/2g Na  A:   DVT prophylaxis: heparin sq/Lovenox sq/SCDs  GI prophylaxis: PPI  Code Status: Full code (discussed with patient at bedside upon admission)   NOK: Puneet Miramontes [phone]                [1]  [Held by provider] amLODIPine, 7.5 mg, oral, Daily  aspirin, 81 mg, oral, Daily  [Held by provider] carvedilol, 25 mg, oral, q12h KEVIN  [Held by provider] heparin (porcine), 5,000 Units, subcutaneous, q8h KEVIN  [Held by provider] hydroCHLOROthiazide, 25 mg, oral, Daily  lidocaine, 5 mL, infiltration, Once  lidocaine, 1 patch, transdermal, Daily  [Held by provider] lisinopril, 40 mg, oral, Daily  magnesium sulfate, 2 g, intravenous, Once  pantoprazole, 40 mg, oral, Daily  pregabalin, 150 mg, oral, BID  rosuvastatin, 10 mg, oral, Nightly  sodium bicarbonate, 1,300 mg, oral, TID     [2]     [3]  PRN medications: acetaminophen, calcium carbonate, dextrose, dextrose, dicyclomine, glucagon, glucagon, simethicone, sodium chloride 0.9%, sodium chloride 0.9%

## 2025-08-03 NOTE — DISCHARGE SUMMARY
Discharge Diagnosis  Diarrhea likely Crohn's Flare vs IBS-D vs Celiac       Issues Requiring Follow-Up  -Celiac panels, Stool O&P, Fecal Calprotectin and Lactoferritin, stool electrolytes, Blood Cx final results  -EGD completed 07/01 with biopsy still pending  -Colonoscopy scheduled in outpatient setting 08/25    Discharge Meds     Medication List      PAUSE taking these medications     amLODIPine 5 mg tablet; Wait to take this until your doctor or other   care provider tells you to start again.; Commonly known as: Norvasc   hydroCHLOROthiazide 25 mg tablet; Wait to take this until your doctor or   other care provider tells you to start again.; Commonly known as:   HYDRODiuril     START taking these medications     dicyclomine 10 mg capsule; Commonly known as: Bentyl; Take 1 capsule (10   mg) by mouth every 6 hours if needed (abd discomort, diarrhea).   simethicone 80 mg chewable tablet; Commonly known as: Mylicon; Chew and   swallow 1 tablet (80 mg) every 8 hours if needed for flatulence.     CHANGE how you take these medications     carvedilol 25 mg tablet; Commonly known as: Coreg; Take 0.5 tablets   (12.5 mg) by mouth every 12 hours.; What changed: how much to take   lisinopril 10 mg tablet; Take 1 tablet (10 mg) by mouth once daily. as   directed; What changed: medication strength, how much to take     CONTINUE taking these medications     acetaminophen 325 mg tablet; Commonly known as: TylenoL; Take 2 tablets   (650 mg) by mouth every 6 hours if needed for mild pain (1 - 3) or fever   (temp greater than 38.0 C).   aspirin 81 mg EC tablet   clopidogrel 75 mg tablet; Commonly known as: Plavix   hydrOXYzine HCL 25 mg tablet; Commonly known as: Atarax   methocarbamol 500 mg tablet; Commonly known as: Robaxin; Take 1 tablet   (500 mg) by mouth 3 times a day as needed for muscle spasms.   nitroglycerin 0.4 mg SL tablet; Commonly known as: Nitrostat   pregabalin 150 mg capsule; Commonly known as: Lyrica    rosuvastatin 40 mg tablet; Commonly known as: Crestor   * vedolizumab 300 mg injection; Commonly known as: Entyvio; Infuse 300   mg into a venous catheter every 8 (eight) weeks.  For Maintenance: every 8   weeks   * vedolizumab 300 mg injection; Commonly known as: Entyvio; Infuse 300   mg into a venous catheter see administration instructions.  For Induction:    0, 2 and 6 weeks   vitamin E 180 mg (400 units) capsule  * This list has 2 medication(s) that are the same as other medications   prescribed for you. Read the directions carefully, and ask your doctor or   other care provider to review them with you.       Test Results Pending At Discharge  Pending Labs       Order Current Status    Calprotectin Stool In process    Celiac Panel In process    Deamidated Gliadin Antibody IgG In process    Electrolytes, stool In process    Extra Urine Gray Tube In process    Surgical Pathology Exam In process    Tissue Transglutaminase IgG In process    Urinalysis with Reflex Culture and Microscopic In process    Blood Culture Preliminary result    Blood Culture Preliminary result            Hospital Course  Hospital Course  65 year old female with past medical history significant for Crohn's disease (on Entyvio every two months), HTN, HLD, CAD s/p stent, NSTEMI, HFpEF, smoker, spinal stenosis, and cholecystectomy presents to the ED hypotensive with 1-week history of generalized abdominal pain, non-bloody diarrhea and inability to tolerate solids.    Her vitals on admission significant for BP 82/53 and admission labs were notable for bicarb 13, BUN 26, Cr 2.37. She was admitted on 07/30 due to poor PO intake and new FLORENCE. She received LR bolus 1000mL and LR infusion 100 mL/hr. A work up was began to rule out Crohn's flare vs infectious etiologies vs IBS-D vs Celiac that included a CT AP, ESR/CRP inflammatory markers, Stool pathogen O&P, Fecal calprotectin and lactoferritin,  stool electrolytes, C diff and infectious panel, blood  cultures. CT AP was only notable for fluid filled bowel loops without stranding or bowel wall thickening.  Inflammatory markers ESR (31) and CRP (37) were moderately elevated. C diff negative, blood cultures negative, and infectious panel unremarkable. Celiac panels, Stool O&P, Fecal Calprotectin and Lactoferritin, and stool electrolytes still pending. EGD completed 07/01 to rule out lesions of Crohns in upper GI and CMV colitis with biopsy still pending. Suspect diarrhea likely due to Crohns flare, however cannot exclude IBS-D or Celiac until stool studies come back. Colonoscopy scheduled in outpatient setting 08/25. Received CT Enterography today, negative for any acute processes or strictures, and non-pertinent findings of atherosclerotic disease of abdominal aorta and its major branches, and hepatic steatosis.     FLORENCE and decreased sodium bicarb on admission likely in setting of volume loss from profuse diarrhea. Urine analysis and electrolytes unremarkable. She was continued on LR infusion for maintenance. FLORENCE now resolved with last Bun/Cr improved to 23/1.04 and sodium bicarb stabilized to 21.    Hypotensive since arrival and throughout hospitalization, but patient asymptomatic. Home meds Amlodipine, Hydrochlorothiazide, Carvedilol, and Lisinopril were held as a result. BP improved to 142/81 today. Discharging patient with Carvedilol 12.5 mg BID, Lisinopril 10 mg qd. She should follow up for repeat BP. If repeat BP is consistent, may consider resuming her other BP regimen.    Patient is discharged in stable condition.      Vitals  Visit Vitals  /81 (BP Location: Right arm, Patient Position: Lying)   Pulse 67   Temp 36.5 °C (97.7 °F) (Temporal)   Resp 17   Ht (!) 1.524 m (5')   Wt 62.6 kg (138 lb 0.1 oz)   SpO2 98%   BMI 26.95 kg/m²   OB Status Postmenopausal   Smoking Status Every Day   BSA 1.63 m²          Pertinent Physical Exam At Time of Discharge  Physical Exam  Constitutional:       Appearance:  Normal appearance.     Cardiovascular:      Rate and Rhythm: Normal rate and regular rhythm.      Pulses: Normal pulses.      Heart sounds: Normal heart sounds.   Pulmonary:      Effort: Pulmonary effort is normal.      Breath sounds: Normal breath sounds.   Abdominal:      Palpations: Abdomen is soft.      Comments: Very slight tenderness to palpation, does not wince or react to light palpation as she did on admission, overall moderate improvement     Skin:     General: Skin is warm.     Neurological:      General: No focal deficit present.      Mental Status: She is alert and oriented to person, place, and time. Mental status is at baseline.     Psychiatric:         Mood and Affect: Mood normal.         Behavior: Behavior normal.           Outpatient Follow-Up  Future Appointments   Date Time Provider Department Center   8/25/2025  8:[facility]   9/16/2025  2:00 PM INF 07 [facility]         Aminah Pozo MD

## 2025-08-03 NOTE — CARE PLAN
Problem: Pain - Adult  Goal: Verbalizes/displays adequate comfort level or baseline comfort level  Outcome: Progressing     Problem: Safety - Adult  Goal: Free from fall injury  Outcome: Progressing     Problem: Discharge Planning  Goal: Discharge to home or other facility with appropriate resources  Outcome: Progressing     Problem: Chronic Conditions and Co-morbidities  Goal: Patient's chronic conditions and co-morbidity symptoms are monitored and maintained or improved  Outcome: Progressing     Problem: Nutrition  Goal: Nutrient intake appropriate for maintaining nutritional needs  Outcome: Progressing     Problem: Skin  Goal: Participates in plan/prevention/treatment measures  Outcome: Progressing  Goal: Prevent/manage excess moisture  Outcome: Progressing  Goal: Prevent/minimize sheer/friction injuries  Outcome: Progressing  Goal: Promote/optimize nutrition  Outcome: Progressing   The patient's goals for the shift include pt will remain HDS this shift    The clinical goals for the shift include Patient will remain safe and not leave the floor this shift    Patient discharged home with son. Medications to be picked up by patient at her pharmacy of choosing. IV removed. Awaiting transport to main entrance.

## 2025-08-04 LAB
CALPROTECTIN STL-MCNT: <5 UG/G
GLIADIN PEPTIDE IGG SER IA-ACNC: 0.95 FLU (ref 0–4.99)
HOLD SPECIMEN: NORMAL
TTG IGG SER IA-ACNC: <0.82 FLU (ref 0–4.99)

## 2025-08-04 NOTE — DISCHARGE SUMMARY
Discharge Diagnosis  Diarrhea, unspecified           Issues Requiring Follow-Up  ***    Discharge Meds     Medication List      PAUSE taking these medications    • amLODIPine 5 mg tablet; Wait to take this until your doctor or other   care provider tells you to start again.; Commonly known as: Norvasc  • hydroCHLOROthiazide 25 mg tablet; Wait to take this until your doctor or   other care provider tells you to start again.; Commonly known as:   HYDRODiuril     START taking these medications    • dicyclomine 10 mg capsule; Commonly known as: Bentyl; Take 1 capsule (10   mg) by mouth every 6 hours if needed (abd discomort, diarrhea).  • simethicone 80 mg chewable tablet; Commonly known as: Mylicon; Chew and   swallow 1 tablet (80 mg) every 8 hours if needed for flatulence.     CHANGE how you take these medications    • carvedilol 25 mg tablet; Commonly known as: Coreg; Take 0.5 tablets   (12.5 mg) by mouth every 12 hours.; What changed: how much to take  • lisinopril 10 mg tablet; Take 1 tablet (10 mg) by mouth once daily. as   directed; What changed: medication strength, how much to take     CONTINUE taking these medications    • acetaminophen 325 mg tablet; Commonly known as: TylenoL; Take 2 tablets   (650 mg) by mouth every 6 hours if needed for mild pain (1 - 3) or fever   (temp greater than 38.0 C).  • aspirin 81 mg EC tablet  • clopidogrel 75 mg tablet; Commonly known as: Plavix  • hydrOXYzine HCL 25 mg tablet; Commonly known as: Atarax  • methocarbamol 500 mg tablet; Commonly known as: Robaxin; Take 1 tablet   (500 mg) by mouth 3 times a day as needed for muscle spasms.  • nitroglycerin 0.4 mg SL tablet; Commonly known as: Nitrostat  • pregabalin 150 mg capsule; Commonly known as: Lyrica  • rosuvastatin 40 mg tablet; Commonly known as: Crestor  • * vedolizumab 300 mg injection; Commonly known as: Entyvio; Infuse 300   mg into a venous catheter every 8 (eight) weeks.  For Maintenance: every 8   weeks  • *  vedolizumab 300 mg injection; Commonly known as: Entyvio; Infuse 300   mg into a venous catheter see administration instructions.  For Induction:    0, 2 and 6 weeks  • vitamin E 180 mg (400 units) capsule  * This list has 2 medication(s) that are the same as other medications   prescribed for you. Read the directions carefully, and ask your doctor or   other care provider to review them with you.       Test Results Pending At Discharge  Pending Labs       Order Current Status    Calprotectin Stool In process    Celiac Panel In process    Deamidated Gliadin Antibody IgG In process    Electrolytes, stool In process    Extra Urine Gray Tube In process    Surgical Pathology Exam In process    Tissue Transglutaminase IgG In process    Urinalysis with Reflex Culture and Microscopic In process            Hospital Course  Hospital Course  65 year old female with past medical history significant for Crohn's disease (on Entyvio every two months), HTN, HLD, CAD s/p stent, NSTEMI, HFpEF, smoker, spinal stenosis, and cholecystectomy presents to the ED hypotensive with 1-week history of generalized abdominal pain, non-bloody diarrhea and inability to tolerate solids.    Her vitals on admission significant for BP 82/53 and admission labs were notable for bicarb 13, BUN 26, Cr 2.37. She was admitted on 07/30 due to poor PO intake and new FLORENCE. She received LR bolus 1000mL and LR infusion 100 mL/hr. A work up was began to rule out Crohn's flare vs infectious etiologies vs IBS-D vs Celiac that included a CT AP, ESR/CRP inflammatory markers, Stool pathogen O&P, Fecal calprotectin and lactoferritin,  stool electrolytes, C diff and infectious panel, blood cultures. CT AP was only notable for fluid filled bowel loops without stranding or bowel wall thickening.  Inflammatory markers ESR (31) and CRP (37) were moderately elevated. C diff, blood cultures, Calprotectin and Lactoferritin negative, and infectious panel unremarkable. Deamidated  Gliadin Antibody IgA and Tissue transglutaminase IgA negative. Tissue transglutaminase IgG and Deamidated Gliadin Antibody IgG celiac serologies still pending. Stool O&P and stool electrolytes still pending. EGD completed 07/01 revealed gastritis and patchy erythematous mucosa in the bulb, 1st, and 2nd part of duodenum. Obtained biopsy to rule out H pylori with results still pending. Suspect diarrhea likely due to Crohns flare. Colonoscopy scheduled in outpatient setting 08/25. Underwent CT Enterography 08/03, negative for any acute processes or strictures and for extra-manifestations of Crohns.     FLORENCE and decreased sodium bicarb on admission likely in setting of volume loss from profuse diarrhea. FLORENCE now resolved with last Bun/Cr improved to 23/1.04 and sodium bicarb stabilized to 21.    Per hypotension, held Amlodipine, Hydrochlorothiazide, and reduced Carvedilol to 12.5 mg BID and Lisinopril to 10 mg. BP on discharge 142/81. Will follow up with PCP for BP regimen.    Patient is discharged in stable condition.  ***    Pertinent Physical Exam At Time of Discharge  Physical Exam    Outpatient Follow-Up  Future Appointments   Date Time Provider Department Center   8/25/2025  8:30 AM MD Enoch [facility]   9/16/2025  2:00 [facility]         Aiden Baker MD MPH

## 2025-08-05 ENCOUNTER — HOSPITAL ENCOUNTER (OUTPATIENT)
Facility: HOSPITAL | Age: 65
Setting detail: OBSERVATION
LOS: 1 days | Discharge: HOME | End: 2025-08-07
Attending: EMERGENCY MEDICINE | Admitting: INTERNAL MEDICINE
Payer: COMMERCIAL

## 2025-08-05 DIAGNOSIS — K92.1 MELENA: Primary | ICD-10-CM

## 2025-08-05 DIAGNOSIS — I25.110 CORONARY ARTERY DISEASE INVOLVING NATIVE CORONARY ARTERY OF NATIVE HEART WITH UNSTABLE ANGINA PECTORIS: ICD-10-CM

## 2025-08-05 DIAGNOSIS — K29.70 GASTRITIS, PRESENCE OF BLEEDING UNSPECIFIED, UNSPECIFIED CHRONICITY, UNSPECIFIED GASTRITIS TYPE: ICD-10-CM

## 2025-08-05 LAB
ABO GROUP (TYPE) IN BLOOD: NORMAL
ALBUMIN SERPL BCP-MCNC: 4.2 G/DL (ref 3.4–5)
ALP SERPL-CCNC: 100 U/L (ref 33–136)
ALT SERPL W P-5'-P-CCNC: 30 U/L (ref 7–45)
ANION GAP BLDV CALCULATED.4IONS-SCNC: 12 MMOL/L (ref 10–25)
ANION GAP SERPL CALC-SCNC: 13 MMOL/L (ref 10–20)
ANTIBODY SCREEN: NORMAL
APTT PPP: 31 SECONDS (ref 26–36)
AST SERPL W P-5'-P-CCNC: 28 U/L (ref 9–39)
BASE EXCESS BLDV CALC-SCNC: -1.8 MMOL/L (ref -2–3)
BASOPHILS # BLD AUTO: 0.06 X10*3/UL (ref 0–0.1)
BASOPHILS NFR BLD AUTO: 0.6 %
BILIRUB SERPL-MCNC: 0.4 MG/DL (ref 0–1.2)
BODY TEMPERATURE: 37 DEGREES CELSIUS
BUN SERPL-MCNC: 17 MG/DL (ref 6–23)
CA-I BLDV-SCNC: 1.25 MMOL/L (ref 1.1–1.33)
CALCIUM SERPL-MCNC: 9.4 MG/DL (ref 8.6–10.6)
CHLORIDE BLDV-SCNC: 107 MMOL/L (ref 98–107)
CHLORIDE SERPL-SCNC: 105 MMOL/L (ref 98–107)
CO2 SERPL-SCNC: 22 MMOL/L (ref 21–32)
CREAT SERPL-MCNC: 0.93 MG/DL (ref 0.5–1.05)
EGFRCR SERPLBLD CKD-EPI 2021: 68 ML/MIN/1.73M*2
EOSINOPHIL # BLD AUTO: 0.17 X10*3/UL (ref 0–0.7)
EOSINOPHIL NFR BLD AUTO: 1.8 %
ERYTHROCYTE [DISTWIDTH] IN BLOOD BY AUTOMATED COUNT: 13.3 % (ref 11.5–14.5)
GLUCOSE BLDV-MCNC: 80 MG/DL (ref 74–99)
GLUCOSE SERPL-MCNC: 79 MG/DL (ref 74–99)
HCO3 BLDV-SCNC: 23.7 MMOL/L (ref 22–26)
HCT VFR BLD AUTO: 38.4 % (ref 36–46)
HCT VFR BLD EST: 41 % (ref 36–46)
HGB BLD-MCNC: 13.1 G/DL (ref 12–16)
HGB BLDV-MCNC: 13.6 G/DL (ref 12–16)
IMM GRANULOCYTES # BLD AUTO: 0.04 X10*3/UL (ref 0–0.7)
IMM GRANULOCYTES NFR BLD AUTO: 0.4 % (ref 0–0.9)
INHALED O2 CONCENTRATION: 21 %
INR PPP: 1 (ref 0.9–1.1)
LACTATE BLDV-SCNC: 1.7 MMOL/L (ref 0.4–2)
LYMPHOCYTES # BLD AUTO: 2.22 X10*3/UL (ref 1.2–4.8)
LYMPHOCYTES NFR BLD AUTO: 23.5 %
MCH RBC QN AUTO: 28.6 PG (ref 26–34)
MCHC RBC AUTO-ENTMCNC: 34.1 G/DL (ref 32–36)
MCV RBC AUTO: 84 FL (ref 80–100)
MONOCYTES # BLD AUTO: 0.59 X10*3/UL (ref 0.1–1)
MONOCYTES NFR BLD AUTO: 6.3 %
NEUTROPHILS # BLD AUTO: 6.35 X10*3/UL (ref 1.2–7.7)
NEUTROPHILS NFR BLD AUTO: 67.4 %
NRBC BLD-RTO: 0 /100 WBCS (ref 0–0)
OXYHGB MFR BLDV: 47.4 % (ref 45–75)
PCO2 BLDV: 42 MM HG (ref 41–51)
PH BLDV: 7.36 PH (ref 7.33–7.43)
PLATELET # BLD AUTO: 246 X10*3/UL (ref 150–450)
PO2 BLDV: 32 MM HG (ref 35–45)
POTASSIUM BLDV-SCNC: 4.1 MMOL/L (ref 3.5–5.3)
POTASSIUM SERPL-SCNC: 3.8 MMOL/L (ref 3.5–5.3)
PROT SERPL-MCNC: 7.9 G/DL (ref 6.4–8.2)
PROTHROMBIN TIME: 11.2 SECONDS (ref 9.8–12.4)
RBC # BLD AUTO: 4.58 X10*6/UL (ref 4–5.2)
RH FACTOR (ANTIGEN D): NORMAL
SAO2 % BLDV: 53 % (ref 45–75)
SODIUM BLDV-SCNC: 139 MMOL/L (ref 136–145)
SODIUM SERPL-SCNC: 136 MMOL/L (ref 136–145)
WBC # BLD AUTO: 9.4 X10*3/UL (ref 4.4–11.3)

## 2025-08-05 PROCEDURE — 86900 BLOOD TYPING SEROLOGIC ABO: CPT | Performed by: EMERGENCY MEDICINE

## 2025-08-05 PROCEDURE — 99285 EMERGENCY DEPT VISIT HI MDM: CPT | Performed by: EMERGENCY MEDICINE

## 2025-08-05 PROCEDURE — 84132 ASSAY OF SERUM POTASSIUM: CPT | Performed by: EMERGENCY MEDICINE

## 2025-08-05 PROCEDURE — 99223 1ST HOSP IP/OBS HIGH 75: CPT

## 2025-08-05 PROCEDURE — 85025 COMPLETE CBC W/AUTO DIFF WBC: CPT | Performed by: EMERGENCY MEDICINE

## 2025-08-05 PROCEDURE — 80053 COMPREHEN METABOLIC PANEL: CPT | Performed by: EMERGENCY MEDICINE

## 2025-08-05 PROCEDURE — 36415 COLL VENOUS BLD VENIPUNCTURE: CPT | Performed by: EMERGENCY MEDICINE

## 2025-08-05 PROCEDURE — 86140 C-REACTIVE PROTEIN: CPT

## 2025-08-05 PROCEDURE — 86901 BLOOD TYPING SEROLOGIC RH(D): CPT | Performed by: EMERGENCY MEDICINE

## 2025-08-05 PROCEDURE — 85610 PROTHROMBIN TIME: CPT | Performed by: EMERGENCY MEDICINE

## 2025-08-05 RX ORDER — PREGABALIN 50 MG/1
150 CAPSULE ORAL ONCE
Status: COMPLETED | OUTPATIENT
Start: 2025-08-05 | End: 2025-08-06

## 2025-08-05 RX ORDER — CARVEDILOL 12.5 MG/1
12.5 TABLET ORAL 2 TIMES DAILY
Status: DISCONTINUED | OUTPATIENT
Start: 2025-08-05 | End: 2025-08-06

## 2025-08-05 RX ORDER — CARVEDILOL 12.5 MG/1
12.5 TABLET ORAL
Status: CANCELLED | OUTPATIENT
Start: 2025-08-05

## 2025-08-05 RX ORDER — ROSUVASTATIN CALCIUM 20 MG/1
40 TABLET, COATED ORAL NIGHTLY
Status: DISCONTINUED | OUTPATIENT
Start: 2025-08-05 | End: 2025-08-06

## 2025-08-05 ASSESSMENT — LIFESTYLE VARIABLES
HAVE YOU EVER FELT YOU SHOULD CUT DOWN ON YOUR DRINKING: NO
EVER HAD A DRINK FIRST THING IN THE MORNING TO STEADY YOUR NERVES TO GET RID OF A HANGOVER: NO
TOTAL SCORE: 0
HAVE PEOPLE ANNOYED YOU BY CRITICIZING YOUR DRINKING: NO
EVER FELT BAD OR GUILTY ABOUT YOUR DRINKING: NO

## 2025-08-05 ASSESSMENT — PAIN SCALES - GENERAL
PAINLEVEL_OUTOF10: 8
PAINLEVEL_OUTOF10: 10 - WORST POSSIBLE PAIN

## 2025-08-05 ASSESSMENT — PAIN DESCRIPTION - LOCATION: LOCATION: ABDOMEN

## 2025-08-05 ASSESSMENT — PAIN DESCRIPTION - ORIENTATION: ORIENTATION: ANTERIOR

## 2025-08-05 ASSESSMENT — PAIN - FUNCTIONAL ASSESSMENT: PAIN_FUNCTIONAL_ASSESSMENT: 0-10

## 2025-08-05 NOTE — ED TRIAGE NOTES
Pt presents to the ED for complaints of black stools that started 3 days ago. Pt states she was just admitted to the hospital and was discharged on Sunday. Pt states she has IBS, ulcerative colitis and 2 other types of GI diseases. Pt states due to this that she has about 10-15 bowel movents a day. Pt noticed that when she got home on Sunday she noticed that her stools were dark or black in color. Pt called her primary doctor and she was instructed to come back to the ED for evaluation

## 2025-08-06 PROBLEM — K92.1 MELENA: Status: ACTIVE | Noted: 2025-08-06

## 2025-08-06 LAB
ALBUMIN SERPL BCP-MCNC: 3.2 G/DL (ref 3.4–5)
ANION GAP SERPL CALC-SCNC: 11 MMOL/L (ref 10–20)
BASOPHILS # BLD AUTO: 0.05 X10*3/UL (ref 0–0.1)
BASOPHILS # BLD MANUAL: 0.4 X10*3/UL (ref 0–0.1)
BASOPHILS NFR BLD AUTO: 0.6 %
BASOPHILS NFR BLD MANUAL: 5.2 %
BLASTS # BLD MANUAL: 0 X10*3/UL
BLASTS NFR BLD MANUAL: 0 %
BUN SERPL-MCNC: 19 MG/DL (ref 6–23)
CALCIUM SERPL-MCNC: 8.2 MG/DL (ref 8.6–10.6)
CHLORIDE SERPL-SCNC: 113 MMOL/L (ref 98–107)
CHLORIDE STL-SCNC: 112 MMOL/L
CO2 SERPL-SCNC: 20 MMOL/L (ref 21–32)
CREAT SERPL-MCNC: 0.67 MG/DL (ref 0.5–1.05)
CRP SERPL-MCNC: 0.86 MG/DL
EGFRCR SERPLBLD CKD-EPI 2021: >90 ML/MIN/1.73M*2
EOSINOPHIL # BLD AUTO: 0.19 X10*3/UL (ref 0–0.7)
EOSINOPHIL # BLD MANUAL: 0.14 X10*3/UL (ref 0–0.7)
EOSINOPHIL NFR BLD AUTO: 2.5 %
EOSINOPHIL NFR BLD MANUAL: 1.8 %
ERYTHROCYTE [DISTWIDTH] IN BLOOD BY AUTOMATED COUNT: 13.4 % (ref 11.5–14.5)
ERYTHROCYTE [DISTWIDTH] IN BLOOD BY AUTOMATED COUNT: 13.6 % (ref 11.5–14.5)
ERYTHROCYTE [SEDIMENTATION RATE] IN BLOOD BY WESTERGREN METHOD: 9 MM/H (ref 0–30)
GLUCOSE SERPL-MCNC: 86 MG/DL (ref 74–99)
HCT VFR BLD AUTO: 30.5 % (ref 36–46)
HCT VFR BLD AUTO: 36.5 % (ref 36–46)
HGB BLD-MCNC: 10.4 G/DL (ref 12–16)
HGB BLD-MCNC: 11.4 G/DL (ref 12–16)
IMM GRANULOCYTES # BLD AUTO: 0.03 X10*3/UL (ref 0–0.7)
IMM GRANULOCYTES # BLD AUTO: 0.04 X10*3/UL (ref 0–0.7)
IMM GRANULOCYTES NFR BLD AUTO: 0.4 % (ref 0–0.9)
IMM GRANULOCYTES NFR BLD AUTO: 0.5 % (ref 0–0.9)
LYMPHOCYTES # BLD AUTO: 2.48 X10*3/UL (ref 1.2–4.8)
LYMPHOCYTES # BLD MANUAL: 2.31 X10*3/UL (ref 1.2–4.8)
LYMPHOCYTES NFR BLD AUTO: 32 %
LYMPHOCYTES NFR BLD MANUAL: 30.4 %
MAGNESIUM SERPL-MCNC: 1.72 MG/DL (ref 1.6–2.4)
MCH RBC QN AUTO: 28.4 PG (ref 26–34)
MCH RBC QN AUTO: 28.5 PG (ref 26–34)
MCHC RBC AUTO-ENTMCNC: 31.2 G/DL (ref 32–36)
MCHC RBC AUTO-ENTMCNC: 34.1 G/DL (ref 32–36)
MCV RBC AUTO: 84 FL (ref 80–100)
MCV RBC AUTO: 91 FL (ref 80–100)
METAMYELOCYTES # BLD MANUAL: 0 X10*3/UL
METAMYELOCYTES NFR BLD MANUAL: 0 %
MONOCYTES # BLD AUTO: 0.58 X10*3/UL (ref 0.1–1)
MONOCYTES # BLD MANUAL: 0.13 X10*3/UL (ref 0.1–1)
MONOCYTES NFR BLD AUTO: 7.5 %
MONOCYTES NFR BLD MANUAL: 1.7 %
MYELOCYTES # BLD MANUAL: 0 X10*3/UL
MYELOCYTES NFR BLD MANUAL: 0 %
NEUTROPHILS # BLD AUTO: 4.4 X10*3/UL (ref 1.2–7.7)
NEUTROPHILS # BLD MANUAL: 3.97 X10*3/UL (ref 1.2–7.7)
NEUTROPHILS NFR BLD AUTO: 56.9 %
NEUTS BAND # BLD MANUAL: 0 X10*3/UL (ref 0–0.7)
NEUTS BAND NFR BLD MANUAL: 0 %
NEUTS SEG # BLD MANUAL: 3.97 X10*3/UL (ref 1.2–7)
NEUTS SEG NFR BLD MANUAL: 52.2 %
NRBC BLD MANUAL-RTO: 0 % (ref 0–0)
NRBC BLD-RTO: 0 /100 WBCS (ref 0–0)
NRBC BLD-RTO: 0 /100 WBCS (ref 0–0)
PHOSPHATE SERPL-MCNC: 2.9 MG/DL (ref 2.5–4.9)
PLASMA CELLS # BLD MANUAL: 0 X10*3/UL
PLASMA CELLS NFR BLD MANUAL: 0 %
PLATELET # BLD AUTO: 190 X10*3/UL (ref 150–450)
PLATELET # BLD AUTO: 212 X10*3/UL (ref 150–450)
POTASSIUM SERPL-SCNC: 3.6 MMOL/L (ref 3.5–5.3)
POTASSIUM STL-SCNC: 11 MMOL/L
PROMYELOCYTES # BLD MANUAL: 0 X10*3/UL
PROMYELOCYTES NFR BLD MANUAL: 0 %
RBC # BLD AUTO: 3.65 X10*6/UL (ref 4–5.2)
RBC # BLD AUTO: 4.01 X10*6/UL (ref 4–5.2)
RBC MORPH BLD: ABNORMAL
SODIUM SERPL-SCNC: 140 MMOL/L (ref 136–145)
SODIUM STL-SCNC: 134 MMOL/L
TOTAL CELLS COUNTED BLD: 115
VARIANT LYMPHS # BLD MANUAL: 0.66 X10*3/UL (ref 0–0.5)
VARIANT LYMPHS NFR BLD: 8.7 %
WBC # BLD AUTO: 7.6 X10*3/UL (ref 4.4–11.3)
WBC # BLD AUTO: 7.7 X10*3/UL (ref 4.4–11.3)
WBC OTHER # BLD MANUAL: 0 X10*3/UL
WBC OTHER NFR BLD MANUAL: 0 %

## 2025-08-06 PROCEDURE — 2500000004 HC RX 250 GENERAL PHARMACY W/ HCPCS (ALT 636 FOR OP/ED)

## 2025-08-06 PROCEDURE — 85027 COMPLETE CBC AUTOMATED: CPT

## 2025-08-06 PROCEDURE — 2500000001 HC RX 250 WO HCPCS SELF ADMINISTERED DRUGS (ALT 637 FOR MEDICARE OP): Performed by: COUNSELOR

## 2025-08-06 PROCEDURE — 2500000001 HC RX 250 WO HCPCS SELF ADMINISTERED DRUGS (ALT 637 FOR MEDICARE OP)

## 2025-08-06 PROCEDURE — 85025 COMPLETE CBC W/AUTO DIFF WBC: CPT

## 2025-08-06 PROCEDURE — 2500000002 HC RX 250 W HCPCS SELF ADMINISTERED DRUGS (ALT 637 FOR MEDICARE OP, ALT 636 FOR OP/ED): Performed by: COUNSELOR

## 2025-08-06 PROCEDURE — 99221 1ST HOSP IP/OBS SF/LOW 40: CPT | Performed by: STUDENT IN AN ORGANIZED HEALTH CARE EDUCATION/TRAINING PROGRAM

## 2025-08-06 PROCEDURE — 96376 TX/PRO/DX INJ SAME DRUG ADON: CPT

## 2025-08-06 PROCEDURE — 85007 BL SMEAR W/DIFF WBC COUNT: CPT

## 2025-08-06 PROCEDURE — 83735 ASSAY OF MAGNESIUM: CPT

## 2025-08-06 PROCEDURE — 96361 HYDRATE IV INFUSION ADD-ON: CPT

## 2025-08-06 PROCEDURE — 96365 THER/PROPH/DIAG IV INF INIT: CPT

## 2025-08-06 PROCEDURE — 85652 RBC SED RATE AUTOMATED: CPT

## 2025-08-06 PROCEDURE — 80069 RENAL FUNCTION PANEL: CPT

## 2025-08-06 PROCEDURE — 96375 TX/PRO/DX INJ NEW DRUG ADDON: CPT

## 2025-08-06 PROCEDURE — 1210000001 HC SEMI-PRIVATE ROOM DAILY

## 2025-08-06 PROCEDURE — 36415 COLL VENOUS BLD VENIPUNCTURE: CPT

## 2025-08-06 PROCEDURE — 99233 SBSQ HOSP IP/OBS HIGH 50: CPT

## 2025-08-06 RX ORDER — CARVEDILOL 12.5 MG/1
12.5 TABLET ORAL
Status: DISCONTINUED | OUTPATIENT
Start: 2025-08-06 | End: 2025-08-07 | Stop reason: HOSPADM

## 2025-08-06 RX ORDER — METHOCARBAMOL 500 MG/1
500 TABLET, FILM COATED ORAL 3 TIMES DAILY PRN
Status: DISCONTINUED | OUTPATIENT
Start: 2025-08-06 | End: 2025-08-07 | Stop reason: HOSPADM

## 2025-08-06 RX ORDER — POTASSIUM CHLORIDE 14.9 MG/ML
20 INJECTION INTRAVENOUS ONCE
Status: COMPLETED | OUTPATIENT
Start: 2025-08-06 | End: 2025-08-06

## 2025-08-06 RX ORDER — HYDROCHLOROTHIAZIDE 25 MG/1
25 TABLET ORAL DAILY
Status: DISCONTINUED | OUTPATIENT
Start: 2025-08-06 | End: 2025-08-07 | Stop reason: HOSPADM

## 2025-08-06 RX ORDER — SIMETHICONE 80 MG
80 TABLET,CHEWABLE ORAL EVERY 8 HOURS PRN
Status: DISCONTINUED | OUTPATIENT
Start: 2025-08-06 | End: 2025-08-07 | Stop reason: HOSPADM

## 2025-08-06 RX ORDER — LISINOPRIL 10 MG/1
10 TABLET ORAL DAILY
Status: DISCONTINUED | OUTPATIENT
Start: 2025-08-06 | End: 2025-08-07 | Stop reason: HOSPADM

## 2025-08-06 RX ORDER — PREGABALIN 50 MG/1
150 CAPSULE ORAL 2 TIMES DAILY
Status: DISCONTINUED | OUTPATIENT
Start: 2025-08-06 | End: 2025-08-06

## 2025-08-06 RX ORDER — AMLODIPINE BESYLATE 5 MG/1
7.5 TABLET ORAL
Status: DISCONTINUED | OUTPATIENT
Start: 2025-08-06 | End: 2025-08-06

## 2025-08-06 RX ORDER — CLOPIDOGREL BISULFATE 75 MG/1
75 TABLET ORAL DAILY
Status: DISCONTINUED | OUTPATIENT
Start: 2025-08-06 | End: 2025-08-07 | Stop reason: HOSPADM

## 2025-08-06 RX ORDER — VIT C/E/ZN/COPPR/LUTEIN/ZEAXAN 250MG-90MG
180 CAPSULE ORAL DAILY
Status: DISCONTINUED | OUTPATIENT
Start: 2025-08-06 | End: 2025-08-07 | Stop reason: HOSPADM

## 2025-08-06 RX ORDER — ACETAMINOPHEN 325 MG/1
650 TABLET ORAL EVERY 6 HOURS PRN
Status: DISCONTINUED | OUTPATIENT
Start: 2025-08-06 | End: 2025-08-07 | Stop reason: HOSPADM

## 2025-08-06 RX ORDER — POLYETHYLENE GLYCOL 3350 17 G/17G
17 POWDER, FOR SOLUTION ORAL DAILY
Status: DISCONTINUED | OUTPATIENT
Start: 2025-08-06 | End: 2025-08-06

## 2025-08-06 RX ORDER — PANTOPRAZOLE SODIUM 40 MG/10ML
40 INJECTION, POWDER, LYOPHILIZED, FOR SOLUTION INTRAVENOUS 2 TIMES DAILY
Status: DISCONTINUED | OUTPATIENT
Start: 2025-08-06 | End: 2025-08-07 | Stop reason: HOSPADM

## 2025-08-06 RX ORDER — NITROGLYCERIN 0.4 MG/1
0.4 TABLET SUBLINGUAL EVERY 5 MIN PRN
Status: DISCONTINUED | OUTPATIENT
Start: 2025-08-06 | End: 2025-08-07 | Stop reason: HOSPADM

## 2025-08-06 RX ORDER — HYDROXYZINE HYDROCHLORIDE 25 MG/1
25 TABLET, FILM COATED ORAL 3 TIMES DAILY PRN
Status: DISCONTINUED | OUTPATIENT
Start: 2025-08-06 | End: 2025-08-07 | Stop reason: HOSPADM

## 2025-08-06 RX ORDER — ROSUVASTATIN CALCIUM 20 MG/1
40 TABLET, COATED ORAL NIGHTLY
Status: DISCONTINUED | OUTPATIENT
Start: 2025-08-07 | End: 2025-08-07 | Stop reason: HOSPADM

## 2025-08-06 RX ORDER — PREGABALIN 75 MG/1
150 CAPSULE ORAL 3 TIMES DAILY
Status: DISCONTINUED | OUTPATIENT
Start: 2025-08-06 | End: 2025-08-07 | Stop reason: HOSPADM

## 2025-08-06 RX ORDER — AMLODIPINE BESYLATE 5 MG/1
5 TABLET ORAL
Status: DISCONTINUED | OUTPATIENT
Start: 2025-08-06 | End: 2025-08-07 | Stop reason: HOSPADM

## 2025-08-06 RX ORDER — DICYCLOMINE HYDROCHLORIDE 10 MG/1
10 CAPSULE ORAL EVERY 6 HOURS PRN
Status: DISCONTINUED | OUTPATIENT
Start: 2025-08-06 | End: 2025-08-07 | Stop reason: HOSPADM

## 2025-08-06 RX ORDER — ASPIRIN 81 MG/1
81 TABLET ORAL DAILY
Status: DISCONTINUED | OUTPATIENT
Start: 2025-08-06 | End: 2025-08-07 | Stop reason: HOSPADM

## 2025-08-06 RX ORDER — CARVEDILOL 12.5 MG/1
12.5 TABLET ORAL EVERY 12 HOURS
Status: ON HOLD | COMMUNITY
Start: 2025-08-03 | End: 2025-08-07

## 2025-08-06 RX ADMIN — PANTOPRAZOLE SODIUM 40 MG: 40 INJECTION, POWDER, FOR SOLUTION INTRAVENOUS at 20:19

## 2025-08-06 RX ADMIN — SODIUM CHLORIDE, SODIUM LACTATE, POTASSIUM CHLORIDE, AND CALCIUM CHLORIDE 500 ML: .6; .31; .03; .02 INJECTION, SOLUTION INTRAVENOUS at 11:26

## 2025-08-06 RX ADMIN — PREGABALIN 150 MG: 75 CAPSULE ORAL at 09:26

## 2025-08-06 RX ADMIN — DICYCLOMINE HYDROCHLORIDE 10 MG: 10 CAPSULE ORAL at 20:19

## 2025-08-06 RX ADMIN — ROSUVASTATIN CALCIUM 40 MG: 20 TABLET, FILM COATED ORAL at 00:03

## 2025-08-06 RX ADMIN — METHOCARBAMOL 500 MG: 500 TABLET ORAL at 21:37

## 2025-08-06 RX ADMIN — DICYCLOMINE HYDROCHLORIDE 10 MG: 10 CAPSULE ORAL at 09:26

## 2025-08-06 RX ADMIN — PREGABALIN 150 MG: 75 CAPSULE ORAL at 15:43

## 2025-08-06 RX ADMIN — CARVEDILOL 12.5 MG: 12.5 TABLET, FILM COATED ORAL at 00:03

## 2025-08-06 RX ADMIN — Medication 180 MG: at 15:26

## 2025-08-06 RX ADMIN — POTASSIUM CHLORIDE 20 MEQ: 14.9 INJECTION, SOLUTION INTRAVENOUS at 13:57

## 2025-08-06 RX ADMIN — PREGABALIN 150 MG: 50 CAPSULE ORAL at 00:03

## 2025-08-06 RX ADMIN — PANTOPRAZOLE SODIUM 40 MG: 40 INJECTION, POWDER, FOR SOLUTION INTRAVENOUS at 09:17

## 2025-08-06 RX ADMIN — ASPIRIN 81 MG: 81 TABLET, COATED ORAL at 10:19

## 2025-08-06 RX ADMIN — PREGABALIN 150 MG: 75 CAPSULE ORAL at 20:19

## 2025-08-06 SDOH — SOCIAL STABILITY: SOCIAL INSECURITY: ARE YOU OR HAVE YOU BEEN THREATENED OR ABUSED PHYSICALLY, EMOTIONALLY, OR SEXUALLY BY ANYONE?: NO

## 2025-08-06 SDOH — SOCIAL STABILITY: SOCIAL INSECURITY: WITHIN THE LAST YEAR, HAVE YOU BEEN AFRAID OF YOUR PARTNER OR EX-PARTNER?: NO

## 2025-08-06 SDOH — SOCIAL STABILITY: SOCIAL INSECURITY: HAS ANYONE EVER THREATENED TO HURT YOUR FAMILY OR YOUR PETS?: NO

## 2025-08-06 SDOH — SOCIAL STABILITY: SOCIAL NETWORK: HOW OFTEN DO YOU ATTEND MEETINGS OF THE CLUBS OR ORGANIZATIONS YOU BELONG TO?: MORE THAN 4 TIMES PER YEAR

## 2025-08-06 SDOH — SOCIAL STABILITY: SOCIAL INSECURITY: WITHIN THE LAST YEAR, HAVE YOU BEEN HUMILIATED OR EMOTIONALLY ABUSED IN OTHER WAYS BY YOUR PARTNER OR EX-PARTNER?: NO

## 2025-08-06 SDOH — ECONOMIC STABILITY: FOOD INSECURITY: WITHIN THE PAST 12 MONTHS, THE FOOD YOU BOUGHT JUST DIDN'T LAST AND YOU DIDN'T HAVE MONEY TO GET MORE.: NEVER TRUE

## 2025-08-06 SDOH — SOCIAL STABILITY: SOCIAL INSECURITY: ARE YOU MARRIED, WIDOWED, DIVORCED, SEPARATED, NEVER MARRIED, OR LIVING WITH A PARTNER?: PATIENT DECLINED

## 2025-08-06 SDOH — SOCIAL STABILITY: SOCIAL INSECURITY: ABUSE: ADULT

## 2025-08-06 SDOH — ECONOMIC STABILITY: FOOD INSECURITY: WITHIN THE PAST 12 MONTHS, YOU WORRIED THAT YOUR FOOD WOULD RUN OUT BEFORE YOU GOT THE MONEY TO BUY MORE.: NEVER TRUE

## 2025-08-06 SDOH — ECONOMIC STABILITY: INCOME INSECURITY: IN THE PAST 12 MONTHS HAS THE ELECTRIC, GAS, OIL, OR WATER COMPANY THREATENED TO SHUT OFF SERVICES IN YOUR HOME?: NO

## 2025-08-06 SDOH — SOCIAL STABILITY: SOCIAL INSECURITY: HAVE YOU HAD ANY THOUGHTS OF HARMING ANYONE ELSE?: NO

## 2025-08-06 SDOH — SOCIAL STABILITY: SOCIAL INSECURITY: HAVE YOU HAD THOUGHTS OF HARMING ANYONE ELSE?: NO

## 2025-08-06 SDOH — SOCIAL STABILITY: SOCIAL NETWORK: HOW OFTEN DO YOU ATTEND CHURCH OR RELIGIOUS SERVICES?: MORE THAN 4 TIMES PER YEAR

## 2025-08-06 SDOH — SOCIAL STABILITY: SOCIAL INSECURITY: ARE THERE ANY APPARENT SIGNS OF INJURIES/BEHAVIORS THAT COULD BE RELATED TO ABUSE/NEGLECT?: NO

## 2025-08-06 SDOH — SOCIAL STABILITY: SOCIAL INSECURITY: DOES ANYONE TRY TO KEEP YOU FROM HAVING/CONTACTING OTHER FRIENDS OR DOING THINGS OUTSIDE YOUR HOME?: NO

## 2025-08-06 SDOH — SOCIAL STABILITY: SOCIAL INSECURITY: WERE YOU ABLE TO COMPLETE ALL THE BEHAVIORAL HEALTH SCREENINGS?: YES

## 2025-08-06 SDOH — SOCIAL STABILITY: SOCIAL INSECURITY: DO YOU FEEL UNSAFE GOING BACK TO THE PLACE WHERE YOU ARE LIVING?: NO

## 2025-08-06 SDOH — SOCIAL STABILITY: SOCIAL NETWORK: HOW OFTEN DO YOU GET TOGETHER WITH FRIENDS OR RELATIVES?: THREE TIMES A WEEK

## 2025-08-06 SDOH — SOCIAL STABILITY: SOCIAL INSECURITY: DO YOU FEEL ANYONE HAS EXPLOITED OR TAKEN ADVANTAGE OF YOU FINANCIALLY OR OF YOUR PERSONAL PROPERTY?: NO

## 2025-08-06 ASSESSMENT — ACTIVITIES OF DAILY LIVING (ADL)
ADEQUATE_TO_COMPLETE_ADL: YES
HEARING - RIGHT EAR: FUNCTIONAL
PATIENT'S MEMORY ADEQUATE TO SAFELY COMPLETE DAILY ACTIVITIES?: YES
HEARING - LEFT EAR: FUNCTIONAL
DRESSING YOURSELF: INDEPENDENT
LACK_OF_TRANSPORTATION: NO
BATHING: INDEPENDENT
JUDGMENT_ADEQUATE_SAFELY_COMPLETE_DAILY_ACTIVITIES: YES
LACK_OF_TRANSPORTATION: NO
TOILETING: INDEPENDENT
FEEDING YOURSELF: INDEPENDENT
GROOMING: INDEPENDENT
WALKS IN HOME: INDEPENDENT

## 2025-08-06 ASSESSMENT — COGNITIVE AND FUNCTIONAL STATUS - GENERAL
DAILY ACTIVITIY SCORE: 24
MOBILITY SCORE: 24
PATIENT BASELINE BEDBOUND: NO

## 2025-08-06 ASSESSMENT — LIFESTYLE VARIABLES
AUDIT-C TOTAL SCORE: 0
HOW MANY STANDARD DRINKS CONTAINING ALCOHOL DO YOU HAVE ON A TYPICAL DAY: PATIENT DOES NOT DRINK
SKIP TO QUESTIONS 9-10: 1
AUDIT-C TOTAL SCORE: 0
HOW OFTEN DO YOU HAVE A DRINK CONTAINING ALCOHOL: NEVER
HOW OFTEN DO YOU HAVE 6 OR MORE DRINKS ON ONE OCCASION: NEVER

## 2025-08-06 ASSESSMENT — PATIENT HEALTH QUESTIONNAIRE - PHQ9
SUM OF ALL RESPONSES TO PHQ9 QUESTIONS 1 & 2: 0
1. LITTLE INTEREST OR PLEASURE IN DOING THINGS: NOT AT ALL
2. FEELING DOWN, DEPRESSED OR HOPELESS: NOT AT ALL

## 2025-08-06 ASSESSMENT — PAIN SCALES - GENERAL
PAINLEVEL_OUTOF10: 7
PAINLEVEL_OUTOF10: 5 - MODERATE PAIN

## 2025-08-06 ASSESSMENT — PAIN - FUNCTIONAL ASSESSMENT
PAIN_FUNCTIONAL_ASSESSMENT: 0-10
PAIN_FUNCTIONAL_ASSESSMENT: 0-10

## 2025-08-06 NOTE — PROGRESS NOTES
Pharmacy Medication History Review    Vivien Miramontes is a 65 y.o. female admitted for Melena. Pharmacy reviewed the patient's [id number] medications and allergies for accuracy.    Medications ADDED:  Carvedilol 12.5 mg  Medications CHANGED:  none  Medications REMOVED:   Hydroxizine     The list below reflects the updated PTA list.   Prior to Admission Medications   Prescriptions Informant   acetaminophen (TylenoL) 325 mg tablet Self   Sig: Take 2 tablets (650 mg) by mouth every 6 hours if needed for mild pain (1 - 3) or fever (temp greater than 38.0 C).   Patient not taking: Reported on 8/6/2025   amLODIPine (Norvasc) 5 mg tablet Self   Sig: Take 1.5 tablets (7.5 mg) by mouth early in the morning..   aspirin 81 mg EC tablet Self   Sig: Take 1 tablet (81 mg) by mouth once daily.   carvedilol (Coreg) 12.5 mg tablet Self   Sig: Take 1 tablet (12.5 mg) by mouth every 12 hours.   carvedilol (Coreg) 25 mg tablet Self   Sig: Take 0.5 tablets (12.5 mg) by mouth every 12 hours.   Patient not taking: Reported on 8/6/2025   clopidogrel (Plavix) 75 mg tablet Self   Sig: Take 1 tablet (75 mg) by mouth once daily.  PT reports taking, last filled 2/25/25 for 90 tablets.    dicyclomine (Bentyl) 10 mg capsule Self   Sig: Take 1 capsule (10 mg) by mouth every 6 hours if needed (abd discomort, diarrhea).   hydroCHLOROthiazide (HYDRODiuril) 25 mg tablet Self   Sig: Take 1 tablet (25 mg) by mouth once daily.   lisinopril 10 mg tablet Self   Sig: Take 1 tablet (10 mg) by mouth once daily. as directed   methocarbamol (Robaxin) 500 mg tablet Self   Sig: Take 1 tablet (500 mg) by mouth 3 times a day as needed for muscle spasms.  PT reports taking, last filled 4/24/25 for 90 tablets.    nitroglycerin (Nitrostat) 0.4 mg SL tablet Self   Sig: Place 1 tablet (0.4 mg) under the tongue. Dissolve 1 tablet under the tongue as needed for chest pain.   pregabalin (Lyrica) 150 mg capsule Self   Sig: Take 1 capsule (150 mg) by mouth 3 times a  "day.  Patient reported taking. Last filled on 2/5/25 for 90 tablets.    rosuvastatin (Crestor) 40 mg tablet Self   Sig: Take 1 tablet (40 mg) by mouth once daily at bedtime.   simethicone (Mylicon) 80 mg chewable tablet Self   Sig: Chew and swallow 1 tablet (80 mg) every 8 hours if needed for flatulence.   Patient not taking: Reported on 8/6/2025   vedolizumab (Entyvio) 300 mg injection Self   Sig: Infuse 300 mg into a venous catheter every 8 (eight) weeks.  For Maintenance: every 8 weeks   vedolizumab (Entyvio) 300 mg injection Self   Sig: Infuse 300 mg into a venous catheter see administration instructions.  For Induction:  0, 2 and 6 weeks   vitamin E 180 mg (400 unit) capsule Self   Sig: Take 1 capsule (400 Units) by mouth once daily.      Facility-Administered Medications: None        The list below reflects the updated allergy list. Please review each documented allergy for additional clarification and justification.  Allergies  Reviewed by Binh Cortes on 8/6/2025        Severity Reactions Comments    Penicillin Not Specified Unknown     Penicillins Low Hives             Patient accepts M2B at discharge.     Sources:   [facility]  Pharmacy dispense history  Patient Interview Good historian  Chart Review  Care Everywhere   8/3/25 Discharge Summary    Additional Comments:  PT was able to verify PTA list with very little to no prompting.  PT's Hydrochlorothiazide and Amlodipine Paused since 8/3/25 until manually resumed.      BINH CORTES  Pharmacy Technician  08/06/25     Secure Chat preferred   If no response call [phone] or Blend \"Med Rec\"   "

## 2025-08-06 NOTE — PROGRESS NOTES
08/06/25 1252   Discharge Planning   Living Arrangements Alone   Support Systems Children;Friends/neighbors   Assistance Needed None   Type of Residence Private residence   Number of Stairs to Enter Residence 5   Number of Stairs Within Residence 0   Do you have animals or pets at home? Yes  (1 cat)   Type of Animals or Pets cat   Who is requesting discharge planning? Provider   Home or Post Acute Services None   Expected Discharge Disposition Home   Financial Resource Strain   How hard is it for you to pay for the very basics like food, housing, medical care, and heating? Not very   Housing Stability   In the last 12 months, was there a time when you were not able to pay the mortgage or rent on time? N   At any time in the past 12 months, were you homeless or living in a shelter (including now)? N   Transportation Needs   In the past 12 months, has lack of transportation kept you from medical appointments or from getting medications? no   In the past 12 months, has lack of transportation kept you from meetings, work, or from getting things needed for daily living? No   Patient Choice   Provider Choice list and CMS website (https://medicare.gov/care-compare#search) for post-acute Quality and Resource Measure Data were provided and reviewed with: Patient     Met with pt introduced myself as  and member of the Care Transitions team for discharge planning.  Pt feels safe at home. Pt stated she was independent prior to admission.  Pt [place] [address].  Pt's address, phone number and contact information was verified. Pt does not have any other questions/concerns at this time.      Previous Home Care:  None  DME:  Straight cane  PCP: Dr. Martinez  Pharmacy:  [facility]   MEDICATIONS AFFORDABLE:  Yes  DIABETIC/SUPPLIES NEEDED:  No  Oxygen/Bipap/Cpap: No  Dialysis: No  Falls:  0  Transport home:  Pt family  Potential Barriers: none   Discharge Disposition: home   ADOD:   2 days    Social Work Note    Pt is  independent with daily needs. Pt is expected to discharge home with no new needs. Pt drives for transportation. Care transitions available to assist with any future discharge needs.       SARAH George

## 2025-08-06 NOTE — H&P
"    HPI:  Vivien Miramontes is a 65 y.o. female with history significant for Crohn's disease (on Entyvio every two months), HTN, HLD, CAD s/p stent, NSTEMI, HFpEF, smoker, spinal stenosis, and cholecystectomy who presented on 8/5/2025 for 3 day history of dark black stools. Admitted for further management of Crohn's disease and melena.    Patient recently admitted 7/30-8/3 for severe diarrhea. Infectious workup unremarkable, etiology of diarrhea thought to be related to Crohn's flare vs IBD-D. Celiac panel completed and negative. Also with FLORENCE which was likely secondary to dehydration and large volume loss from diarrhea. Seen by GI and underwent EGD 08/01 to rule out lesions of Crohns in upper GI and CMV colitis with biopsy still pending. Notable for gastritis and duodenitis.    Patient now presenting with 3 day history of dark black stools. At baseline she has around 10 stools per day. Her stooling frequency has not changed however she describes the stool consistency as \"sticky\" and color as dark black. They are loose and not formed. She first noticed that upon discharge from the hospital her stools were dark and black in color. She called her primary care physician who told her to go to the ED for evaluation. She is reporting diffuse abdominal pain as well which is chronic for her. There is no one focal area of tenderness. She reports poor appetite since getting home from the hospital. She has been conscious of her fluid intake and is trying to stay up on her water intake. Her last dose of Plavix was in the morning on 8/5. Patient denies any associated fever, chills, lightheadedness, shortness of breath, chest pain, N/V, lower extremity pain/swelling.    In the ED, vitals afebrile, p74, RR 18, /85, saturating appropriately on room air. CMP within normal limits, WBC 9.4, Hgb 13.1 (baseline 14-15). Type and screen ordered. No new imaging obtained, however underwent CT enterography prior to discharge on 8/3 no " strictures or bowel wall edema identified, just commented on severe atherosclerotic disease of the abdominal aorta.     GI Hx:   - Bowel perforation due to complicated diverticulitis 2009  - S/p colostomy in 2009 subsequently had reversal in 2010     - Crohns disease diagnosed 2010  - Initially on Humira-->Stelara (no remission) -->Entyvio (last infusion 7/22/25)  - Colonoscopy 2021, Mild ulceration in sigmoid colon otherwise normal.   - Colonoscopy  6/26/2024: Mild (in right colon ) to moderate erythema (transverse colon, descending colon, sigmoid colon) consistent with crohn's colitis, TI appears to be normal, biopsy-chronic colitis in right colon. Biopsy with colon mucosa with chronic colitis with focal activity, no dysplasia    EGD (performed 8/1/25)  FINDINGS:  The esophagus appeared normal. No evidence of esophagitis, hiatal hernia, or Ayala's esophagus.  Regular Z-line 35 cm from the incisors  The fundus of the stomach, body of the stomach, greater curve of the stomach, lesser curve of the stomach, incisura and pylorus appeared normal.  Mild, patchy erythematous mucosa in the antrum, suggestive of gastritis; performed cold forceps biopsy to rule out H. pylori  Moderate, patchy erythematous mucosa in the duodenal bulb, 1st part of the duodenum and 2nd part of the duodenum; performed cold forceps biopsy               - PATHOLOGY RESULTS PENDING     CT enterography with IV contrast (performed 8/3/25)   IMPRESSION:  1.  No acute process seen within the abdomen pelvis.  2. moderate burden of stool seen throughout the colon with more  liquid stool noted in proximal colon/cecum, now measuring 5.3 cm,  previously 4.2 cm on 07/30/2025.  3. No strictures for areas of bowel wall edema identified.  4. Hepatic steatosis.  5. Severe coronary artery calcifications versus stents are partially  visualized.  6. Severe atherosclerotic disease of the abdominal aorta and its  major branches including calcified and noncalcified  disease.    Colonoscopy 6/26/24   The terminal ileum appeared normal. Performed random biopsy using biopsy forceps.  Mild abnormal mucosa in the ascending colon, consistent with Crohn's disease; performed cold forceps biopsy  Moderate abnormal mucosa in the transverse colon, descending colon, sigmoid colon and rectum, consistent with scarring from Crohn's disease; performed cold forceps biopsy to rule out active disease  Healthy signs of previous surgery in the rectosigmoid      Vitals:   /65 (Patient Position: Lying)   Pulse 57   Temp 36.7 °C (98.1 °F) (Oral)   Resp 18   Ht (!) 1.524 m (5')   Wt 62.6 kg (138 lb)   SpO2 100%   BMI 26.95 kg/m²    - Labs:   CBC: WBC 7.6 Hgb 10.4  plt 190   BMP: Na 140, K 3.6 Cl 113 HCO3 20 BUN 19 Cr 0.67glu 86   LFT: Ca 8.2 tprot 7.9, alb 3.2 alkphos 100 AST28 ALT 30 tbili 0.4 dbili _   Electrolytes: PO4 _ Mg 1.72   Heme: PT 11.2, INR 1.0 aPTT 31  BNP _ lactate 1.3 Troponin 10  CRP: 0.86  - Imaging:  No new imaging    Cardiology, Vascular, and Other Imaging  No other imaging results found for the past 2 days      Medications prior to admission:  Current Outpatient Medications   Medication Instructions    acetaminophen (TYLENOL) 650 mg, oral, Every 6 hours PRN    [Paused] amLODIPine (Norvasc) 5 mg tablet 1.5 tablets, oral, Daily (0630)    aspirin 81 mg, Daily    carvedilol (COREG) 12.5 mg, oral, Every 12 hours scheduled (0630,1830)    clopidogrel (PLAVIX) 75 mg, Daily    dicyclomine (BENTYL) 10 mg, oral, Every 6 hours PRN    [Paused] hydroCHLOROthiazide (HYDRODIURIL) 25 mg, Daily    hydrOXYzine HCL (Atarax) 25 mg tablet 1 tablet, 3 times daily PRN    lisinopril 10 mg, oral, Daily, as directed    methocarbamol (ROBAXIN) 500 mg, oral, 3 times daily PRN    nitroglycerin (NITROSTAT) 0.4 mg    pregabalin (Lyrica) 150 mg capsule 1 capsule, 3 times daily (0900,1400,1900)    rosuvastatin (Crestor) 40 mg tablet 1 tablet, Nightly    simethicone (MYLICON) 80 mg, oral, Every 8 hours  PRN    vedolizumab (ENTYVIO) 300 mg, intravenous, Every 8 weeks, For Maintenance: every 8 weeks    vedolizumab (ENTYVIO) 300 mg, intravenous, See admin instructions, For Induction:  0, 2 and 6 weeks    vitamin E 400 Units, Daily       Allergies:  RX Allergies[1]    Past medical history:  Medical History[2]    Surgical history:  Surgical History[3]    Family history:  Family History[4]    Social history:   reports that she has been smoking cigarettes. She has a 15 pack-year smoking history. She has never used smokeless tobacco. She reports that she does not currently use alcohol. She reports that she does not currently use drugs.     Objective   Vitals:  /65 (Patient Position: Lying)   Pulse 57   Temp 36.7 °C (98.1 °F) (Oral)   Resp 18   Ht (!) 1.524 m (5')   Wt 62.6 kg (138 lb)   SpO2 100%   BMI 26.95 kg/m²   24 Hour Vitals  Temperature:  [36.4 °C (97.5 °F)-36.7 °C (98.1 °F)] 36.7 °C (98.1 °F)  Heart Rate:  [57-74] 57  Respirations:  [18] 18  BP: ()/(47-85) 100/65  Pulse Ox:  [98 %-100 %] 100 %    Temp (24hrs), Av.6 °C (97.8 °F), Min:36.4 °C (97.5 °F), Max:36.7 °C (98.1 °F)     24 hour Intake/Output  No intake or output data in the 24 hours ending 25 0602     Physical exam:  Constitutional: Appears stated age, normal hygiene, in no acute distress.  HEENT: NCAT, EOMI, PERRL, sclera anicteric, MMM.   Respiratory: CTAB. No wheezes, crackles, or rhonchi. Normal respiratory effort on RA.   Cardiovascular: RRR, normal S1/S2, No murmurs, rubs, or gallops.   Abdominal: +BS, soft, distended, non tender to light palpation, diffusely tender to deep palpation. No rebound, masses, or guarding.   Neuro: A&Ox4. CN II-XII grossly intact. Moving all extremities with no focal deficits.   MSK: WWP, no peripheral edema, cap refill<3 seconds, no joint swelling.   Skin: No lesions, wounds, or ecchymoses.    Psych: Appropriate mood and affect, linear thought process and judgement intact.       Medications:    Scheduled Medications  Scheduled Medications[5] Continuous Medications  Continuous Medications[6] PRN Medications  PRN Medications[7]     Labs:  CBC:  Results from last 7 days   Lab Units 08/06/25 0426 08/05/25 1432 08/02/25  1409   WBC AUTO x10*3/uL 7.6 9.4 6.9   HEMOGLOBIN g/dL 10.4* 13.1 12.4   HEMATOCRIT % 30.5* 38.4 38.9   MCV fL 84 84 88   PLATELETS AUTO x10*3/uL 190 246 251     RFP:  Results from last 72 hours   Lab Units 08/06/25 0426 08/05/25 1432 08/03/25  1137   SODIUM mmol/L 140 136  --    POTASSIUM mmol/L 3.6 3.8  --    CHLORIDE mmol/L 113* 105  --    CO2 mmol/L 20* 22  --    BUN mg/dL 19 17  --    CREATININE mg/dL 0.67 0.93  --    CALCIUM mg/dL 8.2* 9.4  --    MAGNESIUM mg/dL 1.72  --  1.62   PHOSPHORUS mg/dL 2.9  --   --      HFP:  Results from last 7 days   Lab Units 08/06/25  0426 08/05/25 1432 08/02/25  1409 07/30/25 1946 07/30/25  1019   AST U/L  --  28  --   --  26   ALT U/L  --  30  --   --  32   ALK PHOS U/L  --  100  --   --  110   BILIRUBIN TOTAL mg/dL  --  0.4  --   --  0.4   ALBUMIN g/dL 3.2* 4.2 3.7   < > 4.5    < > = values in this interval not displayed.     Cardiac:  Results from last 7 days   Lab Units 08/02/25  0456 07/30/25  1019   TROPHSCMC ng/L 10 8     Coag:  Results from last 7 days   Lab Units 08/05/25  1432   PROTIME seconds 11.2   APTT seconds 31   INR  1.0     ABG/VBG:       Results from last 7 days   Lab Units 08/05/25  1432 08/01/25  0007 07/31/25  0423 07/30/25  1946 07/30/25  1019   POCT PH, VENOUS pH 7.36 7.29* 7.23* 7.27* 7.35   POCT PCO2, VENOUS mm Hg 42 23* 33* 29* 30*   POCT PO2, VENOUS mm Hg 32* 54* 46* 68* 53*   POCT HCO3 CALCULATED, VENOUS mmol/L 23.7 11.1* 13.8* 13.3* 16.6*      UA:   Results from last 7 days   Lab Units 08/02/25  0859   COLOR U  Colorless*   PH U  6.0   SPEC GRAV UR  1.010   PROTEIN U mg/dL NEGATIVE   BLOOD UR mg/dL NEGATIVE   NITRITE U  NEGATIVE      Cultures:   No results found for the last 90 days.       Assessment/Plan   Assessment  and Plan:  Vivien Miramontes is a 65 y.o. female with history significant for Crohn's disease (on Entyvio every two months), HTN, HLD, CAD s/p stent, NSTEMI, HFpEF, smoker, spinal stenosis, and cholecystectomy who presented on 8/5/2025 for 3 day history of dark black stools. Recently admitted for severe diarrhea, dehydration, FLORENCE and was just discharged 2 days ago. Infectious workup and CT enterography negative for acute processes or strictures. EGD completed 8/1 with evidence of gastritis and duodenitis and biopsies still pending. Now with concern for melenic stools, Hgb stable at 13.1. Ddx for bleed includes Crohn's disease flare v known gastritis/duodenitis on EGD v peptic ulcer. With recent hospital stay must consider infection however low suspicion for infection at this time given absence of leukocytosis, fever, no increase in stool frequency and negative recent stool studies. Admitted for further management of Crohn's disease and melena.     #Melena c/f UGIB  #Crohn's disease  ::Crohn's disease was diagnosed in 2010, initially was on Humira then switched to Stelara, Skyrizi, recently started on Entyvio 300 mg injection every 2 months (last injection self-reported 07/22)   ::S/p colostomy in 2009 subsequently had reversal in 2010     ::Followed by Dr. Ping Allen at  (last appt 07/11) with next follow up in 6 months, and colonoscopy in 2-3 months   ::Crohn's disease was diagnosed in 2010, initially was on Humira then switched to Stelara, Skyrizi, precluded from Rinvoq due to cardiac risks, recently started on Entyvio 300 mg injection every 2 months (last injection self-reported 07/22)   ::Last colonoscopy 6/26/24 results above  ::Recent admission 7/30 - 8/3 at [facility] [place] abdominal pain, non bloody diarrhea. EGD completed 8/01 to rule out lesions of Crohns in upper GI and CMV colitis with biopsy still pending. Notable for gastritis and duodenitis.  ::CT enterography 8/3 - negative for any acute processes or  strictures, and non-pertinent findings of atherosclerotic disease of abdominal aorta and its major branches, and hepatic steatosis.   ::Scheduled for outpatient colonoscopy on 8/25  ::Treatment regimen: Vitamin E 180 mg capsule, Entyvio 300 mg q2 months   ::Labs from previous hospitalization - stool pathogen panel, O&P, celiac panel, calprotectin, lactoferrin, bcx negative, ESR/CRP wnl  Plan:  - NPO pending possible procedure  - Trend CBC, transfuse to goal Hgb >7 and platelets >50  - Maintain active T&S  - IV PPI BID  - Holding anticoagulation and antiplatelet agents; Avoid NSAIDs    - Monitor for bleeding, if patient develops significant bleeding with hemodynamic instability, obtain STAT CT angio A/P   - ESR ordered, CRP 0.86  - Continue Vitamin E    #HFpEF  #HTN  ::Echo 08/06/2024: EF 52%, normal RV size and RV systolic function   ::Home regimen: Amlodipine 7.5 mg daily, hydrochlorothiazide 25 mg daily, lisinopril 40 mg, carvedilol 25 mg BID  ::After hospitalization discharged on just lisinopril 10mg and coreg 12.5mg BID with plans to follow up with PCP, she reports also taking amlodipine 5mg   Plan:  - Given one dose of coreg 12.5mg on admission, held for soft BPs  - Hold other BP meds and slowly reintroduce as tolerated given previous issues with hypotension    #CAD s/p stent  #HLD  ::Home regimen: Rouvastatin 40mg Aspirin 81 mg, Plavix 75mg  ::Of note, Plavix was held during previous admission and resumed on discharge  ::Last dose of Plavix in AM on 8/5  Plan:  - Continue statin, aspirin  - Hold Plavix in setting of melenic stools    #Lumbar spinal stenosis  ::S/p bilateral L4 transforaminal epidural steroid injection under fluoroscopy in 06/23. -followed by [facility] Pain, Dr. Angie Bansal   ::Home regimen: Pregablin 150mg TID, Robaxin 500 mg TID PRN, Tylenol  Plan:  - Pain regimen - continue Pregablin 150mg TID, Robaxin 500 mg TID PRN  - Tylenol 650mg Q6H PRN       F: Replete PRN  E: Replete PRN  N: NPO  Diet; Effective now  A: PIV     DVT prophylaxis: Hold iso melena  GI Prophylaxis: IV PPI BID  Antimicrobials: not indicated    Code Status: Full Code (confirmed on admission)   Surrogate Decision Maker:  Primary Emergency Contact: Kena Miramontes [phone] (daughter)    Leticia Cisneros MD - 25  Internal Medicine PGY-2         [1]   Allergies  Allergen Reactions    Penicillin Unknown    Penicillins Hives   [2]   Past Medical History:  Diagnosis Date    Anemia     CHF (congestive heart failure)     Chronic diarrhea 2009    Coronary artery disease     2012: cardiac cath with PCI, 23: cardiac cath with PCI x2 (LAD & LCx) intermediate disease of a tortuous RCA - cardiac clearance requested    Crohn's disease (Multi) 2010    bowel perforation 2009 s/p colostomy, reversed     Depression     Hyperlipidemia     Hypertension 2010    IBS (irritable bowel syndrome)     Lung nodules     nonconcerning per pulm note    Myocardial infarction (Multi)     Ulcerative colitis 2010   [3]   Past Surgical History:  Procedure Laterality Date    ABDOMINAL HERNIA REPAIR  2018    with mesh    CARDIAC CATHETERIZATION  2012    PCI    CARDIAC CATHETERIZATION  2023    PCI x 2 (LAD and LCx)     SECTION, LOW TRANSVERSE  1987    CHOLECYSTECTOMY  2017    COLON SURGERY  2009    COLONOSCOPY      COLOSTOMY  2009    REVISION / TAKEDOWN COLOSTOMY  2010    SMALL INTESTINE SURGERY  2009    TUBAL LIGATION  1998    UMBILICAL HERNIA REPAIR      x 2   [4]   Family History  Problem Relation Name Age of Onset    Stroke Mother      Hypertension Mother      Heart attack Mother      Heart attack Father      Diabetes Father      Multiple sclerosis Sister      Breast cancer Sister      Diabetes Brother      Alcohol abuse Brother Romaine Joy 20 - 29   [5] [Held by provider] amLODIPine, 5 mg, oral, Daily  aspirin, 81 mg, oral, Daily  [Held by provider] carvedilol, 12.5 mg, oral, q12h KEVIN  [Held by provider] clopidogrel, 75  mg, oral, Daily  [Held by provider] hydroCHLOROthiazide, 25 mg, oral, Daily  [Held by provider] lisinopril, 10 mg, oral, Daily  pantoprazole, 40 mg, intravenous, BID  pregabalin, 150 mg, oral, TID  [START ON 8/7/2025] rosuvastatin, 40 mg, oral, Nightly  vitamin E, 180 mg, oral, Daily     [6]    [7] PRN medications: acetaminophen, dicyclomine, hydrOXYzine HCL, methocarbamol, nitroglycerin, simethicone

## 2025-08-06 NOTE — CARE PLAN
The patient's goals for the shift include      The clinical goals for the shift include Admit patient to [place] [id number]    Over the shift, the patient did  make progress toward the following goals.

## 2025-08-06 NOTE — PROGRESS NOTES
08/06/25 1101   Rapid Rounds   Attendance Provider;Care Transitions   Expected Discharge Disposition Home   Today we still await: Clinical stability   Review at Escalation Rounds No escalation needed     Social Work Note    LSW met with Pt Med Team. Per med team Pt not medically ready. Pt ending workup and clinical stability. Care transitions available to assist with any future discharge needs.       SARAH George

## 2025-08-06 NOTE — PROGRESS NOTES
MEDICINE INPATIENT PROGRESS NOTE  Vivien Miramontes is a 65 y.o. female on day 0 of admission presenting with Melena    Subjective   No acute events overnight.     Patient seen this morning resting comfortably in bed. Pt has had 2 bowel movements this morning that she says are more similar to her usual consistency (Watery and not sticky) but still some darkness. Endorses baseline 5/10 pain (baseline) with increased frequency of sudden, short-lived, sharp pains 2-3/10 minutes that last a few seconds.    Denies fever, chills, chest pain, dyspnea, nausea, vomiting, leg swelling, and dysuria. No additional questions or concerns at this time.         Objective   Current Vitals  /65 (Patient Position: Lying)   Pulse 57   Temp 36.7 °C (98.1 °F) (Oral)   Resp 18   Ht (!) 1.524 m (5')   Wt 62.6 kg (138 lb)   SpO2 100%   BMI 26.95 kg/m²      No intake/output data recorded.      Physical exam:  Constitutional: Appears stated age, normal hygiene, in no acute distress.  HEENT: NCAT, normal external inspection of ears and nose. Oropharynx normal and moist.  Cardio: RRR, S1/S2, no murmurs, rubs, or gallops, radial pulses +2, no edema of extremities, cannot determine cap refill 2/2 nail polish  Pulm: CTAB, no respiratory distress, no wheezes/rales/rhonchi, normal respiratory effort.  GI: +BS, soft, tender and guarding in the epigastric and RLQ, nondistended, no rebound, no masses.  MSK: Normal ROM.  Skin: No lesions, contusions, or erythema on exposed extremities. Warm and dry.  Extremities: No BLE swelling.  Neuro: A&Ox4. No focal deficits.  Psych: Cooperative, appropriate mood and affect.       Medications   Scheduled Medications  Scheduled Medications[1] Continuous Medications  Continuous Medications[2] PRN Medications  PRN Medications[3]     Relevant Results  Labs:  CBC  Results from last 72 hours   Lab Units 08/06/25  0426 08/05/25  1432   WBC AUTO x10*3/uL 7.6 9.4   HEMOGLOBIN g/dL 10.4* 13.1   HEMATOCRIT % 30.5*  38.4   PLATELETS AUTO x10*3/uL 190 246        BMP  Results from last 72 hours   Lab Units 08/06/25  0426 08/05/25  1432   SODIUM mmol/L 140 136   POTASSIUM mmol/L 3.6 3.8   CHLORIDE mmol/L 113* 105   BUN mg/dL 19 17   CREATININE mg/dL 0.67 0.93   MAGNESIUM mg/dL 1.72  --    PHOSPHORUS mg/dL 2.9  --        Micro/culture data:  No results found for the last 120 days.      Imaging:     === 07/30/25 ===    CT ENTEROGRAPHY WITH CONTRAST    - Impression -  1.  No acute process seen within the abdomen pelvis.  2. moderate burden of stool seen throughout the colon with more  liquid stool noted in proximal colon/cecum, now measuring 5.3 cm,  previously 4.2 cm on 07/30/2025.  3. No strictures for areas of bowel wall edema identified.  4. Hepatic steatosis.  5. Severe coronary artery calcifications versus stents are partially  visualized.  6. Severe atherosclerotic disease of the abdominal aorta and its  major branches including calcified and noncalcified disease.    I personally reviewed the images/study and I agree with the findings  as stated by Saulo Prasad MD (resident).    MACRO:  None    Signed by: Jimy Salazar 8/3/2025 10:09 AM  Dictation workstation:   [id number]          Assessment/Plan     Assessment/Plan:     Vivien Miramontes is a 65 y.o. female with history significant for Crohn's disease (on Entyvio every two months), HTN, HLD, CAD s/p stent, NSTEMI, HFpEF, smoker, spinal stenosis, and cholecystectomy who presented on 8/5/2025 for 3 day history of dark black stools. Recently admitted for severe diarrhea, dehydration, [place] and was just discharged 2 days ago. Infectious workup and CT enterography negative for acute processes or strictures. EGD completed 8/1 with evidence of gastritis and duodenitis and biopsies still pending. Now with concern for melenic stools, Hgb stable at 13.1. Ddx for bleed includes Crohn's disease flare v known gastritis/duodenitis on EGD v peptic ulcer. With recent hospital stay must  consider infection however low suspicion for infection at this time given absence of leukocytosis, fever, no increase in stool frequency and negative recent stool studies. Admitted for further management of Crohn's disease and melena.      #Melena c/f UGIB  #Crohn's disease  #Abdominal Pain   ::Crohn's disease as diagnosed in 2010, trialed Javris Maciel Skyrizi, recently started on Entyvio 300 mg injection every 2 months recently started on Entyvio 300 mg injection every 2 months (last injection self-reported 07/22)   ::S/p colostomy in 2009 subsequently had reversal in 2010     ::Followed by Dr. Ping Allen at  (last appt 07/11), last OP colonoscopy on 8/25, scheduled for OP in a few weeks  ::[place] [facility] [place], [place] bloody [place]. EGD 8/01 gastritis and duodenitis, biopsy still pending  ::CT enterography 8/3 - negative for any acute processes   ::Treatment regimen: Vitamin E 180 mg capsule, Entyvio 300 mg q2 months   ::Labs from previous hospitalization - stool pathogen panel, O&P, celiac panel, calprotectin, lactoferrin, bcx negative, ESR/CRP wnl  ::On admission Hb 13.1>10.4, coincides with BUN decrease. Likely dilutional  ::Stools (media in chart) dark brown with large particles  Plan:  - Trend CBC, transfuse to goal Hgb >7 and platelets >50  - Maintain active T&S  - IV PPI BID  - Resume aspirin 81 mg. Holding anticoagulation and antiplatelet agents (home plavix); Avoid NSAIDs    - Monitor for bleeding, if patient develops significant bleeding with hemodynamic instability, obtain STAT CT angio A/P   - ESR , CRP wnl  - Continue Vitamin E  -GI consulted, appreciate reccs     #HFpEF  #HTN  ::Echo 08/06/2024: EF 52%, normal RV size and RV systolic function   ::Home regimen: Amlodipine 7.5 mg daily, hydrochlorothiazide 25 mg daily, lisinopril 40 mg, carvedilol 25 mg BID  ::After hospitalization discharged on just lisinopril 10mg and coreg 12.5mg BID with plans to follow  up with PCP, she reports also taking amlodipine 5mg   Plan:  - Given one dose of coreg 12.5mg on admission, held for soft BPs  - Hold other BP meds and slowly reintroduce as tolerated given previous issues with hypotension     #CAD s/p stent  #HLD  ::Home regimen: Rouvastatin 40mg Aspirin 81 mg, Plavix 75mg  ::Of note, Plavix was held during previous admission and resumed on discharge  ::Last dose of Plavix in AM on 8/5  Plan:  - Continue statin, aspirin  - Hold Plavix in setting of melenic stools     #Lumbar spinal stenosis  ::S/p bilateral L4 transforaminal epidural steroid injection under fluoroscopy in 06/23. -followed by [facility] Pain, Dr. Angie Bansal   ::Home regimen: Pregablin 150mg TID, Robaxin 500 mg TID PRN, Tylenol  Plan:  - Pain regimen - continue Pregablin 150mg TID, Robaxin 500 mg TID PRN  - Tylenol 650mg Q6H PRN    Fluids: Replete PRN  Electrolytes: K >4, Mg >2   Nutrition: Adult diet Regular  Bowel Regimen: N/A PPI BID    Antimicrobials: NA  DVT PPX: DVT: SCD's          PIV  Pain Control: Not at this time  Oxygen: RA    Disposition: Floor    Code Status: Full Code (confirmed on admission)   NOK:    Primary Emergency Contact: Kena Miramontes III, MD  Internal Medicine PGY-1    Patient discussed with attending physician, Richi Le MD, who agrees with plan.           [1] [Held by provider] amLODIPine, 5 mg, oral, Daily  aspirin, 81 mg, oral, Daily  [Held by provider] carvedilol, 12.5 mg, oral, q12h KEVIN  [Held by provider] clopidogrel, 75 mg, oral, Daily  [Held by provider] hydroCHLOROthiazide, 25 mg, oral, Daily  lactated Ringer's, 500 mL, intravenous, Once  [Held by provider] lisinopril, 10 mg, oral, Daily  pantoprazole, 40 mg, intravenous, BID  potassium chloride, 20 mEq, intravenous, Once  pregabalin, 150 mg, oral, TID  [START ON 8/7/2025] rosuvastatin, 40 mg, oral, Nightly  vitamin E, 180 mg, oral, Daily     [2]    [3] PRN medications: acetaminophen, dicyclomine,  hydrOXYzine HCL, methocarbamol, nitroglycerin, simethicone

## 2025-08-06 NOTE — ED PROVIDER NOTES
"  Emergency Department Provider Note       History of Present Illness     History provided by: Patient  Limitations to History: None  External Records Reviewed with Brief Summary: None    HPI:  Vivien Miramontes is a 65 y.o. female with past medical history significant for Crohn's disease (on Entyvio every two months), HTN, HLD, CAD s/p stent, NSTEMI, HFpEF, smoker, spinal stenosis, and cholecystectomy recently discharged from hospital after treatment of dehydration secondary to 1 week of diarrhea who presents to ED with 2 days of black stools.     Patient reports ongoing diarrhea (patient reports all of her stools are loose for many years) but this is the first time stools have been black and \"sticky\". Patient reports chronic diffuse abdominal pain and she is presently diffusely tender but is not able to specify if character or location of pain has changed. Patient is unsure if pain has been affected by eating. Patient reports she was able to eat a little food and drink fluids yesterday.     Denies nausea since hospital discharge. Denies vomiting.     Patient denies any history of symptomatic acid reflux, does not take any ppis/h2 blockers.     Denies fever/chills. Denies SOB/chest pain.     Additional hx by chart review:     EGD (performed 8/1/25)  FINDINGS:  The esophagus appeared normal. No evidence of esophagitis, hiatal hernia, or Ayala's esophagus.  Regular Z-line 35 cm from the incisors  The fundus of the stomach, body of the stomach, greater curve of the stomach, lesser curve of the stomach, incisura and pylorus appeared normal.  Mild, patchy erythematous mucosa in the antrum, suggestive of gastritis; performed cold forceps biopsy to rule out H. pylori  Moderate, patchy erythematous mucosa in the duodenal bulb, 1st part of the duodenum and 2nd part of the duodenum; performed cold forceps biopsy    - PATHOLOGY RESULTS PENDING     CT enterography with IV contrast (performed 8/3/25)   IMPRESSION:  1.  No acute " process seen within the abdomen pelvis.  2. moderate burden of stool seen throughout the colon with more  liquid stool noted in proximal colon/cecum, now measuring 5.3 cm,  previously 4.2 cm on 2025.  3. No strictures for areas of bowel wall edema identified.  4. Hepatic steatosis.  5. Severe coronary artery calcifications versus stents are partially  visualized.  6. Severe atherosclerotic disease of the abdominal aorta and its  major branches including calcified and noncalcified disease.      Since discharge 25   `- celiac panel resulted -- negative    -  plavix+aspirin were restarted    - she is scheduled for outpatient colonoscopy on 2025     Physical Exam   Triage vitals:  T 36.4 °C (97.5 °F)  HR 74  /85  RR 18  O2 100 % None (Room air)    General: Awake, alert, in no acute distress  Eyes: Gaze conjugate.  No scleral icterus or injection  HENT: Normo-cephalic, atraumatic. No stridor  CV: Regular rate, regular rhythm. Radial pulses 2+ bilaterally  Resp: Breathing non-labored, speaking in full sentences.  Clear to auscultation bilaterally  GI: Diffusely tender to light palpation throughout. No guarding.  Rectal: A chaperone was offered and chaperone: accepted, exam chaperoned by Aron RN - nonbleeding external hemorrhoid, negative for red blood, positive for melena.   MSK/Extremities: No gross bony deformities. Moving all extremities  Skin: Warm. Appropriate color  Neuro: Alert. Oriented. Face symmetric. Speech is fluent.  Gross strength and sensation intact in b/l UE and LEs  Psych: Appropriate mood and affect      Medical Decision Making & ED Course   Medical Decision Makin y.o. female with past medical history significant for Crohn's disease (on Entyvio every two months), HTN, HLD, CAD s/p stent, NSTEMI, HFpEF, smoker, spinal stenosis, and cholecystectomy recently discharged from hospital after treatment of dehydration secondary to 1 week of diarrhea who presents to ED with 2 days  of black stools.     Initial ED Hgb (2:32 PM): 13.1   (Hgb on 8/2/25 prior to onset of melena was 12.4)    Repeat Hgb - pending     Admit to medicine     ----      Differential diagnoses considered include but are not limited to: erosive gastritis, angiodysplasia     Social Determinants of Health which Significantly Impact Care: Social Determinants of Health which Significantly Impact Care: None identified     EKG Independent Interpretation: EKG not obtained    Independent Result Review and Interpretation: Relevant laboratory and radiographic results were reviewed and independently interpreted by myself.  As necessary, they are commented on in the ED Course.    Chronic conditions affecting the patient's care: As documented above in [facility]    The patient was discussed with the following consultants/services: None    Care Considerations: As documented above in [facility]    ED Course:  Diagnoses as of 08/05/25 2325   Melena   65 y.o. female with past medical history significant for Crohn's disease (on Entyvio every two months), HTN, HLD, CAD s/p stent, NSTEMI, HFpEF, smoker, spinal stenosis, and cholecystectomy recently discharged from hospital after treatment of dehydration secondary to 1 week of diarrhea who presents to ED with 2 days of black stools.     Initial ED Hgb (2:32 PM): 13.1   (Hgb on 8/2/25 prior to onset of melena was 12.4)    Repeat Hgb - pending     Admit to medicine     Disposition   As a result of their workup, the patient will require admission to the hospital.  The patient was informed of her diagnosis.  The patient was given the opportunity to ask questions and I answered them. The patient agreed to be admitted to the hospital.    Procedures   Procedures    Patient seen and discussed with ED attending physician.    Tamara Curtis DO  Emergency Medicine                                                       Tamara Curtis DO  Resident  08/05/25 [id number]

## 2025-08-07 ENCOUNTER — RESULTS FOLLOW-UP (OUTPATIENT)
Dept: GASTROENTEROLOGY | Facility: HOSPITAL | Age: 65
End: 2025-08-07
Payer: COMMERCIAL

## 2025-08-07 ENCOUNTER — PHARMACY VISIT (OUTPATIENT)
Dept: PHARMACY | Facility: CLINIC | Age: 65
End: 2025-08-07
Payer: COMMERCIAL

## 2025-08-07 VITALS
DIASTOLIC BLOOD PRESSURE: 72 MMHG | RESPIRATION RATE: 16 BRPM | WEIGHT: 138 LBS | TEMPERATURE: 97.5 F | BODY MASS INDEX: 27.09 KG/M2 | HEART RATE: 60 BPM | HEIGHT: 60 IN | OXYGEN SATURATION: 96 % | SYSTOLIC BLOOD PRESSURE: 134 MMHG

## 2025-08-07 LAB
ALBUMIN SERPL BCP-MCNC: 3.4 G/DL (ref 3.4–5)
ANION GAP SERPL CALC-SCNC: 9 MMOL/L (ref 10–20)
BASOPHILS # BLD AUTO: 0.04 X10*3/UL (ref 0–0.1)
BASOPHILS NFR BLD AUTO: 0.6 %
BUN SERPL-MCNC: 12 MG/DL (ref 6–23)
CALCIUM SERPL-MCNC: 8.8 MG/DL (ref 8.6–10.6)
CHLORIDE SERPL-SCNC: 112 MMOL/L (ref 98–107)
CO2 SERPL-SCNC: 25 MMOL/L (ref 21–32)
CREAT SERPL-MCNC: 0.81 MG/DL (ref 0.5–1.05)
EGFRCR SERPLBLD CKD-EPI 2021: 80 ML/MIN/1.73M*2
EOSINOPHIL # BLD AUTO: 0.16 X10*3/UL (ref 0–0.7)
EOSINOPHIL NFR BLD AUTO: 2.2 %
ERYTHROCYTE [DISTWIDTH] IN BLOOD BY AUTOMATED COUNT: 13.6 % (ref 11.5–14.5)
GLUCOSE SERPL-MCNC: 85 MG/DL (ref 74–99)
HCT VFR BLD AUTO: 34.5 % (ref 36–46)
HGB BLD-MCNC: 11 G/DL (ref 12–16)
IMM GRANULOCYTES # BLD AUTO: 0.04 X10*3/UL (ref 0–0.7)
IMM GRANULOCYTES NFR BLD AUTO: 0.6 % (ref 0–0.9)
LABORATORY COMMENT REPORT: NORMAL
LYMPHOCYTES # BLD AUTO: 2.13 X10*3/UL (ref 1.2–4.8)
LYMPHOCYTES NFR BLD AUTO: 29.8 %
MAGNESIUM SERPL-MCNC: 1.7 MG/DL (ref 1.6–2.4)
MCH RBC QN AUTO: 28.4 PG (ref 26–34)
MCHC RBC AUTO-ENTMCNC: 31.9 G/DL (ref 32–36)
MCV RBC AUTO: 89 FL (ref 80–100)
MONOCYTES # BLD AUTO: 0.48 X10*3/UL (ref 0.1–1)
MONOCYTES NFR BLD AUTO: 6.7 %
NEUTROPHILS # BLD AUTO: 4.3 X10*3/UL (ref 1.2–7.7)
NEUTROPHILS NFR BLD AUTO: 60.1 %
NRBC BLD-RTO: 0 /100 WBCS (ref 0–0)
PATH REPORT.ADDENDUM SPEC: NORMAL
PATH REPORT.FINAL DX SPEC: NORMAL
PATH REPORT.GROSS SPEC: NORMAL
PATH REPORT.TOTAL CANCER: NORMAL
PHOSPHATE SERPL-MCNC: 3.4 MG/DL (ref 2.5–4.9)
PLATELET # BLD AUTO: 201 X10*3/UL (ref 150–450)
POTASSIUM SERPL-SCNC: 4.5 MMOL/L (ref 3.5–5.3)
RBC # BLD AUTO: 3.87 X10*6/UL (ref 4–5.2)
SODIUM SERPL-SCNC: 141 MMOL/L (ref 136–145)
WBC # BLD AUTO: 7.2 X10*3/UL (ref 4.4–11.3)

## 2025-08-07 PROCEDURE — 36415 COLL VENOUS BLD VENIPUNCTURE: CPT

## 2025-08-07 PROCEDURE — RXMED WILLOW AMBULATORY MEDICATION CHARGE

## 2025-08-07 PROCEDURE — 2500000001 HC RX 250 WO HCPCS SELF ADMINISTERED DRUGS (ALT 637 FOR MEDICARE OP)

## 2025-08-07 PROCEDURE — G0378 HOSPITAL OBSERVATION PER HR: HCPCS

## 2025-08-07 PROCEDURE — 85025 COMPLETE CBC W/AUTO DIFF WBC: CPT

## 2025-08-07 PROCEDURE — 80069 RENAL FUNCTION PANEL: CPT

## 2025-08-07 PROCEDURE — 99239 HOSP IP/OBS DSCHRG MGMT >30: CPT

## 2025-08-07 PROCEDURE — 96376 TX/PRO/DX INJ SAME DRUG ADON: CPT

## 2025-08-07 PROCEDURE — 2500000004 HC RX 250 GENERAL PHARMACY W/ HCPCS (ALT 636 FOR OP/ED)

## 2025-08-07 PROCEDURE — 83735 ASSAY OF MAGNESIUM: CPT

## 2025-08-07 RX ORDER — PANTOPRAZOLE SODIUM 40 MG/1
40 TABLET, DELAYED RELEASE ORAL DAILY
Qty: 30 TABLET | Refills: 2 | Status: SHIPPED | OUTPATIENT
Start: 2025-08-07 | End: 2025-11-05

## 2025-08-07 RX ORDER — CARVEDILOL 12.5 MG/1
6.25 TABLET ORAL EVERY 12 HOURS
Qty: 30 TABLET | Refills: 0 | Status: SHIPPED | OUTPATIENT
Start: 2025-08-07 | End: 2025-08-07 | Stop reason: HOSPADM

## 2025-08-07 RX ORDER — AMLODIPINE BESYLATE 5 MG/1
5 TABLET ORAL
Status: CANCELLED
Start: 2025-08-07

## 2025-08-07 RX ADMIN — PANTOPRAZOLE SODIUM 40 MG: 40 INJECTION, POWDER, FOR SOLUTION INTRAVENOUS at 11:33

## 2025-08-07 RX ADMIN — ASPIRIN 81 MG: 81 TABLET, COATED ORAL at 09:03

## 2025-08-07 RX ADMIN — PREGABALIN 150 MG: 75 CAPSULE ORAL at 09:03

## 2025-08-07 RX ADMIN — Medication 180 MG: at 09:03

## 2025-08-07 ASSESSMENT — PAIN - FUNCTIONAL ASSESSMENT: PAIN_FUNCTIONAL_ASSESSMENT: 0-10

## 2025-08-07 ASSESSMENT — PAIN SCALES - GENERAL: PAINLEVEL_OUTOF10: 0 - NO PAIN

## 2025-08-07 NOTE — NURSING NOTE
8/7/2025 1425 Discharge Note  Patient discharged home with no needs. Discharge instructions discussed with patient, verbalized understanding. Aware of follow up appointments needed. Aware of medication changes and paperwork updated per MD. Meds to beds delivered to bedside. Peripheral IV removed. Belongings gathered per patient. Patient has a ride home. Placed in transport but refused to wait for transport wheelchair. Left unit at 1417. ----- Nkechi Horvath RN

## 2025-08-07 NOTE — DISCHARGE INSTRUCTIONS
Dear Vivien Miramontes ,    You were admitted to the hospital with concern for blood in your stools. We observed to ensure your blood levels and blood pressures did not drop and remained stable. We are glad you are feeling well enough for discharge.     Please refer to our resources and highly consider that you stop smoking as this is contributing to and worsening your symptoms and health.    The following changes were made to your medications:    Medication changes    Start:  Pantoprazole 40 mg- this will help with pain attributable to stomach pain. We saw some irritation here on your imaging  Continue:  lisinopril 40 mg  STOP: Plavix (aka clopidogrel)  This may be contributing to bloody stools  HOLD (stop for now) these medications for low blood pressure and discuss with your primary care physician  Amlodipine 7.5 mg daily, hydrochlorothiazide 25 mg daily, , carvedilol 25 mg BID     Please Note: Please see the medication section of your discharge instructions above for more information, and reach out to a doctor with any questions.    Please follow up with your PCP following this hospitalization within 1 weeks.     Other follow up appointments and instructions you have include:  Future Appointments   Date Time Provider Department Center   8/25/2025  8:30 AM MD Enoch Cartwright [facility]   9/16/2025  2:00 PM INF 07 JOB Shira [facility]   10/8/2025   1:15 PM   Fannie Christian MD  Cardiology, [facility], [address]     If you have any new or worsening symptoms, please seek medical attention immediately.     If you have not received a call to schedule an appointment within 2 days, please call [phone]. Please bring with you to the appointment a photo ID and insurance card. Please also bring a list of all of the medications that you are currently taking to this appointment as well so these can be reviewed.     We strongly urge all of my patients to register [email or web address] by going to:  [email or web address].    It was a pleasure taking care of you,  Your  Care Team

## 2025-08-07 NOTE — CONSULTS
[facility]   [facility] [place]  INITIAL CONSULT NOTE       Reason For Consult  Concern for melena    SUBJECTIVE     History Of Present Illness  Vivien Miramontes is a 65 y.o. female with a past medical history of Crohn's disease (diagnosed in 2010, on Entyvio (previously on Humira and Stelara, follows with Dr. Pedro), HTN, HLD, CAD s/p stent, NSTEMI, HFpEF, smoker, spinal stenosis, and diverticulitis c/b bowel perforation requiring colostomy in 2009 (reversed in 2010), who presented on 8/5/2025 for 2 day history of dark stools. GI is consulted for concern for melena.      Patient was recently admitted 7/30-8/3/2025 for severe diarrhea and FLORENCE. Celiac panel negative. FLORENCE likely secondary to dehydration and large volume loss from diarrhea. C.diff and stool pathogen panel negative. Serum CMV PCR negative. CRP not elevated. FCP was <5. O&P negative. TSH WNL. ?zCT abdomen/pelvis showed fluid filled small and large bowel, but was otherwise unremarkable. She was seen by GI and underwent EGD 08/01/2025 to rule out lesions of Crohn's in upper GI tract. EGD showed mild gastritis and moderate duodenitis (biopsy results still pending). She was recommended to undergo colonoscopy as outpatient. Of note, a CT enterography done on 8/3/2025 showed moderate stool burden throughout the colon, but no strictures or inflammation.      Patient says she went home and started having dark stools. She says the day after her discharge, she had more than 10 dark, sticky bowel movements per day. She was concerned about this and she decided to present to the hospital. Her abdominal pain is at her baseline. Her BUN is not elevated (11). Her hemoglobin this morning has dropped from 13.1 --> 10.4, but all her 3 cell lines have dropped as well. Her CRP is normal. Denies any EIMs.     She had a colonoscopy by Dr. Jessica Hughes on 6/26/2024: Mild (in right colon) to moderate erythema (transverse colon,  descending colon, sigmoid colon) consistent with Crohn's colitis, TI appears to be normal, biopsy-chronic colitis in right colon.     Of note, the patient follows with Dr. Pedro for her Crohn's disease. She was previously on Humira and Stelara. Has been on Entyvio since Nov 2024. She continues to smoke cigarettes. Denies NSAIDs. Denies any FH of IBD or colon cancer.      Review of Systems  A 12 point ROS was performed and is negative except for HPI above.      Past Medical History:  Medical History[1]    Home Medications  Prescriptions Prior to Admission[2]    Surgical History:  Surgical History[3]    Allergies:  Allergies[4]    Social History:    Social History     Socioeconomic History    Marital status:      Spouse name: Not on file    Number of children: Not on file    Years of education: Not on file    Highest education level: Not on file   Occupational History    Not on file   Tobacco Use    Smoking status: Every Day     Current packs/day: 0.25     Average packs/day: 0.3 packs/day for 58.3 years (15.0 ttl pk-yrs)     Types: Cigarettes    Smokeless tobacco: Never   Vaping Use    Vaping status: Not on file   Substance and Sexual Activity    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    Sexual activity: Not Currently   Other Topics Concern    Not on file   Social History Narrative    Not on file     Social Drivers of Health     Financial Resource Strain: Low Risk  (8/6/2025)    Overall Financial Resource Strain (CARDIA)     Difficulty of Paying Living Expenses: Not very hard   Food Insecurity: No Food Insecurity (8/6/2025)    Hunger Vital Sign     Worried About Running Out of Food in the Last Year: Never true     Ran Out of Food in the Last Year: Never true   Transportation Needs: No Transportation Needs (8/6/2025)    PRAPARE - Transportation     Lack of Transportation (Medical): No     Lack of Transportation (Non-Medical): No    Physical Activity: Not on file   Stress: No Stress Concern Present (8/6/2025)    [employer] [place] [employer] - Occupational Stress Questionnaire     Feeling of Stress: Not at all   Social Connections: Unknown (8/6/2025)    Social Connection and Isolation Panel     Frequency of Communication with Friends and Family: More than three times a week     Frequency of Social Gatherings with Friends and Family: Three times a week     Attends Worship Services: More than 4 times per year     Active Member of Clubs or Organizations: Yes     Attends Club or Organization Meetings: More than 4 times per year     Marital Status: Patient declined   Intimate Partner Violence: Not At Risk (8/6/2025)    Humiliation, Afraid, Rape, and Kick questionnaire     Fear of Current or Ex-Partner: No     Emotionally Abused: No     Physically Abused: No     Sexually Abused: No   Housing Stability: Low Risk  (8/6/2025)    Housing Stability Vital Sign     Unable to Pay for Housing in the Last Year: No     Number of Times Moved in the Last Year: 0     Homeless in the Last Year: No       Family History:  Family History[5]    EXAM     Vitals:    Vitals:    08/05/25 2226 08/06/25 0334 08/06/25 0427 08/06/25 1535   BP: 117/80 (!) 87/47 100/65 117/72   BP Location:  Right arm  Left arm   Patient Position:  Lying Lying Sitting   Pulse: 65 57  80   Resp: 18 18  18   Temp: 36.7 °C (98.1 °F)   36.3 °C (97.3 °F)   TempSrc: Oral   Temporal   SpO2: 98% 100%  94%   Weight:       Height:         Failed to redirect to the Timeline version of the [employer] SmartLink.  No intake or output data in the 24 hours ending 08/06/25 2125      Physical Exam  General: well-nourished, no acute distress  HEENT: no pallor, no aphthous ulcers   Respiratory: CTAB  Cardiovascular: S1, S2 heard   Abdomen: Soft, mild tenderness to palpation  Extremities: no edema, no asterixis  Neuro: alert and oriented X3, CNII-XII grossly intact, moves all 4 extremities with no focal  deficits    OBJECTIVE                                                                              Medications     Current Medications[6]                                                                            Labs     Reviewed.                                                                            Imaging           CT enterography with contrast 8/3/2025:  IMPRESSION:  1.  No acute process seen within the abdomen pelvis.  2. moderate burden of stool seen throughout the colon with more liquid stool noted in proximal colon/cecum, now measuring 5.3 cm, previously 4.2 cm on 07/30/2025.  3. No strictures for areas of bowel wall edema identified.  4. Hepatic steatosis.  5. Severe coronary artery calcifications versus stents are partially visualized.  6. Severe atherosclerotic disease of the abdominal aorta and its  major branches including calcified and noncalcified disease.    CT a/p with IV contrast 7/30/2025:  IMPRESSION:  1. Fluid-filled small and large bowel without evidence of  obstruction, wall thickening, or adjacent inflammatory changes, to be correlated with history of diarrhea.  2. Circumferential submucosal fat deposition involving the ileocecal valve, which can be seen in setting of Crohn's disease.  3. Colonic diverticulosis without evidence of diverticulitis.  4. Moderate-to-severe calcific and noncalcified atherosclerotic  disease of the abdominal aorta and branching vasculature with no hemodynamically significant stenosis or occlusion.  5. Additional incidental/chronic findings as above.                                                                         GI Procedures     Colonoscopy 6/26/2024:  Findings  The terminal ileum appeared normal. Performed random biopsy using biopsy forceps.  Mild, patchy abnormal mucosa with erosions in the ascending colon/cecum, consistent with mild Crohn's disease; performed cold forceps biopsy  Moderate, patchy scarring and pseudopolyps in the transverse colon,  descending colon, sigmoid colon and rectum, consistent with known Crohn's disease; performed cold forceps biopsy to rule out active colitis. There were scattered small pseudopolyps. No evidence of active disease.;  Healthy signs of previous surgery in the rectosigmoid     Bx:  A. TERMINAL ILEUM, BIOPSY:               -Small intestinal mucosa with no significant histopathologic findings.        B.  RIGHT COLON, RANDOM BIOPSY:               -Colon mucosa with chronic colitis with focal activity.  See note.               -No dysplasia identified.     Note: The biopsy shows colon mucosa with chronic inflammatory changes with only focal activity consisting of cryptitis.  No granulomas are seen.        C. COLON - TRANSVERSE, BIOPSY:               -Colon mucosa with no significant histopathologic findings.        D. COLON - DESCENDING, BIOPSY:               -Colon mucosa with no significant histopathologic findings.        E. RECTUM, BIOPSY:               -Colon mucosa with no significant histopathologic findings.  Grossly      ASSESSMENT / PLAN                  ASSESSMENT/PLAN:  Vivien Miramontes is a 65 y.o. female with a past medical history of Crohn's disease (diagnosed in 2010, on Entyvio (previously on Humira and Stelara, follows with Dr. Pedro), HTN, HLD, CAD s/p stent, NSTEMI, HFpEF, smoker, spinal stenosis, and diverticulitis c/b bowel perforation requiring colostomy in 2009 (reversed in 2010), who presented on 8/5/2025 for 2 day history of dark stools. GI is consulted for concern for melena.     Patient with long standing Crohn's disease with recent colonoscopy in June 2024 showing mostly chronic colitis in the R colon with focal activity. She has chronic diarrhea and abdominal pain at baseline. She was recently admitted 7/30-8/3/2025 for severe diarrhea and FLORENCE. Extensive infectious and inflammatory workup was negative. EGD showed mild gastritis and moderate duodenitis (biopsy results still pending). She was  recommended to undergo colonoscopy as outpatient. CT enteroscopy on 8/3/2025 was negative for strictures or inflammation. Cause of acute diarrhea could be some kind of viral enterocolitis that may not have been picked up in our standard stool infectious workup.     She now presents for 2 days of dark, sticky stools concerning for melena. She had a ~3 g drop in her hemoglobin today when compared to yesterday, but likely a component of dilutional anemia (as her WBC and platelets dropped as well). Review of her bowel movement today shows that it is dark brown in color and it is not very convincing for melena. Her BUN: creatinine ratio is not elevated either, as such there is low likelihood that this is an UGIB.      RECS:  At this time, given relative hemodynamic stability, and negative EGD recently on 8/1/2025, we will hold off on repeating her EGD. Continue to trend her hemoglobin closely. If her hemoglobin continues to downtrend, then we can consider further endoscopic evaluation including considering a colonoscopy and/or SBCE  Please make the patient NPO after midnight in case we decide to pursue any additional endoscopic interventions tomorrow   Can use fiber and loperamide for diarrhea since infectious workup was negative recently   Continue Entyvio as outpatient. Consider checking Entyvio level before next infusion in Sept 2025   Patient should stop smoking completely after discharge   She will follow with Dr. Pedro after discharge     Patient was seen and discussed with GI attending, Dr. Joel Mcgregor.    Thank you for this interesting consult. Gastroenterology will continue to follow.     -During weekday hours of 7am-5pm please do not hesitate to contact me on [employer] Chat or page [phone] if there are any further questions between the weekday hours of 7 AM - 5 PM.   -After hours, on weekends, and on holidays, please page the [place] at [phone]. Thank you.    Mojgan Mendoza MD MPH   GI Fellow  [facility] [place]        [1]   Past Medical History:  Diagnosis Date    Anemia     CHF (congestive heart failure) 2012    Chronic diarrhea 2009    Coronary artery disease     2012: cardiac cath with PCI, 6/2/23: cardiac cath with PCI x2 (LAD & LCx) intermediate disease of a tortuous RCA - cardiac clearance requested    Crohn's disease (Multi) 2010    bowel perforation 2009 s/p colostomy, reversed 2010    Depression     Hyperlipidemia     Hypertension 2010    IBS (irritable bowel syndrome)     Lung nodules     nonconcerning per pulm note    Myocardial infarction (Multi) 2012    Ulcerative colitis 2010   [2]   Medications Prior to Admission   Medication Sig Dispense Refill Last Dose/Taking    carvedilol (Coreg) 12.5 mg tablet Take 1 tablet (12.5 mg) by mouth every 12 hours.   Taking    acetaminophen (TylenoL) 325 mg tablet Take 2 tablets (650 mg) by mouth every 6 hours if needed for mild pain (1 - 3) or fever (temp greater than 38.0 C). (Patient not taking: Reported on 8/6/2025) 30 tablet 0 Not Taking    [Paused] amLODIPine (Norvasc) 5 mg tablet Take 1.5 tablets (7.5 mg) by mouth early in the morning..       aspirin 81 mg EC tablet Take 1 tablet (81 mg) by mouth once daily.       carvedilol (Coreg) 25 mg tablet Take 0.5 tablets (12.5 mg) by mouth every 12 hours. (Patient not taking: Reported on 8/6/2025) 30 tablet 2 Not Taking    clopidogrel (Plavix) 75 mg tablet Take 1 tablet (75 mg) by mouth once daily.       dicyclomine (Bentyl) 10 mg capsule Take 1 capsule (10 mg) by mouth every 6 hours if needed (abd discomort, diarrhea). 40 capsule 0     [Paused] hydroCHLOROthiazide (HYDRODiuril) 25 mg tablet Take 1 tablet (25 mg) by mouth once daily.       lisinopril 10 mg tablet Take 1 tablet (10 mg) by mouth once daily. as directed 30 tablet 2     methocarbamol (Robaxin) 500 mg tablet Take 1 tablet (500 mg) by mouth 3 times a day as needed for muscle spasms. 90 tablet 0     nitroglycerin (Nitrostat) 0.4 mg SL tablet  Place 1 tablet (0.4 mg) under the tongue. Dissolve 1 tablet under the tongue as needed for chest pain.       pregabalin (Lyrica) 150 mg capsule Take 1 capsule (150 mg) by mouth 3 times a day.       rosuvastatin (Crestor) 40 mg tablet Take 1 tablet (40 mg) by mouth once daily at bedtime.       simethicone (Mylicon) 80 mg chewable tablet Chew and swallow 1 tablet (80 mg) every 8 hours if needed for flatulence. (Patient not taking: Reported on 2025) 30 tablet 0 Not Taking    vedolizumab (Entyvio) 300 mg injection Infuse 300 mg into a venous catheter every 8 (eight) weeks.  For Maintenance: every 8 weeks 5 mL 2     vedolizumab (Entyvio) 300 mg injection Infuse 300 mg into a venous catheter see administration instructions.  For Induction:  0, 2 and 6 weeks 15 mL 0     vitamin E 180 mg (400 unit) capsule Take 1 capsule (400 Units) by mouth once daily.      [3]   Past Surgical History:  Procedure Laterality Date    ABDOMINAL HERNIA REPAIR      with mesh    CARDIAC CATHETERIZATION      PCI    CARDIAC CATHETERIZATION  2023    PCI x 2 (LAD and LCx)     SECTION, LOW TRANSVERSE  1987    CHOLECYSTECTOMY  2017    COLON SURGERY  2009    COLONOSCOPY      COLOSTOMY  2009    REVISION / TAKEDOWN COLOSTOMY  2010    SMALL INTESTINE SURGERY      TUBAL LIGATION      UMBILICAL HERNIA REPAIR      x 2   [4]   Allergies  Allergen Reactions    Penicillin Unknown    Penicillins Hives   [5]   Family History  Problem Relation Name Age of Onset    Stroke Mother      Hypertension Mother      Heart attack Mother      Heart attack Father      Diabetes Father      Multiple sclerosis Sister      Breast cancer Sister      Diabetes Brother      Alcohol abuse Brother Romaine Joy 20 - 29   [6]   Current Facility-Administered Medications:     acetaminophen (Tylenol) tablet 650 mg, 650 mg, oral, q6h PRN, Leticia Cisneros MD    [Held by provider] amLODIPine (Norvasc) tablet 5 mg, 5 mg, oral, Daily, Leticia Cisneros  MD    aspirin EC tablet 81 mg, 81 mg, oral, Daily, Leticia Cisneros MD, 81 mg at 08/06/25 1019    [Held by provider] carvedilol (Coreg) tablet 12.5 mg, 12.5 mg, oral, q12h KEVIN, Leticia Cisneros MD    [Held by provider] clopidogrel (Plavix) tablet 75 mg, 75 mg, oral, Daily, Leticia Cisneros MD    dicyclomine (Bentyl) capsule 10 mg, 10 mg, oral, q6h PRN, Leticia Cisneros MD, 10 mg at 08/06/25 2019    [Held by provider] hydroCHLOROthiazide (HYDRODiuril) tablet 25 mg, 25 mg, oral, Daily, Leticia Cisneros MD    hydrOXYzine HCL (Atarax) tablet 25 mg, 25 mg, oral, TID PRN, Leticia Cisneros MD    [Held by provider] lisinopril tablet 10 mg, 10 mg, oral, Daily, Leticia Cisneros MD    methocarbamol (Robaxin) tablet 500 mg, 500 mg, oral, TID PRN, Leticia Cisneros MD    nitroglycerin (Nitrostat) SL tablet 0.4 mg, 0.4 mg, sublingual, q5 min PRN, Leticia Cisneros MD    pantoprazole (Protonix) injection 40 mg, 40 mg, intravenous, BID, Leticia Cisneros MD, 40 mg at 08/06/25 2019    pregabalin (Lyrica) capsule 150 mg, 150 mg, oral, TID, Leticia Cisneros MD, 150 mg at 08/06/25 2019    [START ON 8/7/2025] rosuvastatin (Crestor) tablet 40 mg, 40 mg, oral, Nightly, Leticia Cisneros MD    simethicone (Mylicon) chewable tablet 80 mg, 80 mg, oral, q8h PRN, Leticia Cisneros MD    vitamin E capsule 180 mg, 180 mg, oral, Daily, Leticia Cisneros MD, 180 mg at 08/06/25 1525

## 2025-08-07 NOTE — HOSPITAL COURSE
Vivien Miramontes is a 65 y.o. female with history significant for Crohn's disease (on Entyvio every two months), HTN, HLD, CAD s/p stent, NSTEMI, HFpEF, smoker, spinal stenosis, and cholecystectomy who presented on 8/5/2025 for 3 day history of dark black stools. Recently admitted for severe diarrhea, dehydration, FLORENCE and was just discharged 2 days ago. Infectious workup and CT enterography negative for acute processes or strictures. EGD completed 8/1 with evidence of gastritis and duodenitis and biopsies still pending. OP colonoscopy scheduled in 2-3 weeks. Now with concern for melenic stools, low suspicion for bloody stools during admission (dark brown but not tarry black). Hgb stable:13.1 on admission and drop to about 11 thought to be dilutional (improved BUN and decrease in multiple cells lines). Continued to trend, remained stable. Low suspicion for infection at this time given absence of leukocytosis, fever, no increase in stool frequency and negative recent stool studies. GI consulted, no procedures at his time. Abdominal pain improving. Hemodynamically stable. Restarting home amlodipine, holding home coreg, amlodipine, hydrochlorothiazide, and blood pressure goals. Patient can ambulate. No IV fluids required this admission. Hb stable. Starting PPI. Stopping home clopidogrel. No questions and concerns at this time. Patient is appropriate for discharge.     Discharge:  Keep follow up colonoscopy; endoscopy biopsies pending  Continue Entyvio as outpatient. Consider checking Entyvio level before next infusion in Sept 2025   Patient should stop smoking completely after discharge   She will follow with Dr. Pedro after discharge   Continue lisinopril but discuss holding coreg, amlodipine, hydrochlorothiazide, and blood pressure goals.

## 2025-08-18 RX ORDER — CARVEDILOL 12.5 MG/1
12.5 TABLET ORAL 2 TIMES DAILY
COMMUNITY

## 2025-08-23 ENCOUNTER — ANESTHESIA EVENT (OUTPATIENT)
Dept: GASTROENTEROLOGY | Facility: HOSPITAL | Age: 65
End: 2025-08-23
Payer: COMMERCIAL

## 2025-08-25 ENCOUNTER — ANESTHESIA (OUTPATIENT)
Dept: GASTROENTEROLOGY | Facility: HOSPITAL | Age: 65
End: 2025-08-25
Payer: COMMERCIAL

## 2025-08-25 ENCOUNTER — HOSPITAL ENCOUNTER (OUTPATIENT)
Dept: GASTROENTEROLOGY | Facility: HOSPITAL | Age: 65
Discharge: HOME | End: 2025-08-25
Payer: COMMERCIAL

## 2025-08-25 VITALS
TEMPERATURE: 97.2 F | HEIGHT: 60 IN | RESPIRATION RATE: 18 BRPM | DIASTOLIC BLOOD PRESSURE: 77 MMHG | OXYGEN SATURATION: 99 % | BODY MASS INDEX: 27.74 KG/M2 | WEIGHT: 141.31 LBS | SYSTOLIC BLOOD PRESSURE: 169 MMHG | HEART RATE: 62 BPM

## 2025-08-25 DIAGNOSIS — K50.119 CROHN'S DISEASE OF LARGE INTESTINE WITH COMPLICATION (MULTI): ICD-10-CM

## 2025-08-25 PROCEDURE — 7100000010 HC PHASE TWO TIME - EACH INCREMENTAL 1 MINUTE

## 2025-08-25 PROCEDURE — 7100000009 HC PHASE TWO TIME - INITIAL BASE CHARGE

## 2025-08-25 PROCEDURE — 2500000004 HC RX 250 GENERAL PHARMACY W/ HCPCS (ALT 636 FOR OP/ED): Performed by: NURSE ANESTHETIST, CERTIFIED REGISTERED

## 2025-08-25 PROCEDURE — A45380 PR COLONOSCOPY,BIOPSY: Performed by: ANESTHESIOLOGY

## 2025-08-25 PROCEDURE — A45380 PR COLONOSCOPY,BIOPSY: Performed by: NURSE ANESTHETIST, CERTIFIED REGISTERED

## 2025-08-25 PROCEDURE — 3700000001 HC GENERAL ANESTHESIA TIME - INITIAL BASE CHARGE

## 2025-08-25 PROCEDURE — 45380 COLONOSCOPY AND BIOPSY: CPT | Performed by: INTERNAL MEDICINE

## 2025-08-25 PROCEDURE — 3700000002 HC GENERAL ANESTHESIA TIME - EACH INCREMENTAL 1 MINUTE

## 2025-08-25 RX ORDER — LIDOCAINE HYDROCHLORIDE 20 MG/ML
INJECTION, SOLUTION EPIDURAL; INFILTRATION; INTRACAUDAL; PERINEURAL AS NEEDED
Status: DISCONTINUED | OUTPATIENT
Start: 2025-08-25 | End: 2025-08-25

## 2025-08-25 RX ORDER — KETOROLAC TROMETHAMINE 30 MG/ML
INJECTION, SOLUTION INTRAMUSCULAR; INTRAVENOUS AS NEEDED
Status: DISCONTINUED | OUTPATIENT
Start: 2025-08-25 | End: 2025-08-25

## 2025-08-25 RX ORDER — PHENYLEPHRINE HCL IN 0.9% NACL 1 MG/10 ML
SYRINGE (ML) INTRAVENOUS AS NEEDED
Status: DISCONTINUED | OUTPATIENT
Start: 2025-08-25 | End: 2025-08-25

## 2025-08-25 RX ORDER — MIDAZOLAM HYDROCHLORIDE 2 MG/2ML
INJECTION, SOLUTION INTRAMUSCULAR; INTRAVENOUS AS NEEDED
Status: DISCONTINUED | OUTPATIENT
Start: 2025-08-25 | End: 2025-08-25

## 2025-08-25 RX ORDER — PROPOFOL 10 MG/ML
INJECTION, EMULSION INTRAVENOUS AS NEEDED
Status: DISCONTINUED | OUTPATIENT
Start: 2025-08-25 | End: 2025-08-25

## 2025-08-25 RX ADMIN — KETOROLAC TROMETHAMINE 15 MG: 30 INJECTION, SOLUTION INTRAMUSCULAR at 08:37

## 2025-08-25 RX ADMIN — PROPOFOL 100 MCG/KG/MIN: 10 INJECTION, EMULSION INTRAVENOUS at 08:35

## 2025-08-25 RX ADMIN — SODIUM CHLORIDE, SODIUM LACTATE, POTASSIUM CHLORIDE, AND CALCIUM CHLORIDE: .6; .31; .03; .02 INJECTION, SOLUTION INTRAVENOUS at 08:31

## 2025-08-25 RX ADMIN — Medication 100 MCG: at 08:51

## 2025-08-25 RX ADMIN — LIDOCAINE HYDROCHLORIDE 60 MG: 20 INJECTION, SOLUTION EPIDURAL; INFILTRATION; INTRACAUDAL; PERINEURAL at 08:36

## 2025-08-25 RX ADMIN — PROPOFOL 20 MG: 10 INJECTION, EMULSION INTRAVENOUS at 08:36

## 2025-08-25 RX ADMIN — MIDAZOLAM HYDROCHLORIDE 2 MG: 1 INJECTION, SOLUTION INTRAMUSCULAR; INTRAVENOUS at 08:35

## 2025-08-25 RX ADMIN — Medication 100 MCG: at 08:57

## 2025-08-25 ASSESSMENT — PAIN - FUNCTIONAL ASSESSMENT
PAIN_FUNCTIONAL_ASSESSMENT: UNABLE TO SELF-REPORT
PAIN_FUNCTIONAL_ASSESSMENT: 0-10
PAIN_FUNCTIONAL_ASSESSMENT: UNABLE TO SELF-REPORT

## 2025-08-25 ASSESSMENT — PAIN SCALES - GENERAL
PAINLEVEL_OUTOF10: 4
PAINLEVEL_OUTOF10: 4
PAINLEVEL_OUTOF10: 7
PAINLEVEL_OUTOF10: 10 - WORST POSSIBLE PAIN
PAINLEVEL_OUTOF10: 0 - NO PAIN

## 2025-08-26 ASSESSMENT — PAIN SCALES - GENERAL: PAINLEVEL_OUTOF10: 0 - NO PAIN

## 2025-09-16 ENCOUNTER — APPOINTMENT (OUTPATIENT)
Dept: INFUSION THERAPY | Facility: CLINIC | Age: 65
End: 2025-09-16
Payer: COMMERCIAL

## (undated) DEVICE — DRAPE, ARM XI

## (undated) DEVICE — TUBESET, HIGH FLOW II, HEATED, W/RTP, PNEUMO SURE

## (undated) DEVICE — DRIVER, NEEDLE, MEGA SUTURE CUT, DAVINCI XI

## (undated) DEVICE — SUTURE, V-LOC PBT, GS-21, 12 IN, BLUE, NONABS

## (undated) DEVICE — Device

## (undated) DEVICE — TROCAR, OPTICAL BLADELESS 5MM X 100 W/ADVANCED FIXATION

## (undated) DEVICE — GLOVE, SURGICAL, PROTEXIS PI ORTHO, 7.0, PF, LF

## (undated) DEVICE — SOLUTION, IRRIGATION, USP, STERILE WATER, 500ML, BOTTLE

## (undated) DEVICE — TROCAR SYSTEM, BALLOON, KII GELPORT, 12 X 130MM

## (undated) DEVICE — SUTURE, VICRYL, 0, 27 IN, UR-6, VIOLET

## (undated) DEVICE — OBTURATOR, BLADELESS , SU

## (undated) DEVICE — SHEAR, W/UNIPOLAR CAUTERY, ENDOSHEAR, 5 MM

## (undated) DEVICE — ADHESIVE, SKIN, LIQUIBAND EXCEED

## (undated) DEVICE — CARE KIT, LAPAROSCOPIC, ADVANCED

## (undated) DEVICE — SUTURE, MONOCRYL, 4-0, 18 IN, PS2, UNDYED

## (undated) DEVICE — BINDER, ABDOMINAL, MEDIUM/LARGE, 12 X 45-62 IN

## (undated) DEVICE — DRAPE, COLUMN, DAVINCI XI

## (undated) DEVICE — SUTURE, VICRYL, 3-0, 27 IN, SH

## (undated) DEVICE — LUBRICANT, ELECTROLUBE, F/ELECTRODE TIPS

## (undated) DEVICE — SCISSORS, MONOPOLAR, CURVED, 8MM

## (undated) DEVICE — SUTURE, V-LOC, 2-0, 12IN, GS-21, GR 180 ABS

## (undated) DEVICE — COVER, TIP HOT SHEARS ENDOWRIST

## (undated) DEVICE — SEAL, UNIVERSAL 5-8MM  XI